# Patient Record
Sex: MALE | Race: WHITE | Employment: FULL TIME | URBAN - METROPOLITAN AREA
[De-identification: names, ages, dates, MRNs, and addresses within clinical notes are randomized per-mention and may not be internally consistent; named-entity substitution may affect disease eponyms.]

---

## 2017-05-03 ENCOUNTER — OFFICE VISIT (OUTPATIENT)
Dept: ORTHOPEDIC SURGERY | Age: 47
End: 2017-05-03

## 2017-05-03 VITALS
WEIGHT: 287 LBS | DIASTOLIC BLOOD PRESSURE: 72 MMHG | BODY MASS INDEX: 45.04 KG/M2 | SYSTOLIC BLOOD PRESSURE: 118 MMHG | TEMPERATURE: 97.6 F | HEART RATE: 82 BPM | HEIGHT: 67 IN | RESPIRATION RATE: 18 BRPM

## 2017-05-03 DIAGNOSIS — M50.20 HNP (HERNIATED NUCLEUS PULPOSUS), CERVICAL: ICD-10-CM

## 2017-05-03 DIAGNOSIS — M79.18 MYOFASCIAL PAIN: Primary | ICD-10-CM

## 2017-05-03 DIAGNOSIS — M62.838 MUSCLE SPASM: ICD-10-CM

## 2017-05-03 RX ORDER — METHYLPREDNISOLONE 4 MG/1
TABLET ORAL
Qty: 1 DOSE PACK | Refills: 0 | Status: SHIPPED | OUTPATIENT
Start: 2017-05-03 | End: 2017-05-25

## 2017-05-03 NOTE — MR AVS SNAPSHOT
Visit Information Date & Time Provider Department Dept. Phone Encounter #  
 5/3/2017  8:00 AM Sendy Bee MD [address] [facility] [phone] [id number] Follow-up Instructions Return in about 6 months (around 11/3/2017) for Medication follow up. Your Appointments 7/7/2017  3:20 PM  
Nurse Visit with [facility] WB NURSE [facility] ([facility]) Appt Note: psa  
 [address] 
[address] [place] [address]  
[address] [place] [address] 7/19/2017  3:45 PM  
Any with Kadeem Leiva MD  
[facility] ([facility]) Appt Note: annual  
 [address] 
[address] [place] [address]  
[address] [facility] [address] [place] [address] Upcoming Health Maintenance Date Due HEMOGLOBIN A1C Q6M 1972 LIPID PANEL Q1 1972 FOOT EXAM Q1 2/12/1982 MICROALBUMIN Q1 2/12/1982 EYE EXAM RETINAL OR DILATED Q1 2/12/1982 Pneumococcal 19-64 Medium Risk (1 of 1 - PPSV23) 2/12/1991 DTaP/Tdap/Td series (1 - Tdap) 2/12/1993 Allergies as of 5/3/2017  Review Complete On: 5/3/2017 By: Sendy Bee MD  
 No Known Allergies Current Immunizations  Never Reviewed No immunizations on file. Not reviewed this visit You Were Diagnosed With   
  
 Codes Comments Myofascial pain    -  Primary ICD-10-CM: M79.1 ICD-9-CM: 729.1 HNP (herniated nucleus pulposus), cervical     ICD-10-CM: M50.20 ICD-9-CM: 722.0 Muscle spasm     ICD-10-CM: R13.339 ICD-9-CM: 728.85 Vitals BP Pulse Temp Resp Height(growth percentile) Weight(growth percentile) 118/72 82 97.6 °F (36.4 °C) (Oral) 18 5' 7\" (1.702 m) 287 lb (130.2 kg) BMI Smoking Status 44.95 kg/m2 Never Smoker BMI and BSA Data Body Mass Index Body Surface Area  44.95 kg/m 2 2.48 m 2  
  
  
 Preferred Pharmacy Pharmacy Name Phone [facility] Your Updated Medication List  
  
   
This list is accurate as of: 5/3/17  8:41 AM.  Always use your most recent med list.  
  
  
  
  
 Georgiann Cables 250-50 mcg/dose diskus inhaler Generic drug:  fluticasone-salmeterol Take 1 Puff by inhalation every twelve (12) hours. amLODIPine-benazepril 5-20 mg per capsule Commonly known as:  Jillian Abide Take 1 Cap by mouth daily. aspirin 81 mg chewable tablet Take 81 mg by mouth daily. carvedilol 10 mg CR capsule Commonly known as:  COREG CR Take  by mouth daily (with breakfast). cpap machine kit  
by Does Not Apply route. DEXILANT 60 mg Cpdb Generic drug:  Dexlansoprazole Take 60 mg by mouth daily. hydroCHLOROthiazide 12.5 mg tablet Commonly known as:  HYDRODIURIL Take 12.5 mg by mouth daily. insulin aspart 100 unit/mL Inpn Commonly known as:  NOVOLOG  
by SubCUTAneous route. insulin glargine 100 unit/mL (3 mL) pen Commonly known as:  LANTUS SOLOSTAR  
by SubCUTAneous route.  
  
 magnesium citrate 100 mg Tab Take 300 mg by mouth daily. metaxalone 800 mg tablet Commonly known as:  SKELAXIN  
1 po bid prn   Indications: MUSCLE SPASM  
  
 methylPREDNISolone 4 mg tablet Commonly known as:  Rendall [place] Per dose pack instructions Misc. Devices Kit Please provide the patient following size mask. Mask: Mirage FX large size mask. DME: [place], [place] Phone: [phone], Fax: [phone]  
  
 montelukast 10 mg tablet Commonly known as:  SINGULAIR Take 10 mg by mouth daily. naproxen 500 mg tablet Commonly known as:  NAPROSYN Take 1 Tab by mouth two (2) times daily (with meals). ondansetron 4 mg disintegrating tablet Commonly known as:  ZOFRAN ODT Take 1-2 tablets every 6-8 hours as needed for nausea and vomiting. Potassium Citrate 15 mEq Wanda Cabrera Take  by mouth two (2) times a day. pravastatin 10 mg tablet Commonly known as:  PRAVACHOL Take  by mouth nightly. pregabalin 50 mg capsule Commonly known as:  Luis Gave TAKE 1 CAP PO Q AM AND 2 CAPS Q PM  
  
 tamsulosin 0.4 mg capsule Commonly known as:  FLOMAX Take 1 Cap by mouth daily (after dinner). traMADol 50 mg tablet Commonly known as:  ULTRAM  
1 po bid prn severe pain VENTOLIN HFA 90 mcg/actuation inhaler Generic drug:  albuterol Take  by inhalation every six (6) hours as needed. Prescriptions Printed Refills  
 methylPREDNISolone (MEDROL DOSEPACK) 4 mg tablet 0 Sig: Per dose pack instructions Class: Print Follow-up Instructions Return in about 6 months (around 11/3/2017) for Medication follow up. Patient Instructions Neck Arthritis: Exercises Your Care Instructions Here are some examples of typical rehabilitation exercises for your condition. Start each exercise slowly. Ease off the exercise if you start to have pain. Your doctor or physical therapist will tell you when you can start these exercises and which ones will work best for you. How to do the exercises Neck stretches to the side 1. This stretch works best if you keep your shoulder down as you lean away from it. To help you remember to do this, start by relaxing your shoulders and lightly holding on to your thighs or your chair. 2. Tilt your head toward your shoulder and hold for 15 to 30 seconds. Let the weight of your head stretch your muscles. 3. Repeat 2 to 4 times toward each shoulder. Chin tuck 1. Lie on the floor with a rolled-up towel under your neck. Your head should be touching the floor. 2. Slowly bring your chin toward your chest. 
3. Hold for a count of 6, and then relax for up to 10 seconds. 4. Repeat 8 to 12 times. Active cervical rotation 1. Sit in a firm chair, or stand up straight. 2. Keeping your chin level, turn your head to the right, and hold for 15 to 30 seconds. 3. Turn your head to the left and hold for 15 to 30 seconds. 4. Repeat 2 to 4 times to each side. Shoulder blade squeeze 1. While standing, squeeze your shoulder blades together. 2. Do not raise your shoulders up as you are squeezing. 3. Hold for 6 seconds. 4. Repeat 8 to 12 times. Shoulder rolls 1. Sit comfortably with your feet shoulder-width apart. You can also do this exercise standing up. 2. Roll your shoulders up, then back, and then down in a smooth, circular motion. 3. Repeat 2 to 4 times. Follow-up care is a key part of your treatment and safety. Be sure to make and go to all appointments, and call your doctor if you are having problems. It's also a good idea to know your test results and keep a list of the medicines you take. Where can you learn more? Go to http://marija-marianne.info/. Enter Y890 in the search box to learn more about \"Neck Arthritis: Exercises. \" Current as of: May 23, 2016 Content Version: 11.2 © 5009-4164 [employer]. Care instructions adapted under license by [employer] (which disclaims liability or warranty for this information). If you have questions about a medical condition or this instruction, always ask your healthcare professional. [place] [address] any warranty or liability for your use of this information. Healthy Upper Back: Exercises Your Care Instructions Here are some examples of exercises for your upper back. Start each exercise slowly. Ease off the exercise if you start to have pain. Your doctor or physical therapist will tell you when you can start these exercises and which ones will work best for you. How to do the exercises Lower neck and upper back stretch 4. Stretch your arms out in front of your body. Clasp one hand on top of your other hand. 5. Gently reach out so that you feel your shoulder blades stretching away from each other. 6. Gently bend your head forward. 7. Hold for 15 to 30 seconds. 8. Repeat 2 to 4 times. Midback stretch Note: If you have knee pain, do not do this exercise. 5. Kneel on the floor, and sit back on your ankles. 6. Lean forward, place your hands on the floor, and stretch your arms out in front of you. Rest your head between your arms. 7. Gently push your chest toward the floor, reaching as far in front of you as possible. 8. Hold for 15 to 30 seconds. 9. Repeat 2 to 4 times. Shoulder rolls 5. Sit comfortably with your feet shoulder-width apart. You can also do this exercise while standing. 6. Roll your shoulders up, then back, and then down in a smooth, circular motion. 7. Repeat 2 to 4 times. Wall push-up 5. Stand against a wall with your feet about 12 to 24 inches back from the wall. If you feel any pain when you do this exercise, stand closer to the wall. 6. Place your hands on the wall slightly wider apart than your shoulders, and lean forward. 7. Gently lean your body toward the wall. Then push back to your starting position. Keep the motion smooth and controlled. 8. Repeat 8 to 12 times. Resisted shoulder blade squeeze Note: For this exercise, you will need elastic exercise material, such as surgical tubing or Thera-Band. 4. Sit or stand, holding the band in both hands in front of you. Keep your elbows close to your sides, bent at a 90-degree angle. Your palms should face up. 5. Squeeze your shoulder blades together, and move your arms to the outside, stretching the band. Be sure to keep your elbows at your sides while you do this. 6. Relax. 7. Repeat 8 to 12 times. Resisted rows Note: For this exercise, you will need elastic exercise material, such as surgical tubing or Thera-Band.  
1. Put the band around a solid object, such as a bedpost, at about waist level. Hold one end of the band in each hand. 2. With your elbows at your sides and bent to 90 degrees, pull the band back to move your shoulder blades toward each other. Return to the starting position. 3. Repeat 8 to 12 times. Follow-up care is a key part of your treatment and safety. Be sure to make and go to all appointments, and call your doctor if you are having problems. It's also a good idea to know your test results and keep a list of the medicines you take. Where can you learn more? Go to http://marija-marianne.info/. Enter F498 in the search box to learn more about \"Healthy Upper Back: Exercises. \" Current as of: May 23, 2016 Content Version: 11.2 © 7960-2355 [employer]. Care instructions adapted under license by [employer]! (which disclaims liability or warranty for this information). If you have questions about a medical condition or this instruction, always ask your healthcare professional. [place] [address] any warranty or liability for your use of this information. Introducing [facility]! Dear Mane Pryor: Thank you for requesting a Pairin account. Our records indicate that you already have an active Pairin account. You can access your account anytime at [email or web address] Did you know that you can access your hospital and ER discharge instructions at any time in Pairin? You can also review all of your test results from your hospital stay or ER visit. Additional Information If you have questions, please visit the Frequently Asked Questions section of the Pairin website at [email or web address] Remember, Pairin is NOT to be used for urgent needs. For medical emergencies, dial 911. Now available from your iPhone and Android! Please provide this summary of care documentation to your next provider. Your primary care clinician is listed as Sumeet Santana. If you have any questions after today's visit, please call [phone].

## 2017-05-03 NOTE — PATIENT INSTRUCTIONS
Neck Arthritis: Exercises  Your Care Instructions  Here are some examples of typical rehabilitation exercises for your condition. Start each exercise slowly. Ease off the exercise if you start to have pain. Your doctor or physical therapist will tell you when you can start these exercises and which ones will work best for you. How to do the exercises  Neck stretches to the side    1. This stretch works best if you keep your shoulder down as you lean away from it. To help you remember to do this, start by relaxing your shoulders and lightly holding on to your thighs or your chair. 2. Tilt your head toward your shoulder and hold for 15 to 30 seconds. Let the weight of your head stretch your muscles. 3. Repeat 2 to 4 times toward each shoulder. Chin tuck    1. Lie on the floor with a rolled-up towel under your neck. Your head should be touching the floor. 2. Slowly bring your chin toward your chest.  3. Hold for a count of 6, and then relax for up to 10 seconds. 4. Repeat 8 to 12 times. Active cervical rotation    1. Sit in a firm chair, or stand up straight. 2. Keeping your chin level, turn your head to the right, and hold for 15 to 30 seconds. 3. Turn your head to the left and hold for 15 to 30 seconds. 4. Repeat 2 to 4 times to each side. Shoulder blade squeeze    1. While standing, squeeze your shoulder blades together. 2. Do not raise your shoulders up as you are squeezing. 3. Hold for 6 seconds. 4. Repeat 8 to 12 times. Shoulder rolls    1. Sit comfortably with your feet shoulder-width apart. You can also do this exercise standing up. 2. Roll your shoulders up, then back, and then down in a smooth, circular motion. 3. Repeat 2 to 4 times. Follow-up care is a key part of your treatment and safety. Be sure to make and go to all appointments, and call your doctor if you are having problems. It's also a good idea to know your test results and keep a list of the medicines you take.   Where can you learn more? Go to http://marija-marianne.info/. Enter T760 in the search box to learn more about \"Neck Arthritis: Exercises. \"  Current as of: May 23, 2016  Content Version: 11.2  © 2744-7025 [employer]. Care instructions adapted under license by [employer] (which disclaims liability or warranty for this information). If you have questions about a medical condition or this instruction, always ask your healthcare professional. [address] any warranty or liability for your use of this information. Healthy Upper Back: Exercises  Your Care Instructions  Here are some examples of exercises for your upper back. Start each exercise slowly. Ease off the exercise if you start to have pain. Your doctor or physical therapist will tell you when you can start these exercises and which ones will work best for you. How to do the exercises  Lower neck and upper back stretch    4. Stretch your arms out in front of your body. Clasp one hand on top of your other hand. 5. Gently reach out so that you feel your shoulder blades stretching away from each other. 6. Gently bend your head forward. 7. Hold for 15 to 30 seconds. 8. Repeat 2 to 4 times. Midback stretch    Note: If you have knee pain, do not do this exercise. 5. Kneel on the floor, and sit back on your ankles. 6. Lean forward, place your hands on the floor, and stretch your arms out in front of you. Rest your head between your arms. 7. Gently push your chest toward the floor, reaching as far in front of you as possible. 8. Hold for 15 to 30 seconds. 9. Repeat 2 to 4 times. Shoulder rolls    5. Sit comfortably with your feet shoulder-width apart. You can also do this exercise while standing. 6. Roll your shoulders up, then back, and then down in a smooth, circular motion. 7. Repeat 2 to 4 times. Wall push-up    5.  Stand against a wall with your feet about 12 to 24 inches back from the wall. If you feel any pain when you do this exercise, stand closer to the wall. 6. Place your hands on the wall slightly wider apart than your shoulders, and lean forward. 7. Gently lean your body toward the wall. Then push back to your starting position. Keep the motion smooth and controlled. 8. Repeat 8 to 12 times. Resisted shoulder blade squeeze    Note: For this exercise, you will need elastic exercise material, such as surgical tubing or Thera-Band. 4. Sit or stand, holding the band in both hands in front of you. Keep your elbows close to your sides, bent at a 90-degree angle. Your palms should face up. 5. Squeeze your shoulder blades together, and move your arms to the outside, stretching the band. Be sure to keep your elbows at your sides while you do this. 6. Relax. 7. Repeat 8 to 12 times. Resisted rows    Note: For this exercise, you will need elastic exercise material, such as surgical tubing or Thera-Band. 1. Put the band around a solid object, such as a bedpost, at about waist level. Hold one end of the band in each hand. 2. With your elbows at your sides and bent to 90 degrees, pull the band back to move your shoulder blades toward each other. Return to the starting position. 3. Repeat 8 to 12 times. Follow-up care is a key part of your treatment and safety. Be sure to make and go to all appointments, and call your doctor if you are having problems. It's also a good idea to know your test results and keep a list of the medicines you take. Where can you learn more? Go to http://marija-marianne.info/. Enter P558 in the search box to learn more about \"Healthy Upper Back: Exercises. \"  Current as of: May 23, 2016  Content Version: 11.2  © 9229-3351 [employer]. Care instructions adapted under license by [employer] (which disclaims liability or warranty for this information).  If you have questions about a medical condition or this instruction, always ask your healthcare professional. [place] [address] any warranty or liability for your use of this information.

## 2017-05-03 NOTE — PROGRESS NOTES
[facility]  [address] [place], [facility] [address]  Phone: [phone]  Fax: [phone]      ASSESSMENT   Sarah Pillai was seen today for neck pain and follow-up. Diagnoses and all orders for this visit:    Myofascial pain  -     methylPREDNISolone (MEDROL DOSEPACK) 4 mg tablet; Per dose pack instructions    HNP (herniated nucleus pulposus), cervical    Muscle spasm         IMPRESSION AND PLAN:  Nuvia Mendoza is a 41 y.o. male with history of cervical pain. Pt states that he did not try Botox injections since his last office visit because his electrical shocking pain has improved. He takes Skelaxin 800 mg and Ultram 50 mg rarely as his pain warrants. 1) Pt was given information on cervical and upper back exercises. 2) He will continue taking Skelaxin 800 mg and Ultram 50 mg as prescribed and does not need refills at this time. 3) Pt was prescribed a Medrol Dosepak prn severe pain. 4) Mr. Paul Quintanilla has a reminder for a \"due or due soon\" health maintenance. I have asked that he contact his primary care provider, Chriss Kawasaki, MD, for follow-up on this health maintenance. 5)  demonstrated consistency with prescribing. 6) Pt will follow-up in 6 months. HISTORY OF PRESENT ILLNESS:  Nuvia Mendoza is a 41 y.o. male with history of cervical pain. Pt states that he did not try Botox injections since his last office visit because his pain improved. He reports that he occasionally experiences tingling in the upper back but overall his electrical shocking pain has improved. Of note, he has tried dry needling with Kelsi Benedict in the past with significant relief. Pt takes Skelaxin 800 mg and Ultram 50 mg rarely as his pain warrants. He is diabetic and reports fluctuations in his blood sugars when trying steroid injections in the past. Pt at this time desires to continue with current care. Of note, Pt works at the [employer]. Pain Scale: 1/10    PCP: Cas Mendez MD       Past Medical History:   Diagnosis Date    Adverse effect of anesthesia     for back sx-had issues with breathing  with apnea- 6 years ago    Asthma     Chest pain, unspecified     ATYPICAL,POSSIBLE ANGINA,MUSCULAR,CAD,CHEST WALL PAINS PERSISTANT AND RECURRENT    Chronic kidney disease     Diabetes mellitus ([facility]     DM (diabetes mellitus) ([facility]     GERD (gastroesophageal reflux disease)     HTN (hypertension)     Hypercholesteremia     Hypertension     Kidney stones     Sleep apnea     cpap        Social History     Social History    Marital status:      Spouse name: N/A    Number of children: N/A    Years of education: N/A     Occupational History          Social History Main Topics    Smoking status: Never Smoker    Smokeless tobacco: Never Used    Alcohol use No    Drug use: No    Sexual activity: Not on file     Other Topics Concern    Not on file     Social History Narrative    ** Merged History Encounter **            Current Outpatient Prescriptions   Medication Sig Dispense Refill    methylPREDNISolone (MEDROL DOSEPACK) 4 mg tablet Per dose pack instructions 1 Dose Pack 0    cpap machine kit by Does Not Apply route.  pregabalin (LYRICA) 50 mg capsule TAKE 1 CAP PO Q AM AND 2 CAPS Q  Cap 1    metaxalone (SKELAXIN) 800 mg tablet 1 po bid prn   Indications: MUSCLE SPASM 60 Tab 5    traMADol (ULTRAM) 50 mg tablet 1 po bid prn severe pain 180 Tab 0    Potassium Citrate 15 mEq TbER Take  by mouth two (2) times a day.  ondansetron (ZOFRAN ODT) 4 mg disintegrating tablet Take 1-2 tablets every 6-8 hours as needed for nausea and vomiting. 10 Tab 0    tamsulosin (FLOMAX) 0.4 mg capsule Take 1 Cap by mouth daily (after dinner). 30 Cap 0    naproxen (NAPROSYN) 500 mg tablet Take 1 Tab by mouth two (2) times daily (with meals).  60 Tab 0    carvedilol (COREG CR) 10 mg CR capsule Take  by mouth daily (with breakfast).  montelukast (SINGULAIR) 10 mg tablet Take 10 mg by mouth daily.  insulin glargine (LANTUS SOLOSTAR) 100 unit/mL (3 mL) pen by SubCUTAneous route.  insulin aspart (NOVOLOG) 100 unit/mL inpn by SubCUTAneous route.  magnesium citrate 100 mg tab Take 300 mg by mouth daily.  aspirin 81 mg chewable tablet Take 81 mg by mouth daily.  Hydrochlorothiazide (HYDRODIURIL) 12.5 mg tablet Take 12.5 mg by mouth daily.  amLODIPine-benazepril (LOTREL) 5-20 mg per capsule Take 1 Cap by mouth daily.  fluticasone-salmeterol (ADVAIR DISKUS) 250-50 mcg/dose diskus inhaler Take 1 Puff by inhalation every twelve (12) hours.  pravastatin (PRAVACHOL) 10 mg tablet Take  by mouth nightly.  Dexlansoprazole (DEXILANT) 60 mg CpDM Take 60 mg by mouth daily.  albuterol (VENTOLIN HFA) 90 mcg/actuation inhaler Take  by inhalation every six (6) hours as needed.  Misc. Devices Kit Please provide the patient following size mask. Mask: Mirage FX large size mask. DME: [place], [place]  Phone: [phone], Fax: [phone] 1 Each 0       No Known Allergies      REVIEW OF SYSTEMS    Constitutional: Negative for fever, chills, or weight change. Respiratory: Negative for cough or shortness of breath. Cardiovascular: Negative for chest pain or palpitations. Gastrointestinal: Negative for acid reflux, change in bowel habits, or constipation. Genitourinary: Negative for dysuria and flank pain. Musculoskeletal: Positive for cervical pain. Skin: Negative for rash. Neurological: Positive for intermittent numbness in the upper back. Negative for headaches or dizziness. Endo/Heme/Allergies: Negative for increased bruising. Psychiatric/Behavioral: Negative for difficulty with sleep.       PHYSICAL EXAMINATION  Visit Vitals    /72    Pulse 82    Temp 97.6 °F (36.4 °C) (Oral)    Resp 18    Ht 5' 7\" (1.702 m)    Wt 287 lb (130.2 kg)    BMI 44.95 kg/m2       Constitutional: Awake, alert, and in no acute distress  Neurological: 1+ symmetrical DTRs in the upper extremities. Sensation to light touch is intact. Negative William's sign bilaterally. Skin: warm, dry, and intact. Musculoskeletal: Tight across the upper trapezius. No pain with extension, axial loading, or forward flexion. No pain with internal or external rotation of his hips. Negative straight leg raise bilaterally. Biceps  Triceps Deltoids Wrist Ext Wrist Flex Hand Intrin   Right +4/5 +4/5 +4/5 +4/5 +4/5 +4/5   Left +4/5 +4/5 +4/5 +4/5 +4/5 +4/5     IMAGING:    Cervical spine MRI from 10/26/2013 was personally reviewed with the Pt and demonstrated:    Results from Hospital Encounter encounter on 10/26/13   MRI CERV SPINE WO CONT   Narrative MR CERVICAL SPINE WITHOUT CONTRAST    CPT CODE: 35032    HISTORY: Chronic neck pain for a year. Numbness in the arm and shoulder on the  left side radiating down the arm. COMPARISON: 12/6/2011    TECHNIQUE: Cervical spine scanned with axial and sagittal T2W scans, sagittal  IR scan, and sagittal T1W scans. FINDINGS:    Alignment is normal at the cervical spine. There is ovoid high T2 signal  lesion at C5 compatible with bony hemangioma and stable. Disc space heights are normally maintained. Cord looks normal. Visualized  parts of base of the brain are unremarkable. There is low T1 and high T2 signal  lesion lateral to the craniocervical junction on the right adjacent to the  joint between the lateral mass of C1 and occipital condyle. It is stable when  compared with previous study and measures 9 x 14 mm. At C2/C3: No central or foraminal stenosis. At C3/C4: Stable tiny central protrusion. No central or foraminal stenosis. At C4/C5: No central or foraminal stenosis. At C5/C6: Mild broad-based protrusion indenting ventral sac. No central or  foraminal stenosis. .    At C6/C7: No central or foraminal stenosis.     At C7/T1: No central or foraminal stenosis. CONCLUSION:    Stable mild degenerative disc disease C3/C4 and C5/C6. No foraminal narrowing. Likely synovial cyst at the craniocervical junction on the right. Thank you for this referral.                  Written by Seven Gutierrez, as dictated by Minal Curtis MD.  I, Dr. Minal Curtis confirm that all documentation is accurate.

## 2017-05-16 ENCOUNTER — TELEPHONE (OUTPATIENT)
Dept: ORTHOPEDIC SURGERY | Age: 47
End: 2017-05-16

## 2017-05-16 DIAGNOSIS — M50.20 HNP (HERNIATED NUCLEUS PULPOSUS), CERVICAL: ICD-10-CM

## 2017-05-16 DIAGNOSIS — M79.18 MYOFASCIAL PAIN: ICD-10-CM

## 2017-05-16 RX ORDER — PREGABALIN 50 MG/1
CAPSULE ORAL
Qty: 270 CAP | Refills: 1 | Status: SHIPPED | OUTPATIENT
Start: 2017-05-16 | End: 2017-06-08 | Stop reason: SDUPTHER

## 2017-05-16 NOTE — TELEPHONE ENCOUNTER
Mail order pharmacy request refills 2 weeks early to allow enough time to receive, process, fill and mail the order.      Last Visit: 05/03/2017 with MD Ramiro Dockery    Next Appointment: 11/01/2017 with MD Ramiro Dockery   Previous Refill Encounters: 11/23/2016 per NP Fausto Valera #[id number] with 1 refill     Requested Prescriptions     Pending Prescriptions Disp Refills    pregabalin (LYRICA) 50 mg capsule 270 Cap 1     Sig: TAKE 1 CAP PO Q AM AND 2 CAPS Q PM

## 2017-06-08 DIAGNOSIS — M79.18 MYOFASCIAL PAIN: ICD-10-CM

## 2017-06-08 DIAGNOSIS — M50.20 HNP (HERNIATED NUCLEUS PULPOSUS), CERVICAL: ICD-10-CM

## 2017-06-08 RX ORDER — PREGABALIN 50 MG/1
CAPSULE ORAL
Qty: 270 CAP | Refills: 1 | Status: SHIPPED | OUTPATIENT
Start: 2017-06-08 | End: 2017-07-07 | Stop reason: SDUPTHER

## 2017-06-08 NOTE — TELEPHONE ENCOUNTER
PATIENT CALLED AND IS REQUESTING A REFILL ON LYRICA MEDICATION. PATIENT SAID HE FORGOT TO ASK FOR THE MEDICATION WHEN HE SAW HER ON 0503/17. PATIENT SAID HE WOULD LIKE TO  THE SCRIPT FOR THE Eva Half. DOES NOT WANT IT CALLED IN TO A PHARMACY. WILL  FROM [facility]. PATIENT TEL. [phone].

## 2017-06-08 NOTE — TELEPHONE ENCOUNTER
It appears there may have been an issue with the previous prescription as there is no documentation of a nurse contacting the patient for . Repending order to reprint.

## 2017-07-07 ENCOUNTER — TELEPHONE (OUTPATIENT)
Dept: ORTHOPEDIC SURGERY | Age: 47
End: 2017-07-07

## 2017-07-07 DIAGNOSIS — M50.20 HNP (HERNIATED NUCLEUS PULPOSUS), CERVICAL: ICD-10-CM

## 2017-07-07 DIAGNOSIS — M79.18 MYOFASCIAL PAIN: ICD-10-CM

## 2017-07-07 RX ORDER — PREGABALIN 50 MG/1
CAPSULE ORAL
Qty: 270 CAP | Refills: 1 | Status: SHIPPED | OUTPATIENT
Start: 2017-07-07 | End: 2017-11-01 | Stop reason: SDUPTHER

## 2017-07-07 NOTE — TELEPHONE ENCOUNTER
The patient states his prescription prescription was filled at [facility]. He recently received a letter from [address] [place] [address] [address] stating he is now required to use mail order. He is requesting a new prescription to be mailed to [facility].      Last Visit: 05/03/2017 with MD Raffi Mathis    Next Appointment: 11/01/2017 with MD Raffi Mathis   Previous Refill Encounters: 06/08/2017 per MD Raffi Mathis #[id number] with 1 refill     Requested Prescriptions     Pending Prescriptions Disp Refills    pregabalin (LYRICA) 50 mg capsule 270 Cap 1     Sig: TAKE 1 CAP PO Q AM AND 2 CAPS Q PM

## 2017-07-14 NOTE — TELEPHONE ENCOUNTER
Per Media Tab, patient picked up Rx on 07/07/2017, encounter being closed, no further action required at this time.

## 2017-11-01 ENCOUNTER — OFFICE VISIT (OUTPATIENT)
Dept: ORTHOPEDIC SURGERY | Age: 47
End: 2017-11-01

## 2017-11-01 VITALS
HEART RATE: 83 BPM | SYSTOLIC BLOOD PRESSURE: 118 MMHG | WEIGHT: 286.8 LBS | DIASTOLIC BLOOD PRESSURE: 83 MMHG | RESPIRATION RATE: 17 BRPM | OXYGEN SATURATION: 99 % | TEMPERATURE: 98.1 F | BODY MASS INDEX: 44.92 KG/M2

## 2017-11-01 DIAGNOSIS — M79.2 NEURITIS: ICD-10-CM

## 2017-11-01 DIAGNOSIS — M50.20 HNP (HERNIATED NUCLEUS PULPOSUS), CERVICAL: Primary | ICD-10-CM

## 2017-11-01 DIAGNOSIS — M62.838 MUSCLE SPASM: ICD-10-CM

## 2017-11-01 DIAGNOSIS — R29.898 LEFT ARM WEAKNESS: ICD-10-CM

## 2017-11-01 DIAGNOSIS — M79.18 MYOFASCIAL PAIN: ICD-10-CM

## 2017-11-01 RX ORDER — PREGABALIN 50 MG/1
CAPSULE ORAL
Qty: 360 CAP | Refills: 1 | Status: SHIPPED | OUTPATIENT
Start: 2017-11-01 | End: 2017-12-05 | Stop reason: DRUGHIGH

## 2017-11-01 NOTE — MR AVS SNAPSHOT
Visit Information Date & Time Provider Department Dept. Phone Encounter #  
 11/1/2017  8:15 AM Gala Yee MD [facility] [phone] [id number] Follow-up Instructions Return in about 1 month (around 12/1/2017) for Diagnostic Test follow up, Medication follow up. Your Appointments 8/29/2018  9:00 AM  
XRAY Visit with [facility] ([facility]) Appt Note: KUB  
 [address] 
[address] [facility] [address]  
[phone]  
  
   
 [address] [facility] [address]  
  
    
  
 8/29/2018  9:00 AM  
Any with Raeann Dumont MD  
[facility] ([facility]) Appt Note: 1 yr KUB same day [address] [facility] [address]  
[address] [facility] [address] [facility] [address] Upcoming Health Maintenance Date Due HEMOGLOBIN A1C Q6M 1972 LIPID PANEL Q1 1972 FOOT EXAM Q1 2/12/1982 MICROALBUMIN Q1 2/12/1982 EYE EXAM RETINAL OR DILATED Q1 2/12/1982 Pneumococcal 19-64 Medium Risk (1 of 1 - PPSV23) 2/12/1991 DTaP/Tdap/Td series (1 - Tdap) 2/12/1993 Allergies as of 11/1/2017  Review Complete On: 11/1/2017 By: Gaal Yee MD  
 No Known Allergies Current Immunizations  Never Reviewed No immunizations on file. Not reviewed this visit You Were Diagnosed With   
  
 Codes Comments HNP (herniated nucleus pulposus), cervical    -  Primary ICD-10-CM: M50.20 ICD-9-CM: 722.0 Myofascial pain     ICD-10-CM: M79.1 ICD-9-CM: 729.1 Left arm weakness     ICD-10-CM: R29.898 ICD-9-CM: 729.89 Neuritis     ICD-10-CM: M79.2 ICD-9-CM: 729.2 Vitals BP Pulse Temp Resp Weight(growth percentile) SpO2  
 118/83 (BP 1 Location: Left arm, BP Patient Position: Sitting) 83 98.1 °F (36.7 °C) (Oral) 17 286 lb 12.8 oz (130.1 kg) 99% BMI Smoking Status 44.92 kg/m2 Never Smoker BMI and BSA Data Body Mass Index Body Surface Area 44.92 kg/m 2 2.48 m 2 Preferred Pharmacy Pharmacy Name Phone [address] [phone] Your Updated Medication List  
  
   
This list is accurate as of: 11/1/17  8:58 AM.  Always use your most recent med list.  
  
  
  
  
 Gray Mantle 250-50 mcg/dose diskus inhaler Generic drug:  fluticasone-salmeterol Take 1 Puff by inhalation every twelve (12) hours. amLODIPine-benazepril 5-20 mg per capsule Commonly known as:  Jose Setters Take 1 Cap by mouth daily. aspirin 81 mg chewable tablet Take 81 mg by mouth daily. carvedilol 10 mg CR capsule Commonly known as:  COREG CR Take  by mouth daily (with breakfast). cpap machine kit  
by Does Not Apply route. DEXILANT 60 mg Cpdb Generic drug:  Dexlansoprazole Take 60 mg by mouth daily. hydroCHLOROthiazide 12.5 mg capsule Commonly known as:  Erik Nicholson HYDROcodone-acetaminophen 5-325 mg per tablet Commonly known as:  Caridad Doroteo Take 1 Tab by mouth every six (6) hours as needed for Pain. Max Daily Amount: 4 Tabs. Indications: Pain  
  
 insulin aspart 100 unit/mL Inpn Commonly known as:  NOVOLOG  
by SubCUTAneous route. insulin glargine 100 unit/mL (3 mL) Inpn Commonly known as:  LANTUS,BASAGLAR  
by SubCUTAneous route.  
  
 magnesium citrate 100 mg Tab Take 300 mg by mouth daily. metaxalone 800 mg tablet Commonly known as:  SKELAXIN  
1 po bid prn   Indications: MUSCLE SPASM Misc. Devices Kit Please provide the patient following size mask. Mask: Mirage FX large size mask. DME: [place], [place] Phone: [phone], Fax: [phone]  
  
 montelukast 10 mg tablet Commonly known as:  SINGULAIR Take 10 mg by mouth daily. ondansetron 4 mg disintegrating tablet Commonly known as:  ZOFRAN ODT  
 Take 1-2 tablets every 6-8 hours as needed for nausea and vomiting. Potassium Citrate Tber tablet Commonly known as:  Mead[employer]vaco Take  by mouth two (2) times a day. pravastatin 20 mg tablet Commonly known as:  PRAVACHOL  
  
 pregabalin 50 mg capsule Commonly known as:  Modesta German TAKE 1 CAP PO Q AM, 1 in the afternoon AND 2 CAPS Q PM  
  
 tamsulosin 0.4 mg capsule Commonly known as:  FLOMAX Take 1 Cap by mouth daily (after dinner). traMADol 50 mg tablet Commonly known as:  ULTRAM  
1 po bid prn severe pain VENTOLIN HFA 90 mcg/actuation inhaler Generic drug:  albuterol Take  by inhalation every six (6) hours as needed. Prescriptions Printed Refills  
 pregabalin (LYRICA) 50 mg capsule 1 Sig: TAKE 1 CAP PO Q AM, 1 in the afternoon AND 2 CAPS Q PM  
 Class: Print Follow-up Instructions Return in about 1 month (around 12/1/2017) for Diagnostic Test follow up, Medication follow up. To-Do List   
 11/02/2017 3:30 PM  
  Appointment with [facility] RM 2 at [address] [place] [address] ([phone]) GENERAL INSTRUCTIONS  Bring information (ID card) if you have any medically implanted devices. You will be required to lie still while the procedure is being performed. Remove any jewelry (including body piercing, hairpins) prior to MRI. If you have had a creatinine level drawn within the past 30 days, please bring most recent results to your appt. Bring any films, CD's, and reports related to your study with you on the day of your exam.  This only includes studies done outside of [address] [place] [address], \[facility]\"", Stacey Benavides, and [facility] Joshi. Bring a complete list of all medications you are currently taking to include prescriptions, over-the-counter meds, herbals, vitamins & any dietary supplements. If you were given medications for claustrophobia or anxiety, please arrange to have someone drive you to your appointment.   QUESTIONS Notify the MRI Department if you have any questions concerning your study. Paco Ott - [address] [facility] [phone]  
  
 11/08/2017 Imaging:  MRI CERV SPINE WO CONT Referral Information Referral ID Referred By Referred To  
  
 [id number] Deborasung Strauss Not Available Visits Status Start Date End Date 1 New Request 11/1/17 11/1/18 If your referral has a status of pending review or denied, additional information will be sent to support the outcome of this decision. Patient Instructions Neck Arthritis: Exercises Your Care Instructions Here are some examples of typical rehabilitation exercises for your condition. Start each exercise slowly. Ease off the exercise if you start to have pain. Your doctor or physical therapist will tell you when you can start these exercises and which ones will work best for you. How to do the exercises Neck stretches to the side 1. This stretch works best if you keep your shoulder down as you lean away from it. To help you remember to do this, start by relaxing your shoulders and lightly holding on to your thighs or your chair. 2. Tilt your head toward your shoulder and hold for 15 to 30 seconds. Let the weight of your head stretch your muscles. 3. Repeat 2 to 4 times toward each shoulder. Chin tuck 1. Lie on the floor with a rolled-up towel under your neck. Your head should be touching the floor. 2. Slowly bring your chin toward your chest. 
3. Hold for a count of 6, and then relax for up to 10 seconds. 4. Repeat 8 to 12 times. Active cervical rotation 1. Sit in a firm chair, or stand up straight. 2. Keeping your chin level, turn your head to the right, and hold for 15 to 30 seconds. 3. Turn your head to the left and hold for 15 to 30 seconds. 4. Repeat 2 to 4 times to each side. Shoulder blade squeeze 1. While standing, squeeze your shoulder blades together. 2. Do not raise your shoulders up as you are squeezing. 3. Hold for 6 seconds. 4. Repeat 8 to 12 times. Shoulder rolls 1. Sit comfortably with your feet shoulder-width apart. You can also do this exercise standing up. 2. Roll your shoulders up, then back, and then down in a smooth, circular motion. 3. Repeat 2 to 4 times. Follow-up care is a key part of your treatment and safety. Be sure to make and go to all appointments, and call your doctor if you are having problems. It's also a good idea to know your test results and keep a list of the medicines you take. Where can you learn more? Go to http://marija-marianne.info/. Enter O638 in the search box to learn more about \"Neck Arthritis: Exercises. \" Current as of: March 21, 2017 Content Version: 11.4 © 4002-5768 PressMatrix. Care instructions adapted under license by [employer] (which disclaims liability or warranty for this information). If you have questions about a medical condition or this instruction, always ask your healthcare professional. [place] [address] any warranty or liability for your use of this information. Healthy Upper Back: Exercises Your Care Instructions Here are some examples of exercises for your upper back. Start each exercise slowly. Ease off the exercise if you start to have pain. Your doctor or physical therapist will tell you when you can start these exercises and which ones will work best for you. How to do the exercises Lower neck and upper back stretch 4. Stretch your arms out in front of your body. Clasp one hand on top of your other hand. 5. Gently reach out so that you feel your shoulder blades stretching away from each other. 6. Gently bend your head forward. 7. Hold for 15 to 30 seconds. 8. Repeat 2 to 4 times. Midback stretch If you have knee pain, do not do this exercise. 5. Kneel on the floor, and sit back on your ankles. 6. Lean forward, place your hands on the floor, and stretch your arms out in front of you. Rest your head between your arms. 7. Gently push your chest toward the floor, reaching as far in front of you as possible. 8. Hold for 15 to 30 seconds. 9. Repeat 2 to 4 times. Shoulder rolls 5. Sit comfortably with your feet shoulder-width apart. You can also do this exercise while standing. 6. Roll your shoulders up, then back, and then down in a smooth, circular motion. 7. Repeat 2 to 4 times. Wall push-up 5. Stand against a wall with your feet about 12 to 24 inches back from the wall. If you feel any pain when you do this exercise, stand closer to the wall. 6. Place your hands on the wall slightly wider apart than your shoulders, and lean forward. 7. Gently lean your body toward the wall. Then push back to your starting position. Keep the motion smooth and controlled. 8. Repeat 8 to 12 times. Resisted shoulder blade squeeze For this exercise, you will need elastic exercise material, such as surgical tubing or Thera-Band. 4. Sit or stand, holding the band in both hands in front of you. Keep your elbows close to your sides, bent at a 90-degree angle. Your palms should face up. 5. Squeeze your shoulder blades together, and move your arms to the outside, stretching the band. Be sure to keep your elbows at your sides while you do this. 6. Relax. 7. Repeat 8 to 12 times. Resisted rows For this exercise, you will need elastic exercise material, such as surgical tubing or Thera-Band. 1. Put the band around a solid object, such as a bedpost, at about waist level. Hold one end of the band in each hand. 2. With your elbows at your sides and bent to 90 degrees, pull the band back to move your shoulder blades toward each other. Return to the starting position. 3. Repeat 8 to 12 times. Follow-up care is a key part of your treatment and safety.  Be sure to make and go to all appointments, and call your doctor if you are having problems. It's also a good idea to know your test results and keep a list of the medicines you take. Where can you learn more? Go to http://marija-marianne.info/. Enter O986 in the search box to learn more about \"Healthy Upper Back: Exercises. \" Current as of: March 21, 2017 Content Version: 11.4 © 8507-0303 Echobit. Care instructions adapted under license by [employer] (which disclaims liability or warranty for this information). If you have questions about a medical condition or this instruction, always ask your healthcare professional. [address] any warranty or liability for your use of this information. Introducing [facility]! Dear Amber Taveras: Thank you for requesting a [employer] account. Our records indicate that you already have an active [employer] account. You can access your account anytime at [email or web address] Did you know that you can access your hospital and ER discharge instructions at any time in [employer]? You can also review all of your test results from your hospital stay or ER visit. Additional Information If you have questions, please visit the Frequently Asked Questions section of the [employer] website at [email or web address] Remember, [employer] is NOT to be used for urgent needs. For medical emergencies, dial 911. Now available from your iPhone and Android! Please provide this summary of care documentation to your next provider. Your primary care clinician is listed as Kamran Coe. If you have any questions after today's visit, please call [phone].

## 2017-11-01 NOTE — PROGRESS NOTES
[facility]  [address] [place], [facility] [address]  Phone: [phone]  Fax: [phone]      ASSESSMENT   Diagnoses and all orders for this visit:    1. HNP (herniated nucleus pulposus), cervical  -     pregabalin (LYRICA) 50 mg capsule; TAKE 1 CAP PO Q AM, 1 in the afternoon AND 2 CAPS Q PM  -     MRI CERV SPINE WO CONT; Future    2. Myofascial pain  -     pregabalin (LYRICA) 50 mg capsule; TAKE 1 CAP PO Q AM, 1 in the afternoon AND 2 CAPS Q PM  -     MRI CERV SPINE WO CONT; Future    3. Left arm weakness  -     MRI CERV SPINE WO CONT; Future    4. Neuritis  -     MRI CERV SPINE WO CONT; Future    5. Muscle spasm         IMPRESSION AND PLAN:  Marvetta Sever is a 41 y.o. male with history of cervical pain. Pt reports that he has experienced flares in his neck pain over the years but he generally takes Ultram and Skelaxin for about a week with improvement. He admits to numbness in the 4th and 5th fingers on the left. 1) Pt was given information on cervical arthritis and upper back exercises; consideration for ulnar entrapment also considered  2) He will increase his Lyrica 50 mg 1 tab QAM, 1 tab in the morning, 1 in the afternoon and 2 tabs QHS to better manage his symptoms. 3) A cervical MRI was ordered. 4) Mr. Loan Price has a reminder for a \"due or due soon\" health maintenance. I have asked that he contact his primary care provider, Adrianne Rodriges MD, for follow-up on this health maintenance. 5)  demonstrated consistency with prescribing. 6) Pt will follow-up in 4 weeks. HISTORY OF PRESENT ILLNESS:  Marvetta Sever is a 41 y.o. male with history of cervical pain. Pt reports that he has experienced flares in his neck pain over the years but he generally takes Ultram and Skelaxin for about a week with improvement. He also has used the [employer] and has had massage in the past.  Pt admits to that he has noticed weakness in the left arm. He reports increased tingling in the 4th and 5th fingers on the left that is worse when he leans or rests on his left arm/elbow. Pt did not try Botox injections since his last office visit and states that when he tried to call the office they were unable to locate the referral for the procedure. Since that time also Dr. Red Menjivar has left the practice. Mr. Rod Carrion has experienced significant relief when trying dry needling with Winifred Albino in the past. Pt has not yet used his previously prescribed Medrol Dosepak. He currently takes Lyrica 50 mg 1 tab QAM and 2 tabs QHS without sedation. Pt reports that he experienced increased pain when he discontinued the Lyrica 50 mg for 1 week. Pt at this time desires to proceed with medication evaluation and a cervical MRI.     Pain Scale: 0 - No pain/10    PCP: Jane Hoover MD       Past Medical History:   Diagnosis Date    Adverse effect of anesthesia     for back sx-had issues with breathing  with apnea- 6 years ago    Asthma     Chest pain, unspecified     ATYPICAL,POSSIBLE ANGINA,MUSCULAR,CAD,CHEST WALL PAINS PERSISTANT AND RECURRENT    Chronic kidney disease     Diabetes mellitus ([address]     DM (diabetes mellitus) ([address]     GERD (gastroesophageal reflux disease)     HTN (hypertension)     Hypercholesteremia     Hypertension     Kidney stones     Sleep apnea     cpap        Social History     Social History    Marital status:      Spouse name: N/A    Number of children: N/A    Years of education: N/A     Occupational History          Social History Main Topics    Smoking status: Never Smoker    Smokeless tobacco: Never Used    Alcohol use No    Drug use: No    Sexual activity: Not on file     Other Topics Concern    Not on file     Social History Narrative    ** Merged History Encounter **            Current Outpatient Prescriptions   Medication Sig Dispense Refill    pregabalin (LYRICA) 50 mg capsule TAKE 1 CAP PO Q AM, 1 in the afternoon AND 2 CAPS Q  Cap 1    hydroCHLOROthiazide (MICROZIDE) 12.5 mg capsule       pravastatin (PRAVACHOL) 20 mg tablet       cpap machine kit by Does Not Apply route.  metaxalone (SKELAXIN) 800 mg tablet 1 po bid prn   Indications: MUSCLE SPASM 60 Tab 5    traMADol (ULTRAM) 50 mg tablet 1 po bid prn severe pain 180 Tab 0    Potassium Citrate 15 mEq TbER Take  by mouth two (2) times a day.  ondansetron (ZOFRAN ODT) 4 mg disintegrating tablet Take 1-2 tablets every 6-8 hours as needed for nausea and vomiting. 10 Tab 0    carvedilol (COREG CR) 10 mg CR capsule Take  by mouth daily (with breakfast).  montelukast (SINGULAIR) 10 mg tablet Take 10 mg by mouth daily.  insulin glargine (LANTUS SOLOSTAR) 100 unit/mL (3 mL) pen by SubCUTAneous route.  insulin aspart (NOVOLOG) 100 unit/mL inpn by SubCUTAneous route.  magnesium citrate 100 mg tab Take 300 mg by mouth daily.  aspirin 81 mg chewable tablet Take 81 mg by mouth daily.  amLODIPine-benazepril (LOTREL) 5-20 mg per capsule Take 1 Cap by mouth daily.  fluticasone-salmeterol (ADVAIR DISKUS) 250-50 mcg/dose diskus inhaler Take 1 Puff by inhalation every twelve (12) hours.  Dexlansoprazole (DEXILANT) 60 mg CpDM Take 60 mg by mouth daily.  albuterol (VENTOLIN HFA) 90 mcg/actuation inhaler Take  by inhalation every six (6) hours as needed.  Misc. Devices Kit Please provide the patient following size mask. Mask: Mirage FX large size mask. DME: [facility], [place], [address]  Phone: [phone], Fax: [phone] 1 Each 0    HYDROcodone-acetaminophen (NORCO) 5-325 mg per tablet Take 1 Tab by mouth every six (6) hours as needed for Pain. Max Daily Amount: 4 Tabs. Indications: Pain 25 Tab 0    tamsulosin (FLOMAX) 0.4 mg capsule Take 1 Cap by mouth daily (after dinner). 30 Cap 0       No Known Allergies      REVIEW OF SYSTEMS    Constitutional: Negative for fever, chills, or weight change. Respiratory: Negative for cough or shortness of breath. Cardiovascular: Negative for chest pain or palpitations. Gastrointestinal: Negative for acid reflux, change in bowel habits, or constipation. Genitourinary: Negative for dysuria and flank pain. Musculoskeletal: Positive for cervical pain and left arm weakness. Skin: Negative for rash. Neurological: Positive for numbness in the 4th and 5th digits in the left hand. Negative for headaches or dizziness. Endo/Heme/Allergies: Negative for increased bruising. Psychiatric/Behavioral: Negative for difficulty with sleep. PHYSICAL EXAMINATION  Visit Vitals    /83 (BP 1 Location: Left arm, BP Patient Position: Sitting)    Pulse 83    Temp 98.1 °F (36.7 °C) (Oral)    Resp 17    Wt 286 lb 12.8 oz (130.1 kg)    SpO2 99%    BMI 44.92 kg/m2       Constitutional: Awake, alert, and in no acute distress. Neurological: 1+ symmetrical DTRs in the upper extremities. Sensation to light touch is intact. Negative William's sign bilaterally. Skin: warm, dry, and intact. Musculoskeletal: Good range of motion in the cervical spine on all planes. Moderately tight across the upper trapezius with scattered trigger points      Biceps  Triceps Deltoids Wrist Ext Wrist Flex Hand Intrin   Right +4/5 +4/5 +4/5 +4/5 +4/5 +4/5   Left +4/5 4/5 +4/5 4/5 +4/5 +4/5     IMAGING:    Cervical spine MRI from 10/26/2013 was personally reviewed with the Pt and demonstrated:  Results from Hospital Encounter on 10/26/13   MRI CERV SPINE WO CONT    Narrative MR CERVICAL SPINE WITHOUT CONTRAST    CPT CODE: 86975    HISTORY: Chronic neck pain for a year. Numbness in the arm and shoulder on the  left side radiating down the arm. COMPARISON: 12/6/2011    TECHNIQUE: Cervical spine scanned with axial and sagittal T2W scans, sagittal  IR scan, and sagittal T1W scans. FINDINGS:    Alignment is normal at the cervical spine.  There is ovoid high T2 signal  lesion at C5 compatible with bony hemangioma and stable. Disc space heights are normally maintained. Cord looks normal. Visualized  parts of base of the brain are unremarkable. There is low T1 and high T2 signal  lesion lateral to the craniocervical junction on the right adjacent to the  joint between the lateral mass of C1 and occipital condyle. It is stable when  compared with previous study and measures 9 x 14 mm. At C2/C3: No central or foraminal stenosis. At C3/C4: Stable tiny central protrusion. No central or foraminal stenosis. At C4/C5: No central or foraminal stenosis. At C5/C6: Mild broad-based protrusion indenting ventral sac. No central or  foraminal stenosis. .    At C6/C7: No central or foraminal stenosis. At C7/T1: No central or foraminal stenosis. CONCLUSION:    Stable mild degenerative disc disease C3/C4 and C5/C6. No foraminal narrowing. Likely synovial cyst at the craniocervical junction on the right. Thank you for this referral.     Written by Nghia Noel, as dictated by Shazia Haynes MD.  I, Dr. Shazia Haynes confirm that all documentation is accurate.

## 2017-11-01 NOTE — PATIENT INSTRUCTIONS
Neck Arthritis: Exercises  Your Care Instructions  Here are some examples of typical rehabilitation exercises for your condition. Start each exercise slowly. Ease off the exercise if you start to have pain. Your doctor or physical therapist will tell you when you can start these exercises and which ones will work best for you. How to do the exercises  Neck stretches to the side    1. This stretch works best if you keep your shoulder down as you lean away from it. To help you remember to do this, start by relaxing your shoulders and lightly holding on to your thighs or your chair. 2. Tilt your head toward your shoulder and hold for 15 to 30 seconds. Let the weight of your head stretch your muscles. 3. Repeat 2 to 4 times toward each shoulder. Chin tuck    1. Lie on the floor with a rolled-up towel under your neck. Your head should be touching the floor. 2. Slowly bring your chin toward your chest.  3. Hold for a count of 6, and then relax for up to 10 seconds. 4. Repeat 8 to 12 times. Active cervical rotation    1. Sit in a firm chair, or stand up straight. 2. Keeping your chin level, turn your head to the right, and hold for 15 to 30 seconds. 3. Turn your head to the left and hold for 15 to 30 seconds. 4. Repeat 2 to 4 times to each side. Shoulder blade squeeze    1. While standing, squeeze your shoulder blades together. 2. Do not raise your shoulders up as you are squeezing. 3. Hold for 6 seconds. 4. Repeat 8 to 12 times. Shoulder rolls    1. Sit comfortably with your feet shoulder-width apart. You can also do this exercise standing up. 2. Roll your shoulders up, then back, and then down in a smooth, circular motion. 3. Repeat 2 to 4 times. Follow-up care is a key part of your treatment and safety. Be sure to make and go to all appointments, and call your doctor if you are having problems. It's also a good idea to know your test results and keep a list of the medicines you take.   Where can you learn more? Go to http://marija-marianne.info/. Enter C961 in the search box to learn more about \"Neck Arthritis: Exercises. \"  Current as of: March 21, 2017  Content Version: 11.4  © 1361-4484 [employer]. Care instructions adapted under license by [employer] (which disclaims liability or warranty for this information). If you have questions about a medical condition or this instruction, always ask your healthcare professional. [address] any warranty or liability for your use of this information. Healthy Upper Back: Exercises  Your Care Instructions  Here are some examples of exercises for your upper back. Start each exercise slowly. Ease off the exercise if you start to have pain. Your doctor or physical therapist will tell you when you can start these exercises and which ones will work best for you. How to do the exercises  Lower neck and upper back stretch    4. Stretch your arms out in front of your body. Clasp one hand on top of your other hand. 5. Gently reach out so that you feel your shoulder blades stretching away from each other. 6. Gently bend your head forward. 7. Hold for 15 to 30 seconds. 8. Repeat 2 to 4 times. Midback stretch    If you have knee pain, do not do this exercise. 5. Kneel on the floor, and sit back on your ankles. 6. Lean forward, place your hands on the floor, and stretch your arms out in front of you. Rest your head between your arms. 7. Gently push your chest toward the floor, reaching as far in front of you as possible. 8. Hold for 15 to 30 seconds. 9. Repeat 2 to 4 times. Shoulder rolls    5. Sit comfortably with your feet shoulder-width apart. You can also do this exercise while standing. 6. Roll your shoulders up, then back, and then down in a smooth, circular motion. 7. Repeat 2 to 4 times. Wall push-up    5. Stand against a wall with your feet about 12 to 24 inches back from the wall. If you feel any pain when you do this exercise, stand closer to the wall. 6. Place your hands on the wall slightly wider apart than your shoulders, and lean forward. 7. Gently lean your body toward the wall. Then push back to your starting position. Keep the motion smooth and controlled. 8. Repeat 8 to 12 times. Resisted shoulder blade squeeze    For this exercise, you will need elastic exercise material, such as surgical tubing or Thera-Band. 4. Sit or stand, holding the band in both hands in front of you. Keep your elbows close to your sides, bent at a 90-degree angle. Your palms should face up. 5. Squeeze your shoulder blades together, and move your arms to the outside, stretching the band. Be sure to keep your elbows at your sides while you do this. 6. Relax. 7. Repeat 8 to 12 times. Resisted rows    For this exercise, you will need elastic exercise material, such as surgical tubing or Thera-Band. 1. Put the band around a solid object, such as a bedpost, at about waist level. Hold one end of the band in each hand. 2. With your elbows at your sides and bent to 90 degrees, pull the band back to move your shoulder blades toward each other. Return to the starting position. 3. Repeat 8 to 12 times. Follow-up care is a key part of your treatment and safety. Be sure to make and go to all appointments, and call your doctor if you are having problems. It's also a good idea to know your test results and keep a list of the medicines you take. Where can you learn more? Go to http://marija-marianne.info/. Enter S743 in the search box to learn more about \"Healthy Upper Back: Exercises. \"  Current as of: March 21, 2017  Content Version: 11.4  © 1658-6041 [employer]. Care instructions adapted under license by [employer] (which disclaims liability or warranty for this information).  If you have questions about a medical condition or this instruction, always ask your healthcare professional. [address] any warranty or liability for your use of this information.

## 2017-11-02 ENCOUNTER — HOSPITAL ENCOUNTER (OUTPATIENT)
Age: 47
Discharge: HOME | End: 2017-11-02
Attending: PHYSICAL MEDICINE & REHABILITATION
Payer: OTHER GOVERNMENT

## 2017-11-02 DIAGNOSIS — R29.898 LEFT ARM WEAKNESS: ICD-10-CM

## 2017-11-02 DIAGNOSIS — M50.20 HNP (HERNIATED NUCLEUS PULPOSUS), CERVICAL: ICD-10-CM

## 2017-11-02 DIAGNOSIS — M79.18 MYOFASCIAL PAIN: ICD-10-CM

## 2017-11-02 DIAGNOSIS — M79.2 NEURITIS: ICD-10-CM

## 2017-11-02 PROCEDURE — 72141 MRI NECK SPINE W/O DYE: CPT

## 2017-12-05 ENCOUNTER — OFFICE VISIT (OUTPATIENT)
Dept: ORTHOPEDIC SURGERY | Age: 47
End: 2017-12-05

## 2017-12-05 VITALS
SYSTOLIC BLOOD PRESSURE: 118 MMHG | BODY MASS INDEX: 45.36 KG/M2 | WEIGHT: 289 LBS | TEMPERATURE: 98.5 F | OXYGEN SATURATION: 93 % | DIASTOLIC BLOOD PRESSURE: 78 MMHG | HEART RATE: 96 BPM | HEIGHT: 67 IN | RESPIRATION RATE: 18 BRPM

## 2017-12-05 DIAGNOSIS — M50.20 HNP (HERNIATED NUCLEUS PULPOSUS), CERVICAL: ICD-10-CM

## 2017-12-05 DIAGNOSIS — M62.838 MUSCLE SPASM: ICD-10-CM

## 2017-12-05 DIAGNOSIS — M79.18 MYOFASCIAL PAIN: Primary | ICD-10-CM

## 2017-12-05 DIAGNOSIS — M79.10 TRIGGER POINT: ICD-10-CM

## 2017-12-05 DIAGNOSIS — M47.812 CERVICAL FACET JOINT SYNDROME: ICD-10-CM

## 2017-12-05 PROBLEM — E66.01 OBESITY, MORBID (HCC): Status: ACTIVE | Noted: 2017-12-05

## 2017-12-05 RX ORDER — PREGABALIN 100 MG/1
100 CAPSULE ORAL 2 TIMES DAILY
Qty: 180 CAP | Refills: 1 | Status: SHIPPED | OUTPATIENT
Start: 2017-12-05 | End: 2018-06-11 | Stop reason: SDUPTHER

## 2017-12-05 NOTE — PROGRESS NOTES
[facility]  [address] [place], [facility] [address]  Phone: [phone]  Fax: [phone]      ASSESSMENT   Diagnoses and all orders for this visit:    1. Myofascial pain  -     pregabalin (LYRICA) 100 mg capsule; Take 1 Cap by mouth two (2) times a day. Max Daily Amount: 200 mg. Indications: FIBROMYALGIA  -     REFERRAL TO PHYSICAL THERAPY    2. Cervical facet joint syndrome  -     REFERRAL TO PHYSICAL THERAPY    3. Muscle spasm    4. HNP (herniated nucleus pulposus), cervical    5. Trigger point  -     REFERRAL TO PHYSICAL THERAPY       IMPRESSION AND PLAN:  Kenneth Gutierrez is a 41 y.o. male with history of cervical pain. Pt increased his Lyrica 50 mg to 2 tabs BID without sedation. He admits that he continues to experience neck pain but he has experienced improvement since increasing the Lyrica. 1) Pt was given information on cervical arthritis exercises. 2) He will adjust his Lyrica from 50 mg 2 tabs BID to 100 mg 1 tab BID. 3) Pt was referred to physical therapy with evaluation for dry needling with [facility]. 4) Mr. Rod Carrion has a reminder for a \"due or due soon\" health maintenance. I have asked that he contact his primary care provider, Jane Hoover MD, for follow-up on this health maintenance. 5)  demonstrated consistency with prescribing. 6) Pt will follow-up in 3 months or sooner if needed. HISTORY OF PRESENT ILLNESS:  Kenneth Gutierrez is a 41 y.o. male with history of cervical pain. He presents to the office today for cervical MRI follow up. Pt increased his Lyrica 50 mg to 2 tabs QAM and 2 tabs QHS without sedation. He admits that he continues to experience neck pain but he has experienced improvement since increasing the Lyrica. He takes Skelaxin 800 mg and Ultram 50 mg as needed with benefit. Pt has tried multiple courses of physical therapy and admits to relief after trying dry needling with Winifred Clay.  Pt at this time desires proceed with dry needling. Pain Scale: 2/10    PCP: Sandra Underwood MD       Past Medical History:   Diagnosis Date    Adverse effect of anesthesia     for back sx-had issues with breathing  with apnea- 6 years ago    Asthma     Chest pain, unspecified     ATYPICAL,POSSIBLE ANGINA,MUSCULAR,CAD,CHEST WALL PAINS PERSISTANT AND RECURRENT    Chronic kidney disease     [address] mellitus [address] [address] [address]     DM (diabetes mellitus) ([facility] [address] [facility]     GERD (gastroesophageal reflux disease)     HTN (hypertension)     Hypercholesteremia     Hypertension     Kidney stones     Sleep apnea     cpap        Social History     Social History    Marital status:      Spouse name: N/A    Number of children: N/A    Years of education: N/A     Occupational History          Social History Main Topics    Smoking status: Never Smoker    Smokeless tobacco: Never Used    Alcohol use No    Drug use: No    Sexual activity: Not on file     Other Topics Concern    Not on file     Social History Narrative    ** Merged History Encounter **            Current Outpatient Prescriptions   Medication Sig Dispense Refill    pregabalin (LYRICA) 100 mg capsule Take 1 Cap by mouth two (2) times a day. Max Daily Amount: 200 mg. Indications: FIBROMYALGIA 180 Cap 1    hydroCHLOROthiazide (MICROZIDE) 12.5 mg capsule       pravastatin (PRAVACHOL) 20 mg tablet       HYDROcodone-acetaminophen (NORCO) 5-325 mg per tablet Take 1 Tab by mouth every six (6) hours as needed for Pain. Max Daily Amount: 4 Tabs. Indications: Pain 25 Tab 0    cpap machine kit by Does Not Apply route.  metaxalone (SKELAXIN) 800 mg tablet 1 po bid prn   Indications: MUSCLE SPASM 60 Tab 5    traMADol (ULTRAM) 50 mg tablet 1 po bid prn severe pain 180 Tab 0    Potassium Citrate 15 mEq TbER Take  by mouth two (2) times a day.       ondansetron (ZOFRAN ODT) 4 mg disintegrating tablet Take 1-2 tablets every 6-8 hours as needed for nausea and vomiting. 10 Tab 0    tamsulosin (FLOMAX) 0.4 mg capsule Take 1 Cap by mouth daily (after dinner). 30 Cap 0    carvedilol (COREG CR) 10 mg CR capsule Take  by mouth daily (with breakfast).  montelukast (SINGULAIR) 10 mg tablet Take 10 mg by mouth daily.  insulin glargine (LANTUS SOLOSTAR) 100 unit/mL (3 mL) pen by SubCUTAneous route.  insulin aspart (NOVOLOG) 100 unit/mL inpn by SubCUTAneous route.  magnesium citrate 100 mg tab Take 300 mg by mouth daily.  aspirin 81 mg chewable tablet Take 81 mg by mouth daily.  amLODIPine-benazepril (LOTREL) 5-20 mg per capsule Take 1 Cap by mouth daily.  fluticasone-salmeterol (ADVAIR DISKUS) 250-50 mcg/dose diskus inhaler Take 1 Puff by inhalation every twelve (12) hours.  Dexlansoprazole (DEXILANT) 60 mg CpDM Take 60 mg by mouth daily.  albuterol (VENTOLIN HFA) 90 mcg/actuation inhaler Take  by inhalation every six (6) hours as needed.  Misc. Devices Kit Please provide the patient following size mask. Mask: Mirage FX large size mask. DME: [place], [place]  Phone: [phone], Fax: [phone] 1 Each 0       No Known Allergies      REVIEW OF SYSTEMS    Constitutional: Negative for fever, chills, or weight change. Respiratory: Negative for cough or shortness of breath. Cardiovascular: Negative for chest pain or palpitations. Gastrointestinal: Negative for acid reflux, change in bowel habits, or constipation. Genitourinary: Negative for dysuria and flank pain. Musculoskeletal: Positive for cervical pain. Skin: Negative for rash. Neurological: Negative for headaches, dizziness, or numbness. Endo/Heme/Allergies: Negative for increased bruising. Psychiatric/Behavioral: Negative for difficulty with sleep.       PHYSICAL EXAMINATION  Visit Vitals    /78    Pulse 96    Temp 98.5 °F (36.9 °C) (Oral)    Resp 18    Ht 5' 7\" (1.702 m)    Wt 289 lb (131.1 kg)    SpO2 93%    BMI 45.26 kg/m2 Constitutional: Awake, alert, and in no acute distress. Neurological: 1+ symmetrical DTRs in the upper extremities. Sensation to light touch is intact. Negative William's sign bilaterally. Skin: warm, dry, and intact. Musculoskeletal: Tight across the upper trapezius with multiple trigger points. Pain with side to side cervical flexion. Pain with cervical extension but good range of motion in the cervical spine on all planes. No pain with extension, axial loading, or forward flexion. No pain with internal or external rotation of his hips. Negative straight leg raise bilaterally. Biceps  Triceps Deltoids Wrist Ext Wrist Flex Hand Intrin   Right +4/5 +4/5 +4/5 +4/5 +4/5 +4/5   Left +4/5 +4/5 +4/5 +4/5 +4/5 +4/5     IMAGING:    Cervical spine MRI from 11/02/2017 was personally reviewed with the patient and demonstrated:    Results from [place] encounter on 11/02/17   MRI CERV SPINE WO CONT   Narrative MR CERVICAL SPINE WITHOUT CONTRAST    CPT CODE: 13812    HISTORY: Chronic cervicalgia progressing over the past 2 months. Left arm pain  and paresthesias. No injury. COMPARISON: October 2013. TECHNIQUE: The following sequences were performed through the cervical spine:    1. Sagittal and axial T2 weighted images without fat saturation. 2.  Sagittal T1 weighted images without fat saturation. 3.  Sagittal STIR sequence. FINDINGS:     Cervical straightening as before. Normal vertebral body heights. No change in a  small rounded area of T2/STIR bright signal within right C5. Likely some  stippled T1 bright signal within; favors hemangioma. Unremarkable disc space  height. Patent cervical medullary junction. No cervical cord expansion or  atrophy. Small oblong fluid bright focus at the right occipital cervical  articulation anteriorly is unchanged. On axial imaging, findings at each level are as follows:    C2/C3: Patent canal and foramina.     C3/C4: Patent canal and foramina. C4/C5: Patent canal and foramina. C5/C6: Mild right paracentral disc protrusion. Patent canal and foramina. C6/C7: Minimal disc bulge on sagittal imaging. Patent canal and foramina. C7/T1: Patent canal and foramina. Incidental imaging of the adjacent soft tissues is unremarkable. Impression IMPRESSION:    1. Minimal degenerative findings of cervical spine. No substantive change. Patent canal and foramina. No specific source for paresthesias. Thank you for this referral.      Written by Jerry Mills, as dictated by Wandy Nagy MD.  I, Dr. Wandy Nagy confirm that all documentation is accurate.

## 2017-12-05 NOTE — PATIENT INSTRUCTIONS
Neck Arthritis: Exercises  Your Care Instructions  Here are some examples of typical rehabilitation exercises for your condition. Start each exercise slowly. Ease off the exercise if you start to have pain. Your doctor or physical therapist will tell you when you can start these exercises and which ones will work best for you. How to do the exercises  Neck stretches to the side    1. This stretch works best if you keep your shoulder down as you lean away from it. To help you remember to do this, start by relaxing your shoulders and lightly holding on to your thighs or your chair. 2. Tilt your head toward your shoulder and hold for 15 to 30 seconds. Let the weight of your head stretch your muscles. 3. Repeat 2 to 4 times toward each shoulder. Chin tuck    1. Lie on the floor with a rolled-up towel under your neck. Your head should be touching the floor. 2. Slowly bring your chin toward your chest.  3. Hold for a count of 6, and then relax for up to 10 seconds. 4. Repeat 8 to 12 times. Active cervical rotation    1. Sit in a firm chair, or stand up straight. 2. Keeping your chin level, turn your head to the right, and hold for 15 to 30 seconds. 3. Turn your head to the left and hold for 15 to 30 seconds. 4. Repeat 2 to 4 times to each side. Shoulder blade squeeze    1. While standing, squeeze your shoulder blades together. 2. Do not raise your shoulders up as you are squeezing. 3. Hold for 6 seconds. 4. Repeat 8 to 12 times. Shoulder rolls    1. Sit comfortably with your feet shoulder-width apart. You can also do this exercise standing up. 2. Roll your shoulders up, then back, and then down in a smooth, circular motion. 3. Repeat 2 to 4 times. Follow-up care is a key part of your treatment and safety. Be sure to make and go to all appointments, and call your doctor if you are having problems. It's also a good idea to know your test results and keep a list of the medicines you take.   Where can you learn more? Go to http://marija-marianne.info/. Enter P866 in the search box to learn more about \"Neck Arthritis: Exercises. \"  Current as of: March 21, 2017  Content Version: 11.4  © 6146-9375 [employer]. Care instructions adapted under license by [employer] (which disclaims liability or warranty for this information). If you have questions about a medical condition or this instruction, always ask your healthcare professional. [address] any warranty or liability for your use of this information.

## 2017-12-08 ENCOUNTER — TELEPHONE (OUTPATIENT)
Dept: ORTHOPEDIC SURGERY | Age: 47
End: 2017-12-08

## 2017-12-08 NOTE — TELEPHONE ENCOUNTER
[facility] referral has been submitted for Physical Therapy to be scheduled @ [facility]

## 2017-12-22 ENCOUNTER — HOSPITAL ENCOUNTER (OUTPATIENT)
Dept: PHYSICAL THERAPY | Age: 47
Discharge: HOME | End: 2017-12-22
Payer: OTHER GOVERNMENT

## 2017-12-22 PROCEDURE — 97110 THERAPEUTIC EXERCISES: CPT | Performed by: PHYSICAL THERAPIST

## 2017-12-22 PROCEDURE — 97140 MANUAL THERAPY 1/> REGIONS: CPT | Performed by: PHYSICAL THERAPIST

## 2017-12-22 PROCEDURE — 97162 PT EVAL MOD COMPLEX 30 MIN: CPT | Performed by: PHYSICAL THERAPIST

## 2017-12-22 NOTE — PROGRESS NOTES
[facility]              [address]        [place], [address]   [phone] fax    Plan of Care/ Statement of Necessity for Physical Therapy Services  Patient name: Ame Talbert Start of Care: 2017   Referral source: Sayra Augustin MD : 1970    Medical Diagnosis: Myalgia [M79.1]  Other specific arthropathies, not elsewhere classified, other specified site [M12.88]   Onset Date:worsened ~2-3 months ago    Treatment Diagnosis: cervical myofascial restrictions   Prior Hospitalization: see medical history Provider#: [id number]   Medications: Verified on Patient summary List    Comorbidities: arthritis, asthma, back pain, BMI >30, DM, GI disease, HA, HBP, kidney/bladder/prostate/urination problems, prior surgery, sleep dysfunction   Prior Level of Function: hx of back and neck pain over the last 5 years, lumbar laminectomy, working full time, minimal numbness and tingling in the L UE     The Plan of Care and following information is based on the information from the initial evaluation. Assessment/ key information: Pt is a 40 y/o male w/ c/o increased pain, tightness, and n/t in the L neck and UE that increased 2-3 months ago but reports he has been dealing with for the last 5 years. Reports pain increases w/ sleeping on the L side, certain positions of L arm elevation, and when holding her L arm tight to his body like holding a phone. Reports pain and numbness decrease w/ heat, massage, TENS, cervical traction, and dry needling. Reports he has a TENS and cervical traction unit at home. Reports MRI shows narrowing and arthritis in his spine. Hx of injections and IMDN in the past w/ some relief of symptoms. Hx of lumbar laminectomy at L5-S1. 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 Pt presents w/ rounded shoulders in sitting. Cervical AROM WNL and pain free w/ pulling reported ar EROM cervical flexion, B rotation, and L SB. B shoulder AROM WFL w/ pulling reported w/ ER and IR. MMT L MT 3+/5, LT -3/5; R MT4+/5, LT 4/5. Poor thoracic segmental mobility, fair cervical segmental mobility. Increased neural tension noted w/ (+) ulnar NTT L. Increased mm tightness in  UT, LS, scalene's, SCM, and pec marjan and min. Pt will benefit from skilled PT to address the deficits and progress with pt goals. Evaluation Complexity History HIGH Complexity :3+ comorbidities / personal factors will impact the outcome/ POC ; Examination MEDIUM Complexity : 3 Standardized tests and measures addressing body structure, function, activity limitation and / or participation in recreation  ;Presentation MEDIUM Complexity : Evolving with changing characteristics  ; Clinical Decision Making MEDIUM Complexity : FOTO score of 26-74  Overall Complexity Rating: MEDIUM  Problem List: pain affecting function, decrease ROM, decrease strength, decrease ADL/ functional abilitiies, decrease activity tolerance and decrease flexibility/ joint mobility   Treatment Plan may include any combination of the following: Therapeutic exercise, Therapeutic activities, Neuromuscular re-education, Physical agent/modality, Manual therapy, Patient education and Functional mobility training  Patient / Family readiness to learn indicated by: asking questions, trying to perform skills and interest  Persons(s) to be included in education: patient (P)  Barriers to Learning/Limitations: None  Patient Goal (s): to help with pain and numbness  Patient Self Reported Health Status: good  Rehabilitation Potential: good    Short Term Goals: To be accomplished in 1 weeks:  1. Pt will be independent and compliant w/ HEP to progress gains in PT. Long Term Goals: To be accomplished in 6 weeks:  1. Pt will improve FOTO to > or = to 65 to demo improved function.    2. Pt will increase L ulnar nerve mobility as demo'd by (-) Ulnar NTT for decreased n/t in the L hand. 3. Pt will improve posture as demo'd by correct shoulder positioning to decrease impingement symptoms. 4. Pt will report > or = to 75% improvement in symptoms for ease w/ return to normal ADLs. Frequency / Duration: Patient to be seen 2 times per week for 6 weeks. Patient/ Caregiver education and instruction: Diagnosis, prognosis, activity modification and exercises   [x]  Plan of care has been reviewed with DEBBIE Wilson, PT 12/22/2017 10:01 AM  ________________________________________________________________________    I certify that the above Therapy Services are being furnished while the patient is under my care. I agree with the treatment plan and certify that this therapy is necessary.     [de-identified] Signature:____________________  Date:____________Time: _________    Please sign and return to [facility] [address], [address]   [phone] fax

## 2017-12-22 NOTE — PROGRESS NOTES
PT DAILY TREATMENT NOTE     Patient Name: Dahiana Began  Date:2017  : 1970  [x]  Patient  Verified  Payor:  / Plan: [facility]  ACTIVE DUTY AND DEPENDENTS / Product Type: Alveta Hammersmith /    In time:900  Out time:944  Total Treatment Time (min): 44  Visit #: 1 of 12    Treatment Area: Myalgia [M79.1]  Other specific arthropathies, not elsewhere classified, other specified site [M12.88]    SUBJECTIVE  Pain Level (0-10 scale): 2-3/10  Any medication changes, allergies to medications, adverse drug reactions, diagnosis change, or new procedure performed?: [x] No    [] Yes (see summary sheet for update)  Subjective functional status/changes:   [] No changes reported  HPI: Pt c/o increased pain, tightness, and n/t in the L neck and UE that increased 2-3 months ago but reports he has been dealing with for the last 5 years. Reports pain increases w/ sleeping on the L side, certain positions of L arm elevation, and when holding her L arm tight to his body like holding a phone. Reports pain and numbness decrease w/ heat, massage, TENS, cervical traction, and dry needling. Reports he has a TENS and cervical traction unit at home. Reports MRI shows narrowing and arthritis in his spine. Hx of injections and IMDN in the past w/ some relief of symptoms. Hx of lumbar laminectomy at L5-S1. 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      OBJECTIVE    Modality rationale: decrease pain and increase tissue extensibility to improve the patients ability to improve mobility and activity tolerance    Min Type Additional Details    [] Estim:  []Unatt       []IFC  []Premod                        []Other:  []w/ice   []w/heat  Position:  Location:    [] Estim: []Att    []TENS instruct  []NMES                    []Other:  []w/US   []w/ice   []w/heat  Position:  Location:    []  Traction: [] Cervical []Lumbar                       [] Prone          []Supine                       []Intermittent   []Continuous Lbs:  [] before manual  [] after manual    []  Ultrasound: []Continuous   [] Pulsed                           []1MHz   []3MHz W/cm2:  Location:    []  Iontophoresis with dexamethasone         Location: [] Take home patch   [] In clinic   10 []  Ice     [x]  heat  []  Ice massage  []  Laser   []  Anodyne Position: supine  Location: neck    []  Laser with stim  []  Other:  Position:  Location:    []  Vasopneumatic Device Pressure:       [] lo [] med [] hi   Temperature: [] lo [] med [] hi   [] Skin assessment post-treatment:  []intact []redness- no adverse reaction    []redness  adverse reaction:     12 min [x]Eval                  []Re-Eval       10 min Therapeutic Exercise:  [] See flow sheet : instructed in and demo'd HEP, educated on IMDN and goals w/ PT. Educated on posture and neural tension   Rationale: increase ROM, increase strength, improve coordination and increase proprioception to improve the patients ability to decrease pain and improve activity tolerance      min Therapeutic Activity:  []  See flow sheet :   Rationale:   to improve the patients ability to       min Neuromuscular Re-education:  []  See flow sheet :   Rationale:   to improve the patients ability to     12 min Manual Therapy:  SOR, TPR/DTM to B UT, LS, scalene's, SCM, and B pec marjan and min, MET for L 1st rib elevation, manual str to L scalenes and into cervical flexion, t/s and c/s PA's, downglides and rib springs    Rationale: decrease pain, increase ROM, increase tissue extensibility, decrease trigger points and increase postural awareness to improve mobility and activity tolerance      min Gait Training:  ___ feet with ___ device on level surfaces with ___ level of assist   Rationale:           With   [] TE   [] TA   [] neuro   [] other: Patient Education: [x] Review HEP    [] Progressed/Changed HEP based on:   [] positioning [] body mechanics   [] transfers   [] heat/ice application    [] other:      Other Objective/Functional Measures: Pt presents w/ rounded shoulders in sitting. Cervical AROM WNL and pain free w/ pulling reported ar EROM cervical flexion, B rotation, and L SB. B shoulder AROM WFL w/ pulling reported w/ ER and IR. MMT L MT 3+/5, LT -3/5; R MT4+/5, LT 4/5. Poor thoracic segmental mobility, fair cervical segmental mobility. Increased neural tension noted w/ (+) ulnar NTT L. Increased mm tightness in  UT, LS, scalene's, SCM, and pec marjan and min. Pain Level (0-10 scale) post treatment: 0/10    ASSESSMENT/Changes in Function: Focus on improving segmental mobility and posture to decrease impingement and nerve pinching into the L UE. Patient will continue to benefit from skilled PT services to modify and progress therapeutic interventions, address functional mobility deficits, address strength deficits, analyze and address soft tissue restrictions, analyze and cue movement patterns, analyze and modify body mechanics/ergonomics, assess and modify postural abnormalities and instruct in home and community integration to attain remaining goals. [x]  See Plan of Care  []  See progress note/recertification  []  See Discharge Summary         Progress towards goals / Updated goals:  Short Term Goals: To be accomplished in 1 weeks:  1. Pt will be independent and compliant w/ HEP to progress gains in PT. At eval: initiated HEP  Long Term Goals: To be accomplished in 6 weeks:  1. Pt will improve FOTO to > or = to 65 to demo improved function. At eval: FOTO = 59  2. Pt will increase L ulnar nerve mobility as demo'd by (-) Ulnar NTT for decreased n/t in the L hand. At eval: (+) L ulnar NTT  3. Pt will improve posture as demo'd by correct shoulder positioning to decrease impingement symptoms. At eval: rounded shoulders in sitting  4.  Pt will report > or = to 75% improvement in symptoms for ease w/ return to normal ADLs.   At eval: 0%    PLAN  []  Upgrade activities as tolerated     []  Continue plan of care  []  Update interventions per flow sheet       []  Discharge due to:_  [x]  Other: 2x/6 weeks      Sunil Franklin PT 12/22/2017  9:49 AM    Future Appointments  Date Time Provider Bryant Meghann   12/26/2017 4:30 PM Sunil Franklin PT [facility]   12/28/2017 2:30 PM Sunil Franklin PT [facility]   1/2/2018 4:30 PM Skip Hernandez PT [facility]   1/4/2018 4:00 PM Sunil Franklin PT [facility]   1/9/2018 9:30 AM Sunil Franklin PT [facility]   3/12/2018 3:30 PM Manuela Viera  [address]   8/29/2018 9:00 AM Gamal Barriga  [address]   8/29/2018 9:00 AM [facility] [place]

## 2017-12-26 ENCOUNTER — HOSPITAL ENCOUNTER (OUTPATIENT)
Dept: PHYSICAL THERAPY | Age: 47
Discharge: HOME | End: 2017-12-26
Payer: OTHER GOVERNMENT

## 2017-12-26 PROCEDURE — 97140 MANUAL THERAPY 1/> REGIONS: CPT | Performed by: PHYSICAL THERAPIST

## 2017-12-26 PROCEDURE — 97110 THERAPEUTIC EXERCISES: CPT | Performed by: PHYSICAL THERAPIST

## 2017-12-26 NOTE — PROGRESS NOTES
PT DAILY TREATMENT NOTE     Patient Name: Gennaro Rush  Date:2017  : 1970  [x]  Patient  Verified  Payor:  / Plan: [facility]  ACTIVE DUTY AND DEPENDENTS / Product Type: Pat Quijano /    In time:409  Out time:505  Total Treatment Time (min): 64  Visit #: 2 of 12    Treatment Area: Myalgia [M79.1]  Other specific arthropathies, not elsewhere classified, other specified site [M12.88]    SUBJECTIVE  Pain Level (0-10 scale): 2/10  Any medication changes, allergies to medications, adverse drug reactions, diagnosis change, or new procedure performed?: [x] No    [] Yes (see summary sheet for update)  Subjective functional status/changes:   [] No changes reported  Pt reports the home exercises are helping w/ his pain    OBJECTIVE  Dry Needling Procedure Note    Procedure: An intramuscular manual therapy (dry needling) and a neuro-muscular re-education treatment was done to deactivate myofascial trigger points with a 30 gauge filament needle under aseptic technique. Indications:  [x] Myofascial pain and dysfunction [] Muscled spasms  [] Myalgia/myositis   [] Muscle cramps  [x] Muscle imbalances  [] Temporomandibular Dysfunction  [] Other:    Chart reviewed for the following:  Lilliana ATKINSON, PT, have reviewed the medical history, summary list and precautions/contraindications for Gennaro Rush.   TIME OUT performed immediately prior to start of procedure:  Lilliana ATKINSON PT, have performed the following reviews on Gennaro Rush prior to the start of the session:      [x] Verified patient identification by name and date of birth    [x] Agreement on all muscles being treated was verified   [x] Purpose of dry needling, side effects, possible complications, risks and benefits were explained to the patient   [x] Procedure site(s) verified  [x] Patient was positioned for comfort and draped for privacy  [x] Informed Consent was signed (initial visit) and verified verbally (subsequent visits)  [x] Patient was instructed on the need to report the use of blood thinners and/or immunosuppressant medications  [x] How to respond to possible adverse effects of treatment  [x] Self treatment of post needling soreness: ice, heat (moist heat, heat wraps) and stretching  [x] Opportunity was given to ask any questions, all questions were answered            Time: 438  Date of procedure: 12/26/2017  Treatment: The following muscles were treated today with intramuscular dry needling  [] Left [] Right Masster  [] Left [] Right Temporalis  [] Left [] Right Zygomaticus Major / Minor  [] Left [] Right Lateral Pterygoid  [] Left [] Right Medial Pterygoid  [] Left [] Right Digastric Post / Anterior Belly  [] Left [] Right Sternocleidomastoid  [] Left [] Right Scalene Anterior / Medial / Posterior  [] Left [] Right Extra Laryngeal Muscles  [x] Left [] Right Upper Trapezius  [] Left [] Right Middle Trapezius  [] Left [] Right Lower Trapezius  [] Left [] Right Oblique Capitis Inferior  [] Left [] Right Splenius Capitis / Cervicis  [] Left [] Right Semispinalis: Capitis / Cervicis  [] Left [] Right Multifidi / Rotatores Cervicis / Thoracic  [] Left [] Right Longissimus Thoracis / Illiocostalis  [x] Left [] Right Levator Scapulae  [] Left [] Right Supraspinatus / Infraspinatus  [] Left [] Right Teres Major / Minor  [x] Left [] Right Rhomboids / Serratus posterior superior  [] Left [] Right Pectoralis Major / Minor  [] Left [] Right Serratus Anterior  [] Left [] Right Latissimus Dorsi  [x] Left [] Right Subscapularis (medial boarder)  [] Left [] Right Coracobrachialis  [] Left [] Right Biceps Brachii  [] Left [] Right Deltoid: Anterior / Medial / Posterior  [] Left [] Right Brachialis  [] Left [] Right Triceps  [] Left [] Right Brachioradialis  [] Left [] Right Extensor Carpi Radialis Brevis / Extensor Carpi Radialis Longus    [] Left [] Right  Extensor digitorum  [] Left [] Right Supinator / Pronator Teres  [] Left [] Right Flexor Carpi Radialis/ Flexor Carpi Ulnaris   [] Left [] Right  Flexor Digitorum Superficialis/ Flexor Digitorum Profundus  [] Left [] Right Flexor Pollicis Longus / Flexor Pollicis Brevis / Palmaris Longus  [] Left [] Right Abductor Pollicis Longus / Abductor Pollicis Brevis  [] Left [] Right Opponens Pollicis / Adductor Pollicis  [] Left [] Right Dorsal / Palmar Interossei / Lumbricalis  [] Left [] Right Abductor Digiti Minimi / Opponens Digiti Minimi    Patient's response to today's treatment:  [x] Latent Twitch Response     [x] Muscle relaxation [x] Pain Relief  [x] Post needling soreness    [x] without complications  [x] Increased Range of Motion   [] Decreased headaches    [] Decreased Tinnitus  [] Other:     Performed by: Keith Fuller PT      Modality rationale: decrease edema, decrease inflammation and decrease pain to improve the patients ability to decrease post needling soreness   Min Type Additional Details    [] Estim:  []Unatt       []IFC  []Premod                        []Other:  []w/ice   []w/heat  Position:  Location:    [] Estim: []Att    []TENS instruct  []NMES                    []Other:  []w/US   []w/ice   []w/heat  Position:  Location:    []  Traction: [] Cervical       []Lumbar                       [] Prone          []Supine                       []Intermittent   []Continuous Lbs:  [] before manual  [] after manual    []  Ultrasound: []Continuous   [] Pulsed                           []1MHz   []3MHz W/cm2:  Location:    []  Iontophoresis with dexamethasone         Location: [] Take home patch   [] In clinic   10 [x]  Ice     []  heat  []  Ice massage  []  Laser   []  Anodyne Position: supine  Location: L shoulder and neck    []  Laser with stim  []  Other:  Position:  Location:    []  Vasopneumatic Device Pressure:       [] lo [] med [] hi   Temperature: [] lo [] med [] hi   [] Skin assessment post-treatment:  []intact []redness- no adverse reaction    []redness  adverse reaction:      min []Eval                  []Re-Eval       33 min Therapeutic Exercise:  [x] See flow sheet : initiated per flow sheet   Rationale: increase ROM, increase strength, improve coordination and increase proprioception to improve the patients ability to decrease pain and improve activity tolerance      min Therapeutic Activity:  []  See flow sheet :   Rationale:   to improve the patients ability to       min Neuromuscular Re-education:  []  See flow sheet :   Rationale:   to improve the patients ability to     13 min Manual Therapy:  IMDN as listed above to include education, palpation, hemostasis, and STM/stretching to treated areas   Rationale: decrease pain, increase ROM, increase tissue extensibility, decrease trigger points and increase postural awareness to decrease pain and improve activity tolerance      min Gait Training:  ___ feet with ___ device on level surfaces with ___ level of assist   Rationale: With   [] TE   [] TA   [] neuro   [] other: Patient Education: [x] Review HEP    [] Progressed/Changed HEP based on:   [] positioning   [] body mechanics   [] transfers   [] heat/ice application    [] other:      Other Objective/Functional Measures:      Pain Level (0-10 scale) post treatment: 1-1.5/10     ASSESSMENT/Changes in Function: initiated POC and IMDN as listed above. Mod VC's for technique w/ ex's. Educated on icing and stretching to decrease post needling soreness. Reassess and continue as beneficial NV. Patient will continue to benefit from skilled PT services to modify and progress therapeutic interventions, address functional mobility deficits, address ROM deficits, address strength deficits, analyze and address soft tissue restrictions, analyze and cue movement patterns, analyze and modify body mechanics/ergonomics, assess and modify postural abnormalities and instruct in home and community integration to attain remaining goals.      []  See Plan of Care  []  See progress note/recertification  []  See Discharge Summary         Progress towards goals / Updated goals:  Short Term Goals: To be accomplished in 1 weeks:  1. Pt will be independent and compliant w/ HEP to progress gains in PT. At eval: initiated HEP  Current: met per pt report 12/26/17  Long Term Goals: To be accomplished in 6 weeks:  1. Pt will improve FOTO to > or = to 65 to demo improved function. At eval: FOTO = 59  2. Pt will increase L ulnar nerve mobility as demo'd by (-) Ulnar NTT for decreased n/t in the L hand. At eval: (+) L ulnar NTT  3. Pt will improve posture as demo'd by correct shoulder positioning to decrease impingement symptoms. At eval: rounded shoulders in sitting  4. Pt will report > or = to 75% improvement in symptoms for ease w/ return to normal ADLs.    At eval: 0%    PLAN  [x]  Upgrade activities as tolerated     [x]  Continue plan of care  []  Update interventions per flow sheet       []  Discharge due to:_  []  Other:_      Susie Neal PT 12/26/2017  4:59 PM    Future Appointments  Date Time Provider Bryant Zavaleta   12/28/2017 2:30 PM Susie Neal PT [facility]   1/2/2018 4:30 PM Gilbert Guillen PT [facility]   1/4/2018 4:00 PM Susie Neal PT [facility]   1/9/2018 9:30 AM Susie Neal PT [facility]   1/11/2018 8:00 AM Susie Neal PT [facility]   1/17/2018 8:00 AM Susie Neal PT [facility]   1/19/2018 7:30 AM Susie Neal PT [facility]   1/22/2018 6:00 PM Susie eNal PT [facility]   1/26/2018 7:30 AM Susie Neal PT [facility]   1/30/2018 6:00 PM Susie Neal, PT [facility]   2/1/2018 5:30 PM Hector Gillette, PT [facility]   3/12/2018 3:30 PM Shwetha Mike  [address]    8/29/2018 9:00 AM Alejandra Posadas MD [id number]   8/29/2018 9:00 AM [facility] [place]

## 2017-12-28 ENCOUNTER — HOSPITAL ENCOUNTER (OUTPATIENT)
Dept: PHYSICAL THERAPY | Age: 47
Discharge: HOME | End: 2017-12-28
Payer: OTHER GOVERNMENT

## 2017-12-28 PROCEDURE — 97110 THERAPEUTIC EXERCISES: CPT | Performed by: PHYSICAL THERAPIST

## 2017-12-28 PROCEDURE — 97140 MANUAL THERAPY 1/> REGIONS: CPT | Performed by: PHYSICAL THERAPIST

## 2017-12-28 NOTE — PROGRESS NOTES
PT DAILY TREATMENT NOTE     Patient Name: Kalyan Fletcher  Date:2017  : 1970  [x]  Patient  Verified  Payor:  / Plan: [facility]  ACTIVE DUTY AND DEPENDENTS / Product Type:  /    In time:303  Out time:349  Total Treatment Time (min): 46  Visit #: 3 of 12    Treatment Area: Myalgia [M79.1]  Other specific arthropathies, not elsewhere classified, other specified site [M12.88]    SUBJECTIVE  Pain Level (0-10 scale): 1/10  Any medication changes, allergies to medications, adverse drug reactions, diagnosis change, or new procedure performed?: [x] No    [] Yes (see summary sheet for update)  Subjective functional status/changes:   [] No changes reported  Pt reports he felt good following last session and is having just a little tightness on the R side. OBJECTIVE  Dry Needling Procedure Note    Procedure: An intramuscular manual therapy (dry needling) and a neuro-muscular re-education treatment was done to deactivate myofascial trigger points with a 30 gauge filament needle under aseptic technique. Indications:  [x] Myofascial pain and dysfunction [x] Muscled spasms  [] Myalgia/myositis   [] Muscle cramps  [x] Muscle imbalances  [] Temporomandibular Dysfunction  [] Other:    Chart reviewed for the following:  IKaitlin, PT, have reviewed the medical history, summary list and precautions/contraindications for Kalyan Fletcher.   TIME OUT performed immediately prior to start of procedure:  Kaitlin ATKINSON, PT, have performed the following reviews on Kalyan Fletcher prior to the start of the session:      [x] Verified patient identification by name and date of birth    [x] Agreement on all muscles being treated was verified   [x] Purpose of dry needling, side effects, possible complications, risks and benefits were explained to the patient   [x] Procedure site(s) verified  [x] Patient was positioned for comfort and draped for privacy  [x] Informed Consent was signed (initial visit) and verified verbally (subsequent visits)  [x] Patient was instructed on the need to report the use of blood thinners and/or immunosuppressant medications  [x] How to respond to possible adverse effects of treatment  [x] Self treatment of post needling soreness: ice, heat (moist heat, heat wraps) and stretching  [x] Opportunity was given to ask any questions, all questions were answered            Time: 329  Date of procedure: 12/28/2017  Treatment: The following muscles were treated today with intramuscular dry needling  [] Left [] Right Masster  [] Left [] Right Temporalis  [] Left [] Right Zygomaticus Major / Minor  [] Left [] Right Lateral Pterygoid  [] Left [] Right Medial Pterygoid  [] Left [] Right Digastric Post / Anterior Belly  [] Left [] Right Sternocleidomastoid  [] Left [] Right Scalene Anterior / Medial / Posterior  [] Left [] Right Extra Laryngeal Muscles  [] Left [x] Right Upper Trapezius  [] Left [x] Right Middle Trapezius  [] Left [] Right Lower Trapezius  [] Left [] Right Oblique Capitis Inferior  [] Left [] Right Splenius Capitis / Cervicis  [] Left [] Right Semispinalis: Capitis / Cervicis  [] Left [] Right Multifidi / Rotatores Cervicis / Thoracic  [] Left [] Right Longissimus Thoracis / Illiocostalis  [] Left [x] Right Levator Scapulae  [] Left [] Right Supraspinatus / Infraspinatus  [] Left [] Right Teres Major / Minor  [] Left [] Right Rhomboids / Serratus posterior superior  [] Left [] Right Pectoralis Major / Minor  [] Left [] Right Serratus Anterior  [] Left [] Right Latissimus Dorsi  [] Left [] Right Subscapularis  [] Left [] Right Coracobrachialis  [] Left [] Right Biceps Brachii  [] Left [] Right Deltoid: Anterior / Medial / Posterior  [] Left [] Right Brachialis  [] Left [] Right Triceps  [] Left [] Right Brachioradialis  [] Left [] Right Extensor Carpi Radialis Brevis / Extensor Carpi Radialis Longus     [] Left [] Right  Extensor digitorum  [] Left [] Right Supinator / Pronator Teres  [] Left [] Right Flexor Carpi Radialis/ Flexor Carpi Ulnaris   [] Left [] Right  Flexor Digitorum Superficialis/ Flexor Digitorum Profundus  [] Left [] Right Flexor Pollicis Longus / Flexor Pollicis Brevis / Palmaris Longus  [] Left [] Right Abductor Pollicis Longus / Abductor Pollicis Brevis  [] Left [] Right Opponens Pollicis / Adductor Pollicis  [] Left [] Right Dorsal / Palmar Interossei / Lumbricalis  [] Left [] Right Abductor Digiti Minimi / Opponens Digiti Minimi    Patient's response to today's treatment:  [] Latent Twitch Response     [x] Muscle relaxation [x] Pain Relief  [] Post needling soreness    [x] without complications  [x] Increased Range of Motion   [] Decreased headaches    [] Decreased Tinnitus  [] Other:     Performed by: Jalyn Blackwell PT      Modality rationale: decrease edema, decrease inflammation and decrease pain to improve the patients ability to decrease post needling soreness   Min Type Additional Details    [] Estim:  []Unatt       []IFC  []Premod                        []Other:  []w/ice   []w/heat  Position:  Location:    [] Estim: []Att    []TENS instruct  []NMES                    []Other:  []w/US   []w/ice   []w/heat  Position:  Location:    []  Traction: [] Cervical       []Lumbar                       [] Prone          []Supine                       []Intermittent   []Continuous Lbs:  [] before manual  [] after manual    []  Ultrasound: []Continuous   [] Pulsed                           []1MHz   []3MHz W/cm2:  Location:    []  Iontophoresis with dexamethasone         Location: [] Take home patch   [] In clinic   10 [x]  Ice     []  heat  []  Ice massage  []  Laser   []  Anodyne Position: supine  Location: R neck    []  Laser with stim  []  Other:  Position:  Location:    []  Vasopneumatic Device Pressure:       [] lo [] med [] hi   Temperature: [] lo [] med [] hi   [] Skin assessment post-treatment:  []intact []redness- no adverse reaction    []redness  adverse reaction:      min []Eval                  []Re-Eval       26 min Therapeutic Exercise:  [x] See flow sheet : increased per flow sheet   Rationale: increase ROM, increase strength, improve coordination and increase proprioception to improve the patients ability to decrease pain and improve activity tolerance     min Therapeutic Activity:  []  See flow sheet :   Rationale:   to improve the patients ability to       min Neuromuscular Re-education:  []  See flow sheet :   Rationale:   to improve the patients ability to     10 min Manual Therapy:  IMDN as listed above to include education, palpation, hemostasis, and STM/stretching to treated areas   Rationale: decrease pain, increase ROM, increase tissue extensibility, decrease trigger points and increase postural awareness to improve activity tolerance and mobility      min Gait Training:  ___ feet with ___ device on level surfaces with ___ level of assist   Rationale: With   [] TE   [] TA   [] neuro   [] other: Patient Education: [x] Review HEP    [] Progressed/Changed HEP based on:   [] positioning   [] body mechanics   [] transfers   [] heat/ice application    [] other:      Other Objective/Functional Measures:      Pain Level (0-10 scale) post treatment: 0/10    ASSESSMENT/Changes in Function: Pt responded well to session today and reported the L tightness decreased following the R IMDN. Continue to progress with pt goals. Patient will continue to benefit from skilled PT services to modify and progress therapeutic interventions, address functional mobility deficits, address ROM deficits, address strength deficits, analyze and address soft tissue restrictions, analyze and cue movement patterns, analyze and modify body mechanics/ergonomics, assess and modify postural abnormalities and instruct in home and community integration to attain remaining goals.      []  See Plan of Care  []  See progress note/recertification  []  See Discharge Summary         Progress towards goals / Updated goals:  Short Term Goals: To be accomplished in 1 weeks:  1. Pt will be independent and compliant w/ HEP to progress gains in PT. At eval: initiated HEP  Current: met per pt report 12/26/17  Long Term Goals: To be accomplished in 6 weeks:  1. Pt will improve FOTO to > or = to 65 to demo improved function. At eval: FOTO = 59  2. Pt will increase L ulnar nerve mobility as demo'd by (-) Ulnar NTT for decreased n/t in the L hand. At eval: (+) L ulnar NTT  3. Pt will improve posture as demo'd by correct shoulder positioning to decrease impingement symptoms. At eval: rounded shoulders in sitting  4. Pt will report > or = to 75% improvement in symptoms for ease w/ return to normal ADLs.    At eval: 0%    PLAN  [x]  Upgrade activities as tolerated     [x]  Continue plan of care  []  Update interventions per flow sheet       []  Discharge due to:_  []  Other:_      Roberto Pae, PT 12/28/2017  3:06 PM    Future Appointments  Date Time Provider Bryant Zavaleta   1/2/2018 4:30 PM Betina Johnson, PT [facility]   1/4/2018 4:00 PM Cherylann Pae, PT [facility]   1/9/2018 9:30 AM Cherylann Pae, PT [facility]   1/11/2018 8:00 AM Cherylann Pae, PT [facility]   1/17/2018 8:00 AM Cherylann Pae, PT [facility]   1/19/2018 7:30 AM Cherylann Pae, PT [facility]   1/22/2018 6:00 PM Cherylann Pae, PT [facility]   1/26/2018 7:30 AM Cherylann Pae, PT [facility]   1/30/2018 6:00 PM Cherylann Pae, PT [facility]   2/1/2018 5:30 PM Ariel Browne PT [facility]   3/12/2018 3:30 PM Robynn Cowden,  [address]    8/29/2018 9:00 AM Amada Alicea MD [id number] [facility]   8/29/2018 9:00 AM [facility] [place]

## 2018-01-02 ENCOUNTER — APPOINTMENT (OUTPATIENT)
Dept: PHYSICAL THERAPY | Age: 48
End: 2018-01-02
Payer: OTHER GOVERNMENT

## 2018-01-04 ENCOUNTER — APPOINTMENT (OUTPATIENT)
Dept: PHYSICAL THERAPY | Age: 48
End: 2018-01-04
Payer: OTHER GOVERNMENT

## 2018-01-09 ENCOUNTER — HOSPITAL ENCOUNTER (OUTPATIENT)
Dept: PHYSICAL THERAPY | Age: 48
Discharge: HOME | End: 2018-01-09
Payer: OTHER GOVERNMENT

## 2018-01-09 PROCEDURE — 97140 MANUAL THERAPY 1/> REGIONS: CPT | Performed by: PHYSICAL THERAPIST

## 2018-01-09 PROCEDURE — 97110 THERAPEUTIC EXERCISES: CPT | Performed by: PHYSICAL THERAPIST

## 2018-01-11 ENCOUNTER — HOSPITAL ENCOUNTER (OUTPATIENT)
Dept: PHYSICAL THERAPY | Age: 48
Discharge: HOME | End: 2018-01-11
Payer: OTHER GOVERNMENT

## 2018-01-11 PROCEDURE — 97140 MANUAL THERAPY 1/> REGIONS: CPT | Performed by: PHYSICAL THERAPIST

## 2018-01-11 PROCEDURE — 97110 THERAPEUTIC EXERCISES: CPT | Performed by: PHYSICAL THERAPIST

## 2018-01-11 NOTE — PROGRESS NOTES
PT DAILY TREATMENT NOTE     Patient Name: Gin Griggs  Date:2018  : 1970  [x]  Patient  Verified  Payor:  / Plan: [facility]  ACTIVE DUTY AND DEPENDENTS / Product Type:  /    In time:759  Out time:855  Total Treatment Time (min): 56  Visit #: 5 of 12    Treatment Area: Myalgia [M79.1]  Other specific arthropathies, not elsewhere classified, other specified site [M12.88]    SUBJECTIVE  Pain Level (0-10 scale): 0/10  Any medication changes, allergies to medications, adverse drug reactions, diagnosis change, or new procedure performed?: [x] No    [] Yes (see summary sheet for update)  Subjective functional status/changes:   [] No changes reported  Pt reports he feels much looser and that the treatments are working    OBJECTIVE  Dry Needling Procedure Note    Procedure: An intramuscular manual therapy (dry needling) and a neuro-muscular re-education treatment was done to deactivate myofascial trigger points with a 30 gauge filament needle under aseptic technique. Indications:  [x] Myofascial pain and dysfunction [x] Muscled spasms  [] Myalgia/myositis   [] Muscle cramps  [x] Muscle imbalances  [] Temporomandibular Dysfunction  [] Other:    Chart reviewed for the following:  Asha ATKINSON, PT, have reviewed the medical history, summary list and precautions/contraindications for Gin Griggs.   TIME OUT performed immediately prior to start of procedure:  Asha ATKINSON PT, have performed the following reviews on Gin Griggs prior to the start of the session:      [x] Verified patient identification by name and date of birth    [x] Agreement on all muscles being treated was verified   [x] Purpose of dry needling, side effects, possible complications, risks and benefits were explained to the patient   [x] Procedure site(s) verified  [x] Patient was positioned for comfort and draped for privacy  [x] Informed Consent was signed (initial visit) and verified verbally (subsequent visits)  [x] Patient was instructed on the need to report the use of blood thinners and/or immunosuppressant medications  [x] How to respond to possible adverse effects of treatment  [x] Self treatment of post needling soreness: ice, heat (moist heat, heat wraps) and stretching  [x] Opportunity was given to ask any questions, all questions were answered            Time: 830  Date of procedure: 1/11/2018  Treatment: The following muscles were treated today with intramuscular dry needling  [] Left [] Right Masster  [] Left [] Right Temporalis  [] Left [] Right Zygomaticus Major / Minor  [] Left [] Right Lateral Pterygoid  [] Left [] Right Medial Pterygoid  [] Left [] Right Digastric Post / Anterior Belly  [] Left [] Right Sternocleidomastoid  [] Left [] Right Scalene Anterior / Medial / Posterior  [] Left [] Right Extra Laryngeal Muscles  [] Left [] Right Upper Trapezius  [] Left [] Right Middle Trapezius  [] Left [] Right Lower Trapezius  [] Left [] Right Oblique Capitis Inferior  [] Left [] Right Splenius Capitis / Cervicis  [] Left [] Right Semispinalis: Capitis / Cervicis  [] Left [] Right Multifidi / Rotatores Cervicis / Thoracic  [x] Left [x] Right Longissimus Thoracis / Illiocostalis  [] Left [] Right Levator Scapulae  [] Left [] Right Supraspinatus / Infraspinatus  [] Left [] Right Teres Major / Minor  [] Left [] Right Rhomboids / Serratus posterior superior  [] Left [] Right Pectoralis Major / Minor  [] Left [] Right Serratus Anterior  [] Left [] Right Latissimus Dorsi  [] Left [] Right Subscapularis  [] Left [] Right Coracobrachialis  [] Left [] Right Biceps Brachii  [] Left [] Right Deltoid: Anterior / Medial / Posterior  [] Left [] Right Brachialis  [] Left [] Right Triceps  [] Left [] Right Brachioradialis  [] Left [] Right Extensor Carpi Radialis Brevis / Extensor Carpi Radialis Longus    [] Left [] Right  Extensor digitorum  [] Left [] Right Supinator / Pronator Teres  [] Left [] Right Flexor Carpi Radialis/ Flexor Carpi Ulnaris   [] Left [] Right  Flexor Digitorum Superficialis/ Flexor Digitorum Profundus  [] Left [] Right Flexor Pollicis Longus / Flexor Pollicis Brevis / Palmaris Longus  [] Left [] Right Abductor Pollicis Longus / Abductor Pollicis Brevis  [] Left [] Right Opponens Pollicis / Adductor Pollicis  [] Left [] Right Dorsal / Palmar Interossei / Lumbricalis  [] Left [] Right Abductor Digiti Minimi / Opponens Digiti Minimi    Patient's response to today's treatment:  [] Latent Twitch Response     [x] Muscle relaxation [x] Pain Relief  [] Post needling soreness    [x] without complications  [x] Increased Range of Motion   [] Decreased headaches    [] Decreased Tinnitus  [] Other:     Performed by: Jalyn Blackwell PT      Modality rationale: decrease edema, decrease inflammation and decrease pain to improve the patients ability to decrease post needling soreness   Min Type Additional Details    [] Estim:  []Unatt       []IFC  []Premod                        []Other:  []w/ice   []w/heat  Position:  Location:    [] Estim: []Att    []TENS instruct  []NMES                    []Other:  []w/US   []w/ice   []w/heat  Position:  Location:    []  Traction: [] Cervical       []Lumbar                       [] Prone          []Supine                       []Intermittent   []Continuous Lbs:  [] before manual  [] after manual    []  Ultrasound: []Continuous   [] Pulsed                           []1MHz   []3MHz W/cm2:  Location:    []  Iontophoresis with dexamethasone         Location: [] Take home patch   [] In clinic   10 [x]  Ice     []  heat  []  Ice massage  []  Laser   []  Anodyne Position: supine  Location: mid back    []  Laser with stim  []  Other:  Position:  Location:    []  Vasopneumatic Device Pressure:       [] lo [] med [] hi   Temperature: [] lo [] med [] hi   [] Skin assessment post-treatment:  []intact []redness- no adverse reaction    []redness  adverse reaction:      min []Eval                  []Re-Eval       31 min Therapeutic Exercise:  [x] See flow sheet : increased and adjusted per flow sheet   Rationale: increase ROM, increase strength, improve coordination and increase proprioception to improve the patients ability to decrease pain and improve activity tolerance      min Therapeutic Activity:  []  See flow sheet :   Rationale:   to improve the patients ability to       min Neuromuscular Re-education:  []  See flow sheet :   Rationale:   to improve the patients ability to     15 min Manual Therapy:  IMDN as listed above to include education, palpation, hemostasis, and STM/stretching to treated areas, t/s PA's and rib springs, grade 4 manip w/ min cavitation   Rationale: decrease pain, increase ROM, increase tissue extensibility, decrease trigger points and increase postural awareness to improve mobility and activity tolerance     min Gait Training:  ___ feet with ___ device on level surfaces with ___ level of assist   Rationale: With   [] TE   [] TA   [] neuro   [] other: Patient Education: [x] Review HEP    [] Progressed/Changed HEP based on:   [] positioning   [] body mechanics   [] transfers   [] heat/ice application    [] other:      Other Objective/Functional Measures:      Pain Level (0-10 scale) post treatment: 0/10    ASSESSMENT/Changes in Function: Pt is progressing well and demo's improved mobility and while his unlar NTT is still (+) it is improved. Added radial nerve glide to HEP. Continue to progress as tolerated. Patient will continue to benefit from skilled PT services to modify and progress therapeutic interventions, address functional mobility deficits, address ROM deficits, address strength deficits, analyze and address soft tissue restrictions, analyze and cue movement patterns, analyze and modify body mechanics/ergonomics and instruct in home and community integration to attain remaining goals.      []  See Plan of Care  []  See progress note/recertification  []  See Discharge Summary         Progress towards goals / Updated goals:  Short Term Goals: To be accomplished in 1 weeks:  1. Pt will be independent and compliant w/ HEP to progress gains in PT. At eval: initiated HEP  Current: met per pt report 12/26/17  Long Term Goals: To be accomplished in 6 weeks:  1. Pt will improve FOTO to > or = to 65 to demo improved function. At eval: FOTO = 59  2. Pt will increase L ulnar nerve mobility as demo'd by (-) Ulnar NTT for decreased n/t in the L hand. At eval: (+) L ulnar NTT  3. Pt will improve posture as demo'd by correct shoulder positioning to decrease impingement symptoms. At eval: rounded shoulders in sitting  4. Pt will report > or = to 75% improvement in symptoms for ease w/ return to normal ADLs.    At eval: 0%  Current: progressing, pt reports 50% improvement in symptoms since starting 1/11/18    PLAN  [x]  Upgrade activities as tolerated     [x]  Continue plan of care  []  Update interventions per flow sheet       []  Discharge due to:_  []  Other:_      Gurinder Liriano, PT 1/11/2018  8:04 AM    Future Appointments  Date Time Provider Bryant Zavaleta   1/17/2018 8:00 AM Gurinder Liriano PT [facility]   1/19/2018 7:30 AM Gurinder Liriano PT [facility]   1/22/2018 6:00 PM Gurinder Liriano PT [facility]   1/26/2018 7:30 AM Gurinder Liriano PT [facility]   1/30/2018 6:00 PM Gurinder Liriano PT [facility]   2/1/2018 5:30 PM Kathleen Hanson PT [facility]   3/12/2018 3:30 PM Melita Gates  [address]   8/29/2018 9:00 AM Lenora Rios MD [id number]   8/29/2018 9:00 AM [facility] [place]

## 2018-01-17 ENCOUNTER — HOSPITAL ENCOUNTER (OUTPATIENT)
Dept: PHYSICAL THERAPY | Age: 48
Discharge: HOME | End: 2018-01-17
Payer: OTHER GOVERNMENT

## 2018-01-17 PROCEDURE — 97140 MANUAL THERAPY 1/> REGIONS: CPT | Performed by: PHYSICAL THERAPIST

## 2018-01-17 PROCEDURE — 97110 THERAPEUTIC EXERCISES: CPT | Performed by: PHYSICAL THERAPIST

## 2018-01-17 NOTE — PROGRESS NOTES
PT DAILY TREATMENT NOTE     Patient Name: Luisa Hayes  Date:2018  : 1970  [x]  Patient  Verified  Payor:  / Plan: [facility]  ACTIVE DUTY AND DEPENDENTS / Product Type:  /    In time:745  Out time:843  Total Treatment Time (min): 58  Visit #: 6 of 12    Treatment Area: Myalgia [M79.1]  Other specific arthropathies, not elsewhere classified, other specified site [M12.88]    SUBJECTIVE  Pain Level (0-10 scale): 0/10 except chest 2/10  Any medication changes, allergies to medications, adverse drug reactions, diagnosis change, or new procedure performed?: [x] No    [] Yes (see summary sheet for update)  Subjective functional status/changes:   [] No changes reported  Pt reports his chest has tightened on the L and has gotten up to a 4/10 pain and just on the L side. OBJECTIVE  Dry Needling Procedure Note    Procedure: An intramuscular manual therapy (dry needling) and a neuro-muscular re-education treatment was done to deactivate myofascial trigger points with a 30 gauge filament needle under aseptic technique. Indications:  [x] Myofascial pain and dysfunction [x] Muscled spasms  [] Myalgia/myositis   [] Muscle cramps  [x] Muscle imbalances  [] Temporomandibular Dysfunction  [] Other:    Chart reviewed for the following:  Reynold ATKINSON, PT, have reviewed the medical history, summary list and precautions/contraindications for Luisa Hayes.   TIME OUT performed immediately prior to start of procedure:  Reynold ATKINSON, PT, have performed the following reviews on Luisa Hayes prior to the start of the session:      [x] Verified patient identification by name and date of birth    [x] Agreement on all muscles being treated was verified   [x] Purpose of dry needling, side effects, possible complications, risks and benefits were explained to the patient   [x] Procedure site(s) verified  [x] Patient was positioned for comfort and draped for privacy  [x] Informed Consent was signed (initial visit) and verified verbally (subsequent visits)  [x] Patient was instructed on the need to report the use of blood thinners and/or immunosuppressant medications  [x] How to respond to possible adverse effects of treatment  [x] Self treatment of post needling soreness: ice, heat (moist heat, heat wraps) and stretching  [x] Opportunity was given to ask any questions, all questions were answered            Time: 818  Date of procedure: 1/17/2018  Treatment: The following muscles were treated today with intramuscular dry needling  [] Left [] Right Masster  [] Left [] Right Temporalis  [] Left [] Right Zygomaticus Major / Minor  [] Left [] Right Lateral Pterygoid  [] Left [] Right Medial Pterygoid  [] Left [] Right Digastric Post / Anterior Belly  [] Left [] Right Sternocleidomastoid  [] Left [] Right Scalene Anterior / Medial / Posterior  [] Left [] Right Extra Laryngeal Muscles  [] Left [] Right Upper Trapezius  [] Left [] Right Middle Trapezius  [] Left [] Right Lower Trapezius  [] Left [] Right Oblique Capitis Inferior  [] Left [] Right Splenius Capitis / Cervicis  [] Left [] Right Semispinalis: Capitis / Cervicis  [] Left [] Right Multifidi / Rotatores Cervicis / Thoracic  [] Left [] Right Longissimus Thoracis / Illiocostalis  [] Left [] Right Levator Scapulae  [] Left [] Right Supraspinatus / Infraspinatus  [] Left [] Right Teres Major / Minor  [] Left [] Right Rhomboids / Serratus posterior superior  [x] Left [] Right Pectoralis Major / Minor  [] Left [] Right Serratus Anterior  [] Left [] Right Latissimus Dorsi  [] Left [] Right Subscapularis  [] Left [] Right Coracobrachialis  [] Left [] Right Biceps Brachii  [] Left [] Right Deltoid: Anterior / Medial / Posterior  [] Left [] Right Brachialis  [] Left [] Right Triceps  [] Left [] Right Brachioradialis  [] Left [] Right Extensor Carpi Radialis Brevis / Extensor Carpi Radialis Longus     [] Left [] Right  Extensor digitorum  [] Left [] Right Supinator / Pronator Teres  [] Left [] Right Flexor Carpi Radialis/ Flexor Carpi Ulnaris   [] Left [] Right  Flexor Digitorum Superficialis/ Flexor Digitorum Profundus  [] Left [] Right Flexor Pollicis Longus / Flexor Pollicis Brevis / Palmaris Longus  [] Left [] Right Abductor Pollicis Longus / Abductor Pollicis Brevis  [] Left [] Right Opponens Pollicis / Adductor Pollicis  [] Left [] Right Dorsal / Palmar Interossei / Lumbricalis  [] Left [] Right Abductor Digiti Minimi / Opponens Digiti Minimi    Patient's response to today's treatment:  [] Latent Twitch Response     [] Muscle relaxation [] Pain Relief  [] Post needling soreness    [] without complications  [] Increased Range of Motion   [] Decreased headaches    [] Decreased Tinnitus  [] Other:     Performed by: Serg Low PT      Modality rationale: decrease edema, decrease inflammation and decrease pain to improve the patients ability to improve post needling soreness   Min Type Additional Details    [] Estim:  []Unatt       []IFC  []Premod                        []Other:  []w/ice   []w/heat  Position:  Location:    [] Estim: []Att    []TENS instruct  []NMES                    []Other:  []w/US   []w/ice   []w/heat  Position:  Location:    []  Traction: [] Cervical       []Lumbar                       [] Prone          []Supine                       []Intermittent   []Continuous Lbs:  [] before manual  [] after manual    []  Ultrasound: []Continuous   [] Pulsed                           []1MHz   []3MHz W/cm2:  Location:    []  Iontophoresis with dexamethasone         Location: [] Take home patch   [] In clinic   10 [x]  Ice     []  heat  []  Ice massage  []  Laser   []  Anodyne Position: long sitting  Location: L chest    []  Laser with stim  []  Other:  Position:  Location:    []  Vasopneumatic Device Pressure:       [] lo [] med [] hi   Temperature: [] lo [] med [] hi   [] Skin assessment post-treatment:  []intact []redness- no adverse reaction    []redness  adverse reaction:      min []Eval                  []Re-Eval       35 min Therapeutic Exercise:  [x] See flow sheet : increased per flow sheet    Rationale: increase ROM, increase strength, improve coordination and increase proprioception to improve the patients ability to decrease pain and improve activity tolerance      min Therapeutic Activity:  []  See flow sheet :   Rationale:   to improve the patients ability to       min Neuromuscular Re-education:  []  See flow sheet :   Rationale:   to improve the patients ability to     13 min Manual Therapy:  IMDN as listed above to include education, palpation, hemostasis, and STM/stretching to treated areas, manual TPR to L pec along the sternum and inntercostals   Rationale: decrease pain, increase ROM, increase tissue extensibility, decrease trigger points and increase postural awareness to decrease myofascial restrictions and allow of greater improvement in mobility      min Gait Training:  ___ feet with ___ device on level surfaces with ___ level of assist   Rationale: With   [] TE   [] TA   [] neuro   [] other: Patient Education: [x] Review HEP    [] Progressed/Changed HEP based on:   [] positioning   [] body mechanics   [] transfers   [] heat/ice application    [] other:      Other Objective/Functional Measures:      Pain Level (0-10 scale) post treatment: 1.5/10 post needling soreness     ASSESSMENT/Changes in Function: Pt progressing well improve mobility and decreasing neural tension. Patient will continue to benefit from skilled PT services to modify and progress therapeutic interventions, address functional mobility deficits, address ROM deficits, address strength deficits, analyze and address soft tissue restrictions, analyze and cue movement patterns, analyze and modify body mechanics/ergonomics, assess and modify postural abnormalities and instruct in home and community integration to attain remaining goals.      [] See Plan of Care  []  See progress note/recertification  []  See Discharge Summary         Progress towards goals / Updated goals:  Short Term Goals: To be accomplished in 1 weeks:  1. Pt will be independent and compliant w/ HEP to progress gains in PT. At eval: initiated HEP  Current: met per pt report 12/26/17  Long Term Goals: To be accomplished in 6 weeks:  1. Pt will improve FOTO to > or = to 65 to demo improved function. At eval: FOTO = 59  Current: progressing, FOTO = 61 1/17/18  2. Pt will increase L ulnar nerve mobility as demo'd by (-) Ulnar NTT for decreased n/t in the L hand. At eval: (+) L ulnar NTT  3. Pt will improve posture as demo'd by correct shoulder positioning to decrease impingement symptoms. At eval: rounded shoulders in sitting  4. Pt will report > or = to 75% improvement in symptoms for ease w/ return to normal ADLs.    At eval: 0%  Current: progressing, pt reports 50% improvement in symptoms since starting 1/11/18    PLAN  [x]  Upgrade activities as tolerated     [x]  Continue plan of care  []  Update interventions per flow sheet       []  Discharge due to:_  []  Other:_      Haydee Cabrera PT 1/17/2018  7:53 AM    Future Appointments  Date Time Provider Bryant Daltoni   1/17/2018 8:00 AM Haydee Cabrera PT [facility] [address]   1/19/2018 7:30 AM Haydee Cabrera PT [facility] [address]   1/22/2018 6:00 PM Haydee Cabrera PT [facility] [address]   1/26/2018 7:30 AM Haydee Cabrera PT [facility] [address]   1/30/2018 6:00 PM Haydee Cabrera PT [facility] [address]   2/1/2018 5:30 PM Bo Rhodes PT [facility] [address]   3/12/2018 3:30 PM Zachary Deras  [address]   8/29/2018 9:00 AM Anamaria Elkins  [facility]   8/29/2018 9:00 AM [facility] [place]

## 2018-01-19 ENCOUNTER — HOSPITAL ENCOUNTER (OUTPATIENT)
Dept: PHYSICAL THERAPY | Age: 48
Discharge: HOME | End: 2018-01-19
Payer: OTHER GOVERNMENT

## 2018-01-19 PROCEDURE — 97110 THERAPEUTIC EXERCISES: CPT | Performed by: PHYSICAL THERAPIST

## 2018-01-19 PROCEDURE — 97140 MANUAL THERAPY 1/> REGIONS: CPT | Performed by: PHYSICAL THERAPIST

## 2018-01-19 NOTE — PROGRESS NOTES
PT DAILY TREATMENT NOTE     Patient Name: Leoncio Swan  Date:2018  : 1970  [x]  Patient  Verified  Payor:  / Plan: [facility]  ACTIVE DUTY AND DEPENDENTS / Product Type:  /    In time:750  Out time:826  Total Treatment Time (min): 36  Visit #: 7 of 12    Treatment Area: Myalgia [M79.1]  Other specific arthropathies, not elsewhere classified, other specified site [M12.88]    SUBJECTIVE  Pain Level (0-10 scale): 0/10  Any medication changes, allergies to medications, adverse drug reactions, diagnosis change, or new procedure performed?: [x] No    [] Yes (see summary sheet for update)  Subjective functional status/changes:   [] No changes reported  Pt reports he is feeling better in the L pec and that's it's sore but stretching now.  Reports relief and 90% improvement in symptoms    OBJECTIVE    Modality rationale:  to improve the patients ability to    Min Type Additional Details    [] Estim:  []Unatt       []IFC  []Premod                        []Other:  []w/ice   []w/heat  Position:  Location:    [] Estim: []Att    []TENS instruct  []NMES                    []Other:  []w/US   []w/ice   []w/heat  Position:  Location:    []  Traction: [] Cervical       []Lumbar                       [] Prone          []Supine                       []Intermittent   []Continuous Lbs:  [] before manual  [] after manual    []  Ultrasound: []Continuous   [] Pulsed                           []1MHz   []3MHz W/cm2:  Location:    []  Iontophoresis with dexamethasone         Location: [] Take home patch   [] In clinic    []  Ice     []  heat  []  Ice massage  []  Laser   []  Anodyne Position:  Location:    []  Laser with stim  []  Other:  Position:  Location:    []  Vasopneumatic Device Pressure:       [] lo [] med [] hi   Temperature: [] lo [] med [] hi   [] Skin assessment post-treatment:  []intact []redness- no adverse reaction    []redness  adverse reaction:      min []Eval                  []Re-Eval 28 min Therapeutic Exercise:  [x] See flow sheet : increased and adjusted per flow sheet   Rationale: increase ROM, increase strength, improve coordination and increase proprioception to improve the patients ability to decrease irritation and improve activity tolerance     min Therapeutic Activity:  []  See flow sheet :   Rationale:   to improve the patients ability to       min Neuromuscular Re-education:  []  See flow sheet :   Rationale:   to improve the patients ability to     8 min Manual Therapy:  T/s PA's, rib springs, grade 4 thoracic PA manip w/ cavitation   Rationale: decrease pain, increase ROM, increase tissue extensibility, decrease trigger points and increase postural awareness to decrease myofascial restrictions and improve postural tolerance     min Gait Training:  ___ feet with ___ device on level surfaces with ___ level of assist   Rationale: With   [] TE   [] TA   [] neuro   [] other: Patient Education: [x] Review HEP    [] Progressed/Changed HEP based on:   [] positioning   [] body mechanics   [] transfers   [] heat/ice application    [] other:      Other Objective/Functional Measures: see below     Pain Level (0-10 scale) post treatment: 0/10    ASSESSMENT/Changes in Function: Pt has made great progression with PT and is reporting 90% improvement in symptoms since [facility]. Reports minimal L hand tingling and that strength is returning. Will decrease sessions to 1x/week to assess tolerance to DC. Patient will continue to benefit from skilled PT services to modify and progress therapeutic interventions, address functional mobility deficits, analyze and address soft tissue restrictions, analyze and cue movement patterns, analyze and modify body mechanics/ergonomics, assess and modify postural abnormalities and instruct in home and community integration to attain remaining goals.      []  See Plan of Care  []  See progress note/recertification  []  See Discharge Summary Progress towards goals / Updated goals:  Short Term Goals: To be accomplished in 1 weeks:  1. Pt will be independent and compliant w/ HEP to progress gains in PT. At eval: initiated HEP  Current: met per pt report 12/26/17  Long Term Goals: To be accomplished in 6 weeks:  1. Pt will improve FOTO to > or = to 65 to demo improved function. At eval: FOTO = 59  Current: progressing, FOTO = 61 1/17/18  2. Pt will increase L ulnar nerve mobility as demo'd by (-) Ulnar NTT for decreased n/t in the L hand. At eval: (+) L ulnar NTT  Current: met, (-) L ulnar NTT 1/19/18  3. Pt will improve posture as demo'd by correct shoulder positioning to decrease impingement symptoms. At eval: rounded shoulders in sitting  Current: progressing, improved overall sitting posture and able to correct to shoulder placement with min VC's 1/19/18  4. Pt will report > or = to 75% improvement in symptoms for ease w/ return to normal ADLs.    At eval: 0%  Current: met, pt reports 90% improvement in symptoms since starting 1/19/18    PLAN  [x]  Upgrade activities as tolerated     [x]  Continue plan of care  []  Update interventions per flow sheet       []  Discharge due to:_  []  Other:_      Serg Low PT 1/19/2018  8:11 AM    Future Appointments  Date Time Provider Bryant Zavaleta   1/22/2018 6:00 PM Serg Low PT [facility]   1/26/2018 7:30 AM Serg Low PT [facility]   1/30/2018 6:00 PM Serg Low PT [facility]   2/1/2018 5:30 PM Iqra Smart PT [facility]   3/12/2018 3:30 PM Duane Ensign,  [address]   8/29/2018 9:00 AM Ninfa Roberson MD [facility]   8/29/2018 9:00 AM [facility] [place]

## 2018-01-19 NOTE — PROGRESS NOTES
[facility]              [address]        [place], [address] [place]   [phone] fax    Progress Note  Patient name: Kelvin Hernandez Start of Care: 17   Referral source: Mckenzie Estrada MD : 1970   Medical/Treatment Diagnosis: Myalgia [M79.1]  Other specific arthropathies, not elsewhere classified, other specified site [M12.88] Onset Date:worsened ~2-3 months ago           Prior Hospitalization: see medical history Provider#: [id number]   Medications: Verified on Patient Summary List    Comorbidities: arthritis, asthma, back pain, BMI >30, DM, GI disease, HA, HBP, kidney/bladder/prostate/urination problems, prior surgery, sleep dysfunction   Prior Level of Function: hx of back and neck pain over the last 5 years, lumbar laminectomy, working full time, minimal numbness and tingling in the L UE  Visits from Start of Care: 7    Missed Visits: 1    Established Goals:        Excellent         Good         Limited            None  [] Increased ROM   []  []  []  []  [] Increased Strength  []  []  []  []  [] Increased Mobility  []  []  []  []   [] Decreased Pain   []  []  []  []  [] Decreased Swelling  []  []  []  []    Key Functional Changes:   Progress towards goals / Updated goals:  Short Term Goals: To be accomplished in 1 weeks:  1. Pt will be independent and compliant w/ HEP to progress gains in PT. At eval: initiated HEP  Current: met per pt report   Long Term Goals: To be accomplished in 6 weeks:  1. Pt will improve FOTO to > or = to 65 to demo improved function. At eval: FOTO = 59  Current: progressing, FOTO = 61   2. Pt will increase L ulnar nerve mobility as demo'd by (-) Ulnar NTT for decreased n/t in the L hand. At eval: (+) L ulnar NTT  Current: met, (-) L ulnar NTT   3. Pt will improve posture as demo'd by correct shoulder positioning to decrease impingement symptoms.    At eval: rounded shoulders in sitting  Current: progressing, improved overall sitting posture and able to correct to shoulder placement with min VC's   4. Pt will report > or = to 75% improvement in symptoms for ease w/ return to normal ADLs. At eval: 0%  Current: met, pt reports 90% improvement in symptoms since starting     Pt has made great progression with PT and is reporting 90% improvement in symptoms since [facility]. Reports minimal L hand tingling and that strength is returning. Will decrease sessions to 1x/week to assess tolerance to DC. Patient will continue to benefit from skilled PT services to modify and progress therapeutic interventions, address functional mobility deficits, analyze and address soft tissue restrictions, analyze and cue movement patterns, analyze and modify body mechanics/ergonomics, assess and modify postural abnormalities and instruct in home and community integration to attain remaining goals.     Updated Goals: to be achieved in 3 weeks:   Continue with unmet goals above    ASSESSMENT/RECOMMENDATIONS:  [x]Continue therapy per initial plan/protocol at a frequency of  1 x per week for 3 weeks  []Continue therapy with the following recommended changes:_____________________      _____________________________________________________________________  []Discontinue therapy progressing towards or have reached established goals  []Discontinue therapy due to lack of appreciable progress towards goals  []Discontinue therapy due to lack of attendance or compliance  []Await Physician's recommendations/decisions regarding therapy  []Other:________________________________________________________________    Thank you for this referral.    Haydee Cabrera, PT 1/19/2018 8:19 AM  NOTE TO PHYSICIAN:  PLEASE COMPLETE THE ORDERS BELOW AND   [facility]: [phone]  If you are unable to process this request in 24 hours please contact our office: [phone]    []  I have read the above report and request that my patient continue as recommended. []  I have read the above report and request that my patient continue therapy with the following changes/special instructions:________________________________________  []I have read the above report and request that my patient be discharged from therapy.     Physicians signature: ________________________________Date: _____Time:_____

## 2018-01-22 ENCOUNTER — APPOINTMENT (OUTPATIENT)
Dept: PHYSICAL THERAPY | Age: 48
End: 2018-01-22
Payer: OTHER GOVERNMENT

## 2018-01-26 ENCOUNTER — APPOINTMENT (OUTPATIENT)
Dept: PHYSICAL THERAPY | Age: 48
End: 2018-01-26
Payer: OTHER GOVERNMENT

## 2018-01-30 ENCOUNTER — HOSPITAL ENCOUNTER (OUTPATIENT)
Dept: PHYSICAL THERAPY | Age: 48
Discharge: HOME | End: 2018-01-30
Payer: OTHER GOVERNMENT

## 2018-01-30 PROCEDURE — 97140 MANUAL THERAPY 1/> REGIONS: CPT | Performed by: PHYSICAL THERAPIST

## 2018-01-30 PROCEDURE — 97110 THERAPEUTIC EXERCISES: CPT | Performed by: PHYSICAL THERAPIST

## 2018-01-30 NOTE — PROGRESS NOTES
[facility]              [address]        [place], [address] [place]   [phone] fax    Discharge Summary  Patient name: Gin Griggs Start of Care: 17   Referral source: Carmen Ovalle MD : 1970   Medical/Treatment Diagnosis: Myalgia [M79.1]  Other specific arthropathies, not elsewhere classified, other specified site [M12.88] Onset Date:worsened ~2-3 months prior to initial visit      Prior Hospitalization: see medical history Provider#: [id number]   Medications: Verified on Patient Summary List    Comorbidities: arthritis, asthma, back pain, BMI >30, DM, GI disease, HA, HBP, kidney/bladder/prostate/urination problems, prior surgery, sleep dysfunction   Prior Level of Function: hx of back and neck pain over the last 5 years, lumbar laminectomy, working full time, minimal numbness and tingling in the L UE  Visits from Start of Care: 8    Missed Visits: 2  Reporting Period : 17 to 18    Summary of Care:  Progress towards goals / Updated goals:  Short Term Goals: To be accomplished in 1 weeks:  1. Pt will be independent and compliant w/ HEP to progress gains in PT. At eval: initiated HEP  Current: met per pt report   Long Term Goals: To be accomplished in 6 weeks:  1. Pt will improve FOTO to > or = to 65 to demo improved function. At eval: FOTO = 59  Current: met, FOTO = 70  2. Pt will increase L ulnar nerve mobility as demo'd by (-) Ulnar NTT for decreased n/t in the L hand. At eval: (+) L ulnar NTT  Current: met, (-) L ulnar NTT   3. Pt will improve posture as demo'd by correct shoulder positioning to decrease impingement symptoms. At eval: rounded shoulders in sitting  Current: met, correct sitting posture w/ good alignment and able to verbalize adjustments he makes when notices poor sitting posture at work  4. Pt will report > or = to 75% improvement in symptoms for ease w/ return to normal ADLs.    At eval: 0%  Current: met, pt reports 90% improvement in symptoms since starting      Pt has made great progress with PT and has met all goals. Reviewed use of weights during gym program and ways to maintain thoracic mobility to assist in keeping impingement symptoms at bay. Pt is (I) w/ HEP and pain management. DC to HEP at this time.     ASSESSMENT/RECOMMENDATIONS:  [x]Discontinue therapy: [x]Patient has reached or is progressing toward set goals      []Patient is non-compliant or has abdicated      []Due to lack of appreciable progress towards set Shayne Love PT 1/30/2018 6:42 PM

## 2018-01-30 NOTE — PROGRESS NOTES
PT DAILY TREATMENT NOTE     Patient Name: Chary Peers  Date:2018  : 1970  [x]  Patient  Verified  Payor:  / Plan: [facility]  ACTIVE DUTY AND DEPENDENTS / Product Type:  /    In time:550  Out time:636  Total Treatment Time (min): 55  Visit #: 1 of 3    Treatment Area: Myalgia [M79.1]  Other specific arthropathies, not elsewhere classified, other specified site [M12.88]    SUBJECTIVE  Pain Level (0-10 scale): 0/10  Any medication changes, allergies to medications, adverse drug reactions, diagnosis change, or new procedure performed?: [x] No    [] Yes (see summary sheet for update)  Subjective functional status/changes:   [] No changes reported  Pt reports he had a minimal amount of pain along the L shoulder blade but was able to get rid of it with stretching and heat.      OBJECTIVE    Modality rationale:  to improve the patients ability to    Min Type Additional Details    [] Estim:  []Unatt       []IFC  []Premod                        []Other:  []w/ice   []w/heat  Position:  Location:    [] Estim: []Att    []TENS instruct  []NMES                    []Other:  []w/US   []w/ice   []w/heat  Position:  Location:    []  Traction: [] Cervical       []Lumbar                       [] Prone          []Supine                       []Intermittent   []Continuous Lbs:  [] before manual  [] after manual    []  Ultrasound: []Continuous   [] Pulsed                           []1MHz   []3MHz W/cm2:  Location:    []  Iontophoresis with dexamethasone         Location: [] Take home patch   [] In clinic    []  Ice     []  heat  []  Ice massage  []  Laser   []  Anodyne Position:  Location:    []  Laser with stim  []  Other:  Position:  Location:    []  Vasopneumatic Device Pressure:       [] lo [] med [] hi   Temperature: [] lo [] med [] hi   [] Skin assessment post-treatment:  []intact []redness- no adverse reaction    []redness  adverse reaction:      min []Eval                  []Re-Eval 38 min Therapeutic Exercise:  [x] See flow sheet : adjusted to allow for reassessment for DC and review of use of foam roller for HEP to assist w/ thoracic mobility   Rationale: increase ROM, increase strength, improve coordination and increase proprioception to improve the patients ability to decrease pain and improve activity tolerance      min Therapeutic Activity:  []  See flow sheet :   Rationale:   to improve the patients ability to       min Neuromuscular Re-education:  []  See flow sheet :   Rationale:   to improve the patients ability to     8 min Manual Therapy:  T/s PA's, rib springs, grade 4 thoracic PA manip w/ cavitation   Rationale: decrease pain, increase ROM, increase tissue extensibility, decrease trigger points and increase postural awareness to improve activity tolerance and mobility      min Gait Training:  ___ feet with ___ device on level surfaces with ___ level of assist   Rationale: With   [] TE   [] TA   [] neuro   [] other: Patient Education: [x] Review HEP    [] Progressed/Changed HEP based on:   [] positioning   [] body mechanics   [] transfers   [] heat/ice application    [] other:      Other Objective/Functional Measures: see below      Pain Level (0-10 scale) post treatment: 0/10    ASSESSMENT/Changes in Function: Pt has made great progress with PT and has met all goals. Reviewed use of weights during gym program and ways to maintain thoracic mobility to assist in keeping impingement symptoms at bay. Pt is (I) w/ HEP and pain management. DC to HEP at this time     []  See Plan of Care  []  See progress note/recertification  [x]  See Discharge Summary         Progress towards goals / Updated goals:  Short Term Goals: To be accomplished in 1 weeks:  1. Pt will be independent and compliant w/ HEP to progress gains in PT. At eval: initiated HEP  Current: met per pt report 12/26/17  Long Term Goals: To be accomplished in 6 weeks:  1.  Pt will improve FOTO to > or = to 65 to demo improved function. At eval: FOTO = 59  Current: met, FOTO = 70 1/30/18  2. Pt will increase L ulnar nerve mobility as demo'd by (-) Ulnar NTT for decreased n/t in the L hand. At eval: (+) L ulnar NTT  Current: met, (-) L ulnar NTT 1/19/18  3. Pt will improve posture as demo'd by correct shoulder positioning to decrease impingement symptoms. At eval: rounded shoulders in sitting  Current: met, correct sitting posture w/ good alignment and able to verbalize adjustments he makes when notices poor sitting posture at work 1/30/18  4. Pt will report > or = to 75% improvement in symptoms for ease w/ return to normal ADLs.    At eval: 0%  Current: met, pt reports 90% improvement in symptoms since starting 1/19/18    PLAN  []  Upgrade activities as tolerated     []  Continue plan of care  []  Update interventions per flow sheet       [x]  Discharge due to: progressing towards or has met all goals  []  Other:_      Kenneth Escalante PT 1/30/2018  6:25 PM    Future Appointments  Date Time Provider \[facility] [place] [facility]\""   3/12/2018 3:30 PM Oscar Winters  [address] [facility]   8/29/2018 9:00 AM Jaspreet Sheppard  [facility]   8/29/2018 9:00 AM [facility] [place]

## 2018-02-01 ENCOUNTER — APPOINTMENT (OUTPATIENT)
Dept: PHYSICAL THERAPY | Age: 48
End: 2018-02-01

## 2018-03-12 ENCOUNTER — OFFICE VISIT (OUTPATIENT)
Dept: ORTHOPEDIC SURGERY | Age: 48
End: 2018-03-12

## 2018-03-12 VITALS
OXYGEN SATURATION: 95 % | HEIGHT: 67 IN | SYSTOLIC BLOOD PRESSURE: 131 MMHG | HEART RATE: 99 BPM | DIASTOLIC BLOOD PRESSURE: 89 MMHG | WEIGHT: 289.6 LBS | BODY MASS INDEX: 45.45 KG/M2 | TEMPERATURE: 98 F | RESPIRATION RATE: 18 BRPM

## 2018-03-12 DIAGNOSIS — M50.20 HNP (HERNIATED NUCLEUS PULPOSUS), CERVICAL: ICD-10-CM

## 2018-03-12 DIAGNOSIS — M79.18 MYOFASCIAL PAIN: Primary | ICD-10-CM

## 2018-03-12 DIAGNOSIS — G89.29 OTHER CHRONIC PAIN: ICD-10-CM

## 2018-03-12 DIAGNOSIS — Z79.891 USE OF OPIATES FOR THERAPEUTIC PURPOSES: ICD-10-CM

## 2018-03-12 DIAGNOSIS — M62.838 MUSCLE SPASM: ICD-10-CM

## 2018-03-12 RX ORDER — LIDOCAINE HYDROCHLORIDE 20 MG/ML
1 INJECTION, SOLUTION EPIDURAL; INFILTRATION; INTRACAUDAL; PERINEURAL ONCE
Qty: 1 ML | Refills: 0
Start: 2018-03-12 | End: 2018-03-12

## 2018-03-12 RX ORDER — BETAMETHASONE SODIUM PHOSPHATE AND BETAMETHASONE ACETATE 3; 3 MG/ML; MG/ML
6 INJECTION, SUSPENSION INTRA-ARTICULAR; INTRALESIONAL; INTRAMUSCULAR; SOFT TISSUE ONCE
Qty: 1 ML | Refills: 0
Start: 2018-03-12 | End: 2018-03-12

## 2018-03-12 RX ORDER — BENAZEPRIL HYDROCHLORIDE 20 MG/1
20 TABLET ORAL DAILY
COMMUNITY

## 2018-03-12 RX ORDER — METAXALONE 800 MG/1
TABLET ORAL
Qty: 60 TAB | Refills: 3 | Status: SHIPPED | OUTPATIENT
Start: 2018-03-12 | End: 2019-07-03 | Stop reason: SDUPTHER

## 2018-03-12 RX ORDER — TRAMADOL HYDROCHLORIDE 50 MG/1
TABLET ORAL
Qty: 60 TAB | Refills: 1 | Status: SHIPPED | OUTPATIENT
Start: 2018-03-12

## 2018-03-12 RX ORDER — BUPIVACAINE HYDROCHLORIDE 2.5 MG/ML
2.5 INJECTION, SOLUTION INFILTRATION; PERINEURAL ONCE
Qty: 1 ML | Refills: 0
Start: 2018-03-12 | End: 2018-03-12

## 2018-03-12 RX ORDER — AMLODIPINE BESYLATE 5 MG/1
5 TABLET ORAL DAILY
COMMUNITY

## 2018-03-12 NOTE — PATIENT INSTRUCTIONS
Neck Arthritis: Exercises  Your Care Instructions  Here are some examples of typical rehabilitation exercises for your condition. Start each exercise slowly. Ease off the exercise if you start to have pain. Your doctor or physical therapist will tell you when you can start these exercises and which ones will work best for you. How to do the exercises  Neck stretches to the side    1. This stretch works best if you keep your shoulder down as you lean away from it. To help you remember to do this, start by relaxing your shoulders and lightly holding on to your thighs or your chair. 2. Tilt your head toward your shoulder and hold for 15 to 30 seconds. Let the weight of your head stretch your muscles. 3. Repeat 2 to 4 times toward each shoulder. Chin tuck    1. Lie on the floor with a rolled-up towel under your neck. Your head should be touching the floor. 2. Slowly bring your chin toward your chest.  3. Hold for a count of 6, and then relax for up to 10 seconds. 4. Repeat 8 to 12 times. Active cervical rotation    1. Sit in a firm chair, or stand up straight. 2. Keeping your chin level, turn your head to the right, and hold for 15 to 30 seconds. 3. Turn your head to the left and hold for 15 to 30 seconds. 4. Repeat 2 to 4 times to each side. Shoulder blade squeeze    1. While standing, squeeze your shoulder blades together. 2. Do not raise your shoulders up as you are squeezing. 3. Hold for 6 seconds. 4. Repeat 8 to 12 times. Shoulder rolls    1. Sit comfortably with your feet shoulder-width apart. You can also do this exercise standing up. 2. Roll your shoulders up, then back, and then down in a smooth, circular motion. 3. Repeat 2 to 4 times. Follow-up care is a key part of your treatment and safety. Be sure to make and go to all appointments, and call your doctor if you are having problems. It's also a good idea to know your test results and keep a list of the medicines you take.   Where can you learn more? Go to http://marija-marianne.info/. Enter Y397 in the search box to learn more about \"Neck Arthritis: Exercises. \"  Current as of: March 21, 2017  Content Version: 11.4  © 7412-3874 [employer]. Care instructions adapted under license by [employer] (which disclaims liability or warranty for this information). If you have questions about a medical condition or this instruction, always ask your healthcare professional. [address] any warranty or liability for your use of this information. Healthy Upper Back: Exercises  Your Care Instructions  Here are some examples of exercises for your upper back. Start each exercise slowly. Ease off the exercise if you start to have pain. Your doctor or physical therapist will tell you when you can start these exercises and which ones will work best for you. How to do the exercises  Lower neck and upper back stretch    4. Stretch your arms out in front of your body. Clasp one hand on top of your other hand. 5. Gently reach out so that you feel your shoulder blades stretching away from each other. 6. Gently bend your head forward. 7. Hold for 15 to 30 seconds. 8. Repeat 2 to 4 times. Midback stretch    If you have knee pain, do not do this exercise. 5. Kneel on the floor, and sit back on your ankles. 6. Lean forward, place your hands on the floor, and stretch your arms out in front of you. Rest your head between your arms. 7. Gently push your chest toward the floor, reaching as far in front of you as possible. 8. Hold for 15 to 30 seconds. 9. Repeat 2 to 4 times. Shoulder rolls    5. Sit comfortably with your feet shoulder-width apart. You can also do this exercise while standing. 6. Roll your shoulders up, then back, and then down in a smooth, circular motion. 7. Repeat 2 to 4 times. Wall push-up    5. Stand against a wall with your feet about 12 to 24 inches back from the wall. If you feel any pain when you do this exercise, stand closer to the wall. 6. Place your hands on the wall slightly wider apart than your shoulders, and lean forward. 7. Gently lean your body toward the wall. Then push back to your starting position. Keep the motion smooth and controlled. 8. Repeat 8 to 12 times. Resisted shoulder blade squeeze    For this exercise, you will need elastic exercise material, such as surgical tubing or Thera-Band. 4. Sit or stand, holding the band in both hands in front of you. Keep your elbows close to your sides, bent at a 90-degree angle. Your palms should face up. 5. Squeeze your shoulder blades together, and move your arms to the outside, stretching the band. Be sure to keep your elbows at your sides while you do this. 6. Relax. 7. Repeat 8 to 12 times. Resisted rows    For this exercise, you will need elastic exercise material, such as surgical tubing or Thera-Band. 1. Put the band around a solid object, such as a bedpost, at about waist level. Hold one end of the band in each hand. 2. With your elbows at your sides and bent to 90 degrees, pull the band back to move your shoulder blades toward each other. Return to the starting position. 3. Repeat 8 to 12 times. Follow-up care is a key part of your treatment and safety. Be sure to make and go to all appointments, and call your doctor if you are having problems. It's also a good idea to know your test results and keep a list of the medicines you take. Where can you learn more? Go to http://marija-marianne.info/. Enter Y702 in the search box to learn more about \"Healthy Upper Back: Exercises. \"  Current as of: March 21, 2017  Content Version: 11.4  © 4867-8087 [employer]. Care instructions adapted under license by [employer] (which disclaims liability or warranty for this information).  If you have questions about a medical condition or this instruction, always ask your healthcare professional. [address] any warranty or liability for your use of this information.

## 2018-03-12 NOTE — PROGRESS NOTES
[facility]  [address] [place] [address], [facility] [address]  Phone: [phone]  Fax: [phone]    Pt's YOB: 1970    ASSESSMENT   Diagnoses and all orders for this visit:    1. Myofascial pain  -     metaxalone (SKELAXIN) 800 mg tablet; 1 po bid prn   Indications: Muscle Spasm  -     traMADol (ULTRAM) 50 mg tablet; 1 po bid prn severe pain  -     bupivacaine (MARCAINE) 0.25 % (2.5 mg/mL) soln injection; 1 mL by IntraMUSCular route once for 1 dose. -     INJECTION, BUPIVICAINE HYDRO  -     LIDOCAINE INJECTION  -     lidocaine, PF, (XYLOCAINE) 20 mg/mL (2 %) injection; 1 mL by Other route once for 1 dose. -     betamethasone (CELESTONE SOLUSPAN) 6 mg/mL injection; 1 mL by IntraMUSCular route once for 1 dose.  -     BETAMETHASONE ACETATE & SODIUM PHOSPHATE INJECTION 6 MG  -     36368 - INJECT TRIGGER POINTS, > 3    2. Other chronic pain  -     DRUG SCREEN UR - W/ CONFIRM; Future  -     traMADol (ULTRAM) 50 mg tablet; 1 po bid prn severe pain    3. Use of opiates for therapeutic purposes  -     DRUG SCREEN UR - W/ CONFIRM; Future  -     traMADol (ULTRAM) 50 mg tablet; 1 po bid prn severe pain    4. Muscle spasm  -     metaxalone (SKELAXIN) 800 mg tablet; 1 po bid prn   Indications: Muscle Spasm    5. HNP (herniated nucleus pulposus), cervical  -     traMADol (ULTRAM) 50 mg tablet; 1 po bid prn severe pain         IMPRESSION AND PLAN:  Kathy Griggs is a 57 y.o. male with history of cervical pain. He tried physical therapy with dry needling since his last office visit with significant improvement. Pt has been taking Lyrica 100 mg 1 tab QAM and 1 tab QHS, Skelaxin 800 mg, and Ultram 50 mg rarely as his pain warrants. 1) Pt was given information on cervical arthritis and upper back exercises. 2) He will continue taking Lyrica 100 mg 1 tab BID. 3) Pt received a refill of Ultram 50 mg 1 tab BID prn pain.   4) He also received a refill of Skelaxin 800 mg 1 tab BID prn muscle spasms. 5) A UDS was obtained and he updated his pain agreement. 6) Pt received trigger point injections in the office today. 7) Mr. Kenyon Collier has a reminder for a \"due or due soon\" health maintenance. I have asked that he contact his primary care provider, Lincoln Johnson MD, for follow-up on this health maintenance. 8)  demonstrated consistency with prescribing. 9) Pt will follow-up in 3 months or sooner if needed. HISTORY OF PRESENT ILLNESS:  Suzanna Sol is a 57 y.o. male with history of cervical pain. He tried physical therapy with dry needling since his last office visit with significant improvement. Pt admits that he continues to experience some tightness across the upper back and wishes to try trigger point injections today. Pt has been prescribed Lyrica 100 mg and takes 1 tab QAM and 1 tab QHS. He also takes Skelaxin 800 mg and Ultram 50 mg rarely as his pain warrants. Pt reports that he generally takes the Ultram 50 mg 6 x a month. Pt at this time desires to proceed with trigger point injections and medication refills.     Pain Scale: 4/10    PCP: Lincoln Johnson MD     Past Medical History:   Diagnosis Date    Adverse effect of anesthesia     for back sx-had issues with breathing  with apnea- 6 years ago    Asthma     Chest pain, unspecified     ATYPICAL,POSSIBLE ANGINA,MUSCULAR,CAD,CHEST WALL PAINS PERSISTANT AND RECURRENT    Chronic kidney disease     Diabetes mellitus ([address]     DM (diabetes mellitus) ([address]     GERD (gastroesophageal reflux disease)     HTN (hypertension)     Hypercholesteremia     Hypertension     Kidney stones     Sleep apnea     cpap        Social History     Social History    Marital status:      Spouse name: N/A    Number of children: N/A    Years of education: N/A     Occupational History          Social History Main Topics    Smoking status: Never Smoker    Smokeless tobacco: Never Used    Alcohol use No    Drug use: No    Sexual activity: Not on file     Other Topics Concern    Not on file     Social History Narrative    ** Merged History Encounter **            Current Outpatient Prescriptions   Medication Sig Dispense Refill    amLODIPine (NORVASC) 5 mg tablet Take 5 mg by mouth daily.  benazepril (LOTENSIN) 20 mg tablet Take 20 mg by mouth daily.  metaxalone (SKELAXIN) 800 mg tablet 1 po bid prn   Indications: Muscle Spasm 60 Tab 3    traMADol (ULTRAM) 50 mg tablet 1 po bid prn severe pain 60 Tab 1    pregabalin (LYRICA) 100 mg capsule Take 1 Cap by mouth two (2) times a day. Max Daily Amount: 200 mg. Indications: FIBROMYALGIA 180 Cap 1    hydroCHLOROthiazide (MICROZIDE) 12.5 mg capsule       pravastatin (PRAVACHOL) 20 mg tablet       cpap machine kit by Does Not Apply route.  Potassium Citrate 15 mEq TbER Take  by mouth two (2) times a day.  ondansetron (ZOFRAN ODT) 4 mg disintegrating tablet Take 1-2 tablets every 6-8 hours as needed for nausea and vomiting. 10 Tab 0    tamsulosin (FLOMAX) 0.4 mg capsule Take 1 Cap by mouth daily (after dinner). 30 Cap 0    carvedilol (COREG CR) 10 mg CR capsule Take  by mouth daily (with breakfast).  montelukast (SINGULAIR) 10 mg tablet Take 10 mg by mouth daily.  insulin glargine (LANTUS SOLOSTAR) 100 unit/mL (3 mL) pen by SubCUTAneous route.  insulin aspart (NOVOLOG) 100 unit/mL inpn by SubCUTAneous route.  magnesium citrate 100 mg tab Take 300 mg by mouth daily.  aspirin 81 mg chewable tablet Take 81 mg by mouth daily.  fluticasone-salmeterol (ADVAIR DISKUS) 250-50 mcg/dose diskus inhaler Take 1 Puff by inhalation every twelve (12) hours.  Dexlansoprazole (DEXILANT) 60 mg CpDM Take 60 mg by mouth daily.  albuterol (VENTOLIN HFA) 90 mcg/actuation inhaler Take  by inhalation every six (6) hours as needed.       HYDROcodone-acetaminophen (NORCO) 5-325 mg per tablet Take 1 Tab by mouth every six (6) hours as needed for Pain. Max Daily Amount: 4 Tabs. Indications: Pain 25 Tab 0    amLODIPine-benazepril (LOTREL) 5-20 mg per capsule Take 1 Cap by mouth daily.  Misc. Devices Kit Please provide the patient following size mask. Mask: Mirage FX large size mask. DME: [facility], [place], [place]  Phone: [phone], Fax: [phone] 1 Each 0       No Known Allergies      REVIEW OF SYSTEMS    Constitutional: Negative for fever, chills, or weight change. Respiratory: Negative for cough or shortness of breath. Cardiovascular: Negative for chest pain or palpitations. Gastrointestinal: Negative for acid reflux, change in bowel habits, or constipation. Genitourinary: Negative for dysuria and flank pain. Musculoskeletal: Positive for cervical pain and muscle spasm  Skin: Negative for rash. Neurological: Negative for headaches, dizziness, or numbness. Endo/Heme/Allergies: Negative for increased bruising. Psychiatric/Behavioral: Negative for difficulty with sleep. PHYSICAL EXAMINATION  Visit Vitals    /89    Pulse 99    Temp 98 °F (36.7 °C) (Oral)    Resp 18    Ht 5' 7\" (1.702 m)    Wt 289 lb 9.6 oz (131.4 kg)    SpO2 95%    BMI 45.36 kg/m2       Constitutional: Awake, alert, and in no acute distress. Neurological: 1+ symmetrical DTRs in the upper extremities. Sensation to light touch is intact. Negative William's sign bilaterally. Skin: warm, dry, and intact. Musculoskeletal: Good range of motion in the cervical spine on all planes. No pain with extension, axial loading, or forward flexion. No pain with internal or external rotation of his hips. Negative straight leg raise bilaterally.       Biceps  Triceps Deltoids Wrist Ext Wrist Flex Hand Intrin   Right +4/5 +4/5 +4/5 +4/5 +4/5 +4/5   Left +4/5 +4/5 +4/5 +4/5 +4/5 +4/5     PROCEDURES:    I administered 2 trigger point injections to the upper left trapezius and 2 to the left lower trapezius using a 1/2 inch 27 gauge needle. Pre pain procedure level is 1. 5/10. Post pain procedure 1.5/10. I used a total of 1cc Marcaine, 1cc Lidocaine, 1cc Celestone to these areas. Pt tolerated procedure well. Consent has been obtained. Time out initiated. IMAGING:    Cervical spine MRI from 11/02/2017 was personally reviewed with the patient and demonstrated:  Results from [facility] on 11/02/17   MRI CERV SPINE WO CONT    Narrative MR CERVICAL SPINE WITHOUT CONTRAST    CPT CODE: 17050    HISTORY: Chronic cervicalgia progressing over the past 2 months. Left arm pain  and paresthesias. No injury. COMPARISON: October 2013. TECHNIQUE: The following sequences were performed through the cervical spine:    1. Sagittal and axial T2 weighted images without fat saturation. 2.  Sagittal T1 weighted images without fat saturation. 3.  Sagittal STIR sequence. FINDINGS:     Cervical straightening as before. Normal vertebral body heights. No change in a  small rounded area of T2/STIR bright signal within right C5. Likely some  stippled T1 bright signal within; favors hemangioma. Unremarkable disc space  height. Patent cervical medullary junction. No cervical cord expansion or  atrophy. Small oblong fluid bright focus at the right occipital cervical  articulation anteriorly is unchanged. On axial imaging, findings at each level are as follows:    C2/C3: Patent canal and foramina. C3/C4: Patent canal and foramina. C4/C5: Patent canal and foramina. C5/C6: Mild right paracentral disc protrusion. Patent canal and foramina. C6/C7: Minimal disc bulge on sagittal imaging. Patent canal and foramina. C7/T1: Patent canal and foramina. Incidental imaging of the adjacent soft tissues is unremarkable.           Impression IMPRESSION:    1. Minimal degenerative findings of cervical spine. No substantive change. Patent canal and foramina. No specific source for paresthesias.     Thank you for this referral. Written by Gen Cooper, as dictated by Breann Bahena MD.  I, Dr. Breann Bahena confirm that all documentation is accurate.

## 2018-03-12 NOTE — MR AVS SNAPSHOT
[address] [facility] [address] [facility] [address] [place] [address] 
[phone] Patient: Chary Hernandez MRN: [id number] Visit Information Date & Time Provider Department Dept. Phone Encounter #  
 3/12/2018  3:30 PM Jose Tejeda MD [facility] [phone] [id number] Follow-up Instructions Return in about 3 months (around 6/12/2018) for Medication follow up. Your Appointments 8/29/2018  9:00 AM  
XRAY Visit with Thais Moritz [facility] ([address] [place] [address]) Appt Note: KUB  
 [address] [facility] [address] [facility]  
  
    
  
 8/29/2018  9:15 AM  
Any with Sharifa Hernandez MD  
[facility] ([address] [place] [address]) Appt Note: 3 yr KUB same day 7[address] [facility] [address]  
  
   
 [address]09 [facility] [address] [address] [address] Upcoming Health Maintenance Date Due HEMOGLOBIN A1C Q6M 1970 LIPID PANEL Q1 1970 FOOT EXAM Q1 2/12/1982 MICROALBUMIN Q1 2/12/1982 EYE EXAM RETINAL OR DILATED Q1 2/12/1982 Pneumococcal 19-64 Medium Risk (1 of 1 - PPSV23) 2/12/1991 DTaP/Tdap/Td series (1 - Tdap) 2/12/1993 Allergies as of 3/12/2018  Review Complete On: 3/12/2018 By: Pema Dorman LPN No Known Allergies Current Immunizations  Never Reviewed No immunizations on file. Not reviewed this visit You Were Diagnosed With   
  
 Codes Comments Other chronic pain    -  Primary ICD-10-CM: G89.29 ICD-9-CM: 338.29 Myofascial pain     ICD-10-CM: M79.1 ICD-9-CM: 729.1 Muscle spasm     ICD-10-CM: H71.509 ICD-9-CM: 728.85 HNP (herniated nucleus pulposus), cervical     ICD-10-CM: M50.20 ICD-9-CM: 722.0 Use of opiates for therapeutic purposes     ICD-10-CM: Z79.891 ICD-9-CM: V58.69 Vitals BP Pulse Temp Resp Height(growth percentile) Weight(growth percentile) 131/89 99 98 °F (36.7 °C) (Oral) 18 5' 7\" (1.702 m) 289 lb 9.6 oz (131.4 kg) SpO2 BMI Smoking Status 95% 45.36 kg/m2 Never Smoker Vitals History BMI and BSA Data Body Mass Index Body Surface Area  
 45.36 kg/m 2 2.49 m 2 Preferred Pharmacy Pharmacy Name [facility] Your Updated Medication List  
  
   
This list is accurate as of 3/12/18  4:59 PM.  Always use your most recent med list.  
  
  
  
  
 Qian Cayetano 250-50 mcg/dose diskus inhaler Generic drug:  fluticasone-salmeterol Take 1 Puff by inhalation every twelve (12) hours. amLODIPine 5 mg tablet Commonly known as:  Rosa Hair Take 5 mg by mouth daily. amLODIPine-benazepril 5-20 mg per capsule Commonly known as:  Agnes Skeeters Take 1 Cap by mouth daily. aspirin 81 mg chewable tablet Take 81 mg by mouth daily. benazepril 20 mg tablet Commonly known as:  LOTENSIN Take 20 mg by mouth daily. betamethasone 6 mg/mL injection Commonly known as:  CELESTONE SOLUSPAN  
1 mL by IntraMUSCular route once for 1 dose. bupivacaine 0.25 % (2.5 mg/mL) Soln injection Commonly known as:  MARCAINE  
1 mL by IntraMUSCular route once for 1 dose. carvedilol 10 mg CR capsule Commonly known as:  COREG CR Take  by mouth daily (with breakfast). cpap machine kit  
by Does Not Apply route. DEXILANT 60 mg Cpdb Generic drug:  Dexlansoprazole Take 60 mg by mouth daily. hydroCHLOROthiazide 12.5 mg capsule Commonly known as:  Arleta Rife HYDROcodone-acetaminophen 5-325 mg per tablet Commonly known as:  Alan Lama Take 1 Tab by mouth every six (6) hours as needed for Pain. Max Daily Amount: 4 Tabs. Indications: Pain insulin aspart U-100 100 unit/mL Inpn Commonly known as:  NOVOLOG  
by SubCUTAneous route. insulin glargine 100 unit/mL (3 mL) Inpn Commonly known as:  LANTUS,BASAGLAR  
by SubCUTAneous route.  
  
 lidocaine (PF) 20 mg/mL (2 %) injection Commonly known as:  XYLOCAINE  
1 mL by Other route once for 1 dose.  
  
 magnesium citrate 100 mg Tab Take 300 mg by mouth daily. metaxalone 800 mg tablet Commonly known as:  SKELAXIN  
1 po bid prn   Indications: Muscle Spasm Misc. Devices Kit Please provide the patient following size mask. Mask: Mirage FX large size mask. DME: [place], [place] Phone: [phone], Fax: [phone]  
  
 montelukast 10 mg tablet Commonly known as:  SINGULAIR Take 10 mg by mouth daily. ondansetron 4 mg disintegrating tablet Commonly known as:  ZOFRAN ODT Take 1-2 tablets every 6-8 hours as needed for nausea and vomiting. Potassium Citrate Tber tablet Commonly known as:  [employer] Take  by mouth two (2) times a day. pravastatin 20 mg tablet Commonly known as:  PRAVACHOL  
  
 pregabalin 100 mg capsule Commonly known as:  Cori Midget Take 1 Cap by mouth two (2) times a day. Max Daily Amount: 200 mg. Indications: FIBROMYALGIA  
  
 tamsulosin 0.4 mg capsule Commonly known as:  FLOMAX Take 1 Cap by mouth daily (after dinner). traMADol 50 mg tablet Commonly known as:  ULTRAM  
1 po bid prn severe pain VENTOLIN HFA 90 mcg/actuation inhaler Generic drug:  albuterol Take  by inhalation every six (6) hours as needed. Prescriptions Printed Refills  
 traMADol (ULTRAM) 50 mg tablet 1 Si po bid prn severe pain  
 Class: Print Prescriptions Sent to Pharmacy Refills  
 metaxalone (SKELAXIN) 800 mg tablet 3 Si po bid prn   Indications: Muscle Spasm Class: Normal  
 Pharmacy: [address], [place]  #: [phone] We Performed the Following BETAMETHASONE ACETATE & SODIUM PHOSPHATE INJECTION 3 MG EA. [ [facility]] INJECTION, BUPIVICAINE HYDRO [ [facility]] LIDOCAINE INJECTION [ [facility]] PA INJECT TRIGGER POINTS, > 3 [id number] CPT(R)] Follow-up Instructions Return in about 3 months (around 6/12/2018) for Medication follow up. To-Do List   
 03/12/2018 Lab:  DRUG SCREEN UR - W/ CONFIRM Patient Instructions Neck Arthritis: Exercises Your Care Instructions Here are some examples of typical rehabilitation exercises for your condition. Start each exercise slowly. Ease off the exercise if you start to have pain. Your doctor or physical therapist will tell you when you can start these exercises and which ones will work best for you. How to do the exercises Neck stretches to the side 1. This stretch works best if you keep your shoulder down as you lean away from it. To help you remember to do this, start by relaxing your shoulders and lightly holding on to your thighs or your chair. 2. Tilt your head toward your shoulder and hold for 15 to 30 seconds. Let the weight of your head stretch your muscles. 3. Repeat 2 to 4 times toward each shoulder. Chin tuck 1. Lie on the floor with a rolled-up towel under your neck. Your head should be touching the floor. 2. Slowly bring your chin toward your chest. 
3. Hold for a count of 6, and then relax for up to 10 seconds. 4. Repeat 8 to 12 times. Active cervical rotation 1. Sit in a firm chair, or stand up straight. 2. Keeping your chin level, turn your head to the right, and hold for 15 to 30 seconds. 3. Turn your head to the left and hold for 15 to 30 seconds. 4. Repeat 2 to 4 times to each side. Shoulder blade squeeze 1. While standing, squeeze your shoulder blades together. 2. Do not raise your shoulders up as you are squeezing. 3. Hold for 6 seconds. 4. Repeat 8 to 12 times. Shoulder rolls 1. Sit comfortably with your feet shoulder-width apart. You can also do this exercise standing up. 2. Roll your shoulders up, then back, and then down in a smooth, circular motion. 3. Repeat 2 to 4 times. Follow-up care is a key part of your treatment and safety. Be sure to make and go to all appointments, and call your doctor if you are having problems. It's also a good idea to know your test results and keep a list of the medicines you take. Where can you learn more? Go to http://marija-marianne.info/. Enter Q066 in the search box to learn more about \"Neck Arthritis: Exercises. \" Current as of: March 21, 2017 Content Version: 11.4 © 3722-1485 [employer]. Care instructions adapted under license by [employer] (which disclaims liability or warranty for this information). If you have questions about a medical condition or this instruction, always ask your healthcare professional. [place] [address] any warranty or liability for your use of this information. Healthy Upper Back: Exercises Your Care Instructions Here are some examples of exercises for your upper back. Start each exercise slowly. Ease off the exercise if you start to have pain. Your doctor or physical therapist will tell you when you can start these exercises and which ones will work best for you. How to do the exercises Lower neck and upper back stretch 4. Stretch your arms out in front of your body. Clasp one hand on top of your other hand. 5. Gently reach out so that you feel your shoulder blades stretching away from each other. 6. Gently bend your head forward. 7. Hold for 15 to 30 seconds. 8. Repeat 2 to 4 times. Midback stretch If you have knee pain, do not do this exercise. 5. Kneel on the floor, and sit back on your ankles. 6. Lean forward, place your hands on the floor, and stretch your arms out in front of you. Rest your head between your arms. 7. Gently push your chest toward the floor, reaching as far in front of you as possible. 8. Hold for 15 to 30 seconds. 9. Repeat 2 to 4 times. Shoulder rolls 5. Sit comfortably with your feet shoulder-width apart. You can also do this exercise while standing. 6. Roll your shoulders up, then back, and then down in a smooth, circular motion. 7. Repeat 2 to 4 times. Wall push-up 5. Stand against a wall with your feet about 12 to 24 inches back from the wall. If you feel any pain when you do this exercise, stand closer to the wall. 6. Place your hands on the wall slightly wider apart than your shoulders, and lean forward. 7. Gently lean your body toward the wall. Then push back to your starting position. Keep the motion smooth and controlled. 8. Repeat 8 to 12 times. Resisted shoulder blade squeeze For this exercise, you will need elastic exercise material, such as surgical tubing or Thera-Band. 4. Sit or stand, holding the band in both hands in front of you. Keep your elbows close to your sides, bent at a 90-degree angle. Your palms should face up. 5. Squeeze your shoulder blades together, and move your arms to the outside, stretching the band. Be sure to keep your elbows at your sides while you do this. 6. Relax. 7. Repeat 8 to 12 times. Resisted rows For this exercise, you will need elastic exercise material, such as surgical tubing or Thera-Band. 1. Put the band around a solid object, such as a bedpost, at about waist level. Hold one end of the band in each hand. 2. With your elbows at your sides and bent to 90 degrees, pull the band back to move your shoulder blades toward each other. Return to the starting position. 3. Repeat 8 to 12 times. Follow-up care is a key part of your treatment and safety. Be sure to make and go to all appointments, and call your doctor if you are having problems. It's also a good idea to know your test results and keep a list of the medicines you take. Where can you learn more? Go to http://marija-marianne.info/. Enter Z806 in the search box to learn more about \"Healthy Upper Back: Exercises. \" Current as of: March 21, 2017 Content Version: 11.4 © 8983-0209 [employer]wise, [employer]. Care instructions adapted under license by [employer] (which disclaims liability or warranty for this information). If you have questions about a medical condition or this instruction, always ask your healthcare professional. [address] any warranty or liability for your use of this information. Introducing [facility]! Dear Marybel Roca: Thank you for requesting a [employer] account. Our records indicate that you already have an active [employer] account. You can access your account anytime at [email or web address] Did you know that you can access your hospital and ER discharge instructions at any time in [employer]? You can also review all of your test results from your hospital stay or ER visit. Additional Information If you have questions, please visit the Frequently Asked Questions section of the [employer] website at [email or web address] Remember, [employer] is NOT to be used for urgent needs. For medical emergencies, dial 911. Now available from your iPhone and Android! Please provide this summary of care documentation to your next provider. Your primary care clinician is listed as Dyana Ashford. If you have any questions after today's visit, please call [phone].

## 2018-06-11 ENCOUNTER — OFFICE VISIT (OUTPATIENT)
Dept: ORTHOPEDIC SURGERY | Age: 48
End: 2018-06-11

## 2018-06-11 VITALS
TEMPERATURE: 98.3 F | SYSTOLIC BLOOD PRESSURE: 124 MMHG | HEART RATE: 97 BPM | BODY MASS INDEX: 45.48 KG/M2 | OXYGEN SATURATION: 98 % | RESPIRATION RATE: 15 BRPM | WEIGHT: 289.8 LBS | HEIGHT: 67 IN | DIASTOLIC BLOOD PRESSURE: 75 MMHG

## 2018-06-11 DIAGNOSIS — M79.18 MYOFASCIAL PAIN: ICD-10-CM

## 2018-06-11 DIAGNOSIS — M79.10 TRIGGER POINT: Primary | ICD-10-CM

## 2018-06-11 DIAGNOSIS — M62.838 MUSCLE SPASM: ICD-10-CM

## 2018-06-11 RX ORDER — PREGABALIN 100 MG/1
100 CAPSULE ORAL 2 TIMES DAILY
Qty: 180 CAP | Refills: 1 | Status: SHIPPED | OUTPATIENT
Start: 2018-06-11 | End: 2019-01-03 | Stop reason: SDUPTHER

## 2018-06-11 RX ORDER — BUPIVACAINE HYDROCHLORIDE 2.5 MG/ML
2.5 INJECTION, SOLUTION INFILTRATION; PERINEURAL ONCE
Qty: 1 ML | Refills: 0
Start: 2018-06-11 | End: 2018-06-11

## 2018-06-11 RX ORDER — BETAMETHASONE SODIUM PHOSPHATE AND BETAMETHASONE ACETATE 3; 3 MG/ML; MG/ML
6 INJECTION, SUSPENSION INTRA-ARTICULAR; INTRALESIONAL; INTRAMUSCULAR; SOFT TISSUE ONCE
Qty: 1 ML | Refills: 0
Start: 2018-06-11 | End: 2018-06-11

## 2018-06-11 RX ORDER — LIDOCAINE HYDROCHLORIDE 20 MG/ML
1 INJECTION, SOLUTION EPIDURAL; INFILTRATION; INTRACAUDAL; PERINEURAL ONCE
Qty: 1 ML | Refills: 0
Start: 2018-06-11 | End: 2018-06-11

## 2018-06-11 NOTE — PATIENT INSTRUCTIONS
Neck Arthritis: Exercises  Your Care Instructions  Here are some examples of typical rehabilitation exercises for your condition. Start each exercise slowly. Ease off the exercise if you start to have pain. Your doctor or physical therapist will tell you when you can start these exercises and which ones will work best for you. How to do the exercises  Neck stretches to the side    1. This stretch works best if you keep your shoulder down as you lean away from it. To help you remember to do this, start by relaxing your shoulders and lightly holding on to your thighs or your chair. 2. Tilt your head toward your shoulder and hold for 15 to 30 seconds. Let the weight of your head stretch your muscles. 3. Repeat 2 to 4 times toward each shoulder. Chin tuck    1. Lie on the floor with a rolled-up towel under your neck. Your head should be touching the floor. 2. Slowly bring your chin toward your chest.  3. Hold for a count of 6, and then relax for up to 10 seconds. 4. Repeat 8 to 12 times. Active cervical rotation    1. Sit in a firm chair, or stand up straight. 2. Keeping your chin level, turn your head to the right, and hold for 15 to 30 seconds. 3. Turn your head to the left and hold for 15 to 30 seconds. 4. Repeat 2 to 4 times to each side. Shoulder blade squeeze    1. While standing, squeeze your shoulder blades together. 2. Do not raise your shoulders up as you are squeezing. 3. Hold for 6 seconds. 4. Repeat 8 to 12 times. Shoulder rolls    1. Sit comfortably with your feet shoulder-width apart. You can also do this exercise standing up. 2. Roll your shoulders up, then back, and then down in a smooth, circular motion. 3. Repeat 2 to 4 times. Follow-up care is a key part of your treatment and safety. Be sure to make and go to all appointments, and call your doctor if you are having problems. It's also a good idea to know your test results and keep a list of the medicines you take.   Where can you learn more? Go to http://marija-marianne.info/. Enter N187 in the search box to learn more about \"Neck Arthritis: Exercises. \"  Current as of: March 21, 2017  Content Version: 11.4  © 6287-2308 [employer]. Care instructions adapted under license by [employer] (which disclaims liability or warranty for this information). If you have questions about a medical condition or this instruction, always ask your healthcare professional. [address] any warranty or liability for your use of this information.

## 2018-06-11 NOTE — PROGRESS NOTES
[facility]  [address] [place] [address], [facility] [address]  Phone: [phone]  Fax: [phone]    Pt's YOB: 1970    ASSESSMENT   Diagnoses and all orders for this visit:    1. Trigger point  -     bupivacaine (MARCAINE) 0.25 % (2.5 mg/mL) soln injection; 1 mL by IntraMUSCular route once for 1 dose. -     INJECTION, BUPIVICAINE HYDRO  -     LIDOCAINE INJECTION  -     lidocaine, PF, (XYLOCAINE) 20 mg/mL (2 %) injection; 1 mL by Other route once for 1 dose. -     betamethasone (CELESTONE SOLUSPAN) 6 mg/mL injection; 1 mL by IntraMUSCular route once for 1 dose.  -     BETAMETHASONE ACETATE & SODIUM PHOSPHATE INJECTION 6 MG  -     70174 - INJECT TRIGGER POINTS, > 3    2. Muscle spasm    3. Myofascial pain  -     pregabalin (LYRICA) 100 mg capsule; Take 1 Cap by mouth two (2) times a day. Max Daily Amount: 200 mg. Indications: FIBROMYALGIA  -     bupivacaine (MARCAINE) 0.25 % (2.5 mg/mL) soln injection; 1 mL by IntraMUSCular route once for 1 dose. -     INJECTION, BUPIVICAINE HYDRO  -     LIDOCAINE INJECTION  -     lidocaine, PF, (XYLOCAINE) 20 mg/mL (2 %) injection; 1 mL by Other route once for 1 dose. -     betamethasone (CELESTONE SOLUSPAN) 6 mg/mL injection; 1 mL by IntraMUSCular route once for 1 dose.  -     BETAMETHASONE ACETATE & SODIUM PHOSPHATE INJECTION 6 MG  -     38726 - INJECT TRIGGER POINTS, > 3       IMPRESSION AND PLAN:  Ken Nicholson is a 57 y.o. male with history of cervical pain. Pt reports that he is doing well at this time. He admits to significant relief when trying trigger point injections at his last office visit. Pt continues to take Lyrica 100 mg and very rarely takes Ultram 50 mg as his pain warrants. 1) Pt was given information on cervical arthritis exercises. 2) He received trigger point injections in the office today. 3) Pt received a refill of Lyrica 100 mg 1 tab BID.   4) Mr. Benji Estrada has a reminder for a \"due or due soon\" health maintenance. I have asked that he contact his primary care provider, Mihir Willett MD, for follow-up on this health maintenance. 5)  demonstrated consistency with prescribing. 6) Last UDS from 03/12/2018 was consistent. 7) Pt will follow-up in 3 months or sooner if needed. HISTORY OF PRESENT ILLNESS:  Junito Landaverde is a 57 y.o. male with history of cervical pain. Pt reports that he is doing well at this time. He continues to experience tightness across the upper back but states that this is generally worse with colder weather. He admits to significant relief when trying trigger point injections at his last office visit. Pt has been prescribed Ultram 50 mg and takes it very rarely as his pain warrants. He has rare use and has only taken 2 tabs of the Ultram 50 mg since his last office visit. He continues to take Lyrica 100 mg and requests a refill at this time. Pt at this time desires to proceed with trigger point injections.     Pain Scale: 2/10    PCP: Mihir Willett MD     Past Medical History:   Diagnosis Date    Adverse effect of anesthesia     for back sx-had issues with breathing  with apnea- 6 years ago    Asthma     Chest pain, unspecified     ATYPICAL,POSSIBLE ANGINA,MUSCULAR,CAD,CHEST WALL PAINS PERSISTANT AND RECURRENT    Chronic kidney disease     Diabetes mellitus ([address]     DM (diabetes mellitus) ([address]     GERD (gastroesophageal reflux disease)     HTN (hypertension)     Hypercholesteremia     Hypertension     Kidney stones     Sleep apnea     cpap        Social History     Social History    Marital status:      Spouse name: N/A    Number of children: N/A    Years of education: N/A     Occupational History          Social History Main Topics    Smoking status: Never Smoker    Smokeless tobacco: Never Used    Alcohol use No    Drug use: No    Sexual activity: Not on file     Other Topics Concern    Not on file     Social History Narrative    ** Merged History Encounter **            Current Outpatient Prescriptions   Medication Sig Dispense Refill    pregabalin (LYRICA) 100 mg capsule Take 1 Cap by mouth two (2) times a day. Max Daily Amount: 200 mg. Indications: FIBROMYALGIA 180 Cap 1    amLODIPine (NORVASC) 5 mg tablet Take 5 mg by mouth daily.  benazepril (LOTENSIN) 20 mg tablet Take 20 mg by mouth daily.  metaxalone (SKELAXIN) 800 mg tablet 1 po bid prn   Indications: Muscle Spasm 60 Tab 3    traMADol (ULTRAM) 50 mg tablet 1 po bid prn severe pain 60 Tab 1    hydroCHLOROthiazide (MICROZIDE) 12.5 mg capsule       pravastatin (PRAVACHOL) 20 mg tablet       HYDROcodone-acetaminophen (NORCO) 5-325 mg per tablet Take 1 Tab by mouth every six (6) hours as needed for Pain. Max Daily Amount: 4 Tabs. Indications: Pain 25 Tab 0    cpap machine kit by Does Not Apply route.  Potassium Citrate 15 mEq TbER Take  by mouth two (2) times a day.  ondansetron (ZOFRAN ODT) 4 mg disintegrating tablet Take 1-2 tablets every 6-8 hours as needed for nausea and vomiting. 10 Tab 0    tamsulosin (FLOMAX) 0.4 mg capsule Take 1 Cap by mouth daily (after dinner). 30 Cap 0    carvedilol (COREG CR) 10 mg CR capsule Take  by mouth daily (with breakfast).  montelukast (SINGULAIR) 10 mg tablet Take 10 mg by mouth daily.  insulin glargine (LANTUS SOLOSTAR) 100 unit/mL (3 mL) pen by SubCUTAneous route.  insulin aspart (NOVOLOG) 100 unit/mL inpn by SubCUTAneous route.  magnesium citrate 100 mg tab Take 300 mg by mouth daily.  aspirin 81 mg chewable tablet Take 81 mg by mouth daily.  fluticasone-salmeterol (ADVAIR DISKUS) 250-50 mcg/dose diskus inhaler Take 1 Puff by inhalation every twelve (12) hours.  Dexlansoprazole (DEXILANT) 60 mg CpDM Take 60 mg by mouth daily.  albuterol (VENTOLIN HFA) 90 mcg/actuation inhaler Take  by inhalation every six (6) hours as needed.  Misc.  Devices Kit Please provide the patient following size mask. Mask: Mirage FX large size mask. DME: [facility], [place], [place]  Phone: [phone], Fax: [phone] 1 Each 0    amLODIPine-benazepril (LOTREL) 5-20 mg per capsule Take 1 Cap by mouth daily. No Known Allergies      REVIEW OF SYSTEMS    Constitutional: Negative for fever, chills, or weight change. Respiratory: Negative for cough or shortness of breath. Cardiovascular: Negative for chest pain or palpitations. Gastrointestinal: Negative for acid reflux, change in bowel habits, or constipation. Genitourinary: Negative for dysuria and flank pain. Musculoskeletal: Positive for cervical pain. Skin: Negative for rash. Neurological: Negative for headaches, dizziness, or numbness. Endo/Heme/Allergies: Negative for increased bruising. Psychiatric/Behavioral: Negative for difficulty with sleep. PHYSICAL EXAMINATION  Visit Vitals    /75    Pulse 97    Temp 98.3 °F (36.8 °C)    Resp 15    Ht 5' 7\" (1.702 m)    Wt 289 lb 12.8 oz (131.5 kg)    SpO2 98%    BMI 45.39 kg/m2       Constitutional: Awake, alert, and in no acute distress. Neurological: 1+ symmetrical DTRs in the upper extremities. Sensation to light touch is intact. Negative William's sign bilaterally. Skin: warm, dry, and intact. Musculoskeletal: Tight across the upper trapezius bilaterally with few trigger points. No pain with extension, axial loading, or forward flexion. No pain with internal or external rotation of his hips. Negative straight leg raise bilaterally. Biceps  Triceps Deltoids Wrist Ext Wrist Flex Hand Intrin   Right +4/5 +4/5 +4/5 +4/5 +4/5 +4/5   Left +4/5 +4/5 +4/5 +4/5 +4/5 +4/5     I administered 3 trigger point injections to the left trapezius upper using a 1/2 inch 27 gauge needle. Pre pain procedure level is 2/10. Post pain procedure 2/10.   He states he experiences significant relief after the trigger point injections usually 2-3 hours later.   I used a total of 1cc Marcaine, 1cc Lidocaine, 1cc Celestone to these areas. Pt tolerated procedure well. Consent has been obtained. Time out initiated. IMAGING:    Cervical spine MRI from 11/02/2017 was personally reviewed with the patient and demonstrated:  [facility]   MRI CERV SPINE WO CONT     Narrative MR CERVICAL SPINE WITHOUT CONTRAST    CPT CODE: 54818    HISTORY: Chronic cervicalgia progressing over the past 2 months. Left arm pain  and paresthesias. No injury. COMPARISON: October 2013. TECHNIQUE: The following sequences were performed through the cervical spine:    1.  Sagittal and axial T2 weighted images without fat saturation. 2.  Sagittal T1 weighted images without fat saturation. 3.  Sagittal STIR sequence. FINDINGS:     Cervical straightening as before. Normal vertebral body heights. No change in a  small rounded area of T2/STIR bright signal within right C5. Likely some  stippled T1 bright signal within; favors hemangioma. Unremarkable disc space  height. Patent cervical medullary junction. No cervical cord expansion or  atrophy. Small oblong fluid bright focus at the right occipital cervical  articulation anteriorly is unchanged. On axial imaging, findings at each level are as follows:    C2/C3: Patent canal and foramina. C3/C4: Patent canal and foramina. C4/C5: Patent canal and foramina. C5/C6: Mild right paracentral disc protrusion. Patent canal and foramina. C6/C7: Minimal disc bulge on sagittal imaging. Patent canal and foramina. C7/T1: Patent canal and foramina. Incidental imaging of the adjacent soft tissues is unremarkable.              Impression IMPRESSION:    1. Minimal degenerative findings of cervical spine. No substantive change. Patent canal and foramina. No specific source for paresthesias.     Thank you for this referral.     Written by Bakari Clemens, as dictated by Dominic Camcaho MD.  I Dr. Roberto Contreras confirm that all documentation is accurate.

## 2018-06-11 NOTE — MR AVS SNAPSHOT
[address] [facility] [address] [facility] [address] 
[phone] Patient: Juanjose Rodriguez MRN: [id number] Visit Information Date & Time Provider Department Dept. Phone Encounter #  
 6/11/2018  3:30 PM Chiki Pryor MD [facility] [phone] Follow-up Instructions Return in about 3 months (around 9/11/2018) for Medication follow up. Your Appointments 8/6/2018  9:00 AM  
XRAY Visit with Rebecca Jaramillo [facility] ([address] [facility]) Appt Note: KUB  
 [address] [facility] [address]  
  
   
 [address]  
  
    
  
 8/6/2018  9:15 AM  
Any with Nicol Bustamante MD  
[facility] ([facility]) Appt Note: 1 yr KUB same day [address] [facility] [address] [facility]  
  
   
 [address] [facility] [address] [facility] [address] [facility] Upcoming Health Maintenance Date Due HEMOGLOBIN A1C Q6M 1970 LIPID PANEL Q1 1970 FOOT EXAM Q1 2/12/1982 MICROALBUMIN Q1 2/12/1982 EYE EXAM RETINAL OR DILATED Q1 2/12/1982 Pneumococcal 19-64 Medium Risk (1 of 1 - PPSV23) 2/12/1991 DTaP/Tdap/Td series (1 - Tdap) 2/12/1993 Allergies as of 6/11/2018  Review Complete On: 6/11/2018 By: Chiki Pryor MD  
 No Known Allergies Current Immunizations  Never Reviewed No immunizations on file. Not reviewed this visit You Were Diagnosed With   
  
 Codes Comments Trigger point    -  Primary ICD-10-CM: M79.1 ICD-9-CM: 729.1 Muscle spasm     ICD-10-CM: R84.428 ICD-9-CM: 728.85 Myofascial pain     ICD-10-CM: M79.1 ICD-9-CM: 729.1 Vitals BP Pulse Temp Resp Height(growth percentile) Weight(growth percentile) 124/75 97 98.3 °F (36.8 °C) 15 5' 7\" (1.702 m) 289 lb 12.8 oz (131.5 kg) SpO2 BMI Smoking Status 98% 45.39 kg/m2 Never Smoker BMI and BSA Data Body Mass Index Body Surface Area  
 45.39 kg/m 2 2.49 m 2 Preferred Pharmacy Pharmacy Name Phone [facility] Your Updated Medication List  
  
   
This list is accurate as of 6/11/18  3:52 PM.  Always use your most recent med list.  
  
  
  
  
 Farzanehbert Bucy 250-50 mcg/dose diskus inhaler Generic drug:  fluticasone-salmeterol Take 1 Puff by inhalation every twelve (12) hours. amLODIPine 5 mg tablet Commonly known as:  Trini Unique Take 5 mg by mouth daily. amLODIPine-benazepril 5-20 mg per capsule Commonly known as:  Sylwia Areola Take 1 Cap by mouth daily. aspirin 81 mg chewable tablet Take 81 mg by mouth daily. benazepril 20 mg tablet Commonly known as:  LOTENSIN Take 20 mg by mouth daily. betamethasone 6 mg/mL injection Commonly known as:  CELESTONE SOLUSPAN  
1 mL by IntraMUSCular route once for 1 dose. bupivacaine 0.25 % (2.5 mg/mL) Soln injection Commonly known as:  MARCAINE  
1 mL by IntraMUSCular route once for 1 dose. carvedilol 10 mg CR capsule Commonly known as:  COREG CR Take  by mouth daily (with breakfast). cpap machine kit  
by Does Not Apply route. DEXILANT 60 mg Cpdb Generic drug:  Dexlansoprazole Take 60 mg by mouth daily. hydroCHLOROthiazide 12.5 mg capsule Commonly known as:  Alvie Perches HYDROcodone-acetaminophen 5-325 mg per tablet Commonly known as:  [place] No Take 1 Tab by mouth every six (6) hours as needed for Pain. Max Daily Amount: 4 Tabs. Indications: Pain  
  
 insulin aspart U-100 100 unit/mL Inpn Commonly known as:  NOVOLOG  
by SubCUTAneous route. insulin glargine 100 unit/mL (3 mL) Inpn Commonly known as:  Yael Baker  
 by SubCUTAneous route.  
  
 lidocaine (PF) 20 mg/mL (2 %) injection Commonly known as:  XYLOCAINE  
1 mL by Other route once for 1 dose.  
  
 magnesium citrate 100 mg Tab Take 300 mg by mouth daily. metaxalone 800 mg tablet Commonly known as:  SKELAXIN  
1 po bid prn   Indications: Muscle Spasm Misc. Devices Kit Please provide the patient following size mask. Mask: Mirage FX large size mask. DME: [place], [place] Phone: [phone], Fax: [phone]  
  
 montelukast 10 mg tablet Commonly known as:  SINGULAIR Take 10 mg by mouth daily. ondansetron 4 mg disintegrating tablet Commonly known as:  ZOFRAN ODT Take 1-2 tablets every 6-8 hours as needed for nausea and vomiting. Potassium Citrate Tber tablet Commonly known as:  [employer] Take  by mouth two (2) times a day. pravastatin 20 mg tablet Commonly known as:  PRAVACHOL  
  
 pregabalin 100 mg capsule Commonly known as:  Huizar Dub Take 1 Cap by mouth two (2) times a day. Max Daily Amount: 200 mg. Indications: FIBROMYALGIA  
  
 tamsulosin 0.4 mg capsule Commonly known as:  FLOMAX Take 1 Cap by mouth daily (after dinner). traMADol 50 mg tablet Commonly known as:  ULTRAM  
1 po bid prn severe pain VENTOLIN HFA 90 mcg/actuation inhaler Generic drug:  albuterol Take  by inhalation every six (6) hours as needed. Prescriptions Printed Refills  
 pregabalin (LYRICA) 100 mg capsule 1 Sig: Take 1 Cap by mouth two (2) times a day. Max Daily Amount: 200 mg. Indications: FIBROMYALGIA Class: Print Route: Oral  
  
We Performed the Following BETAMETHASONE ACETATE & SODIUM PHOSPHATE INJECTION 3 MG EA. [ [facility]] INJECTION, BUPIVICAINE HYDRO [ [facility]] LIDOCAINE INJECTION [ [facility]] MO INJECT TRIGGER POINTS, > 3 [id number] CPT(R)] Follow-up Instructions Return in about 3 months (around 9/11/2018) for Medication follow up. Patient Instructions Neck Arthritis: Exercises Your Care Instructions Here are some examples of typical rehabilitation exercises for your condition. Start each exercise slowly. Ease off the exercise if you start to have pain. Your doctor or physical therapist will tell you when you can start these exercises and which ones will work best for you. How to do the exercises Neck stretches to the side 1. This stretch works best if you keep your shoulder down as you lean away from it. To help you remember to do this, start by relaxing your shoulders and lightly holding on to your thighs or your chair. 2. Tilt your head toward your shoulder and hold for 15 to 30 seconds. Let the weight of your head stretch your muscles. 3. Repeat 2 to 4 times toward each shoulder. Chin tuck 1. Lie on the floor with a rolled-up towel under your neck. Your head should be touching the floor. 2. Slowly bring your chin toward your chest. 
3. Hold for a count of 6, and then relax for up to 10 seconds. 4. Repeat 8 to 12 times. Active cervical rotation 1. Sit in a firm chair, or stand up straight. 2. Keeping your chin level, turn your head to the right, and hold for 15 to 30 seconds. 3. Turn your head to the left and hold for 15 to 30 seconds. 4. Repeat 2 to 4 times to each side. Shoulder blade squeeze 1. While standing, squeeze your shoulder blades together. 2. Do not raise your shoulders up as you are squeezing. 3. Hold for 6 seconds. 4. Repeat 8 to 12 times. Shoulder rolls 1. Sit comfortably with your feet shoulder-width apart. You can also do this exercise standing up. 2. Roll your shoulders up, then back, and then down in a smooth, circular motion. 3. Repeat 2 to 4 times. Follow-up care is a key part of your treatment and safety. Be sure to make and go to all appointments, and call your doctor if you are having problems.  It's also a good idea to know your test results and keep a list of the medicines you take. Where can you learn more? Go to http://marija-marianne.info/. Enter L339 in the search box to learn more about \"Neck Arthritis: Exercises. \" Current as of: March 21, 2017 Content Version: 11.4 © 6097-6988 [employer]. Care instructions adapted under license by [employer] (which disclaims liability or warranty for this information). If you have questions about a medical condition or this instruction, always ask your healthcare professional. [address] any warranty or liability for your use of this information. Introducing [facility]! Dear Jerald Rios: Thank you for requesting a [employer] account. Our records indicate that you already have an active [employer] account. You can access your account anytime at [email or web address] Did you know that you can access your hospital and ER discharge instructions at any time in [employer]? You can also review all of your test results from your hospital stay or ER visit. Additional Information If you have questions, please visit the Frequently Asked Questions section of the [employer] website at [email or web address] Remember, [employer] is NOT to be used for urgent needs. For medical emergencies, dial 911. Now available from your iPhone and Android! Please provide this summary of care documentation to your next provider. Your primary care clinician is listed as Lynn Evans. If you have any questions after today's visit, please call [phone].

## 2018-08-06 ENCOUNTER — HOSPITAL ENCOUNTER (OUTPATIENT)
Dept: ULTRASOUND IMAGING | Age: 48
Discharge: HOME | End: 2018-08-06
Attending: UROLOGY
Payer: OTHER GOVERNMENT

## 2018-08-06 DIAGNOSIS — N20.0 KIDNEY STONE: ICD-10-CM

## 2018-08-06 PROCEDURE — 76700 US EXAM ABDOM COMPLETE: CPT

## 2018-08-06 NOTE — PROGRESS NOTES
tell pat  that   kidneys  are fine   and that  gallbladder not well seen. ... no  cause for  back pain  . ..  needs to  further  d/w  PCP

## 2018-08-09 ENCOUNTER — APPOINTMENT (OUTPATIENT)
Dept: GENERAL RADIOLOGY | Age: 48
End: 2018-08-09
Attending: EMERGENCY MEDICINE
Payer: OTHER GOVERNMENT

## 2018-08-09 ENCOUNTER — HOSPITAL ENCOUNTER (EMERGENCY)
Age: 48
Discharge: HOME | End: 2018-08-09
Attending: EMERGENCY MEDICINE
Payer: OTHER GOVERNMENT

## 2018-08-09 VITALS
DIASTOLIC BLOOD PRESSURE: 75 MMHG | HEART RATE: 84 BPM | OXYGEN SATURATION: 99 % | BODY MASS INDEX: 44.73 KG/M2 | RESPIRATION RATE: 16 BRPM | HEIGHT: 67 IN | TEMPERATURE: 98.7 F | WEIGHT: 285 LBS | SYSTOLIC BLOOD PRESSURE: 124 MMHG

## 2018-08-09 DIAGNOSIS — R07.89 ATYPICAL CHEST PAIN: ICD-10-CM

## 2018-08-09 DIAGNOSIS — R53.81 MALAISE AND FATIGUE: Primary | ICD-10-CM

## 2018-08-09 DIAGNOSIS — R51.9 NONINTRACTABLE EPISODIC HEADACHE, UNSPECIFIED HEADACHE TYPE: ICD-10-CM

## 2018-08-09 DIAGNOSIS — R53.83 MALAISE AND FATIGUE: Primary | ICD-10-CM

## 2018-08-09 LAB
ALBUMIN SERPL-MCNC: 3.5 G/DL (ref 3.4–5)
ALBUMIN/GLOB SERPL: 1.1 {RATIO} (ref 0.8–1.7)
ALP SERPL-CCNC: 95 U/L (ref 45–117)
ALT SERPL-CCNC: 44 U/L (ref 16–61)
ANION GAP SERPL CALC-SCNC: 5 MMOL/L (ref 3–18)
AST SERPL-CCNC: 20 U/L (ref 15–37)
BASOPHILS # BLD: 0.1 K/UL (ref 0–0.1)
BASOPHILS NFR BLD: 1 % (ref 0–2)
BILIRUB SERPL-MCNC: 0.5 MG/DL (ref 0.2–1)
BUN SERPL-MCNC: 20 MG/DL (ref 7–18)
BUN/CREAT SERPL: 19 (ref 12–20)
CALCIUM SERPL-MCNC: 8.9 MG/DL (ref 8.5–10.1)
CHLORIDE SERPL-SCNC: 104 MMOL/L (ref 100–108)
CO2 SERPL-SCNC: 32 MMOL/L (ref 21–32)
CREAT SERPL-MCNC: 1.08 MG/DL (ref 0.6–1.3)
DIFFERENTIAL METHOD BLD: NORMAL
EOSINOPHIL # BLD: 0.2 K/UL (ref 0–0.4)
EOSINOPHIL NFR BLD: 3 % (ref 0–5)
ERYTHROCYTE [DISTWIDTH] IN BLOOD BY AUTOMATED COUNT: 13.9 % (ref 11.6–14.5)
GLOBULIN SER CALC-MCNC: 3.1 G/DL (ref 2–4)
GLUCOSE SERPL-MCNC: 132 MG/DL (ref 74–99)
HCT VFR BLD AUTO: 44.3 % (ref 36–48)
HGB BLD-MCNC: 14.5 G/DL (ref 13–16)
LYMPHOCYTES # BLD: 2 K/UL (ref 0.9–3.6)
LYMPHOCYTES NFR BLD: 25 % (ref 21–52)
MAGNESIUM SERPL-MCNC: 1.8 MG/DL (ref 1.6–2.6)
MCH RBC QN AUTO: 27.4 PG (ref 24–34)
MCHC RBC AUTO-ENTMCNC: 32.7 G/DL (ref 31–37)
MCV RBC AUTO: 83.7 FL (ref 74–97)
MONOCYTES # BLD: 0.7 K/UL (ref 0.05–1.2)
MONOCYTES NFR BLD: 9 % (ref 3–10)
NEUTS SEG # BLD: 5.1 K/UL (ref 1.8–8)
NEUTS SEG NFR BLD: 62 % (ref 40–73)
PLATELET # BLD AUTO: 236 K/UL (ref 135–420)
PMV BLD AUTO: 11.4 FL (ref 9.2–11.8)
POTASSIUM SERPL-SCNC: 4 MMOL/L (ref 3.5–5.5)
PROT SERPL-MCNC: 6.6 G/DL (ref 6.4–8.2)
RBC # BLD AUTO: 5.29 M/UL (ref 4.7–5.5)
SODIUM SERPL-SCNC: 141 MMOL/L (ref 136–145)
TROPONIN I SERPL-MCNC: <0.02 NG/ML (ref 0–0.06)
TSH SERPL DL<=0.05 MIU/L-ACNC: 2.2 UIU/ML (ref 0.36–3.74)
WBC # BLD AUTO: 8 K/UL (ref 4.6–13.2)

## 2018-08-09 PROCEDURE — 99284 EMERGENCY DEPT VISIT MOD MDM: CPT

## 2018-08-09 PROCEDURE — 84484 ASSAY OF TROPONIN QUANT: CPT | Performed by: EMERGENCY MEDICINE

## 2018-08-09 PROCEDURE — 71046 X-RAY EXAM CHEST 2 VIEWS: CPT

## 2018-08-09 PROCEDURE — 84443 ASSAY THYROID STIM HORMONE: CPT | Performed by: EMERGENCY MEDICINE

## 2018-08-09 PROCEDURE — 83735 ASSAY OF MAGNESIUM: CPT | Performed by: EMERGENCY MEDICINE

## 2018-08-09 PROCEDURE — 93005 ELECTROCARDIOGRAM TRACING: CPT

## 2018-08-09 PROCEDURE — 80053 COMPREHEN METABOLIC PANEL: CPT | Performed by: EMERGENCY MEDICINE

## 2018-08-09 PROCEDURE — 85025 COMPLETE CBC W/AUTO DIFF WBC: CPT | Performed by: EMERGENCY MEDICINE

## 2018-08-09 NOTE — ED TRIAGE NOTES
Pt presents with intermittent chest pain x 1 week, headache and right flank pain x 1 week, nausea and vomiting. Pt states was seen at urologist office Monday for routine visit.

## 2018-08-09 NOTE — DISCHARGE INSTRUCTIONS
Fatigue: Care Instructions  Your Care Instructions    Fatigue is a feeling of tiredness, exhaustion, or lack of energy. You may feel fatigue because of too much or not enough activity. It can also come from stress, lack of sleep, boredom, and poor diet. Many medical problems, such as viral infections, can cause fatigue. Emotional problems, especially depression, are often the cause of fatigue. Fatigue is most often a symptom of another problem. Treatment for fatigue depends on the cause. For example, if you have fatigue because you have a certain health problem, treating this problem also treats your fatigue. If depression or anxiety is the cause, treatment may help. Follow-up care is a key part of your treatment and safety. Be sure to make and go to all appointments, and call your doctor if you are having problems. It's also a good idea to know your test results and keep a list of the medicines you take. How can you care for yourself at home? · Get regular exercise. But don't overdo it. Go back and forth between rest and exercise. · Get plenty of rest.  · Eat a healthy diet. Do not skip meals, especially breakfast.  · Reduce your use of caffeine, tobacco, and alcohol. Caffeine is most often found in coffee, tea, cola drinks, and chocolate. · Limit medicines that can cause fatigue. This includes tranquilizers and cold and allergy medicines. When should you call for help? Watch closely for changes in your health, and be sure to contact your doctor if:    · You have new symptoms such as fever or a rash.     · Your fatigue gets worse.     · You have been feeling down, depressed, or hopeless. Or you may have lost interest in things that you usually enjoy.     · You are not getting better as expected. Where can you learn more? Go to http://marija-marianne.info/. Enter K512 in the search box to learn more about \"Fatigue: Care Instructions. \"  Current as of: November 20, 2017  Content Version: 11.7  © 1086-9684 [employer]. Care instructions adapted under license by [employer] (which disclaims liability or warranty for this information). If you have questions about a medical condition or this instruction, always ask your healthcare professional. [address] any warranty or liability for your use of this information. Chest Pain: Care Instructions  Your Care Instructions    There are many things that can cause chest pain. Some are not serious and will get better on their own in a few days. But some kinds of chest pain need more testing and treatment. Your doctor may have recommended a follow-up visit in the next 8 to 12 hours. If you are not getting better, you may need more tests or treatment. Even though your doctor has released you, you still need to watch for any problems. The doctor carefully checked you, but sometimes problems can develop later. If you have new symptoms or if your symptoms do not get better, get medical care right away. If you have worse or different chest pain or pressure that lasts more than 5 minutes or you passed out (lost consciousness), call 911 or seek other emergency help right away. A medical visit is only one step in your treatment. Even if you feel better, you still need to do what your doctor recommends, such as going to all suggested follow-up appointments and taking medicines exactly as directed. This will help you recover and help prevent future problems. How can you care for yourself at home? · Rest until you feel better. · Take your medicine exactly as prescribed. Call your doctor if you think you are having a problem with your medicine. · Do not drive after taking a prescription pain medicine. When should you call for help? Call 911 if:    · You passed out (lost consciousness).     · You have severe difficulty breathing.     · You have symptoms of a heart attack.  These may include:  ¨ Chest pain or pressure, or a strange feeling in your chest.  ¨ Sweating. ¨ Shortness of breath. ¨ Nausea or vomiting. ¨ Pain, pressure, or a strange feeling in your back, neck, jaw, or upper belly or in one or both shoulders or arms. ¨ Lightheadedness or sudden weakness. ¨ A fast or irregular heartbeat. After you call 911, the  may tell you to chew 1 adult-strength or 2 to 4 low-dose aspirin. Wait for an ambulance. Do not try to drive yourself.    Call your doctor today if:    · You have any trouble breathing.     · Your chest pain gets worse.     · You are dizzy or lightheaded, or you feel like you may faint.     · You are not getting better as expected.     · You are having new or different chest pain. Where can you learn more? Go to http://marija-marianne.info/. Enter A120 in the search box to learn more about \"Chest Pain: Care Instructions. \"  Current as of: November 20, 2017  Content Version: 11.7  © 9875-0750 [employer]. Care instructions adapted under license by [employer] (which disclaims liability or warranty for this information). If you have questions about a medical condition or this instruction, always ask your healthcare professional. Delano Hazel any warranty or liability for your use of this information.

## 2018-08-09 NOTE — ED PROVIDER NOTES
EMERGENCY DEPARTMENT HISTORY AND PHYSICAL EXAM    9:14 AM      Date: 8/9/2018  Patient Name: Sandeep Valdes    History of Presenting Illness     Chief Complaint   Patient presents with    Chest Pain    Flank Pain    Headache    Vomiting         History Provided By: Patient    Chief Complaint: Fatigue   Duration: 1.5 Weeks  Timing:  Intermittent and Worsening  Quality: generalized weakness   Severity: Moderate  Modifying Factors: worse even after sleeping   Associated Symptoms: right axillary rash, mild cp, headache       Additional History (Context): Sandeep Valdes is a 57 y.o. male with PMHx of GERD, diabetes, HTN, asthma, CKD, and sleep apnea who presents with intermittent now worsening moderate fatigue described as a generalized weakness that has been ongoing for x 1.5 weeks. The fatigue remains even after he \"has had a good night sleep\". Pt also presents c/o a right axillary rash, mild cp, and a headache. Pt states he \"doesn't think it's a heart issue\". The headache is generalized and \"doesn't completely go away\". No Hx of tension headache. Admits to similar Sx \"a few years ago\" when he was diagnosed with low Magnesium. Denies fever. Denies any further complaints or symptoms at the moment. PCP: Vita Elliott MD    Current Outpatient Prescriptions   Medication Sig Dispense Refill    pregabalin (LYRICA) 100 mg capsule Take 1 Cap by mouth two (2) times a day. Max Daily Amount: 200 mg. Indications: FIBROMYALGIA 180 Cap 1    amLODIPine (NORVASC) 5 mg tablet Take 5 mg by mouth daily.  benazepril (LOTENSIN) 20 mg tablet Take 20 mg by mouth daily.  metaxalone (SKELAXIN) 800 mg tablet 1 po bid prn   Indications: Muscle Spasm 60 Tab 3    traMADol (ULTRAM) 50 mg tablet 1 po bid prn severe pain 60 Tab 1    hydroCHLOROthiazide (MICROZIDE) 12.5 mg capsule       pravastatin (PRAVACHOL) 20 mg tablet       cpap machine kit by Does Not Apply route.       Potassium Citrate 15 mEq TbER Take  by mouth two (2) times a day.  ondansetron (ZOFRAN ODT) 4 mg disintegrating tablet Take 1-2 tablets every 6-8 hours as needed for nausea and vomiting. 10 Tab 0    tamsulosin (FLOMAX) 0.4 mg capsule Take 1 Cap by mouth daily (after dinner). 30 Cap 0    carvedilol (COREG CR) 10 mg CR capsule Take  by mouth daily (with breakfast).  montelukast (SINGULAIR) 10 mg tablet Take 10 mg by mouth daily.  insulin glargine (LANTUS SOLOSTAR) 100 unit/mL (3 mL) pen by SubCUTAneous route.  insulin aspart (NOVOLOG) 100 unit/mL inpn by SubCUTAneous route.  magnesium citrate 100 mg tab Take 300 mg by mouth daily.  aspirin 81 mg chewable tablet Take 81 mg by mouth daily.  fluticasone-salmeterol (ADVAIR DISKUS) 250-50 mcg/dose diskus inhaler Take 1 Puff by inhalation every twelve (12) hours.  Dexlansoprazole (DEXILANT) 60 mg CpDM Take 60 mg by mouth daily.  albuterol (VENTOLIN HFA) 90 mcg/actuation inhaler Take  by inhalation every six (6) hours as needed.  Misc. Devices Kit Please provide the patient following size mask. Mask: Mirage FX large size mask.   DME: [place], [place]  Phone: [phone], Fax: [phone] 1 Each 0       Past History     Past Medical History:  Past Medical History:   Diagnosis Date    Adverse effect of anesthesia     for back sx-had issues with breathing  with apnea- 6 years ago    Asthma     Chest pain, unspecified     ATYPICAL,POSSIBLE ANGINA,MUSCULAR,CAD,CHEST WALL PAINS PERSISTANT AND RECURRENT    Chronic kidney disease     Diabetes mellitus ([address]     DM (diabetes mellitus) ([address]     GERD (gastroesophageal reflux disease)     HTN (hypertension)     Hypercholesteremia     Hypertension     Kidney stones     Sleep apnea     cpap       Past Surgical History:  Past Surgical History:   Procedure Laterality Date    HX BACK SURGERY      laminectomy L5-S1    HX BACK SURGERY      HX TONSILLECTOMY      HX UROLOGICAL  3/24/2016    [facility], Dr. Obed Mcgill, Cystoscopy, Right Retrograde Ureteroscopy, Holmium Laser Lithotripsy, double j cath       Family History:  Family History   Problem Relation Age of Onset    Diabetes Mother     Hypertension Mother     Heart Disease Mother     Heart Attack Father      MI in 45s    Stroke Father     Diabetes Father        Social History:  Social History   Substance Use Topics    Smoking status: Never Smoker    Smokeless tobacco: Never Used    Alcohol use No       Allergies:  No Known Allergies      Review of Systems       Review of Systems   Constitutional: Positive for fatigue. Negative for fever. Respiratory: Negative for shortness of breath. Cardiovascular: Positive for chest pain (L-sided intermittent, chronic, not acutely worse). Skin: Positive for rash. Neurological: Positive for headaches. All other systems reviewed and are negative. Physical Exam     Visit Vitals    /73 (BP 1 Location: Left arm, BP Patient Position: Supine; Head of bed elevated (Comment degrees))    Pulse 87    Temp 98.7 °F (37.1 °C)    Resp 18    Ht 5' 7\" (1.702 m)    Wt 129.3 kg (285 lb)    SpO2 98%    BMI 44.64 kg/m2         Physical Exam   Constitutional: He is oriented to person, place, and time. He appears well-developed and well-nourished. No distress. HENT:   Head: Normocephalic and atraumatic. Eyes: No scleral icterus. Neck: No thyromegaly present. Cardiovascular: Normal rate, regular rhythm and normal heart sounds. Pulmonary/Chest: Effort normal and breath sounds normal. No respiratory distress. Abdominal: Soft. There is no tenderness. Musculoskeletal: He exhibits no edema. Neurological: He is alert and oriented to person, place, and time. No cranial nerve deficit. Coordination normal.   Skin: Skin is warm and dry. Scaly axillary rash on R   Psychiatric: He has a normal mood and affect. Nursing note and vitals reviewed.         Diagnostic Study Results     Labs -  Recent Results (from the past 12 hour(s))   EKG, 12 LEAD, INITIAL    Collection Time: 08/09/18  8:43 AM   Result Value Ref Range    Ventricular Rate 86 BPM    Atrial Rate 86 BPM    P-R Interval 168 ms    QRS Duration 82 ms    Q-T Interval 350 ms    QTC Calculation (Bezet) 418 ms    Calculated P Axis 47 degrees    Calculated R Axis 38 degrees    Calculated T Axis 29 degrees    Diagnosis       Normal sinus rhythm  Normal ECG  When compared with ECG of 22-MAR-2016 12:20,  No significant change was found     CBC WITH AUTOMATED DIFF    Collection Time: 08/09/18  8:45 AM   Result Value Ref Range    WBC 8.0 4.6 - 13.2 K/uL    RBC 5.29 4.70 - 5.50 M/uL    HGB 14.5 13.0 - 16.0 g/dL    HCT 44.3 36.0 - 48.0 %    MCV 83.7 74.0 - 97.0 FL    MCH 27.4 24.0 - 34.0 PG    MCHC 32.7 31.0 - 37.0 g/dL    RDW 13.9 11.6 - 14.5 %    PLATELET 544 518 - 183 K/uL    MPV 11.4 9.2 - 11.8 FL    NEUTROPHILS 62 40 - 73 %    LYMPHOCYTES 25 21 - 52 %    MONOCYTES 9 3 - 10 %    EOSINOPHILS 3 0 - 5 %    BASOPHILS 1 0 - 2 %    ABS. NEUTROPHILS 5.1 1.8 - 8.0 K/UL    ABS. LYMPHOCYTES 2.0 0.9 - 3.6 K/UL    ABS. MONOCYTES 0.7 0.05 - 1.2 K/UL    ABS. EOSINOPHILS 0.2 0.0 - 0.4 K/UL    ABS. BASOPHILS 0.1 0.0 - 0.1 K/UL    DF AUTOMATED     METABOLIC PANEL, COMPREHENSIVE    Collection Time: 08/09/18  8:45 AM   Result Value Ref Range    Sodium 141 136 - 145 mmol/L    Potassium 4.0 3.5 - 5.5 mmol/L    Chloride 104 100 - 108 mmol/L    CO2 32 21 - 32 mmol/L    Anion gap 5 3.0 - 18 mmol/L    Glucose 132 (H) 74 - 99 mg/dL    BUN 20 (H) 7.0 - 18 MG/DL    Creatinine 1.08 0.6 - 1.3 MG/DL    BUN/Creatinine ratio 19 12 - 20      GFR est AA >60 >60 ml/min/1.73m2    GFR est non-AA >60 >60 ml/min/1.73m2    Calcium 8.9 8.5 - 10.1 MG/DL    Bilirubin, total 0.5 0.2 - 1.0 MG/DL    ALT (SGPT) 44 16 - 61 U/L    AST (SGOT) 20 15 - 37 U/L    Alk.  phosphatase 95 45 - 117 U/L    Protein, total 6.6 6.4 - 8.2 g/dL    Albumin 3.5 3.4 - 5.0 g/dL    Globulin 3.1 2.0 - 4.0 g/dL    A-G Ratio 1.1 0.8 - 1.7     MAGNESIUM    Collection Time: 08/09/18  8:45 AM   Result Value Ref Range    Magnesium 1.8 1.6 - 2.6 mg/dL   TROPONIN I    Collection Time: 08/09/18  8:45 AM   Result Value Ref Range    Troponin-I, Qt. <0.02 0.00 - 0.06 NG/ML   TSH 3RD GENERATION    Collection Time: 08/09/18  8:45 AM   Result Value Ref Range    TSH 2.20 0.36 - 3.74 uIU/mL       Radiologic Studies -   XR CHEST PA LAT   Final Result        CXR NAD per radiology      Medical Decision Making   I am the first provider for this patient. I reviewed the vital signs, available nursing notes, past medical history, past surgical history, family history and social history. Vital Signs-Reviewed the patient's vital signs. Pulse Oximetry Analysis -  98 % on RA, normal     EKG: Interpreted by the EP. Time Interpreted: 2470   Rate: 86   Rhythm: Normal Sinus Rhythm, normal intervals and axis, no st elevations   Interpretation: normal    Records Reviewed: Nursing Notes (Time of Review: 9:14 AM)    ED Course: Progress Notes, Reevaluation, and Consults:    Provider Notes (Medical Decision Making): Imp: Generalized malaise and fatigue along w/ sporadic headaches. Has \"very mild\" chest pain upper chest, sharp, worse w/ inspiration--low risk for cardiac source by history. EKG and Trop normal. Axillary rash c/w candida. Pt treating w/ OTC meds currently--continue same. Diagnosis     Clinical Impression:   1. Malaise and fatigue    2. Nonintractable episodic headache, unspecified headache type    3. Atypical chest pain      1) Routine labs including magnesium and thyroid profile were normal today and no precise cause of underlying fatigue was determined  2) EKG, chest x-ray, and heart muscle enzymes test (troponin) were normal.   3) OK to take Tylenol or Excedrin for headache  4) Recheck with your PCP in 1 week if not improved.   5) Return for medical emergencies as needed    Disposition: home    Follow-up Information     Follow up With Details Comments Contact Dada Bustamante MD  for recheck of ongoing symptoms [facility] [address]  [address] [facility]  [phone]      [facility] EMERGENCY DEPT Go to As needed, If symptoms worsen [address] [facility]  [phone]           Patient's Medications   Start Taking    No medications on file   Continue Taking    ALBUTEROL (VENTOLIN HFA) 90 MCG/ACTUATION INHALER    Take  by inhalation every six (6) hours as needed. AMLODIPINE (NORVASC) 5 MG TABLET    Take 5 mg by mouth daily. ASPIRIN 81 MG CHEWABLE TABLET    Take 81 mg by mouth daily. BENAZEPRIL (LOTENSIN) 20 MG TABLET    Take 20 mg by mouth daily. CARVEDILOL (COREG CR) 10 MG CR CAPSULE    Take  by mouth daily (with breakfast). CPAP MACHINE KIT    by Does Not Apply route. DEXLANSOPRAZOLE (DEXILANT) 60 MG CPDM    Take 60 mg by mouth daily. FLUTICASONE-SALMETEROL (ADVAIR DISKUS) 250-50 MCG/DOSE DISKUS INHALER    Take 1 Puff by inhalation every twelve (12) hours. HYDROCHLOROTHIAZIDE (MICROZIDE) 12.5 MG CAPSULE        INSULIN ASPART (NOVOLOG) 100 UNIT/ML INPN    by SubCUTAneous route. INSULIN GLARGINE (LANTUS SOLOSTAR) 100 UNIT/ML (3 ML) PEN    by SubCUTAneous route. MAGNESIUM CITRATE 100 MG TAB    Take 300 mg by mouth daily. METAXALONE (SKELAXIN) 800 MG TABLET    1 po bid prn   Indications: Muscle Spasm    MISC. DEVICES KIT    Please provide the patient following size mask. Mask: Mirage FX large size mask. DME: [place], [place]  Phone: [phone], Fax: [phone]    MONTELUKAST (SINGULAIR) 10 MG TABLET    Take 10 mg by mouth daily. ONDANSETRON (ZOFRAN ODT) 4 MG DISINTEGRATING TABLET    Take 1-2 tablets every 6-8 hours as needed for nausea and vomiting. POTASSIUM CITRATE 15 MEQ TBER    Take  by mouth two (2) times a day. PRAVASTATIN (PRAVACHOL) 20 MG TABLET        PREGABALIN (LYRICA) 100 MG CAPSULE    Take 1 Cap by mouth two (2) times a day.  Max Daily Amount: 200 mg. Indications: FIBROMYALGIA    TAMSULOSIN (FLOMAX) 0.4 MG CAPSULE    Take 1 Cap by mouth daily (after dinner). TRAMADOL (ULTRAM) 50 MG TABLET    1 po bid prn severe pain   These Medications have changed    No medications on file   Stop Taking    AMLODIPINE-BENAZEPRIL (LOTREL) 5-20 MG PER CAPSULE    Take 1 Cap by mouth daily. _______________________________    Attestations:  Scribe Attestation     Olvin Banda (Aj) acting as a scribe for and in the presence of Zainab Arevalo MD      August 09, 2018 at 9:14 AM       Provider Attestation:      I personally performed the services described in the documentation, reviewed the documentation, as recorded by the scribe in my presence, and it accurately and completely records my words and actions.  August 09, 2018 at 9:14 AM - Zainab Arevalo MD    _______________________________

## 2018-08-10 LAB
ATRIAL RATE: 86 BPM
CALCULATED P AXIS, ECG09: 47 DEGREES
CALCULATED R AXIS, ECG10: 38 DEGREES
CALCULATED T AXIS, ECG11: 29 DEGREES
DIAGNOSIS, 93000: NORMAL
P-R INTERVAL, ECG05: 168 MS
Q-T INTERVAL, ECG07: 350 MS
QRS DURATION, ECG06: 82 MS
QTC CALCULATION (BEZET), ECG08: 418 MS
VENTRICULAR RATE, ECG03: 86 BPM

## 2018-09-11 ENCOUNTER — OFFICE VISIT (OUTPATIENT)
Dept: ORTHOPEDIC SURGERY | Age: 48
End: 2018-09-11

## 2018-09-11 VITALS
WEIGHT: 290 LBS | RESPIRATION RATE: 16 BRPM | BODY MASS INDEX: 45.52 KG/M2 | SYSTOLIC BLOOD PRESSURE: 146 MMHG | DIASTOLIC BLOOD PRESSURE: 81 MMHG | TEMPERATURE: 98.3 F | OXYGEN SATURATION: 97 % | HEIGHT: 67 IN | HEART RATE: 88 BPM

## 2018-09-11 DIAGNOSIS — M62.838 MUSCLE SPASM: ICD-10-CM

## 2018-09-11 DIAGNOSIS — M79.18 MYOFASCIAL PAIN: Primary | ICD-10-CM

## 2018-09-11 DIAGNOSIS — M50.90 CERVICAL DISC DISEASE: ICD-10-CM

## 2018-09-11 NOTE — PROGRESS NOTES
[address] 
[address] Phone: [phone] Fax: [phone] Pt's YOB: 1970 ASSESSMENT Diagnoses and all orders for this visit: 
 
1. Myofascial pain -     LIDOCAINE INJECTION 
-     lidocaine, PF, (XYLOCAINE) 20 mg/mL (2 %) injection; 1 mL by Other route once for 1 dose. -     bupivacaine (MARCAINE) 0.25 % (2.5 mg/mL) soln injection; 1 mL by IntraMUSCular route once for 1 dose. -     INJECTION, BUPIVICAINE HYDRO 
-     betamethasone (CELESTONE SOLUSPAN) 6 mg/mL injection; 1 mL by IntraMUSCular route once for 1 dose. 
-     BETAMETHASONE ACETATE & SODIUM PHOSPHATE INJECTION 6 MG 
-     00882 - INJECT TRIGGER POINTS, > 3 2. Cervical disc disease 3. Muscle spasm IMPRESSION AND PLAN: 
Serean Terry is a 57 y.o. male with history of cervical pain. He notes that he experiences intermittent neck pain and frequent stiffness/tightness in the upper back and neck. Pt takes Lyrica 100 mg 1 tab BID and Skelaxin 800 mg and request to try trigger point injections today. 1) Pt was given information on cervical arthritis exercises. 2) He received trigger point injections in the office today. 3) Pt will continue taking Lyrica 100 mg and Skelaxin 800 mg as prescribed and does not need a refill at this time. 4)  [employer] has a reminder for a \"due or due soon\" health maintenance. I have asked that he contact his primary care provider, Kwadwo Seaman MD, for follow-up on this health maintenance. 5)  demonstrated consistency with prescribing. 6) Pt will follow-up in 3 months or sooner if needed. HISTORY OF PRESENT ILLNESS: 
Serena Terry is a 57 y.o. male with history of cervical pain. He notes that he experiences intermittent neck pain and frequent stiffness/tightness in the upper back and neck.  Pt has been prescribed Lyrica 100 mg and takes 1 tab BID. He also takes Skelaxin 800 mg as needed. Pt reports significant relief with trigger points in the past and requests them again at this time. He notes that he tries dry needling about once a year with benefit. Pt at this time desires to proceed with trigger point injections. Of note, patient is retired [employer] but continues to work at the [employer] at this time. Pain Scale: 5/10 PCP: Kal Cortés MD 
  
Past Medical History:  
Diagnosis Date  Adverse effect of anesthesia   
 for back sx-had issues with breathing  with apnea- 6 years ago  Asthma  Chest pain, unspecified ATYPICAL,POSSIBLE ANGINA,MUSCULAR,CAD,CHEST WALL PAINS PERSISTANT AND RECURRENT  Chronic kidney disease  Diabetes mellitus ([address]  DM (diabetes mellitus) ([address]  GERD (gastroesophageal reflux disease)  HTN (hypertension)  Hypercholesteremia  Hypertension  Kidney stones  Sleep apnea   
 cpap Social History Social History  Marital status:  Spouse name: N/A  
 Number of children: N/A  
 Years of education: N/A Occupational History   Social History Main Topics  Smoking status: Never Smoker  Smokeless tobacco: Never Used  Alcohol use No  
 Drug use: No  
 Sexual activity: Not on file Other Topics Concern  Not on file Social History Narrative ** Merged History Encounter **  
    
 
 
Current Outpatient Prescriptions Medication Sig Dispense Refill  lidocaine, PF, (XYLOCAINE) 20 mg/mL (2 %) injection 1 mL by Other route once for 1 dose. 1 mL 0  
 bupivacaine (MARCAINE) 0.25 % (2.5 mg/mL) soln injection 1 mL by IntraMUSCular route once for 1 dose. 1 mL 0  
 betamethasone (CELESTONE SOLUSPAN) 6 mg/mL injection 1 mL by IntraMUSCular route once for 1 dose.  1 mL 0  
 pregabalin (LYRICA) 100 mg capsule Take 1 Cap by mouth two (2) times a day. Max Daily Amount: 200 mg. Indications: FIBROMYALGIA 180 Cap 1  
 amLODIPine (NORVASC) 5 mg tablet Take 5 mg by mouth daily.  benazepril (LOTENSIN) 20 mg tablet Take 20 mg by mouth daily.  metaxalone (SKELAXIN) 800 mg tablet 1 po bid prn   Indications: Muscle Spasm 60 Tab 3  
 traMADol (ULTRAM) 50 mg tablet 1 po bid prn severe pain 60 Tab 1  
 hydroCHLOROthiazide (MICROZIDE) 12.5 mg capsule  pravastatin (PRAVACHOL) 20 mg tablet  cpap machine kit by Does Not Apply route.  Potassium Citrate 15 mEq TbER Take  by mouth two (2) times a day.  ondansetron (ZOFRAN ODT) 4 mg disintegrating tablet Take 1-2 tablets every 6-8 hours as needed for nausea and vomiting. 10 Tab 0  
 tamsulosin (FLOMAX) 0.4 mg capsule Take 1 Cap by mouth daily (after dinner). 30 Cap 0  carvedilol (COREG CR) 10 mg CR capsule Take  by mouth daily (with breakfast).  montelukast (SINGULAIR) 10 mg tablet Take 10 mg by mouth daily.  insulin glargine (LANTUS SOLOSTAR) 100 unit/mL (3 mL) pen by SubCUTAneous route.  insulin aspart (NOVOLOG) 100 unit/mL inpn by SubCUTAneous route.  magnesium citrate 100 mg tab Take 300 mg by mouth daily.  aspirin 81 mg chewable tablet Take 81 mg by mouth daily.  fluticasone-salmeterol (ADVAIR DISKUS) 250-50 mcg/dose diskus inhaler Take 1 Puff by inhalation every twelve (12) hours.  Dexlansoprazole (DEXILANT) 60 mg CpDM Take 60 mg by mouth daily.  albuterol (VENTOLIN HFA) 90 mcg/actuation inhaler Take  by inhalation every six (6) hours as needed.  Misc. Devices Kit Please provide the patient following size mask. Mask: Mirage FX large size mask. DME: [place], [place] Phone: [phone], Fax: [phone] 1 Each 0 No Known Allergies REVIEW OF SYSTEMS Constitutional: Negative for fever, chills, or weight change. Respiratory: Negative for cough or shortness of breath. Cardiovascular: Negative for chest pain or palpitations. Gastrointestinal: Negative for acid reflux, change in bowel habits, or constipation. Genitourinary: Negative for dysuria and flank pain. Musculoskeletal: Positive for cervical pain. Skin: Negative for rash. Neurological: Negative for headaches, dizziness, or numbness. Endo/Heme/Allergies: Negative for increased bruising. Psychiatric/Behavioral: Negative for difficulty with sleep. PHYSICAL EXAMINATION Visit Vitals  /81 (BP 1 Location: Left arm, BP Patient Position: Sitting)  Pulse 88  Temp 98.3 °F (36.8 °C) (Oral)  Resp 16  
 Ht 5' 7\" (1.702 m)  Wt 290 lb (131.5 kg)  SpO2 97%  BMI 45.42 kg/m2 Constitutional: Awake, alert, and in no acute distress. Neurological: 1+ symmetrical DTRs in the upper extremities. 1+ symmetrical DTRs in the lower extremities. Sensation to light touch is intact. Negative William's sign bilaterally. Skin: warm, dry, and intact. Musculoskeletal: Tight across the upper trapezius bilaterally with few trigger points. No pain with extension, axial loading, or forward flexion. No pain with internal or external rotation of his hips. Negative straight leg raise bilaterally. Biceps  Triceps Deltoids Wrist Ext Wrist Flex Hand Intrin Right +4/5 +4/5 +4/5 +4/5 +4/5 +4/5 Left +4/5 +4/5 +4/5 +4/5 +4/5 +4/5 PROCEDURES: 
 
I administered 3 trigger point injections to left upper trapezius. Pre pain procedure level is 2/10. Post pain procedure 2/10. I used a total of 1cc Marcaine, 1cc Lidocaine, 1cc Celestone to these areas. Pt tolerated procedure well. Consent has been obtained. Time out initiated. IMAGING: 
 
Cervical spine MRI from 11/02/2017 was personally reviewed with the patient and demonstrated: 
Results from [facility] on 11/02/17 MRI CERV SPINE WO CONT 
   Narrative MR CERVICAL SPINE WITHOUT CONTRAST 
 
CPT CODE: 69359 HISTORY: Chronic cervicalgia progressing over the past 2 months. Left arm pain 
and paresthesias. No injury. COMPARISON: October 2013. TECHNIQUE: The following sequences were performed through the cervical spine: 1.  Sagittal and axial T2 weighted images without fat saturation. 2.  Sagittal T1 weighted images without fat saturation. 3.  Sagittal STIR sequence. FINDINGS:  
 
Cervical straightening as before. Normal vertebral body heights. No change in a 
small rounded area of T2/STIR bright signal within right C5. Likely some 
stippled T1 bright signal within; favors hemangioma. Unremarkable disc space 
height. Patent cervical medullary junction. No cervical cord expansion or 
atrophy. Small oblong fluid bright focus at the right occipital cervical 
articulation anteriorly is unchanged. On axial imaging, findings at each level are as follows: 
 
C2/C3: Patent canal and foramina. C3/C4: Patent canal and foramina. C4/C5: Patent canal and foramina. C5/C6: Mild right paracentral disc protrusion. Patent canal and foramina. C6/C7: Minimal disc bulge on sagittal imaging. Patent canal and foramina. C7/T1: Patent canal and foramina. Incidental imaging of the adjacent soft tissues is unremarkable.      
   
   Impression IMPRESSION: 
 
1. Minimal degenerative findings of cervical spine. No substantive change. Patent canal and foramina. No specific source for paresthesias. Thank you for this referral.  
  
Written by Radha Canales, as dictated by Scout Roberson MD. 
I, Dr. Scout Roberson confirm that all documentation is accurate.

## 2018-09-11 NOTE — PATIENT INSTRUCTIONS
Neck Arthritis: Exercises Your Care Instructions Here are some examples of typical rehabilitation exercises for your condition. Start each exercise slowly. Ease off the exercise if you start to have pain. Your doctor or physical therapist will tell you when you can start these exercises and which ones will work best for you. How to do the exercises Neck stretches to the side 1. This stretch works best if you keep your shoulder down as you lean away from it. To help you remember to do this, start by relaxing your shoulders and lightly holding on to your thighs or your chair. 2. Tilt your head toward your shoulder and hold for 15 to 30 seconds. Let the weight of your head stretch your muscles. 3. Repeat 2 to 4 times toward each shoulder. Chin tuck 1. Lie on the floor with a rolled-up towel under your neck. Your head should be touching the floor. 2. Slowly bring your chin toward your chest. 
3. Hold for a count of 6, and then relax for up to 10 seconds. 4. Repeat 8 to 12 times. Active cervical rotation 1. Sit in a firm chair, or stand up straight. 2. Keeping your chin level, turn your head to the right, and hold for 15 to 30 seconds. 3. Turn your head to the left and hold for 15 to 30 seconds. 4. Repeat 2 to 4 times to each side. Shoulder blade squeeze 1. While standing, squeeze your shoulder blades together. 2. Do not raise your shoulders up as you are squeezing. 3. Hold for 6 seconds. 4. Repeat 8 to 12 times. Shoulder rolls 1. Sit comfortably with your feet shoulder-width apart. You can also do this exercise standing up. 2. Roll your shoulders up, then back, and then down in a smooth, circular motion. 3. Repeat 2 to 4 times. Follow-up care is a key part of your treatment and safety. Be sure to make and go to all appointments, and call your doctor if you are having problems. It's also a good idea to know your test results and keep a list of the medicines you take. Where can you learn more? Go to http://marija-marianne.info/. Enter W262 in the search box to learn more about \"Neck Arthritis: Exercises. \" Current as of: November 29, 2017 Content Version: 11.7 © 3436-1046 [employer]. Care instructions adapted under license by [employer] (which disclaims liability or warranty for this information). If you have questions about a medical condition or this instruction, always ask your healthcare professional. [place] [address] any warranty or liability for your use of this information.

## 2018-09-12 RX ORDER — BETAMETHASONE SODIUM PHOSPHATE AND BETAMETHASONE ACETATE 3; 3 MG/ML; MG/ML
6 INJECTION, SUSPENSION INTRA-ARTICULAR; INTRALESIONAL; INTRAMUSCULAR; SOFT TISSUE ONCE
Qty: 1 ML | Refills: 0
Start: 2018-09-12 | End: 2018-09-12

## 2018-09-12 RX ORDER — LIDOCAINE HYDROCHLORIDE 20 MG/ML
1 INJECTION, SOLUTION EPIDURAL; INFILTRATION; INTRACAUDAL; PERINEURAL ONCE
Qty: 1 ML | Refills: 0
Start: 2018-09-12 | End: 2018-09-12

## 2018-09-12 RX ORDER — BUPIVACAINE HYDROCHLORIDE 2.5 MG/ML
2.5 INJECTION, SOLUTION INFILTRATION; PERINEURAL ONCE
Qty: 1 ML | Refills: 0
Start: 2018-09-12 | End: 2018-09-12

## 2018-09-14 DIAGNOSIS — M50.20 HNP (HERNIATED NUCLEUS PULPOSUS), CERVICAL: ICD-10-CM

## 2018-09-14 DIAGNOSIS — M79.18 MYOFASCIAL PAIN: ICD-10-CM

## 2018-09-14 DIAGNOSIS — Z79.891 USE OF OPIATES FOR THERAPEUTIC PURPOSES: ICD-10-CM

## 2018-09-14 DIAGNOSIS — G89.29 OTHER CHRONIC PAIN: ICD-10-CM

## 2018-09-14 RX ORDER — TRAMADOL HYDROCHLORIDE 50 MG/1
TABLET ORAL
Qty: 60 TAB | Refills: 0 | OUTPATIENT
Start: 2018-09-14

## 2018-09-26 NOTE — TELEPHONE ENCOUNTER
How Severe Are Your Spot(S)?: mild Pt was just seen Tuesday -- he did not mention needing refill -- he rarely uses this -- please call him and ok to phone in refill if needed Have Your Spot(S) Been Treated In The Past?: has not been treated Hpi Title: Evaluation of Skin Lesions Family Member: Mother

## 2018-10-17 NOTE — TELEPHONE ENCOUNTER
Attempted to call patient numerous times, no return call noted. Closing encounter due to inability to reach patient.

## 2018-12-17 ENCOUNTER — OFFICE VISIT (OUTPATIENT)
Dept: ORTHOPEDIC SURGERY | Age: 48
End: 2018-12-17

## 2018-12-17 VITALS
DIASTOLIC BLOOD PRESSURE: 79 MMHG | TEMPERATURE: 98.8 F | HEIGHT: 67 IN | RESPIRATION RATE: 18 BRPM | SYSTOLIC BLOOD PRESSURE: 132 MMHG | BODY MASS INDEX: 45.52 KG/M2 | WEIGHT: 290 LBS | HEART RATE: 90 BPM | OXYGEN SATURATION: 96 %

## 2018-12-17 DIAGNOSIS — M79.10 TRIGGER POINT: ICD-10-CM

## 2018-12-17 DIAGNOSIS — M47.812 CERVICAL FACET JOINT SYNDROME: ICD-10-CM

## 2018-12-17 DIAGNOSIS — M62.838 MUSCLE SPASM: ICD-10-CM

## 2018-12-17 DIAGNOSIS — M79.18 MYOFASCIAL PAIN: Primary | ICD-10-CM

## 2018-12-17 RX ORDER — LIDOCAINE HYDROCHLORIDE 20 MG/ML
1 INJECTION, SOLUTION EPIDURAL; INFILTRATION; INTRACAUDAL; PERINEURAL ONCE
Qty: 1 ML | Refills: 0
Start: 2018-12-17 | End: 2018-12-17

## 2018-12-17 RX ORDER — BETAMETHASONE SODIUM PHOSPHATE AND BETAMETHASONE ACETATE 3; 3 MG/ML; MG/ML
6 INJECTION, SUSPENSION INTRA-ARTICULAR; INTRALESIONAL; INTRAMUSCULAR; SOFT TISSUE ONCE
Qty: 1 ML | Refills: 0
Start: 2018-12-17 | End: 2018-12-17

## 2018-12-17 RX ORDER — BUPIVACAINE HYDROCHLORIDE 2.5 MG/ML
1 INJECTION, SOLUTION INFILTRATION; PERINEURAL ONCE
Qty: 1 ML | Refills: 0
Start: 2018-12-17 | End: 2018-12-17

## 2018-12-17 NOTE — PATIENT INSTRUCTIONS
Neck Arthritis: Exercises  Your Care Instructions  Here are some examples of typical rehabilitation exercises for your condition. Start each exercise slowly. Ease off the exercise if you start to have pain. Your doctor or physical therapist will tell you when you can start these exercises and which ones will work best for you. How to do the exercises  Neck stretches to the side    1. This stretch works best if you keep your shoulder down as you lean away from it. To help you remember to do this, start by relaxing your shoulders and lightly holding on to your thighs or your chair. 2. Tilt your head toward your shoulder and hold for 15 to 30 seconds. Let the weight of your head stretch your muscles. 3. Repeat 2 to 4 times toward each shoulder. Chin tuck    1. Lie on the floor with a rolled-up towel under your neck. Your head should be touching the floor. 2. Slowly bring your chin toward your chest.  3. Hold for a count of 6, and then relax for up to 10 seconds. 4. Repeat 8 to 12 times. Active cervical rotation    1. Sit in a firm chair, or stand up straight. 2. Keeping your chin level, turn your head to the right, and hold for 15 to 30 seconds. 3. Turn your head to the left and hold for 15 to 30 seconds. 4. Repeat 2 to 4 times to each side. Shoulder blade squeeze    1. While standing, squeeze your shoulder blades together. 2. Do not raise your shoulders up as you are squeezing. 3. Hold for 6 seconds. 4. Repeat 8 to 12 times. Shoulder rolls    1. Sit comfortably with your feet shoulder-width apart. You can also do this exercise standing up. 2. Roll your shoulders up, then back, and then down in a smooth, circular motion. 3. Repeat 2 to 4 times. Follow-up care is a key part of your treatment and safety. Be sure to make and go to all appointments, and call your doctor if you are having problems.  It's also a good idea to know your test results and keep a list of the medicines you take.  Where can you learn more? Go to http://marija-marianne.info/. Enter K394 in the search box to learn more about \"Neck Arthritis: Exercises. \"  Current as of: November 29, 2017  Content Version: 11.8  © 9377-2676 [facility]. Care instructions adapted under license by [employer] (which disclaims liability or warranty for this information). If you have questions about a medical condition or this instruction, always ask your healthcare professional. [address] any warranty or liability for your use of this information. Healthy Upper Back: Exercises  Your Care Instructions  Here are some examples of exercises for your upper back. Start each exercise slowly. Ease off the exercise if you start to have pain. Your doctor or physical therapist will tell you when you can start these exercises and which ones will work best for you. How to do the exercises  Lower neck and upper back stretch    4. Stretch your arms out in front of your body. Clasp one hand on top of your other hand. 5. Gently reach out so that you feel your shoulder blades stretching away from each other. 6. Gently bend your head forward. 7. Hold for 15 to 30 seconds. 8. Repeat 2 to 4 times. Midback stretch    5. Kneel on the floor, and sit back on your ankles. 6. Lean forward, place your hands on the floor, and stretch your arms out in front of you. Rest your head between your arms. 7. Gently push your chest toward the floor, reaching as far in front of you as possible. 8. Hold for 15 to 30 seconds. 9. Repeat 2 to 4 times. Shoulder rolls    5. Sit comfortably with your feet shoulder-width apart. You can also do this exercise while standing. 6. Roll your shoulders up, then back, and then down in a smooth, circular motion. 7. Repeat 2 to 4 times. Wall push-up    5. Stand against a wall with your feet about 12 to 24 inches back from the wall.  If you feel any pain when you do this exercise, stand closer to the wall. 6. Place your hands on the wall slightly wider apart than your shoulders, and lean forward. 7. Gently lean your body toward the wall. Then push back to your starting position. Keep the motion smooth and controlled. 8. Repeat 8 to 12 times. Resisted shoulder blade squeeze    4. Sit or stand, holding the band in both hands in front of you. Keep your elbows close to your sides, bent at a 90-degree angle. Your palms should face up. 5. Squeeze your shoulder blades together, and move your arms to the outside, stretching the band. Be sure to keep your elbows at your sides while you do this. 6. Relax. 7. Repeat 8 to 12 times. Resisted rows    1. Put the band around a solid object, such as a bedpost, at about waist level. Hold one end of the band in each hand. 2. With your elbows at your sides and bent to 90 degrees, pull the band back to move your shoulder blades toward each other. Return to the starting position. 3. Repeat 8 to 12 times. Follow-up care is a key part of your treatment and safety. Be sure to make and go to all appointments, and call your doctor if you are having problems. It's also a good idea to know your test results and keep a list of the medicines you take. Where can you learn more? Go to http://marija-marianne.info/. Enter Q825 in the search box to learn more about \"Healthy Upper Back: Exercises. \"  Current as of: November 29, 2017  Content Version: 11.8  © 0395-1491 [employer]. Care instructions adapted under license by [employer] (which disclaims liability or warranty for this information). If you have questions about a medical condition or this instruction, always ask your healthcare professional. [address] any warranty or liability for your use of this information.

## 2018-12-17 NOTE — PROGRESS NOTES
[facility]  [address] [place] [address], [facility] [address]  Phone: [phone]  Fax: [phone]    Pt's YOB: 1970    ASSESSMENT   Diagnoses and all orders for this visit:    1. Myofascial pain  -     LIDOCAINE INJECTION  -     lidocaine, PF, (XYLOCAINE) 20 mg/mL (2 %) injection; 1 mL by Other route once for 1 dose. -     bupivacaine (MARCAINE) 0.25 % (2.5 mg/mL) soln injection; 1 mL by IntraMUSCular route once for 1 dose. -     INJECTION, BUPIVICAINE HYDRO  -     betamethasone (CELESTONE SOLUSPAN) 6 mg/mL injection; 1 mL by IntraMUSCular route once for 1 dose.  -     BETAMETHASONE ACETATE & SODIUM PHOSPHATE INJECTION 3 MG EA.  -     FL INJECT TRIGGER POINTS, > 3    2. Trigger point  -     LIDOCAINE INJECTION  -     lidocaine, PF, (XYLOCAINE) 20 mg/mL (2 %) injection; 1 mL by Other route once for 1 dose. -     bupivacaine (MARCAINE) 0.25 % (2.5 mg/mL) soln injection; 1 mL by IntraMUSCular route once for 1 dose. -     INJECTION, BUPIVICAINE HYDRO  -     betamethasone (CELESTONE SOLUSPAN) 6 mg/mL injection; 1 mL by IntraMUSCular route once for 1 dose.  -     BETAMETHASONE ACETATE & SODIUM PHOSPHATE INJECTION 3 MG EA.  -     FL INJECT TRIGGER POINTS, > 3    3. Cervical facet joint syndrome    4. Muscle spasm         IMPRESSION AND PLAN:  Babar Polanco is a 57 y.o. male with history of cervical pain. He notes tightness in the upper back with numbness radiating down the left arm to the thumb. Pt takes Skelaxin 800 mg as needed and Ultram 50 mg rarely as his pain warrants. 1) Pt was given information on cervical arthritis and upper back exercises. 2) He received trigger point injections during his office visit today. 3) Pt will continue taking Skelaxin 800 mg and Ultram as prescribed and does not need a refill at this time. 4) Mr. Denton Graves has a reminder for a \"due or due soon\" health maintenance.  I have asked that he contact his primary care provider, Soumya Kinsey MD, for follow-up on this health maintenance. 5)  demonstrated consistency with prescribing. 6) Pt will follow-up in 3 months or sooner if needed. HISTORY OF PRESENT ILLNESS:  Danielito Akers is a 57 y.o. male with history of cervical pain and presents to the office today for follow up. He notes tightness in the upper back with numbness radiating down the left arm to the thumb. Pt presents to the office today requesting trigger point injections which have helped in the past.  He also has had dry needling with improvement as well. He denies any weakness in his arms, headache, or difficulty with his balance. He takes Skelaxin 800 mg as needed and Ultram 50 mg rarely as his pain warrants. Pt at this time desires to trigger point injections.     Pain Scale: 2/10    PCP: Soumya Kinsey MD     Past Medical History:   Diagnosis Date    Adverse effect of anesthesia     for back sx-had issues with breathing  with apnea- 6 years ago    Asthma     Chest pain, unspecified     ATYPICAL,POSSIBLE ANGINA,MUSCULAR,CAD,CHEST WALL PAINS PERSISTANT AND RECURRENT    Chronic kidney disease     Diabetes mellitus ([address]     DM (diabetes mellitus) ([address]     GERD (gastroesophageal reflux disease)     HTN (hypertension)     Hypercholesteremia     Hypertension     Kidney stones     Sleep apnea     cpap        Social History     Socioeconomic History    Marital status:      Spouse name: Not on file    Number of children: Not on file    Years of education: Not on file    Highest education level: Not on file   Social Needs    Financial resource strain: Not on file    Food insecurity - worry: Not on file    Food insecurity - inability: Not on file   [employer] needs - medical: Not on file   [employer] needs - non-medical: Not on file   Occupational History    Occupation:    Tobacco Use    Smoking status: Never Smoker    Smokeless tobacco: Never Used   Substance and Sexual Activity    Alcohol use: No    Drug use: No    Sexual activity: Not on file   Other Topics Concern    Not on file   Social History Narrative    ** Merged History Encounter **            Current Outpatient Medications   Medication Sig Dispense Refill    pregabalin (LYRICA) 100 mg capsule Take 1 Cap by mouth two (2) times a day. Max Daily Amount: 200 mg. Indications: FIBROMYALGIA 180 Cap 1    amLODIPine (NORVASC) 5 mg tablet Take 5 mg by mouth daily.  benazepril (LOTENSIN) 20 mg tablet Take 20 mg by mouth daily.  metaxalone (SKELAXIN) 800 mg tablet 1 po bid prn   Indications: Muscle Spasm 60 Tab 3    traMADol (ULTRAM) 50 mg tablet 1 po bid prn severe pain 60 Tab 1    hydroCHLOROthiazide (MICROZIDE) 12.5 mg capsule       pravastatin (PRAVACHOL) 20 mg tablet       cpap machine kit by Does Not Apply route.  Potassium Citrate 15 mEq TbER Take  by mouth two (2) times a day.  ondansetron (ZOFRAN ODT) 4 mg disintegrating tablet Take 1-2 tablets every 6-8 hours as needed for nausea and vomiting. 10 Tab 0    tamsulosin (FLOMAX) 0.4 mg capsule Take 1 Cap by mouth daily (after dinner). 30 Cap 0    carvedilol (COREG CR) 10 mg CR capsule Take  by mouth daily (with breakfast).  montelukast (SINGULAIR) 10 mg tablet Take 10 mg by mouth daily.  insulin glargine (LANTUS SOLOSTAR) 100 unit/mL (3 mL) pen by SubCUTAneous route.  insulin aspart (NOVOLOG) 100 unit/mL inpn by SubCUTAneous route.  magnesium citrate 100 mg tab Take 300 mg by mouth daily.  aspirin 81 mg chewable tablet Take 81 mg by mouth daily.  fluticasone-salmeterol (ADVAIR DISKUS) 250-50 mcg/dose diskus inhaler Take 1 Puff by inhalation every twelve (12) hours.  Dexlansoprazole (DEXILANT) 60 mg CpDM Take 60 mg by mouth daily.  albuterol (VENTOLIN HFA) 90 mcg/actuation inhaler Take  by inhalation every six (6) hours as needed.  Misc.  Devices Kit Please provide the patient following size mask. Mask: Mirage FX large size mask. DME: [facility], [place], [address]  Phone: [phone], Fax: [phone] 1 Each 0       No Known Allergies      REVIEW OF SYSTEMS    Constitutional: Negative for fever, chills, or weight change. Respiratory: Negative for cough or shortness of breath. Cardiovascular: Negative for chest pain or palpitations. Gastrointestinal: Negative for acid reflux, change in bowel habits, or constipation. Genitourinary: Negative for dysuria and flank pain. Musculoskeletal: Positive for cervical pain. Skin: Negative for rash. Neurological: Positive for numbness in the left arm. Negative for headaches or dizziness. Endo/Heme/Allergies: Negative for increased bruising. Psychiatric/Behavioral: Negative for difficulty with sleep. PHYSICAL EXAMINATION  Visit Vitals  /79   Pulse 90   Temp 98.8 °F (37.1 °C)   Resp 18   Ht 5' 7\" (1.702 m)   Wt 290 lb (131.5 kg)   SpO2 96%   BMI 45.42 kg/m²       Constitutional: Awake, alert, and in no acute distress. Neurological: 1+ symmetrical DTRs in the upper extremities. Sensation to light touch is intact. Negative William's sign bilaterally. Skin: warm, dry, and intact. Musculoskeletal: Tight across the upper trapezii with scattered trigger points. Decreased range of motion with side to side cervical flexion. No pain with extension, axial loading, or forward flexion. No pain with internal or external rotation of his hips. Negative straight leg raise bilaterally. Biceps  Triceps Deltoids Wrist Ext Wrist Flex Hand Intrin   Right +4/5 +4/5 +4/5 +4/5 +4/5 +4/5   Left +4/5 +4/5 +4/5 +4/5 +4/5 +4/5     PROCEDURES:    I administered 4 trigger point injections to the left upper trapezius and 1 below the left arm in the lower axilla using 1 cc Marcaine, 1 cc Lidocaine, and 1 cc Celestone . Pre-injection pain level was 3/10 and post-injection pain level was 2/10.      Chart reviewed for the following:   Aron Ojeda Cherrie Odom MD, have reviewed the History, Physical and updated the Allergic reactions for Ray Pal. TIME OUT performed immediately prior to start of procedure:  I, Gopi Art MD, have performed the following reviews on Ray Pal prior to the start of the procedure:            * Patient was identified by name and date of birth   * Agreement on procedure being performed was verified  * Risks and Benefits explained to the patient  * Procedure site verified and marked as necessary  * Patient was positioned for comfort  * Consent was obtained     Time: 3:54 PM     Date of procedure: 12/21/2018    Procedure performed by: Dorothy Romero MD    Mr. Nohemi Mayo tolerated the procedure well with no complications. IMAGING:    Cervical spine MRI from 11/02/2017 was personally reviewed with the patient and demonstrated:  Results from [facility] on 11/02/17   MRI CERV SPINE WO CONT     Narrative MR CERVICAL SPINE WITHOUT CONTRAST    CPT CODE: 33075    HISTORY: Chronic cervicalgia progressing over the past 2 months. Left arm pain  and paresthesias. No injury. COMPARISON: October 2013. TECHNIQUE: The following sequences were performed through the cervical spine:    1.  Sagittal and axial T2 weighted images without fat saturation. 2.  Sagittal T1 weighted images without fat saturation. 3.  Sagittal STIR sequence. FINDINGS:     Cervical straightening as before. Normal vertebral body heights. No change in a  small rounded area of T2/STIR bright signal within right C5. Likely some  stippled T1 bright signal within; favors hemangioma. Unremarkable disc space  height. Patent cervical medullary junction. No cervical cord expansion or  atrophy. Small oblong fluid bright focus at the right occipital cervical  articulation anteriorly is unchanged. On axial imaging, findings at each level are as follows:    C2/C3: Patent canal and foramina. C3/C4: Patent canal and foramina.     C4/C5: Patent canal and foramina. C5/C6: Mild right paracentral disc protrusion. Patent canal and foramina. C6/C7: Minimal disc bulge on sagittal imaging. Patent canal and foramina. C7/T1: Patent canal and foramina. Incidental imaging of the adjacent soft tissues is unremarkable.              Impression IMPRESSION:    1. Minimal degenerative findings of cervical spine. No substantive change. Patent canal and foramina. No specific source for paresthesias. Thank you for this referral.     Written by Dar Mendez, as dictated by Katya Medina MD.  I, Dr. Katya Medina confirm that all documentation is accurate.

## 2019-01-03 DIAGNOSIS — M79.18 MYOFASCIAL PAIN: ICD-10-CM

## 2019-01-03 RX ORDER — PREGABALIN 100 MG/1
100 CAPSULE ORAL 2 TIMES DAILY
Qty: 180 CAP | Refills: 0 | Status: SHIPPED | OUTPATIENT
Start: 2019-01-03 | End: 2019-03-18 | Stop reason: SDUPTHER

## 2019-01-03 NOTE — TELEPHONE ENCOUNTER
Patient requests a hardcopy RX. Last Visit: 12/17/2018 with MD Raffi Mathis  Next Appointment: 03/18/2019 with MD Raffi Mathis  Previous Refill Encounter(s): 06/11/2018 per MD Raffi Mathis #[id number] with 1 refill    Requested Prescriptions     Pending Prescriptions Disp Refills    pregabalin (LYRICA) 100 mg capsule 180 Cap 0     Sig: Take 1 Cap by mouth two (2) times a day. Max Daily Amount: 200 mg.

## 2019-01-03 NOTE — TELEPHONE ENCOUNTER
Called patient and informed him that his prescription for Lyrica 100 mg #180 is ready for pickup at our [facility] office. Patient stated he will be there to pick it up tomorrow.

## 2019-03-18 ENCOUNTER — OFFICE VISIT (OUTPATIENT)
Dept: ORTHOPEDIC SURGERY | Age: 49
End: 2019-03-18

## 2019-03-18 VITALS
SYSTOLIC BLOOD PRESSURE: 144 MMHG | OXYGEN SATURATION: 99 % | TEMPERATURE: 98.2 F | WEIGHT: 291 LBS | BODY MASS INDEX: 45.67 KG/M2 | HEART RATE: 96 BPM | HEIGHT: 67 IN | DIASTOLIC BLOOD PRESSURE: 73 MMHG | RESPIRATION RATE: 16 BRPM

## 2019-03-18 DIAGNOSIS — M79.18 MYOFASCIAL PAIN: Primary | ICD-10-CM

## 2019-03-18 DIAGNOSIS — M47.812 CERVICAL FACET JOINT SYNDROME: ICD-10-CM

## 2019-03-18 DIAGNOSIS — M62.838 MUSCLE SPASM: ICD-10-CM

## 2019-03-18 RX ORDER — PREGABALIN 100 MG/1
100 CAPSULE ORAL 2 TIMES DAILY
Qty: 180 CAP | Refills: 1 | Status: SHIPPED | OUTPATIENT
Start: 2019-03-18 | End: 2019-07-03 | Stop reason: SDUPTHER

## 2019-03-18 NOTE — PATIENT INSTRUCTIONS
Neck Arthritis: Exercises  Your Care Instructions  Here are some examples of typical rehabilitation exercises for your condition. Start each exercise slowly. Ease off the exercise if you start to have pain. Your doctor or physical therapist will tell you when you can start these exercises and which ones will work best for you. How to do the exercises  Neck stretches to the side    1. This stretch works best if you keep your shoulder down as you lean away from it. To help you remember to do this, start by relaxing your shoulders and lightly holding on to your thighs or your chair. 2. Tilt your head toward your shoulder and hold for 15 to 30 seconds. Let the weight of your head stretch your muscles. 3. Repeat 2 to 4 times toward each shoulder. Chin tuck    1. Lie on the floor with a rolled-up towel under your neck. Your head should be touching the floor. 2. Slowly bring your chin toward your chest.  3. Hold for a count of 6, and then relax for up to 10 seconds. 4. Repeat 8 to 12 times. Active cervical rotation    1. Sit in a firm chair, or stand up straight. 2. Keeping your chin level, turn your head to the right, and hold for 15 to 30 seconds. 3. Turn your head to the left and hold for 15 to 30 seconds. 4. Repeat 2 to 4 times to each side. Shoulder blade squeeze    1. While standing, squeeze your shoulder blades together. 2. Do not raise your shoulders up as you are squeezing. 3. Hold for 6 seconds. 4. Repeat 8 to 12 times. Shoulder rolls    1. Sit comfortably with your feet shoulder-width apart. You can also do this exercise standing up. 2. Roll your shoulders up, then back, and then down in a smooth, circular motion. 3. Repeat 2 to 4 times. Follow-up care is a key part of your treatment and safety. Be sure to make and go to all appointments, and call your doctor if you are having problems.  It's also a good idea to know your test results and keep a list of the medicines you take.  Where can you learn more? Go to http://marija-marianne.info/. Enter P056 in the search box to learn more about \"Neck Arthritis: Exercises. \"  Current as of: September 20, 2018  Content Version: 11.9  © 4608-4450 [employer]. Care instructions adapted under license by [employer] (which disclaims liability or warranty for this information). If you have questions about a medical condition or this instruction, always ask your healthcare professional. [address] any warranty or liability for your use of this information. Yarelis Shearing Tenosynovitis: Care Instructions  Your Care Instructions  Yarelis Shearing (say \"Fernando\") tenosynovitis is a problem that makes the bottom of your thumb and the side of your wrist hurt. When you have de Quervain's, the ropey fiber (tendon) that helps move your thumb away from your fingers becomes swollen. You may have pain when you move your wrist or pick things up. You may hear a creaking sound when you move your wrist or thumb. Symptoms often get better in a few weeks with home care. Your doctor may want you to start some gentle stretching exercises once your symptoms are gone. Sometimes treatment with an injection or surgery is needed. Follow-up care is a key part of your treatment and safety. Be sure to make and go to all appointments, and call your doctor if you are having problems. It's also a good idea to know your test results and keep a list of the medicines you take. How can you care for yourself at home? · Until your symptoms are better, stop the activities that caused the pain. · Avoid moving the hand and wrist that hurt. · Follow your doctor's directions for wearing a splint to keep your thumb and wrist from moving. · Try ice or heat. ? Put ice or a cold pack on your thumb and wrist for 10 to 20 minutes at a time. Put a thin cloth between the ice and your skin.   ? You can use heat for 20 to 30 minutes, 2 or 3 times a day. Try using a heating pad, hot shower, or hot pack. · Ask your doctor if you can take an over-the-counter pain medicine, such as acetaminophen (Tylenol), ibuprofen (Advil, Motrin), or naproxen (Aleve). Be safe with medicines. Read and follow all instructions on the label. When should you call for help? Watch closely for changes in your health, and be sure to contact your doctor if:    · You have new or worse pain.     · You have new or worse numbness or tingling in your hand or fingers.     · Your hand feels weaker.     · You do not get better as expected. Where can you learn more? Go to http://marija-marianne.info/. Enter P557 in the search box to learn more about \"De Quervain's Tenosynovitis: Care Instructions. \"  Current as of: September 20, 2018  Content Version: 11.9  © 1469-4438 [employer]. Care instructions adapted under license by [employer] (which disclaims liability or warranty for this information). If you have questions about a medical condition or this instruction, always ask your healthcare professional. [place] [address] any warranty or liability for your use of this information. Tenosynovitis of the Wrist: Care Instructions  Your Care Instructions  Tenosynovitis (say \"ten-oh-sin-uh-VY-tus\") means the lining of a tendon is inflamed. This problem usually affects tendons in your thumb and wrist. A tendon is a cord that joins muscle to bone. Tenosynovitis can be caused by an injury. Or it may be caused by repeating a movement over and over, such as when you knit, lift things, or play video games. In rare cases, an infected wound causes it. In most cases, you can recover fully. But if the problem is caused by doing something over and over and you don't stop or change doing that, it may come back. Follow-up care is a key part of your treatment and safety.  Be sure to make and go to all appointments, and call your doctor if you are having problems. It's also a good idea to know your test results and keep a list of the medicines you take. How can you care for yourself at home? · Prop up the sore wrist on a pillow when you ice it or anytime you sit or lie down during the next 3 days. Try to keep it above the level of your heart. This will help reduce swelling. · Put ice or cold packs on your wrist for 10 to 20 minutes at a time. Try to do this every 1 to 2 hours for the next 3 days (when you are awake) or until the swelling goes down. Put a thin cloth between the ice pack and your skin. · If your swelling is gone after 2 or 3 days, put a heating pad set on low or a warm cloth on your wrist for 15 to 20 minutes. This can reduce pain. · If your doctor gave you an elastic bandage, keep it on for the next 24 to 36 hours or for as long as your doctor told you. The bandage should be snug. But it should not be tight enough to cause numbness, tingling, or swelling. · If your doctor gave you a splint or brace, wear it as directed. It will protect your wrist until it is better. · Take pain medicines exactly as directed. ? If the doctor gave you a prescription medicine for pain, take it as prescribed. ? If you are not taking a prescription pain medicine, ask your doctor if you can take an over-the-counter medicine. · If your doctor prescribes antibiotics, take them as directed. Do not stop taking them just because you feel better. You need to take the full course of antibiotics. · Try not to use your injured wrist and hand. · After you are better, do exercises to make the muscles around your tendon stronger. This can prevent the problem from coming back. Follow instructions from your doctor. When should you call for help?   Call your doctor now or seek immediate medical care if:    · Your hand or fingers are cool or pale or change colors.     · You have tingling, weakness, or numbness in your hand and fingers.     · Your pain gets worse.     · The tendon may be infected. Signs of infection include:  ? Increased pain and tenderness around the wrist or thumb. ? Swelling or redness of the wrist or thumb. ? A fever.    Watch closely for changes in your health, and be sure to contact your doctor if:    · You do not get better as expected. Where can you learn more? Go to http://marija-marianne.info/. Enter O390 in the search box to learn more about \"Tenosynovitis of the Wrist: Care Instructions. \"  Current as of: September 20, 2018  Content Version: 11.9  © 0527-8551 [employer]. Care instructions adapted under license by [employer] (which disclaims liability or warranty for this information). If you have questions about a medical condition or this instruction, always ask your healthcare professional. [address] any warranty or liability for your use of this information. Tenosynovitis of the Wrist: Care Instructions  Your Care Instructions  Tenosynovitis (say \"ten-oh-sin-uh-VY-tus\") means the lining of a tendon is inflamed. This problem usually affects tendons in your thumb and wrist. A tendon is a cord that joins muscle to bone. Tenosynovitis can be caused by an injury. Or it may be caused by repeating a movement over and over, such as when you knit, lift things, or play video games. In rare cases, an infected wound causes it. In most cases, you can recover fully. But if the problem is caused by doing something over and over and you don't stop or change doing that, it may come back. Follow-up care is a key part of your treatment and safety. Be sure to make and go to all appointments, and call your doctor if you are having problems. It's also a good idea to know your test results and keep a list of the medicines you take. How can you care for yourself at home?   · Prop up the sore wrist on a pillow when you ice it or anytime you sit or lie down during the next 3 days. Try to keep it above the level of your heart. This will help reduce swelling. · Put ice or cold packs on your wrist for 10 to 20 minutes at a time. Try to do this every 1 to 2 hours for the next 3 days (when you are awake) or until the swelling goes down. Put a thin cloth between the ice pack and your skin. · If your swelling is gone after 2 or 3 days, put a heating pad set on low or a warm cloth on your wrist for 15 to 20 minutes. This can reduce pain. · If your doctor gave you an elastic bandage, keep it on for the next 24 to 36 hours or for as long as your doctor told you. The bandage should be snug. But it should not be tight enough to cause numbness, tingling, or swelling. · If your doctor gave you a splint or brace, wear it as directed. It will protect your wrist until it is better. · Take pain medicines exactly as directed. ? If the doctor gave you a prescription medicine for pain, take it as prescribed. ? If you are not taking a prescription pain medicine, ask your doctor if you can take an over-the-counter medicine. · If your doctor prescribes antibiotics, take them as directed. Do not stop taking them just because you feel better. You need to take the full course of antibiotics. · Try not to use your injured wrist and hand. · After you are better, do exercises to make the muscles around your tendon stronger. This can prevent the problem from coming back. Follow instructions from your doctor. When should you call for help? Call your doctor now or seek immediate medical care if:    · Your hand or fingers are cool or pale or change colors.     · You have tingling, weakness, or numbness in your hand and fingers.     · Your pain gets worse.     · The tendon may be infected. Signs of infection include:  ? Increased pain and tenderness around the wrist or thumb. ? Swelling or redness of the wrist or thumb.   ? A fever.    Watch closely for changes in your health, and be sure to contact your doctor if:    · You do not get better as expected. Where can you learn more? Go to http://marija-marianne.info/. Enter K672 in the search box to learn more about \"Tenosynovitis of the Wrist: Care Instructions. \"  Current as of: September 20, 2018  Content Version: 11.9  © 8110-9177 [employer]. Care instructions adapted under license by [employer] (which disclaims liability or warranty for this information). If you have questions about a medical condition or this instruction, always ask your healthcare professional. [place] [address] any warranty or liability for your use of this information. Neck Arthritis: Exercises  Your Care Instructions  Here are some examples of typical rehabilitation exercises for your condition. Start each exercise slowly. Ease off the exercise if you start to have pain. Your doctor or physical therapist will tell you when you can start these exercises and which ones will work best for you. How to do the exercises  Neck stretches to the side    4. This stretch works best if you keep your shoulder down as you lean away from it. To help you remember to do this, start by relaxing your shoulders and lightly holding on to your thighs or your chair. 5. Tilt your head toward your shoulder and hold for 15 to 30 seconds. Let the weight of your head stretch your muscles. 6. Repeat 2 to 4 times toward each shoulder. Chin tuck    5. Lie on the floor with a rolled-up towel under your neck. Your head should be touching the floor. 6. Slowly bring your chin toward your chest.  7. Hold for a count of 6, and then relax for up to 10 seconds. 8. Repeat 8 to 12 times. Active cervical rotation    5. Sit in a firm chair, or stand up straight. 6. Keeping your chin level, turn your head to the right, and hold for 15 to 30 seconds. 7. Turn your head to the left and hold for 15 to 30 seconds.   8. Repeat 2 to 4 times to each side. Shoulder blade squeeze    5. While standing, squeeze your shoulder blades together. 6. Do not raise your shoulders up as you are squeezing. 7. Hold for 6 seconds. 8. Repeat 8 to 12 times. Shoulder rolls    4. Sit comfortably with your feet shoulder-width apart. You can also do this exercise standing up. 5. Roll your shoulders up, then back, and then down in a smooth, circular motion. 6. Repeat 2 to 4 times. Follow-up care is a key part of your treatment and safety. Be sure to make and go to all appointments, and call your doctor if you are having problems. It's also a good idea to know your test results and keep a list of the medicines you take. Where can you learn more? Go to http://marija-marianne.info/. Enter H492 in the search box to learn more about \"Neck Arthritis: Exercises. \"  Current as of: September 20, 2018  Content Version: 11.9  © 1007-5856 [employer]. Care instructions adapted under license by [employer] (which disclaims liability or warranty for this information). If you have questions about a medical condition or this instruction, always ask your healthcare professional. [address] any warranty or liability for your use of this information.

## 2019-03-18 NOTE — PROGRESS NOTES
[facility]  [address] [place], [facility] [address]  Phone: [phone]  Fax: [phone]    Pt's YOB: 1970    ASSESSMENT   Diagnoses and all orders for this visit:    1. Myofascial pain  -     pregabalin (LYRICA) 100 mg capsule; Take 1 Cap by mouth two (2) times a day. Max Daily Amount: 200 mg.    2. Cervical facet joint syndrome    3. Muscle spasm         IMPRESSION AND PLAN:  John Peralta is a 54 y.o. male with history of cervical pain. He admits to increased pain with colder weather. Pt takes Ultram 50 mg intermittently as his pain warrants, Lyrica 100 mg 1 tab BID, and Skelaxin 800 mg as needed. He notes that he generally takes Tylenol prior to resorting to the Ultram.     1) Pt was given information on cervical arthritis exercises. 2) He will continue taking Ultram 50 mg as prescribed and does not need a refill at this time. 3) Pt received a refill of Lyrica 100 mg 1 tab BID. 4)  [employer] has a reminder for a \"due or due soon\" health maintenance. I have asked that he contact his primary care provider, Odilon Vee MD, for follow-up on this health maintenance. 5)  demonstrated consistency with prescribing. 6) Pt will follow-up in 3 months or sooner if needed. HISTORY OF PRESENT ILLNESS:  John Peralta is a 54 y.o. male with history of cervical pain and presents to the office today for follow up. Pt complains of pain in the neck that occasionally radiates down the left arm to the wrist. He admits to increased pain with colder weather. Pt states that he recently returned from a business trip in [place] and it was snowing while he was there. Pt has been prescribed Ultram 50 mg and takes it intermittently as his pain warrants. He notes that he generally takes Tylenol prior to resorting to the Ultram. Pt continues to take Lyrica 100 mg BID and Skelaxin 800 mg as needed.  He is diabetic and notes that his blood sugars are well controlled at this time. Pt at this time desires to continue with current care.     Pain Scale: 2/10    PCP: Gabriel Choe MD     Past Medical History:   Diagnosis Date    Adverse effect of anesthesia     for back sx-had issues with breathing  with apnea- 6 years ago    Asthma     Chest pain, unspecified     ATYPICAL,POSSIBLE ANGINA,MUSCULAR,CAD,CHEST WALL PAINS PERSISTANT AND RECURRENT    Chronic kidney disease     Diabetes mellitus ([facility]     DM (diabetes mellitus) ([facility]     GERD (gastroesophageal reflux disease)     HTN (hypertension)     Hypercholesteremia     Hypertension     Kidney stones     Sleep apnea     cpap        Social History     Socioeconomic History    Marital status:      Spouse name: Not on file    Number of children: Not on file    Years of education: Not on file    Highest education level: Not on file   Occupational History    Occupation:    Social Needs    Financial resource strain: Not on file    Food insecurity:     Worry: Not on file     Inability: Not on file    Transportation needs:     Medical: Not on file     Non-medical: Not on file   Tobacco Use    Smoking status: Never Smoker    Smokeless tobacco: Never Used   Substance and Sexual Activity    Alcohol use: No    Drug use: No    Sexual activity: Not on file   Lifestyle    Physical activity:     Days per week: Not on file     Minutes per session: Not on file    Stress: Not on file   Relationships    Social connections:     Talks on phone: Not on file     Gets together: Not on file     Attends [employer] service: Not on file     Active member of club or organization: Not on file     Attends meetings of clubs or organizations: Not on file     Relationship status: Not on file    Intimate partner violence:     Fear of current or ex partner: Not on file     Emotionally abused: Not on file     Physically abused: Not on file     Forced sexual activity: Not on file   Other Topics Concern    Not on file   Social History Narrative    ** Merged History Encounter **            Current Outpatient Medications   Medication Sig Dispense Refill    pregabalin (LYRICA) 100 mg capsule Take 1 Cap by mouth two (2) times a day. Max Daily Amount: 200 mg. 180 Cap 1    amLODIPine (NORVASC) 5 mg tablet Take 5 mg by mouth daily.  benazepril (LOTENSIN) 20 mg tablet Take 20 mg by mouth daily.  metaxalone (SKELAXIN) 800 mg tablet 1 po bid prn   Indications: Muscle Spasm 60 Tab 3    traMADol (ULTRAM) 50 mg tablet 1 po bid prn severe pain 60 Tab 1    hydroCHLOROthiazide (MICROZIDE) 12.5 mg capsule       pravastatin (PRAVACHOL) 20 mg tablet       cpap machine kit by Does Not Apply route.  Potassium Citrate 15 mEq TbER Take  by mouth two (2) times a day.  ondansetron (ZOFRAN ODT) 4 mg disintegrating tablet Take 1-2 tablets every 6-8 hours as needed for nausea and vomiting. 10 Tab 0    tamsulosin (FLOMAX) 0.4 mg capsule Take 1 Cap by mouth daily (after dinner). 30 Cap 0    carvedilol (COREG CR) 10 mg CR capsule Take  by mouth daily (with breakfast).  montelukast (SINGULAIR) 10 mg tablet Take 10 mg by mouth daily.  insulin glargine (LANTUS SOLOSTAR) 100 unit/mL (3 mL) pen by SubCUTAneous route.  insulin aspart (NOVOLOG) 100 unit/mL inpn by SubCUTAneous route.  magnesium citrate 100 mg tab Take 300 mg by mouth daily.  aspirin 81 mg chewable tablet Take 81 mg by mouth daily.  fluticasone-salmeterol (ADVAIR DISKUS) 250-50 mcg/dose diskus inhaler Take 1 Puff by inhalation every twelve (12) hours.  Dexlansoprazole (DEXILANT) 60 mg CpDM Take 60 mg by mouth daily.  albuterol (VENTOLIN HFA) 90 mcg/actuation inhaler Take  by inhalation every six (6) hours as needed.  Misc. Devices Kit Please provide the patient following size mask. Mask: Mirage FX large size mask.   DME: [facility], [place], [place]  Phone: [phone], Fax: [phone] 1 Each 0       No Known Allergies      REVIEW OF SYSTEMS    Constitutional: Negative for fever, chills, or weight change. Respiratory: Negative for cough or shortness of breath. Cardiovascular: Negative for chest pain or palpitations. Gastrointestinal: Negative for acid reflux, change in bowel habits, or constipation. Genitourinary: Negative for dysuria and flank pain. Musculoskeletal: Positive for cervical and left wrist pain. Skin: Negative for rash. Neurological: Negative for headaches, dizziness, or numbness. Endo/Heme/Allergies: Negative for increased bruising. Psychiatric/Behavioral: Negative for difficulty with sleep. PHYSICAL EXAMINATION  Visit Vitals  /73   Pulse 96   Temp 98.2 °F (36.8 °C) (Oral)   Resp 16   Ht 5' 7\" (1.702 m)   Wt 291 lb (132 kg)   SpO2 99%   BMI 45.58 kg/m²       Constitutional: Awake, alert, and in no acute distress. Neurological: 1+ symmetrical DTRs in the upper extremities. 1+ symmetrical DTRs in the lower extremities. Sensation to light touch is intact. Negative William's sign bilaterally. Skin: warm, dry, and intact. Musculoskeletal: Decreased range of motion with side to side cervical flexion. Minimally tight across the upper trapezius. Positive Finkelstein's test on the left. Biceps  Triceps Deltoids Wrist Ext Wrist Flex Hand Intrin   Right +4/5 +4/5 +4/5 +4/5 +4/5 +4/5   Left +4/5 +4/5 +4/5 +4/5 +4/5 +4/5     IMAGING:    Cervical spine MRI from 11/02/2017 was personally reviewed with the patient and demonstrated:  Results from [facility] on 11/02/17   MRI CERV SPINE WO CONT     Narrative MR CERVICAL SPINE WITHOUT CONTRAST    CPT CODE: 76395    HISTORY: Chronic cervicalgia progressing over the past 2 months. Left arm pain  and paresthesias. No injury. COMPARISON: October 2013. TECHNIQUE: The following sequences were performed through the cervical spine:    1.  Sagittal and axial T2 weighted images without fat saturation.   2.  Sagittal T1 weighted images without fat saturation. 3.  Sagittal STIR sequence. FINDINGS:     Cervical straightening as before. Normal vertebral body heights. No change in a  small rounded area of T2/STIR bright signal within right C5. Likely some  stippled T1 bright signal within; favors hemangioma. Unremarkable disc space  height. Patent cervical medullary junction. No cervical cord expansion or  atrophy. Small oblong fluid bright focus at the right occipital cervical  articulation anteriorly is unchanged. On axial imaging, findings at each level are as follows:    C2/C3: Patent canal and foramina. C3/C4: Patent canal and foramina. C4/C5: Patent canal and foramina. C5/C6: Mild right paracentral disc protrusion. Patent canal and foramina. C6/C7: Minimal disc bulge on sagittal imaging. Patent canal and foramina. C7/T1: Patent canal and foramina. Incidental imaging of the adjacent soft tissues is unremarkable.              Impression IMPRESSION:    1. Minimal degenerative findings of cervical spine. No substantive change. Patent canal and foramina. No specific source for paresthesias. Thank you for this referral.     Written by Bill Marroquin MD, [address], as dictated by Tori Rodriguez MD.  I, Dr. Tori Rodriguez confirm that all documentation is accurate.

## 2019-07-03 ENCOUNTER — OFFICE VISIT (OUTPATIENT)
Dept: ORTHOPEDIC SURGERY | Age: 49
End: 2019-07-03

## 2019-07-03 VITALS
HEART RATE: 92 BPM | BODY MASS INDEX: 45.2 KG/M2 | RESPIRATION RATE: 18 BRPM | OXYGEN SATURATION: 95 % | TEMPERATURE: 98.3 F | DIASTOLIC BLOOD PRESSURE: 63 MMHG | WEIGHT: 288 LBS | SYSTOLIC BLOOD PRESSURE: 124 MMHG | HEIGHT: 67 IN

## 2019-07-03 DIAGNOSIS — M79.18 MYOFASCIAL PAIN: ICD-10-CM

## 2019-07-03 DIAGNOSIS — M47.812 CERVICAL FACET JOINT SYNDROME: Primary | ICD-10-CM

## 2019-07-03 DIAGNOSIS — G89.29 CHRONIC RIGHT SHOULDER PAIN: ICD-10-CM

## 2019-07-03 DIAGNOSIS — M79.10 TRIGGER POINT: ICD-10-CM

## 2019-07-03 DIAGNOSIS — M62.838 MUSCLE SPASM: ICD-10-CM

## 2019-07-03 DIAGNOSIS — M25.511 CHRONIC RIGHT SHOULDER PAIN: ICD-10-CM

## 2019-07-03 RX ORDER — BETAMETHASONE SODIUM PHOSPHATE AND BETAMETHASONE ACETATE 3; 3 MG/ML; MG/ML
6 INJECTION, SUSPENSION INTRA-ARTICULAR; INTRALESIONAL; INTRAMUSCULAR; SOFT TISSUE ONCE
Qty: 1 ML | Refills: 0
Start: 2019-07-03 | End: 2019-07-03

## 2019-07-03 RX ORDER — PREGABALIN 100 MG/1
100 CAPSULE ORAL 2 TIMES DAILY
Qty: 180 CAP | Refills: 1 | Status: SHIPPED | OUTPATIENT
Start: 2019-07-03 | End: 2020-01-06 | Stop reason: SDUPTHER

## 2019-07-03 RX ORDER — LIDOCAINE HYDROCHLORIDE 20 MG/ML
1 INJECTION, SOLUTION EPIDURAL; INFILTRATION; INTRACAUDAL; PERINEURAL ONCE
Qty: 1 ML | Refills: 0
Start: 2019-07-03 | End: 2019-07-03

## 2019-07-03 RX ORDER — METAXALONE 800 MG/1
TABLET ORAL
Qty: 180 TAB | Refills: 1 | Status: SHIPPED | OUTPATIENT
Start: 2019-07-03 | End: 2021-05-10

## 2019-07-03 RX ORDER — BUPIVACAINE HYDROCHLORIDE 2.5 MG/ML
2.5 INJECTION, SOLUTION INFILTRATION; PERINEURAL ONCE
Qty: 1 ML | Refills: 0
Start: 2019-07-03 | End: 2019-07-03

## 2019-07-03 NOTE — PATIENT INSTRUCTIONS
Shoulder Arthritis: Exercises Your Care Instructions Here are some examples of typical rehabilitation exercises for your condition. Start each exercise slowly. Ease off the exercise if you start to have pain. Your doctor or physical therapist will tell you when you can start these exercises and which ones will work best for you. How to do the exercises Shoulder flexion (lying down) 1. Lie on your back, holding a wand with both hands. Your palms should face down as you hold the wand. 2. Keeping your elbows straight, slowly raise your arms over your head. Raise them until you feel a stretch in your shoulders, upper back, and chest. 
3. Hold for 15 to 30 seconds. 4. Repeat 2 to 4 times. Shoulder rotation (lying down) 1. Lie on your back. Hold a wand with both hands with your elbows bent and palms up. 2. Keep your elbows close to your body, and move the wand across your body toward the sore arm. 3. Hold for 8 to 12 seconds. 4. Repeat 2 to 4 times. Shoulder internal rotation with towel 1. Hold a towel above and behind your head with the arm that is not sore. 2. With your sore arm, reach behind your back and grasp the towel. 3. With the arm above your head, pull the towel upward. Do this until you feel a stretch on the front and outside of your sore shoulder. 4. Hold 15 to 30 seconds. 5. Repeat 2 to 4 times. Shoulder blade squeeze 1. Stand with your arms at your sides, and squeeze your shoulder blades together. Do not raise your shoulders up as you squeeze. 2. Hold 6 seconds. 3. Repeat 8 to 12 times. Resisted rows 1. Put the band around a solid object at about waist level. (A bedpost will work well.) Each hand should hold an end of the band. 2. With your elbows at your sides and bent to 90 degrees, pull the band back. Your shoulder blades should move toward each other. Return to the starting position. 3. Repeat 8 to 12 times. External rotator strengthening exercise 1. Start by tying a piece of elastic exercise material to a doorknob. You can use surgical tubing or Thera-Band. (You may also hold one end of the band in each hand.) 2. Stand or sit with your shoulder relaxed and your elbow bent 90 degrees. Your upper arm should rest comfortably against your side. Squeeze a rolled towel between your elbow and your body for comfort. This will help keep your arm at your side. 3. Hold one end of the elastic band with the hand of the painful arm. 4. Start with your forearm across your belly. Slowly rotate the forearm out away from your body. Keep your elbow and upper arm tucked against the towel roll or the side of your body until you begin to feel tightness in your shoulder. Slowly move your arm back to where you started. 5. Repeat 8 to 12 times. Internal rotator strengthening exercise 1. Start by tying a piece of elastic exercise material to a doorknob. You can use surgical tubing or Thera-Band. 2. Stand or sit with your shoulder relaxed and your elbow bent 90 degrees. Your upper arm should rest comfortably against your side. Squeeze a rolled towel between your elbow and your body for comfort. This will help keep your arm at your side. 3. Hold one end of the elastic band in the hand of the painful arm. 4. Slowly rotate your forearm toward your body until it touches your belly. Slowly move it back to where you started. 5. Keep your elbow and upper arm firmly tucked against the towel roll or at your side. 6. Repeat 8 to 12 times. Pendulum swing 1. Hold on to a table or the back of a chair with your good arm. Then bend forward a little and let your sore arm hang straight down. This exercise does not use the arm muscles. Rather, use your legs and your hips to create movement that makes your arm swing freely. 2. Use the movement from your hips and legs to guide the slightly swinging arm back and forth like a pendulum (or elephant trunk).  Then guide it in circles that start small (about the size of a dinner plate). Make the circles a bit larger each day, as your pain allows. 3. Do this exercise for 5 minutes, 5 to 7 times each day. 4. As you have less pain, try bending over a little farther to do this exercise. This will increase the amount of movement at your shoulder. Follow-up care is a key part of your treatment and safety. Be sure to make and go to all appointments, and call your doctor if you are having problems. It's also a good idea to know your test results and keep a list of the medicines you take. Where can you learn more? Go to http://marija-marianne.info/. Enter H562 in the search box to learn more about \"Shoulder Arthritis: Exercises. \" Current as of: September 20, 2018 Content Version: 11.9 © 4640-3334 [employer]. Care instructions adapted under license by [employer] (which disclaims liability or warranty for this information). If you have questions about a medical condition or this instruction, always ask your healthcare professional. [address] any warranty or liability for your use of this information. Neck Arthritis: Exercises Your Care Instructions Here are some examples of typical rehabilitation exercises for your condition. Start each exercise slowly. Ease off the exercise if you start to have pain. Your doctor or physical therapist will tell you when you can start these exercises and which ones will work best for you. How to do the exercises Neck stretches to the side 5. This stretch works best if you keep your shoulder down as you lean away from it. To help you remember to do this, start by relaxing your shoulders and lightly holding on to your thighs or your chair. 6. Tilt your head toward your shoulder and hold for 15 to 30 seconds. Let the weight of your head stretch your muscles. 7. Repeat 2 to 4 times toward each shoulder. Chin tuck 5. Lie on the floor with a rolled-up towel under your neck. Your head should be touching the floor. 6. Slowly bring your chin toward your chest. 
7. Hold for a count of 6, and then relax for up to 10 seconds. 8. Repeat 8 to 12 times. Active cervical rotation 6. Sit in a firm chair, or stand up straight. 7. Keeping your chin level, turn your head to the right, and hold for 15 to 30 seconds. 8. Turn your head to the left and hold for 15 to 30 seconds. 9. Repeat 2 to 4 times to each side. Shoulder blade squeeze 4. While standing, squeeze your shoulder blades together. 5. Do not raise your shoulders up as you are squeezing. 6. Hold for 6 seconds. 7. Repeat 8 to 12 times. Shoulder rolls 4. Sit comfortably with your feet shoulder-width apart. You can also do this exercise standing up. 5. Roll your shoulders up, then back, and then down in a smooth, circular motion. 6. Repeat 2 to 4 times. Follow-up care is a key part of your treatment and safety. Be sure to make and go to all appointments, and call your doctor if you are having problems. It's also a good idea to know your test results and keep a list of the medicines you take. Where can you learn more? Go to http://marija-marianne.info/. Enter R236 in the search box to learn more about \"Neck Arthritis: Exercises. \" Current as of: September 20, 2018 Content Version: 11.9 © 2385-0840 [employer]. Care instructions adapted under license by [employer] (which disclaims liability or warranty for this information). If you have questions about a medical condition or this instruction, always ask your healthcare professional. [address] any warranty or liability for your use of this information.

## 2019-07-03 NOTE — LETTER
7/8/19 Patient: Татьяна Marquez YOB: 1970 Date of Visit: 7/3/2019 Tanisha Bojorquez MD 
[address] [place] [address] VIA Facsimile: [phone] Dear Tanisha Bojorquez MD, Thank you for referring Mr. Cassandra Denny to [facility] for evaluation. My notes for this consultation are attached. If you have questions, please do not hesitate to call me. I look forward to following your patient along with you. Sincerely, Michelle Limon MD
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
## 2019-07-15 ENCOUNTER — HOSPITAL ENCOUNTER (OUTPATIENT)
Dept: PHYSICAL THERAPY | Age: 49
Discharge: HOME | End: 2019-07-15
Payer: OTHER GOVERNMENT

## 2019-07-15 PROCEDURE — 97530 THERAPEUTIC ACTIVITIES: CPT

## 2019-07-15 PROCEDURE — 97161 PT EVAL LOW COMPLEX 20 MIN: CPT

## 2019-07-15 PROCEDURE — 97110 THERAPEUTIC EXERCISES: CPT

## 2019-07-15 NOTE — PROGRESS NOTES
PT DAILY TREATMENT NOTE/SHOULDER EVAL 10-18    Patient Name: Caridad Pimentel  Date:7/15/2019  : 1970  [x]  Patient  Verified  Payor:  / Plan: [facility]  [facility] / Product Type:  /    In time:503  Out time:550  Total Treatment Time (min): 47  Visit #: 1 of 12         Medicare/BCBS Only   Total Timed Codes (min):  - 1:1 Treatment Time:  -      Treatment Area: Pain in right shoulder [M25.511]  Neck pain [M54.2]  Spondylosis without myelopathy or radiculopathy, cervical region [M47.812]  Other muscle spasm [M62.838]  Myalgia, other site [M79.18]     SUBJECTIVE  Pain Level (0-10 scale): 2  [x]constant []intermittent [x]improving []worsening []no change since onset  WORSE movement and use of mostly right arm with certain mid range movements and reaching behind, also left arm, BETTER rest and non use  Any medication changes, allergies to medications, adverse drug reactions, diagnosis change, or new procedure performed?: [x] No    [] Yes (see summary sheet for update)  Subjective functional status/changes:     PLOF: I all areas of ADLs and activities with 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 Current symptoms/Complaints: 53 YO male diagnosed as above and with S/S consistent with above diagnosis presents to skilled outpatient PT. CCO right shoulder weakness,and  pain with certain movements of the right shoulder, neck tightness, headaches and exacerbation of neck MFPS due to a right shoulder injury late May/early 2019. He is right hand dominant.    Previous Treatment/Compliance: ER, spine center for regular follow up for his neck, x-rays, previously prescribed meds, ice and heat  PMHx/Surgical Hx: DM, arthritis, MFPS, HTN, kidney disease. Back surgery 2010  Work Hx: full time [employer]  Living Situation: lives in a 2 story house , not alone   Pt Goals: gain strength in the right arm, \"fix \" the pain in the right shoulder during movement. Barriers: [x]pain []financial []time []transportation []other  Motivation:Good  Substance use: []Alcohol []Tobacco []other:   FABQ Score: []low []elevate  Cognition: A & O x 4  Other:     OBJECTIVE/EXAMINATION  Domestic Life: work, household and community activities, Christian , walking, playing bass guitar, camping and shooting  Activity/Recreational Limitations: pain and weakness of the right shoulder  Mobility: I  Self Care:  I    Modality rationale:     Min Type Additional Details    [] Estim:  []Unatt       []IFC  []Premod                        []Other:  []w/ice   []w/heat  Position:  Location:    [] Estim: []Att    []TENS instruct  []NMES                    []Other:  []w/US   []w/ice   []w/heat  Position:  Location:    []  Traction: [] Cervical       []Lumbar                       [] Prone          []Supine                       []Intermittent   []Continuous Lbs:  [] before manual  [] after manual    []  Ultrasound: []Continuous   [] Pulsed                           []1MHz   []3MHz Location:  W/cm2:    []  Iontophoresis with dexamethasone         Location: [] Take home patch   [] In clinic    []  Ice     []  heat  []  Ice massage  []  Laser   []  Anodyne Position:  Location:    []  Laser with stim  []  Other: Position:  Location:    []  Vasopneumatic Device Pressure:       [] lo [] med [] hi   Temperature: [] lo [] med [] hi   [] Skin assessment post-treatment:  []intact []redness- no adverse reaction    []redness  adverse reaction:     22 min [x]Eval                  []Re-Eval       15 min Therapeutic Exercise:  [x] See flow sheet :   Rationale: increase ROM, increase strength and improve coordination to improve the patients ability to aid with increase tolerance to ADLs and activities    10 min Therapeutic Activity:  [x]  See flow sheet :   Rationale: increase ROM, increase strength and improve coordination  to improve the patients ability to aid with increase tolerance to ADLs and activities      min Neuromuscular Re-education:  []  See flow sheet :   Rationale:   to improve the patients ability to      min Manual Therapy:     Rationale:  to      min Gait Training:  ___ feet with ___ device on level surfaces with ___ level of assist   Rationale:           With   [x] TE   [x] TA   [] neuro   [] other: Patient Education: [x] Review HEP    [] Progressed/Changed HEP based on:   [] positioning   [] body mechanics   [] transfers   [] heat/ice application    [x] other: Anatomy, diagnosis     Other Objective/Functional Measures: Csp FF/BB   25/35   ROT Right /Left   50/50   SB Right/ left   42/36    Physical Therapy Evaluation - Shoulder    Posture: [] Poor    [x] Fair    [] Good    Describe:    ROM:  [] Unable to assess at this time                                           AROM                                                              PROM   Left Right  Left Right   Flexion 150 140 Flexion     Extension   Extension     Scaption/ 92 Scaptin/ABD     ER @ 0 Degrees 70 55 ER @ 0 Degrees     ER @ 90 Degrees   ER @ 90 Degrees     IR @ 0 Degrees abdomen abdomen IR @ 90 Degrees       End Feel / Painful Arc:    Strength:   [] Unable to assess at this time                                                                            L (1-5) R (1-5) Pain   Flexors 5 4 [] Yes   [] No   Abductors 5 4- [x] Yes   [] No   External Rotators 5 4 [] Yes   [] No   Internal Rotators 5 5 [] Yes   [] No   Supraspinatus   [] Yes   [] No   Serratus Anterior   [] Yes   [] No   Lower Trapezius   [] Yes   [] No   Elbow Flexion   [] Yes   [] No   Elbow Extension   [] Yes   [] No       Scapulohumoral Control / Rhythm:  Able to eccentrically lower with good control? Left: [x] Yes   [] No     Right: [x] Yes   [] No    Accessory Motions:    Palpation  [] Min  [x] Mod  [] Severe    Location:STC and TTP  , trigger points B UT  [] Min  [] Mod  [] Severe    Location:  [] Min  [] Mod  [] Severe    Location:    Optional Tests:    Sensation Left Right Reflexes Left Right   Biceps (C5)   Biceps (C5)     Robbins Radial(C6-7)   Brachioradialis (C6)     Robbins Ulnar(C8-T1)   Triceps (C7)       Adson's Test  [] Pos   [] Neg Yergason's Test [] Pos   [] Neg  Margaux's Test  [] Pos   [] Neg Jose's Sign [] Pos   [] Neg  Neer's Test  [] Pos   [x] Neg Clunk Test  [] Pos   [] Neg  Hawkin's Test  [x] Pos   [] Neg AC Joint  [] Pos   [] Neg  Speed's Test  [] Pos   [] Neg SC Joint  [] Pos   [] Neg  Empty Can  [x] Pos   [] Neg Pectoral Tightness [] Pos   [] Neg  Anterior Apprehension [] Pos   [] Neg   Posterior Apprehension [] Pos   [] Neg      Other Tests / Comments:        Pain Level (0-10 scale) post treatment: 2    ASSESSMENT/Changes in Function: Patient demonstrates the potential to make gains with improved ROM, strength, endurance/activity tolerance, functional FOTO survey score and all within a reasonable time frame so as to increase their functional independence with ADLs and activities for carryover to  Improved quality of life, tolerance to household chores, community activities and work demands. Patient requires skilled Physical Therapy so as to monitor their response to and modify their treatment plan accordingly. Patient appears to be an appropriate candidate for skilled outpatient Physical Therapy.     Patient will continue to benefit from skilled PT services to modify and progress therapeutic interventions, address ROM deficits, address strength deficits, analyze and address soft tissue restrictions, analyze and cue movement patterns, analyze and modify body mechanics/ergonomics, assess and modify postural abnormalities and instruct in home and community integration to attain remaining goals.      [x]  See Plan of Care  []  See progress note/recertification  []  See Discharge Summary         Progress towards goals / Updated goals:       PLAN  [x]  Upgrade activities as tolerated     [x]  Continue plan of care  []  Update interventions per flow sheet       []  Discharge due to:_  []  Other:_      Debora Ward, PT 7/15/2019  5:19 PM

## 2019-07-15 NOTE — PROGRESS NOTES
[facility] [id number] [facility], [address] [place] [address], [facility] [address] [address]  [phone] fax      Plan of Care/ Statement of Necessity for Physical Therapy Services    Patient name: Deni Salazar Start of Care: 7/15/2019   Referral source: Doris Cotton MD : 1970    Medical Diagnosis: Pain in right shoulder [M25.511]  Neck pain [M54.2]  Spondylosis without myelopathy or radiculopathy, cervical region [M47.812]  Other muscle spasm [M62.838]  Myalgia, other site [M79.18]  Payor:  / Plan: Carlos Minor 74 / Product Type: [employer] /  Onset Date:late May , early 2019    Treatment Diagnosis: right shoulder pain, neck pain   Prior Hospitalization: see medical history Provider#: [id number]   Medications: Verified on Patient summary List    Comorbidities: : DM, arthritis, MFPS, HTN, kidney disease. 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 Assessment/ key information: 53 YO male diagnosed as above and with S/S consistent with above diagnosis presents to skilled outpatient PT. CCO right shoulder weakness,and  pain with certain movements of the right shoulder, neck tightness, headaches and exacerbation of neck MFPS due to a right shoulder injury late May/early 2019. He is right hand dominant. Minimal pain today and with impingement S/S of the right shoulder, additionally with decreased strength right shoulder. He has TTP anterior right shoulder and trigger points in the right UT.    Patient demonstrates the potential to make gains with improved ROM, strength, endurance/activity tolerance, functional FOTO survey score and all within a reasonable time frame so as to increase their functional independence with ADLs and activities for carryover to  Improved quality of life, tolerance to household chores, community activities and work demands. Patient requires skilled Physical Therapy so as to monitor their response to and modify their treatment plan accordingly. Patient appears to be an appropriate candidate for skilled outpatient Physical Therapy. Evaluation Complexity History MEDIUM  Complexity : 1-2 comorbidities / personal factors will impact the outcome/ POC ; Examination MEDIUM Complexity : 3 Standardized tests and measures addressing body structure, function, activity limitation and / or participation in recreation  ;Presentation LOW Complexity : Stable, uncomplicated  ;Clinical Decision Making MEDIUM Complexity : FOTO score of 26-74  Overall Complexity Rating: LOW   Problem List: pain affecting function, decrease ROM, decrease strength, decrease ADL/ functional abilitiies, decrease activity tolerance, decrease flexibility/ joint mobility and other FOTO 54   Treatment Plan may include any combination of the following: Therapeutic exercise, Therapeutic activities, Neuromuscular re-education, Physical agent/modality, Manual therapy, Patient education, Self Care training and Home safety training  Patient / Family readiness to learn indicated by: asking questions, trying to perform skills and interest  Persons(s) to be included in education: patient (P)  Barriers to Learning/Limitations: None  Patient Goal (s): gain strength in the right arm, \"fix \" the pain in the right shoulder during movement\"  Patient Self Reported Health Status: good  Rehabilitation Potential: good  Short Term Goals: To be accomplished in 5 treatments:   1 patient will have established and be I with HEP to aid with self management of S/S at discharge   EVAL issued   CURRENT     Long Term Goals:  To be accomplished in 12 treatments:   1 patient will have AROM right shoulder F 150 ad  to aid with increase tolerance to work demands and light household activities  Including reaching a shelf at shoulder height   EVAL F 140 and ABD 92, some difficulty   CURRENT   2 patient will have MMT right shoulder 5/5 to aid with  Moving a skillet on the stove   EVAL MMT right shoulder F 4, abd 4-, ER 4, much difficulty   CURRENT    3 patient will have FOTO 70 to show significant improvement with reaching across his body   EVAL 54, little difficulty   CURRENT   4 patient will report overall 50% improvement to aid with increase tolerance to mid range movements of the right UE with activities    EVAL worsens pain   CURRENT  Frequency / Duration: Patient to be seen 1-2 times per week for 12 treatments. Patient/ Caregiver education and instruction: Diagnosis, prognosis, self care, activity modification and exercises   [x]  Plan of care has been reviewed with DEBBIE Chaudhari, PT 7/15/2019 5:35 PM  ________________________________________________________________________    I certify that the above Therapy Services are being furnished while the patient is under my care. I agree with the treatment plan and certify that this therapy is necessary.     Physician's Signature:____________Date:_________TIME:________    [facility], Date and Time must be completed for valid certification **      Please sign and return to In . Taina Ksawerego [address], [address] [place] [address], [address] [place] [address],[facility] [address]  [phone] fax

## 2019-07-24 ENCOUNTER — HOSPITAL ENCOUNTER (OUTPATIENT)
Dept: PHYSICAL THERAPY | Age: 49
Discharge: HOME | End: 2019-07-24
Payer: OTHER GOVERNMENT

## 2019-07-24 PROCEDURE — 97140 MANUAL THERAPY 1/> REGIONS: CPT

## 2019-07-24 PROCEDURE — 97110 THERAPEUTIC EXERCISES: CPT

## 2019-07-24 NOTE — PROGRESS NOTES
PT DAILY TREATMENT NOTE 10-18    Patient Name: Geralene Severin  Date:2019  : 1970  [x]  Patient  Verified  Payor: CHERI / Plan: Carlos Minor 76 / Product Type:  /    In time:5:37  Out time:6:09  Total Treatment Time (min): 32  Visit #: 2 of 12    Medicare/BCBS Only   Total Timed Codes (min):  n/a 1:1 Treatment Time:  n/a       Treatment Area: Pain in right shoulder [M25.511]  Neck pain [M54.2]  Spondylosis without myelopathy or radiculopathy, cervical region [M47.812]  Other muscle spasm [M62.838]  Myalgia, other site [M79.18]    SUBJECTIVE  Pain Level (0-10 scale): 2  Any medication changes, allergies to medications, adverse drug reactions, diagnosis change, or new procedure performed?: [x] No    [] Yes (see summary sheet for update)  Subjective functional status/changes:   [] No changes reported  Pt reports compliance with the HEP but reports having pain/pinching in the right shoulder with a fw of the exercises ,especially the pendulums. OBJECTIVE    17 min Therapeutic Exercise:  [x] See flow sheet :   Rationale: increase ROM and increase strength to improve the patients ability to tolerate ADLs    15 min Manual Therapy:  Long axis distraction right GHJ, inferior/posterior glides to the right GHJ, DTM/TPR right UT   Rationale: decrease pain, increase ROM, increase tissue extensibility and decrease trigger points to improve activity tolerance          With   [] TE   [] TA   [] neuro   [] other: Patient Education: [x] Review HEP    [] Progressed/Changed HEP based on:   [] positioning   [] body mechanics   [] transfers   [] heat/ice application    [] other:      Other Objective/Functional Measures: Initiated exercises/interventions in flow sheet. Pain Level (0-10 scale) post treatment: 0    ASSESSMENT/Changes in Function: Reported no pain post session today or with exercises. Tenderness noted to the right UT region, which improved with manual interventions.  Educated pt to perform pendulums/HEP exercises in a smaller motion to avoid increased pain. Continue POC as tolerated. Patient will continue to benefit from skilled PT services to modify and progress therapeutic interventions, address functional mobility deficits, address ROM deficits, address strength deficits, analyze and address soft tissue restrictions, analyze and cue movement patterns, analyze and modify body mechanics/ergonomics, assess and modify postural abnormalities, address imbalance/dizziness and instruct in home and community integration to attain remaining goals. []  See Plan of Care  []  See progress note/recertification  []  See Discharge Summary         Progress towards goals / Updated goals:  Short Term Goals: To be accomplished in 5 treatments:               1 patient will have established and be I with HEP to aid with self management of S/S at discharge               EVAL issued               CURRENT reports compliance but reports having some pain with exercises 7/24/2019  Long Term Goals:  To be accomplished in 12 treatments:               1 patient will have AROM right shoulder F 150 ad  to aid with increase tolerance to work demands and light household activities  Including reaching a shelf at shoulder height               EVAL F 140 and ABD 92, some difficulty               CURRENT               2 patient will have MMT right shoulder 5/5 to aid with  Moving a skillet on the stove               EVAL MMT right shoulder F 4, abd 4-, ER 4, much difficulty               CURRENT                3 patient will have FOTO 70 to show significant improvement with reaching across his body               EVAL 54, little difficulty               CURRENT               4 patient will report overall 50% improvement to aid with increase tolerance to mid range movements of the right UE with activities                EVAL worsens pain               CURRENT    PLAN  [x]  Upgrade activities as tolerated     [x] Continue plan of care  [x]  Update interventions per flow sheet       []  Discharge due to:_  []  Other:_      Nolan Smith, PT 7/24/2019  5:50 PM    Future Appointments   Date Time Provider Bryant Zavaleta   7/24/2019  5:30 PM Maday Little, PT [facility] [address]   7/30/2019  5:00 PM Maciej Jackson, PT [facility] [address]   8/1/2019  3:00 PM Monica Marinelli, PT [facility] [address]   8/5/2019  5:30 PM Maday Little, PT [facility] [address]   8/8/2019  5:00 PM Elby Feather, PT [facility] [address]   8/12/2019  6:00 PM Elby Feather, PT [facility] [address]   8/20/2019  8:30 AM Elby Feather, PT [facility] [address]   8/22/2019  5:30 PM Elby Feather, PT [facility] [address]   8/26/2019  6:00 PM Elby Feather, PT [facility] [address]   8/29/2019  5:00 PM Elby Feather, PT [facility] [address]   9/5/2019  3:30 PM [facility]   9/5/2019  4:00 PM Eze Belcher MD [facility]   9/9/2019  3:30 PM Amber Gates  [address]

## 2019-07-30 ENCOUNTER — APPOINTMENT (OUTPATIENT)
Dept: PHYSICAL THERAPY | Age: 49
End: 2019-07-30
Payer: OTHER GOVERNMENT

## 2019-08-01 ENCOUNTER — APPOINTMENT (OUTPATIENT)
Dept: PHYSICAL THERAPY | Age: 49
End: 2019-08-01
Payer: OTHER GOVERNMENT

## 2019-08-05 ENCOUNTER — HOSPITAL ENCOUNTER (OUTPATIENT)
Dept: PHYSICAL THERAPY | Age: 49
Discharge: HOME | End: 2019-08-05
Payer: OTHER GOVERNMENT

## 2019-08-05 PROCEDURE — 97140 MANUAL THERAPY 1/> REGIONS: CPT

## 2019-08-05 PROCEDURE — 97110 THERAPEUTIC EXERCISES: CPT

## 2019-08-05 NOTE — PROGRESS NOTES
PT DAILY TREATMENT NOTE 10-18    Patient Name: Bob Laguna  Date:2019  : 1970  [x]  Patient  Verified  Payor: CHERI / Plan: [facility] Minor [facility] / Product Type:  /    In time: 5:31   Out time: 6:19  Total Treatment Time (min): 45 (3 min wait on therapist)  Visit #: 3 of 12    Medicare/BCBS Only   Total Timed Codes (min):  n/a 1:1 Treatment Time:  n/a       Treatment Area: Pain in right shoulder [M25.511]  Neck pain [M54.2]  Spondylosis without myelopathy or radiculopathy, cervical region [M47.812]  Other muscle spasm [M62.838]  Myalgia, other site [M79.18]    SUBJECTIVE  Pain Level (0-10 scale): 2  Any medication changes, allergies to medications, adverse drug reactions, diagnosis change, or new procedure performed?: [x] No    [] Yes (see summary sheet for update)  Subjective functional status/changes:   [] No changes reported  Pt reports he felt a lot looser after the last session. Pt reports he had to cancel the last few appointments secondary to being sick and has not performed his exercises for HEP. OBJECTIVE    30 min Therapeutic Exercise:  [x] See flow sheet :   Rationale: increase ROM and increase strength to improve the patients ability to tolerate ADLs    15 min Manual Therapy:  Long axis distraction right GHJ, inferior/posterior glides/grade 2-3 to the right GHJ, DTM/TPR right UT/pec minot   Rationale: decrease pain, increase ROM, increase tissue extensibility and decrease trigger points to improve activity tolerance          With   [] TE   [] TA   [] neuro   [] other: Patient Education: [x] Review HEP    [] Progressed/Changed HEP based on:   [] positioning   [] body mechanics   [] transfers   [] heat/ice application    [] other:      Other Objective/Functional Measures: Tightness and tenderness noted to the right pec minor during manual interventions.       Pain Level (0-10 scale) post treatment: 0    ASSESSMENT/Changes in Function: Reported no pain post session and reported improvement in tightness post manual interventions. Educated pt on how to perform a self-TPR with a theracane/tennis ball to the right pec minor to improve tenderness and tightness to the right shoulder. Continue POC as tolerated. Patient will continue to benefit from skilled PT services to modify and progress therapeutic interventions, address functional mobility deficits, address ROM deficits, address strength deficits, analyze and address soft tissue restrictions, analyze and cue movement patterns, analyze and modify body mechanics/ergonomics, assess and modify postural abnormalities, address imbalance/dizziness and instruct in home and community integration to attain remaining goals. []  See Plan of Care  []  See progress note/recertification  []  See Discharge Summary         Progress towards goals / Updated goals:  Short Term Goals: To be accomplished in 5 treatments:               1 patient will have established and be I with HEP to aid with self management of S/S at discharge               EVAL issued               CURRENT reports compliance but reports having some pain with exercises 7/24/2019  Long Term Goals:  To be accomplished in 12 treatments:               1 patient will have AROM right shoulder F 150 ad  to aid with increase tolerance to work demands and light household activities  Including reaching a shelf at shoulder height               EVAL F 140 and ABD 92, some difficulty               CURRENT               2 patient will have MMT right shoulder 5/5 to aid with  Moving a skillet on the stove               EVAL MMT right shoulder F 4, abd 4-, ER 4, much difficulty               CURRENT                3 patient will have FOTO 70 to show significant improvement with reaching across his body               EVAL 54, little difficulty               CURRENT               4 patient will report overall 50% improvement to aid with increase tolerance to mid range movements of the right UE with activities                EVAL worsens pain               CURRENT    PLAN  [x]  Upgrade activities as tolerated     [x]  Continue plan of care  [x]  Update interventions per flow sheet       []  Discharge due to:_  []  Other:_      Jennie Mcaariorachel, PT 2019  5:56 PM    Future Appointments   Date Time Provider Bryant Meghann   2019  5:30 PM Michoacano Giron, PT [facility]   2019  5:00 PM Aliyah Arrant, PT [facility]   2019  6:00 PM Aliyah Arrant, PT [facility]   2019  8:30 AM Aliyah Arrant, PT [facility]   2019  5:30 PM Aliyah Arrant, PT [facility]   2019  6:00 PM Aliyah Arrant, PT [facility]   2019  5:00 PM Aliyah Arrant, PT [facility]   2019  3:30 PM MITESH Love   2019  4:00 PM MD Ivan Weathers   2019  3:30 PM Miya Carvalho  [address]

## 2019-08-08 ENCOUNTER — APPOINTMENT (OUTPATIENT)
Dept: PHYSICAL THERAPY | Age: 49
End: 2019-08-08
Payer: OTHER GOVERNMENT

## 2019-08-12 ENCOUNTER — HOSPITAL ENCOUNTER (OUTPATIENT)
Dept: PHYSICAL THERAPY | Age: 49
Discharge: HOME | End: 2019-08-12
Payer: OTHER GOVERNMENT

## 2019-08-12 PROCEDURE — 97140 MANUAL THERAPY 1/> REGIONS: CPT

## 2019-08-12 PROCEDURE — 97110 THERAPEUTIC EXERCISES: CPT

## 2019-08-12 PROCEDURE — 97530 THERAPEUTIC ACTIVITIES: CPT

## 2019-08-12 NOTE — PROGRESS NOTES
PT DAILY TREATMENT NOTE 10-18    Patient Name: Luca Manley  Date:2019  : 1970  [x]  Patient  Verified  Payor:  / Plan: Wilton Chauhan / Product Type:  /    In time:602  Out time:652  Total Treatment Time (min): 45  Visit #: 4 of 12    Medicare/BCBS Only   Total Timed Codes (min):   1:1 Treatment Time:         Treatment Area: Pain in right shoulder [M25.511]  Neck pain [M54.2]  Spondylosis without myelopathy or radiculopathy, cervical region [M47.812]  Other muscle spasm [M62.838]  Myalgia, other site [M79.18]    SUBJECTIVE  Pain Level (0-10 scale): 2 1/2  Any medication changes, allergies to medications, adverse drug reactions, diagnosis change, or new procedure performed?: [x] No    [] Yes (see summary sheet for update)  Subjective functional status/changes:   [] No changes reported  \"I missed a few times due to having bronchitis and then once due to traffic. It is definitely improved overall about 25% with the movement , it has gotten alot looser , strength wise it is still the same, going out to the side is still weak, the mid range and twisting movements are still the same and overall the pain is still about the same.  \"    OBJECTIVE    Modality rationale:     Min Type Additional Details    [] Estim:  []Unatt       []IFC  []Premod                        []Other:  []w/ice   []w/heat  Position:  Location:    [] Estim: []Att    []TENS instruct  []NMES                    []Other:  []w/US   []w/ice   []w/heat  Position:  Location:    []  Traction: [] Cervical       []Lumbar                       [] Prone          []Supine                       []Intermittent   []Continuous Lbs:  [] before manual  [] after manual    []  Ultrasound: []Continuous   [] Pulsed                           []1MHz   []3MHz W/cm2:  Location:    []  Iontophoresis with dexamethasone         Location: [] Take home patch   [] In clinic   PD ices at home []  Ice     []  heat  []  Ice massage  []  Laser   [] Anodyne Position:  Location:    []  Laser with stim  []  Other:  Position:  Location:    []  Vasopneumatic Device Pressure:       [] lo [] med [] hi   Temperature: [] lo [] med [] hi   [] Skin assessment post-treatment:  []intact []redness- no adverse reaction    []redness  adverse reaction:       min []Eval                  []Re-Eval       15 min Therapeutic Exercise:  [x] See flow sheet :   Rationale: increase ROM, increase strength and improve coordination to improve the patients ability to aid with increase tolerance to ADLS and activities    15 min Therapeutic Activity:  [x]  See flow sheet :   Rationale: increase ROM, increase strength and improve coordination  to improve the patients ability to aid with increase tolerance to ADLs and activities      min Neuromuscular Re-education:  []  See flow sheet :   Rationale:   to improve the patients ability to     15 min Manual Therapy:  LAD, Oscill, PROM all planes with overstretch for IR and ER   Rationale: decrease pain, increase ROM and increase tissue extensibility to aid with increase tolerance to ADLs and activities     min Gait Training:  ___ feet with ___ device on level surfaces with ___ level of assist   Rationale: With   [x] TE   [x] TA   [] neuro   [] other: Patient Education: [x] Review HEP    [x] Progressed/Changed HEP based on:   [] positioning   [] body mechanics   [] transfers   [] heat/ice application    [] other:      Other Objective/Functional Measures: VC exercises and technique Added Tband exercises    Pain Level (0-10 scale) post treatment: 2 1/2    ASSESSMENT/Changes in Function: tolerated well.      Patient will continue to benefit from skilled PT services to modify and progress therapeutic interventions, address ROM deficits, address strength deficits, analyze and address soft tissue restrictions, analyze and cue movement patterns, analyze and modify body mechanics/ergonomics, assess and modify postural abnormalities and instruct in home and community integration to attain remaining goals.      [x]  See Plan of Care  [x]  See progress note/recertification  []  See Discharge Summary         Progress towards goals / Updated goals:  Short Term Goals: To be accomplished in 5 treatments:               1 patient will have established and be I with HEP to aid with self management of S/S at discharge               EVAL issued               XVSBVAR reports compliance but reports having some pain with exercises 7/24/2019 8/12/19  Long Term Goals: To be accomplished in 12 treatments:               1 patient will have AROM right shoulder F 150 ad  to aid with increase tolerance to work demands and light household activities [address] reaching a shelf at shoulder height               EVAL F 140 and ABD 92, some difficulty               TZLQCYO               2 patient will have MMT right shoulder 5/5 to aid with  Moving a skillet on the stove               EVAL MMT right shoulder F 4, abd 4-, ER 4, much difficulty               YDZCHYR                3 patient will have FOTO 70 to show significant improvement with reaching across his body               EVAL 54, little difficulty               MEZLVRD               4 patient will report overall 50% improvement to aid with increase tolerance to mid range movements of the right UE with activities                EVAL worsens pain               BZYZAXU  25% especially with movement, not pain or strength 8/12/19    PLAN  [x]  Upgrade activities as tolerated     [x]  Continue plan of care  []  Update interventions per flow sheet       []  Discharge due to:_  []  Other:_      Alexx Caldwell, PT 8/12/2019  6:08 PM    Future Appointments   Date Time Provider Bryant Zavaleta   8/20/2019  8:30 AM Hollie Monteiro PT [facility]   8/22/2019  5:30 PM Hollie Monteiro PT [facility]   8/26/2019  6:00 PM Hollie Monteiro PT [facility]   8/29/2019  5:00 PM Hollie Monteiro PT [facility]   9/5/2019  3:30 PM [facility] Ivan   9/5/2019  4:00 PM MD Ivan Toledo   9/9/2019  3:30 PM Jose G Bowden  [facility]

## 2019-08-12 NOTE — PROGRESS NOTES
[facility]  [address] [facility], [address] [place] [address], [facility] [address]  [phone] fax    Physician Update  [x] Progress Note  [] Discharge Summary  Patient name: Gabriella Major Start of Care: 7/15/19   Referral source: Lazaro Zhang MD : 1970   Medical/Treatment Diagnosis: Pain in right shoulder [M25.511]  Neck pain [M54.2]  Spondylosis without myelopathy or radiculopathy, cervical region [M47.812]  Other muscle spasm [M62.838]  Myalgia, other site [M79.18]  Payor:  / Plan: Carlos Minor 76 / Product Type:  /  Onset Date::late May , early 2019                  Prior Hospitalization: see medical history Provider#: [id number]   Medications: Verified on Patient Summary List     Comorbidities: : DM, arthritis, MFPS, HTN, kidney disease. 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 Overall about 25% with the movement , it has gotten alot looser , strength wise it is still the same, going out to the side is still weak, the mid range and twisting movements are still the same and overall the pain is still about the same. \" he is progressing well with the exercises and continues with S/S of + impingement . Patient demonstrates the potential to make gains with improved ROM, strength, endurance/activity tolerance, functional FOTO survey score   and all within a reasonable time frame so as to increase their functional independence with ADLs and activities for carryover to  Improved quality of life and tolerance to household chores , work demands and community activities.  Patient requires skilled Physical Therapy so as to monitor their response to and modify their treatment plan accordingly. Patient appears to be an appropriate candidate for skilled outpatient Physical Therapy. Goals: to be achieved in 12 treatments :  Continue with eval goals  ASSESSMENT/RECOMMENDATIONS:  [x]Continue therapy per initial plan/protocol at a frequency of  2-3 x per week for 12 total treatment   []Continue therapy with the following recommended changes:_____________________      _____________________________________________________________________  []Discontinue therapy progressing towards or have reached established goals  []Discontinue therapy due to lack of appreciable progress towards goals  []Discontinue therapy due to lack of attendance or compliance  []Await Physician's recommendations/decisions regarding therapy  []Other:________________________________________________________________    Thank you for this referral. Debora Ward, PT 8/12/2019 6:56 PM  NOTE TO PHYSICIAN:  PLEASE COMPLETE THE ORDERS BELOW AND   [facility]: [phone]  If you are unable to process this request in 24 hours please contact our office: [phone]    ? I have read the above report and request that my patient continue as recommended. ? I have read the above report and request that my patient continue therapy with the following changes/special instructions:_____________________________________  ? I have read the above report and request that my patient be discharged from therapy.     [address] [place] [address] Signature:____________Date:_________TIME:________    [facility], Date and Time must be completed for valid certification **

## 2019-08-20 ENCOUNTER — HOSPITAL ENCOUNTER (OUTPATIENT)
Dept: PHYSICAL THERAPY | Age: 49
Discharge: HOME | End: 2019-08-20
Payer: OTHER GOVERNMENT

## 2019-08-20 PROCEDURE — 97530 THERAPEUTIC ACTIVITIES: CPT

## 2019-08-20 PROCEDURE — 97110 THERAPEUTIC EXERCISES: CPT

## 2019-08-20 PROCEDURE — 97140 MANUAL THERAPY 1/> REGIONS: CPT

## 2019-08-20 NOTE — PROGRESS NOTES
PT DAILY TREATMENT NOTE 10-18    Patient Name: Bambi Yung  Date:2019  : 1970  [x]  Patient  Verified  Payor:  / Plan: [facility]  [facility] / Product Type:  /    In time:836  Out time:916  Total Treatment Time (min): 40  Visit #: 5 of 12    Medicare/BCBS Only   Total Timed Codes (min):   1:1 Treatment Time:         Treatment Area: Pain in right shoulder [M25.511]  Neck pain [M54.2]  Spondylosis without myelopathy or radiculopathy, cervical region [M47.812]  Other muscle spasm [M62.838]  Myalgia, other site [M79.18]    SUBJECTIVE  Pain Level (0-10 scale): 1 1/2  Any medication changes, allergies to medications, adverse drug reactions, diagnosis change, or new procedure performed?: [x] No    [] Yes (see summary sheet for update)  Subjective functional status/changes:   [] No changes reported  \"I can tell the therapy is helping because I can sleep on the right side now whereas when I started here I could not do that. \"    OBJECTIVE    Modality rationale:       Min Type Additional Details    [] Estim:  []Unatt       []IFC  []Premod                        []Other:  []w/ice   []w/heat  Position:  Location:    [] Estim: []Att    []TENS instruct  []NMES                    []Other:  []w/US   []w/ice   []w/heat  Position:  Location:    []  Traction: [] Cervical       []Lumbar                       [] Prone          []Supine                       []Intermittent   []Continuous Lbs:  [] before manual  [] after manual    []  Ultrasound: []Continuous   [] Pulsed                           []1MHz   []3MHz W/cm2:  Location:    []  Iontophoresis with dexamethasone         Location: [] Take home patch   [] In clinic    []  Ice     []  heat  []  Ice massage  []  Laser   []  Anodyne Position:  Location:    []  Laser with stim  []  Other:  Position:  Location:    []  Vasopneumatic Device Pressure:       [] lo [] med [] hi   Temperature: [] lo [] med [] hi   [] Skin assessment post-treatment:  []intact []redness- no adverse reaction    []redness  adverse reaction:       min []Eval                  []Re-Eval       15 min Therapeutic Exercise:  [x] See flow sheet :   Rationale: increase ROM, increase strength and improve coordination to improve the patients ability to aid with increase tolerance to ADLs and activities    12 min Therapeutic Activity:  [x]  See flow sheet :   Rationale: increase ROM, increase strength and improve coordination  to improve the patients ability to aid with increase tolerance to ADLs and activities    min Neuromuscular Re-education:  []  See flow sheet :   Rationale:   to improve the patients ability to     13 min Manual Therapy:  LAD, oscillations PROM all planes right shoulder with mobs for ER IR   Rationale: decrease pain, increase ROM and increase tissue extensibility to aid with increase tolerance to ADLS and activities     min Gait Training:  ___ feet with ___ device on level surfaces with ___ level of assist   Rationale: With   [x] TE   [x] TA   [] neuro   [] other: Patient Education: [x] Review HEP    [] Progressed/Changed HEP based on:   [] positioning   [] body mechanics   [] transfers   [] heat/ice application    [] other:      Other Objective/Functional Measures: VC exercises and tech     Pain Level (0-10 scale) post treatment: 1    ASSESSMENT/Changes in Function: tolerated well. Patient will continue to benefit from skilled PT services to modify and progress therapeutic interventions, address ROM deficits, address strength deficits, analyze and address soft tissue restrictions, analyze and cue movement patterns, analyze and modify body mechanics/ergonomics, assess and modify postural abnormalities and instruct in home and community integration to attain remaining goals.      [x]  See Plan of Care  [x]  See progress note/recertification  []  See Discharge Summary         Progress towards goals / Updated goals:   Short Term Goals: To be accomplished in 5 treatments:               3 patient will have established and be I with HEP to aid with self management of S/S at discharge               EVAL issued               [id number] reports compliance but reports having some pain with exercises 7/24/2019 8/12/19 8/20/19  Long Term Goals: To be accomplished in 12 treatments:               1 patient will have AROM right shoulder F 150 ad  to aid with increase tolerance to work demands and light household activities [address] reaching a shelf at shoulder height               EVAL F 140 and ABD 92, some difficulty               EOLKSNT               2 patient will have MMT right shoulder 5/5 to aid with  Moving a skillet on the stove               EVAL MMT right shoulder F 4, abd 4-, ER 4, much difficulty               RDYAJDY                3 patient will have FOTO 70 to show significant improvement with reaching across his body               EVAL 54, little difficulty               LFSKLGD               4 patient will report overall 50% improvement to aid with increase tolerance to mid range movements of the right UE with activities                EVAL worsens pain               FZCCZSR  25% especially with movement, not pain or strength 8/12/19       PLAN  [x]  Upgrade activities as tolerated     [x]  Continue plan of care  []  Update interventions per flow sheet       []  Discharge due to:_  []  Other:_      Luz Elena Chaudhari, PT 8/20/2019  8:32 AM    Future Appointments   Date Time Provider Bryant Zavaleta   8/22/2019  5:30 PM Connor Oar, PT [facility]   8/26/2019  6:00 PM Connor Oar, PT [facility]   8/29/2019  5:00 PM Connor Oar, PT [facility]   9/5/2019  3:30 PM [facility] [address]   9/5/2019  4:00 PM Lyndon Gomez MD [address] [facility]   9/9/2019  3:30 PM Dave Du  [address]

## 2019-08-21 ENCOUNTER — HOSPITAL ENCOUNTER (EMERGENCY)
Age: 49
Discharge: HOME | End: 2019-08-21
Attending: EMERGENCY MEDICINE
Payer: OTHER GOVERNMENT

## 2019-08-21 ENCOUNTER — APPOINTMENT (OUTPATIENT)
Dept: CT IMAGING | Age: 49
End: 2019-08-21
Attending: EMERGENCY MEDICINE
Payer: OTHER GOVERNMENT

## 2019-08-21 VITALS
WEIGHT: 276.5 LBS | BODY MASS INDEX: 43.4 KG/M2 | TEMPERATURE: 99.6 F | RESPIRATION RATE: 23 BRPM | HEIGHT: 67 IN | HEART RATE: 87 BPM | DIASTOLIC BLOOD PRESSURE: 95 MMHG | OXYGEN SATURATION: 93 % | SYSTOLIC BLOOD PRESSURE: 148 MMHG

## 2019-08-21 DIAGNOSIS — R11.2 NON-INTRACTABLE VOMITING WITH NAUSEA, UNSPECIFIED VOMITING TYPE: ICD-10-CM

## 2019-08-21 DIAGNOSIS — R51.9 ACUTE NONINTRACTABLE HEADACHE, UNSPECIFIED HEADACHE TYPE: Primary | ICD-10-CM

## 2019-08-21 LAB
ANION GAP SERPL CALC-SCNC: 8 MMOL/L (ref 3–18)
BASOPHILS # BLD: 0 K/UL (ref 0–0.1)
BASOPHILS NFR BLD: 0 % (ref 0–2)
BUN SERPL-MCNC: 16 MG/DL (ref 7–18)
BUN/CREAT SERPL: 14 (ref 12–20)
CALCIUM SERPL-MCNC: 8.9 MG/DL (ref 8.5–10.1)
CHLORIDE SERPL-SCNC: 107 MMOL/L (ref 100–111)
CO2 SERPL-SCNC: 27 MMOL/L (ref 21–32)
CREAT SERPL-MCNC: 1.17 MG/DL (ref 0.6–1.3)
DIFFERENTIAL METHOD BLD: ABNORMAL
EOSINOPHIL # BLD: 0.1 K/UL (ref 0–0.4)
EOSINOPHIL NFR BLD: 1 % (ref 0–5)
ERYTHROCYTE [DISTWIDTH] IN BLOOD BY AUTOMATED COUNT: 14 % (ref 11.6–14.5)
GLUCOSE SERPL-MCNC: 135 MG/DL (ref 74–99)
HCT VFR BLD AUTO: 42.8 % (ref 36–48)
HGB BLD-MCNC: 14.3 G/DL (ref 13–16)
LACTATE BLD-SCNC: 1.65 MMOL/L (ref 0.4–2)
LYMPHOCYTES # BLD: 1.2 K/UL (ref 0.9–3.6)
LYMPHOCYTES NFR BLD: 8 % (ref 21–52)
MAGNESIUM SERPL-MCNC: 1.8 MG/DL (ref 1.6–2.6)
MCH RBC QN AUTO: 27.8 PG (ref 24–34)
MCHC RBC AUTO-ENTMCNC: 33.4 G/DL (ref 31–37)
MCV RBC AUTO: 83.3 FL (ref 74–97)
MONOCYTES # BLD: 1.1 K/UL (ref 0.05–1.2)
MONOCYTES NFR BLD: 7 % (ref 3–10)
NEUTS SEG # BLD: 12.6 K/UL (ref 1.8–8)
NEUTS SEG NFR BLD: 84 % (ref 40–73)
PLATELET # BLD AUTO: 235 K/UL (ref 135–420)
PMV BLD AUTO: 11.4 FL (ref 9.2–11.8)
POTASSIUM SERPL-SCNC: 4.1 MMOL/L (ref 3.5–5.5)
RBC # BLD AUTO: 5.14 M/UL (ref 4.7–5.5)
SODIUM SERPL-SCNC: 142 MMOL/L (ref 136–145)
WBC # BLD AUTO: 15 K/UL (ref 4.6–13.2)

## 2019-08-21 PROCEDURE — 85025 COMPLETE CBC W/AUTO DIFF WBC: CPT

## 2019-08-21 PROCEDURE — 93005 ELECTROCARDIOGRAM TRACING: CPT

## 2019-08-21 PROCEDURE — 83735 ASSAY OF MAGNESIUM: CPT

## 2019-08-21 PROCEDURE — 96374 THER/PROPH/DIAG INJ IV PUSH: CPT

## 2019-08-21 PROCEDURE — 80048 BASIC METABOLIC PNL TOTAL CA: CPT

## 2019-08-21 PROCEDURE — 70450 CT HEAD/BRAIN W/O DYE: CPT

## 2019-08-21 PROCEDURE — 99284 EMERGENCY DEPT VISIT MOD MDM: CPT

## 2019-08-21 PROCEDURE — 74011250636 HC RX REV CODE- 250/636: Performed by: EMERGENCY MEDICINE

## 2019-08-21 PROCEDURE — 83605 ASSAY OF LACTIC ACID: CPT

## 2019-08-21 PROCEDURE — 96361 HYDRATE IV INFUSION ADD-ON: CPT

## 2019-08-21 PROCEDURE — 96375 TX/PRO/DX INJ NEW DRUG ADDON: CPT

## 2019-08-21 RX ORDER — KETOROLAC TROMETHAMINE 30 MG/ML
15 INJECTION, SOLUTION INTRAMUSCULAR; INTRAVENOUS
Status: COMPLETED | OUTPATIENT
Start: 2019-08-21 | End: 2019-08-21

## 2019-08-21 RX ORDER — SODIUM CHLORIDE 9 MG/ML
1000 INJECTION, SOLUTION INTRAVENOUS ONCE
Status: COMPLETED | OUTPATIENT
Start: 2019-08-21 | End: 2019-08-21

## 2019-08-21 RX ORDER — METOCLOPRAMIDE HYDROCHLORIDE 5 MG/ML
10 INJECTION INTRAMUSCULAR; INTRAVENOUS
Status: COMPLETED | OUTPATIENT
Start: 2019-08-21 | End: 2019-08-21

## 2019-08-21 RX ORDER — DIPHENHYDRAMINE HYDROCHLORIDE 50 MG/ML
25 INJECTION, SOLUTION INTRAMUSCULAR; INTRAVENOUS
Status: COMPLETED | OUTPATIENT
Start: 2019-08-21 | End: 2019-08-21

## 2019-08-21 RX ORDER — ONDANSETRON 4 MG/1
4 TABLET, ORALLY DISINTEGRATING ORAL
Qty: 10 TAB | Refills: 0 | Status: SHIPPED | OUTPATIENT
Start: 2019-08-21

## 2019-08-21 RX ADMIN — KETOROLAC TROMETHAMINE 15 MG: 30 INJECTION, SOLUTION INTRAMUSCULAR at 08:30

## 2019-08-21 RX ADMIN — SODIUM CHLORIDE 1000 ML: 900 INJECTION, SOLUTION INTRAVENOUS at 11:05

## 2019-08-21 RX ADMIN — DIPHENHYDRAMINE HYDROCHLORIDE 25 MG: 50 INJECTION INTRAMUSCULAR; INTRAVENOUS at 08:30

## 2019-08-21 RX ADMIN — METOCLOPRAMIDE 10 MG: 5 INJECTION, SOLUTION INTRAMUSCULAR; INTRAVENOUS at 08:30

## 2019-08-21 NOTE — ED PROVIDER NOTES
53-year-old male presents for evaluation of headache and vomiting. Onset of symptoms was this morning upon awakening. He notes that he is still getting over an upper respiratory infection characterized by cough and congestion loss of appetite. Onset of cough symptoms was more than 4 weeks ago. Outpatient chest x-ray was negative per the patient report. He was treated with antibiotics regardless. He seemed to be doing better until this morning. Woke with a throbbing headache but predominantly left-sided. Is associated with nausea and the patient vomited once. He denies prior history of migraine. He has no fever in the last 24 hours. There is no stiff neck. No recent head trauma. No chest pain. He does note a generalized \"tingling\" over his entire body. No focal weakness.            Past Medical History:   Diagnosis Date    Adverse effect of anesthesia     for back sx-had issues with breathing  with apnea- 6 years ago    Asthma     Chest pain, unspecified     ATYPICAL,POSSIBLE ANGINA,MUSCULAR,CAD,CHEST WALL PAINS PERSISTANT AND RECURRENT    Chronic kidney disease     Diabetes mellitus ([facility] [address]     DM (diabetes mellitus) (HCC)     GERD (gastroesophageal reflux disease)     HTN (hypertension)     Hypercholesteremia     Hypertension     Kidney stones     Sleep apnea     cpap       Past Surgical History:   Procedure Laterality Date    HX BACK SURGERY      laminectomy L5-S1    HX BACK SURGERY      HX TONSILLECTOMY      HX UROLOGICAL  3/24/2016    [facility], Dr. Jer Bedoya, Cystoscopy, Right Retrograde Ureteroscopy, Holmium Laser Lithotripsy, double j cath         Family History:   Problem Relation Age of Onset    Diabetes Mother     Hypertension Mother     Heart Disease Mother     Heart Attack Father         MI in 45s    Stroke Father     Diabetes Father        Social History     Socioeconomic History    Marital status:      Spouse name: Not on file    Number of children: Not on file    Years of education: Not on file    Highest education level: Not on file   Occupational History    Occupation:    Social Needs    Financial resource strain: Not on file    Food insecurity:     Worry: Not on file     Inability: Not on file    Transportation needs:     Medical: Not on file     Non-medical: Not on file   Tobacco Use    Smoking status: Never Smoker    Smokeless tobacco: Never Used   Substance and Sexual Activity    Alcohol use: No    Drug use: No    Sexual activity: Not on file   Lifestyle    Physical activity:     Days per week: Not on file     Minutes per session: Not on file    Stress: Not on file   Relationships    Social connections:     Talks on phone: Not on file     Gets together: Not on file     Attends Restorationism service: Not on file     Active member of club or organization: Not on file     Attends meetings of clubs or organizations: Not on file     Relationship status: Not on file    Intimate partner violence:     Fear of current or ex partner: Not on file     Emotionally abused: Not on file     Physically abused: Not on file     Forced sexual activity: Not on file   Other Topics Concern    Not on file   Social History Narrative    ** Merged History Encounter **              ALLERGIES: Patient has no known allergies. Review of Systems   Constitutional: Positive for fatigue. Gastrointestinal: Positive for nausea and vomiting. Neurological: Positive for headaches. Negative for syncope. All other systems reviewed and are negative. Vitals:    08/21/19 0806   BP: 129/77   Pulse: (!) 103   Resp: 19   Temp: 99.6 °F (37.6 °C)   SpO2: 99%   Weight: 125.4 kg (276 lb 8 oz)   Height: 5' 7\" (1.702 m)            Physical Exam   Constitutional: He is oriented to person, place, and time. He appears well-developed and well-nourished. No distress. HENT:   Head: Normocephalic and atraumatic. Mouth/Throat: No oropharyngeal exudate.    Eyes: Pupils are equal, round, and reactive to light. EOM are normal. No scleral icterus. Discs sharp bilaterally. Neck: Neck supple. No JVD present. Cardiovascular: Regular rhythm. Tachycardic rate. Pulmonary/Chest: Effort normal and breath sounds normal.   Abdominal: Soft. Musculoskeletal: He exhibits no edema. Neurological: He is alert and oriented to person, place, and time. No cranial nerve deficit. He exhibits normal muscle tone. Coordination normal.   Skin: Skin is warm and dry. Psychiatric: He has a normal mood and affect. Nursing note and vitals reviewed. EKG interpreted per myself at 0801 hrs. Sinus tachycardia with a rate of 104. Intervals and axis are within normal limits. No ischemic changes. Compared to prior EKG dated 3/22/2016 sinus tachycardia new, otherwise unchanged. CT HEAD WO CONT   Final Result   IMPRESSION:       The brain looks normal.         ===================   Note: [facility] maintains that all CT scans at their facilities   are performed by using dose optimization technique as appropriate to a performed   examination, to include automated exposure control, adjustment of the mAs and/or   kVp according to patient size (including appropriate matching for site specific   examinations) or use of iterative reconstruction technique.            Recent Results (from the past 12 hour(s))   EKG, 12 LEAD, INITIAL    Collection Time: 08/21/19  8:01 AM   Result Value Ref Range    Ventricular Rate 104 BPM    Atrial Rate 104 BPM    P-R Interval 156 ms    QRS Duration 88 ms    Q-T Interval 330 ms    QTC Calculation (Bezet) 433 ms    Calculated P Axis 33 degrees    Calculated R Axis 28 degrees    Calculated T Axis 19 degrees    Diagnosis       Sinus tachycardia  Otherwise normal ECG  When compared with ECG of 09-AUG-2018 08:43,  No significant change was found     METABOLIC PANEL, BASIC    Collection Time: 08/21/19  8:10 AM   Result Value Ref Range    Sodium 142 136 - 145 mmol/L    Potassium 4.1 3.5 - 5.5 mmol/L    Chloride 107 100 - 111 mmol/L    CO2 27 21 - 32 mmol/L    Anion gap 8 3.0 - 18 mmol/L    Glucose 135 (H) 74 - 99 mg/dL    BUN 16 7.0 - 18 MG/DL    Creatinine 1.17 0.6 - 1.3 MG/DL    BUN/Creatinine ratio 14 12 - 20      GFR est AA >60 >60 ml/min/1.73m2    GFR est non-AA >60 >60 ml/min/1.73m2    Calcium 8.9 8.5 - 10.1 MG/DL   CBC WITH AUTOMATED DIFF    Collection Time: 08/21/19  8:10 AM   Result Value Ref Range    WBC 15.0 (H) 4.6 - 13.2 K/uL    RBC 5.14 4.70 - 5.50 M/uL    HGB 14.3 13.0 - 16.0 g/dL    HCT 42.8 36.0 - 48.0 %    MCV 83.3 74.0 - 97.0 FL    MCH 27.8 24.0 - 34.0 PG    MCHC 33.4 31.0 - 37.0 g/dL    RDW 14.0 11.6 - 14.5 %    PLATELET 003 940 - 990 K/uL    MPV 11.4 9.2 - 11.8 FL    NEUTROPHILS 84 (H) 40 - 73 %    LYMPHOCYTES 8 (L) 21 - 52 %    MONOCYTES 7 3 - 10 %    EOSINOPHILS 1 0 - 5 %    BASOPHILS 0 0 - 2 %    ABS. NEUTROPHILS 12.6 (H) 1.8 - 8.0 K/UL    ABS. LYMPHOCYTES 1.2 0.9 - 3.6 K/UL    ABS. MONOCYTES 1.1 0.05 - 1.2 K/UL    ABS. EOSINOPHILS 0.1 0.0 - 0.4 K/UL    ABS. BASOPHILS 0.0 0.0 - 0.1 K/UL    DF AUTOMATED     MAGNESIUM    Collection Time: 08/21/19  8:10 AM   Result Value Ref Range    Magnesium 1.8 1.6 - 2.6 mg/dL       MDM  Number of Diagnoses or Management Options  Acute nonintractable headache, unspecified headache type:   Non-intractable vomiting with nausea, unspecified vomiting type:   Diagnosis management comments: Impression: New onset headache with nausea and vomiting. Neuro exam is nonfocal at this time. He does not have nuchal rigidity or other findings of meningitis. Recent URI. Lungs currently clear without clinical findings of pneumonia. Patient has a generalized tingling and numbness may be secondary to hyperventilation. No focal deficits appreciated. Weaning head CT obtained as well as electrolyte panel. Headache treated with Toradol, Reglan, and Benadryl IV.     Patient meets SIRS criteria with heart rate greater than 90 and white blood cell count greater than 12,000, however, does not demonstrate a source of infection. Moreno Segoviagood appearance is nontoxic and his headache is relieved with above-noted medications. Heart rate is approximately 100 after these interventions. Lactic acid is within normal limits. 1 L of normal saline infused heart rate afterwards in the mid 80s. Procedures      Diagnosis:   1. Acute nonintractable headache, unspecified headache type    2. Non-intractable vomiting with nausea, unspecified vomiting type      1. CT scan of the brain is negative for acute abnormalities. 2.  Routine labs are within normal limits with the exception of an elevation in the white blood cell count. This is a nonspecific marker for inflammation or infection. Lactic acid level was normal (marker for sepsis). 3.  Drink plenty of fluids. 4.  Okay to take Excedrin for recurrent headache. 5.  Zofran for nausea. 6.  Clear liquids advancing to bland diet as tolerated. 7.  Follow-up with your primary care physician for recheck in 2 to 3 days if not better. 8.  Return to the emergency department for acutely worsening headache, high fever, repetitive vomiting, or other acute medical emergencies. Disposition: home    Follow-up Information     Follow up With Specialties Details Why Contact Info    Chiki White MD Family Practice In 3 days As needed, If symptoms persist [place] [id number]  [address] [facility]  [phone] [id number] [facility] EMERGENCY DEPT Emergency Medicine  If symptoms worsen [address] [facility]  [phone]          Patient's Medications   Start Taking    ONDANSETRON (ZOFRAN ODT) 4 MG DISINTEGRATING TABLET    1 Tab by SubLINGual route every eight (8) hours as needed for Nausea. Continue Taking    ALBUTEROL (VENTOLIN HFA) 90 MCG/ACTUATION INHALER    Take  by inhalation every six (6) hours as needed. AMLODIPINE (NORVASC) 5 MG TABLET    Take 5 mg by mouth daily.     ASPIRIN 81 MG CHEWABLE TABLET    Take 81 mg by mouth daily. BENAZEPRIL (LOTENSIN) 20 MG TABLET    Take 20 mg by mouth daily. CARVEDILOL (COREG CR) 10 MG CR CAPSULE    Take  by mouth daily (with breakfast). CPAP MACHINE KIT    by Does Not Apply route. DEXLANSOPRAZOLE (DEXILANT) 60 MG CPDM    Take 60 mg by mouth daily. FLUTICASONE-SALMETEROL (ADVAIR DISKUS) 250-50 MCG/DOSE DISKUS INHALER    Take 1 Puff by inhalation every twelve (12) hours. HYDROCHLOROTHIAZIDE (MICROZIDE) 12.5 MG CAPSULE        INSULIN ASPART (NOVOLOG) 100 UNIT/ML INPN    by SubCUTAneous route. INSULIN GLARGINE (LANTUS SOLOSTAR) 100 UNIT/ML (3 ML) PEN    by SubCUTAneous route. MAGNESIUM CITRATE 100 MG TAB    Take 300 mg by mouth daily. METAXALONE (SKELAXIN) 800 MG TABLET    1 po BID-TID prn  Indications: muscle spasm    MISC. DEVICES KIT    Please provide the patient following size mask. Mask: Mirage FX large size mask. DME: [facility]  Phone: [phone], Fax: [phone]    MONTELUKAST (SINGULAIR) 10 MG TABLET    Take 10 mg by mouth daily. POTASSIUM CITRATE 15 MEQ TBER    Take  by mouth two (2) times a day. PRAVASTATIN (PRAVACHOL) 20 MG TABLET        PREGABALIN (LYRICA) 100 MG CAPSULE    Take 1 Cap by mouth two (2) times a day. Max Daily Amount: 200 mg. Indications: disorder characterized by stiff, tender & painful muscles    TAMSULOSIN (FLOMAX) 0.4 MG CAPSULE    Take 1 Cap by mouth daily (after dinner). TRAMADOL (ULTRAM) 50 MG TABLET    1 po bid prn severe pain   These Medications have changed    No medications on file   Stop Taking    ONDANSETRON (ZOFRAN ODT) 4 MG DISINTEGRATING TABLET    Take 1-2 tablets every 6-8 hours as needed for nausea and vomiting.

## 2019-08-21 NOTE — DISCHARGE INSTRUCTIONS
1.  CT scan of the brain is negative for acute abnormalities. 2.  Routine labs are within normal limits with the exception of an elevation in the white blood cell count. This is a nonspecific marker for inflammation or infection. Lactic acid level was normal (marker for sepsis). 3.  Drink plenty of fluids. 4.  Okay to take Excedrin for recurrent headache. 5.  Zofran for nausea. 6.  Clear liquids advancing to bland diet as tolerated. 7.  Follow-up with your primary care physician for recheck in 2 to 3 days if not better. 8.  Return to the emergency department for acutely worsening headache, high fever, repetitive vomiting, or other acute medical emergencies. Patient Education        Headache: Care Instructions  Your Care Instructions    Headaches have many possible causes. Most headaches aren't a sign of a more serious problem, and they will get better on their own. Home treatment may help you feel better faster. The doctor has checked you carefully, but problems can develop later. If you notice any problems or new symptoms, get medical treatment right away. Follow-up care is a key part of your treatment and safety. Be sure to make and go to all appointments, and call your doctor if you are having problems. It's also a good idea to know your test results and keep a list of the medicines you take. How can you care for yourself at home? · Do not drive if you have taken a prescription pain medicine. · Rest in a quiet, dark room until your headache is gone. Close your eyes and try to relax or go to sleep. Don't watch TV or read. · Put a cold, moist cloth or cold pack on the painful area for 10 to 20 minutes at a time. Put a thin cloth between the cold pack and your skin. · Use a warm, moist towel or a heating pad set on low to relax tight shoulder and neck muscles. · Have someone gently massage your neck and shoulders. · Take pain medicines exactly as directed.   ? If the doctor gave you a prescription medicine for pain, take it as prescribed. ? If you are not taking a prescription pain medicine, ask your doctor if you can take an over-the-counter medicine. · Be careful not to take pain medicine more often than the instructions allow, because you may get worse or more frequent headaches when the medicine wears off. · Do not ignore new symptoms that occur with a headache, such as a fever, weakness or numbness, vision changes, or confusion. These may be signs of a more serious problem. To prevent headaches  · Keep a headache diary so you can figure out what triggers your headaches. Avoiding triggers may help you prevent headaches. Record when each headache began, how long it lasted, and what the pain was like (throbbing, aching, stabbing, or dull). Write down any other symptoms you had with the headache, such as nausea, flashing lights or dark spots, or sensitivity to bright light or loud noise. Note if the headache occurred near your period. List anything that might have triggered the headache, such as certain foods (chocolate, cheese, wine) or odors, smoke, bright light, stress, or lack of sleep. · Find healthy ways to deal with stress. Headaches are most common during or right after stressful times. Take time to relax before and after you do something that has caused a headache in the past.  · Try to keep your muscles relaxed by keeping good posture. Check your jaw, face, neck, and shoulder muscles for tension, and try relaxing them. When sitting at a desk, change positions often, and stretch for 30 seconds each hour. · Get plenty of sleep and exercise. · Eat regularly and well. Long periods without food can trigger a headache. · Treat yourself to a massage. Some people find that regular massages are very helpful in relieving tension. · Limit caffeine by not drinking too much coffee, tea, or soda. But don't quit caffeine suddenly, because that can also give you headaches.   · Reduce eyestrain from computers by blinking frequently and looking away from the computer screen every so often. Make sure you have proper eyewear and that your monitor is set up properly, about an arm's length away. · Seek help if you have depression or anxiety. Your headaches may be linked to these conditions. Treatment can both prevent headaches and help with symptoms of anxiety or depression. When should you call for help? Call 911 anytime you think you may need emergency care. For example, call if:    · You have signs of a stroke. These may include:  ? Sudden numbness, paralysis, or weakness in your face, arm, or leg, especially on only one side of your body. ? Sudden vision changes. ? Sudden trouble speaking. ? Sudden confusion or trouble understanding simple statements. ? Sudden problems with walking or balance. ? A sudden, severe headache that is different from past headaches.    Call your doctor now or seek immediate medical care if:    · You have a new or worse headache.     · Your headache gets much worse. Where can you learn more? Go to http://marija-marianne.info/. Enter M271 in the search box to learn more about \"Headache: Care Instructions. \"  Current as of: March 28, 2019  Content Version: 12.1  © 5937-9711 [employer]. Care instructions adapted under license by [employer] (which disclaims liability or warranty for this information). If you have questions about a medical condition or this instruction, always ask your healthcare professional. [place] [address] any warranty or liability for your use of this information.

## 2019-08-21 NOTE — ED TRIAGE NOTES
Patient Dx with URI one month ago, has been feeling better until this am. Woke up with headache, tingling \"all over\", general fatigue, dizziness, vomited once, has chest pain (with associated cough). Concerned his magnesium may be low (had similar Sx in past).

## 2019-08-21 NOTE — ED NOTES
Saji Robles is a 54 y.o. male that was discharged in stable condition. The patients diagnosis, condition and treatment were explained to  patient and aftercare instructions were given. The patient verbalized understanding. Patient armband removed and shredded.

## 2019-08-22 ENCOUNTER — HOSPITAL ENCOUNTER (OUTPATIENT)
Dept: PHYSICAL THERAPY | Age: 49
Discharge: HOME | End: 2019-08-22
Payer: OTHER GOVERNMENT

## 2019-08-22 LAB
ATRIAL RATE: 104 BPM
CALCULATED P AXIS, ECG09: 33 DEGREES
CALCULATED R AXIS, ECG10: 28 DEGREES
CALCULATED T AXIS, ECG11: 19 DEGREES
DIAGNOSIS, 93000: NORMAL
P-R INTERVAL, ECG05: 156 MS
Q-T INTERVAL, ECG07: 330 MS
QRS DURATION, ECG06: 88 MS
QTC CALCULATION (BEZET), ECG08: 433 MS
VENTRICULAR RATE, ECG03: 104 BPM

## 2019-08-22 PROCEDURE — 97110 THERAPEUTIC EXERCISES: CPT

## 2019-08-22 PROCEDURE — 97140 MANUAL THERAPY 1/> REGIONS: CPT

## 2019-08-22 PROCEDURE — 97530 THERAPEUTIC ACTIVITIES: CPT

## 2019-08-22 NOTE — PROGRESS NOTES
PT DAILY TREATMENT NOTE 10-18    Patient Name: Cat Judd  Date:2019  : 1970  [x]  Patient  Verified  Payor:  / Plan: Carlos Minor 76 / Product Type:  /    In time:422  Out time:518  Total Treatment Time (min): 50  Visit #: 6 of 12    Medicare/BCBS Only   Total Timed Codes (min):   1:1 Treatment Time:         Treatment Area: Pain in right shoulder [M25.511]  Neck pain [M54.2]  Spondylosis without myelopathy or radiculopathy, cervical region [M47.812]  Other muscle spasm [M62.838]  Myalgia, other site [M79.18]    SUBJECTIVE  Pain Level (0-10 scale): 0  Any medication changes, allergies to medications, adverse drug reactions, diagnosis change, or new procedure performed?: [x] No    [] Yes (see summary sheet for update)  Subjective functional status/changes:   [] No changes reported  \"it is feeling better overall.  \"    OBJECTIVE    Modality rationale:     Min Type Additional Details    [] Estim:  []Unatt       []IFC  []Premod                        []Other:  []w/ice   []w/heat  Position:  Location:    [] Estim: []Att    []TENS instruct  []NMES                    []Other:  []w/US   []w/ice   []w/heat  Position:  Location:    []  Traction: [] Cervical       []Lumbar                       [] Prone          []Supine                       []Intermittent   []Continuous Lbs:  [] before manual  [] after manual    []  Ultrasound: []Continuous   [] Pulsed                           []1MHz   []3MHz W/cm2:  Location:    []  Iontophoresis with dexamethasone         Location: [] Take home patch   [] In clinic    []  Ice     []  heat  []  Ice massage  []  Laser   []  Anodyne Position:  Location:    []  Laser with stim  []  Other:  Position:  Location:    []  Vasopneumatic Device Pressure:       [] lo [] med [] hi   Temperature: [] lo [] med [] hi   [] Skin assessment post-treatment:  []intact []redness- no adverse reaction    []redness  adverse reaction:       min []Eval []Re-Eval       15 min Therapeutic Exercise:  [] See flow sheet :   Rationale: increase ROM, increase strength and improve coordination to improve the patients ability to aid with increase tolerance to ADLs and activities    15 min Therapeutic Activity:  []  See flow sheet :   Rationale: increase ROM, increase strength and improve coordination  to improve the patients ability to aid with increase tolerance to ADLs and activities      min Neuromuscular Re-education:  []  See flow sheet :   Rationale:   to improve the patients ability to     20 min Manual Therapy:  LAD, oscillations PROM all planes right shoulder with mobs for ER IR   Rationale: increase ROM and increase tissue extensibility to aid with increase tolerance to ADLs and activities     min Gait Training:  ___ feet with ___ device on level surfaces with ___ level of assist   Rationale: With   [x] TE   [x] TA   [] neuro   [] other: Patient Education: [x] Review HEP    [] Progressed/Changed HEP based on:   [] positioning   [] body mechanics   [] transfers   [] heat/ice application    [] other:      Other Objective/Functional Measures: VC exercises and technique     Pain Level (0-10 scale) post treatment: 0    ASSESSMENT/Changes in Function: tolerated well. Patient will continue to benefit from skilled PT services to modify and progress therapeutic interventions, address ROM deficits, address strength deficits, analyze and address soft tissue restrictions, analyze and cue movement patterns, analyze and modify body mechanics/ergonomics, assess and modify postural abnormalities and instruct in home and community integration to attain remaining goals.      [x]  See Plan of Care  []  See progress note/recertification  []  See Discharge Summary         Progress towards goals / Updated goals:   Short Term Goals: To be accomplished in 5 treatments:               1 patient will have established and be I with HEP to aid with self management of S/S at discharge               EVAL issued               DNDEIZU reports compliance but reports having some pain with exercises 7/24/2019 8/12/19 8/20/19  Met 8/22/19  Long Term Goals: To be accomplished in 12 treatments:               1 patient will have AROM right shoulder F 150 ad  to aid with increase tolerance to work demands and light household activities [address] reaching a shelf at shoulder height               EVAL F 140 and ABD 92, some difficulty               [id number]               2 patient will have MMT right shoulder 5/5 to aid with  Moving a skillet on the stove               EVAL MMT right shoulder F 4, abd 4-, ER 4, much difficulty               [id number]                3 patient will have FOTO 70 to show significant improvement with reaching across his body               EVAL 54, little difficulty               [id number]               4 patient will report overall 50% improvement to aid with increase tolerance to mid range movements of the right UE with activities                EVAL worsens pain               MXMSTSU  25% especially with movement, not pain or strength 8/12/19          PLAN  [x]  Upgrade activities as tolerated     [x]  Continue plan of care  []  Update interventions per flow sheet       []  Discharge due to:_  []  Other:_      Ada Salgado, PT 8/22/2019  5:19 PM    Future Appointments   Date Time Provider Bryant Zavaleta   8/26/2019  6:00 PM Coreen Messina, PT [facility]   8/29/2019  5:00 PM Zohaib Jaramillo, PT [facility]   9/5/2019  3:30 PM [facility]   9/5/2019  4:00 PM MD Ivan Bourne   9/9/2019  3:30 PM Sylvia Cortes  [id number]

## 2019-08-26 ENCOUNTER — HOSPITAL ENCOUNTER (OUTPATIENT)
Dept: PHYSICAL THERAPY | Age: 49
Discharge: HOME | End: 2019-08-26
Payer: OTHER GOVERNMENT

## 2019-08-26 PROCEDURE — 97110 THERAPEUTIC EXERCISES: CPT

## 2019-08-26 PROCEDURE — 97530 THERAPEUTIC ACTIVITIES: CPT

## 2019-08-26 PROCEDURE — 97140 MANUAL THERAPY 1/> REGIONS: CPT

## 2019-08-26 NOTE — PROGRESS NOTES
PT DAILY TREATMENT NOTE 10-18    Patient Name: Cat Judd  Date:2019  : 1970  [x]  Patient  Verified  Payor:  / Plan: Baljinder Ryan / Product Type:  /    In time:544  Out time:636  Total Treatment Time (min): 46  Visit #: 7 of 12    Medicare/BCBS Only   Total Timed Codes (min):   1:1 Treatment Time:         Treatment Area: Pain in right shoulder [M25.511]  Neck pain [M54.2]  Spondylosis without myelopathy or radiculopathy, cervical region [M47.812]  Other muscle spasm [M62.838]  Myalgia, other site [M79.18]    SUBJECTIVE  Pain Level (0-10 scale): 2.5  Any medication changes, allergies to medications, adverse drug reactions, diagnosis change, or new procedure performed?: [x] No    [] Yes (see summary sheet for update)  Subjective functional status/changes:   [] No changes reported  \"It is a little more sore today, I am not sure why. Overall it is better than it was though. I can still sleep on it more. \"    OBJECTIVE    Modality rationale:     Min Type Additional Details    [] Estim:  []Unatt       []IFC  []Premod                        []Other:  []w/ice   []w/heat  Position:  Location:    [] Estim: []Att    []TENS instruct  []NMES                    []Other:  []w/US   []w/ice   []w/heat  Position:  Location:    []  Traction: [] Cervical       []Lumbar                       [] Prone          []Supine                       []Intermittent   []Continuous Lbs:  [] before manual  [] after manual    []  Ultrasound: []Continuous   [] Pulsed                           []1MHz   []3MHz W/cm2:  Location:    []  Iontophoresis with dexamethasone         Location: [] Take home patch   [] In clinic    []  Ice     []  heat  []  Ice massage  []  Laser   []  Anodyne Position:  Location:    []  Laser with stim  []  Other:  Position:  Location:    []  Vasopneumatic Device Pressure:       [] lo [] med [] hi   Temperature: [] lo [] med [] hi   [] Skin assessment post-treatment:  []intact []redness- no adverse reaction    []redness  adverse reaction:       min []Eval                  []Re-Eval       23 min Therapeutic Exercise:  [x] See flow sheet :   Rationale: increase ROM, increase strength and improve coordination to improve the patients ability to aid with increase tolerance to ADLs and activities    8 min Therapeutic Activity:  [x]  See flow sheet :   Rationale: increase ROM, increase strength and improve coordination  to improve the patients ability to aid with increase tolerance to ADLs and activities      min Neuromuscular Re-education:  []  See flow sheet :   Rationale:   to improve the patients ability to     15 min Manual Therapy:    LAD, oscillations PROM all planes right shoulder with mobs for ER IR   Rationale: decrease pain, increase ROM, increase tissue extensibility and decrease trigger points to aid with increase tolerance to ADLS and activities     min Gait Training:  ___ feet with ___ device on level surfaces with ___ level of assist   Rationale: With   [x] TE   [x] TA   [] neuro   [] other: Patient Education: [x] Review HEP    [x] Progressed/Changed HEP based on:   [] positioning   [] body mechanics   [] transfers   [] heat/ice application    [] other:      Other Objective/Functional Measures: VC exercises and technique     MMT   F   4+ ABD 4-,4      ER  4+    IR  5    Pain Level (0-10 scale) post treatment: 2    ASSESSMENT/Changes in Function: tolerated well . Continues with impingement S/S right shoulder mid range horizontal abd although reports overall improvement    Patient will continue to benefit from skilled PT services to modify and progress therapeutic interventions, address ROM deficits, address strength deficits, analyze and address soft tissue restrictions, analyze and cue movement patterns, analyze and modify body mechanics/ergonomics, assess and modify postural abnormalities and instruct in home and community integration to attain remaining goals. [x]  See Plan of Care  [x]  See progress note/recertification  []  See Discharge Summary         Progress towards goals / Updated goals:   Short Term Goals: To be accomplished in 5 treatments:               1 patient will have established and be I with HEP to aid with self management of S/S at discharge               EVAL issued               MSXXQSL reports compliance but reports having some pain with exercises 7/24/2019 8/12/19 8/20/19  Met 8/22/19 8/26/19  Long Term Goals: To be accomplished in 12 treatments:               1 patient will have AROM right shoulder F 150 ad  to aid with increase tolerance to work demands and light household activities [address] reaching a shelf at shoulder height               EVAL F 140 and ABD 92, some difficulty               [id number]               2 patient will have MMT right shoulder 5/5 to aid with  Moving a skillet on the stove               EVAL MMT right shoulder F 4, abd 4-, ER 4, much difficulty               [id number]  MMT   F  4+ ABD 4-,4   ER 4+    IR 5   8/26/19                   3 patient will have FOTO 70 to show significant improvement with reaching across his body               EVAL 54, little difficulty               [id number]               4 patient will report overall 50% improvement to aid with increase tolerance to mid range movements of the right UE with activities                EVAL worsens pain               CPXCVER  25% especially with movement, not pain or strength 8/12/19       PLAN  [x]  Upgrade activities as tolerated     [x]  Continue plan of care  []  Update interventions per flow sheet       []  Discharge due to:_  []  Other:_      Scott Lora, PT 8/26/2019  5:52 PM    Future Appointments   Date Time Provider Bryant Alvaresisti   8/26/2019  6:00 PM Slim Greer, PT [facility] [address]   8/29/2019  5:00 PM Antwan Clemons, SVETA [facility] [address]   9/5/2019  3:30 PM [address]   9/5/2019  4:00 PM Kiara Mera MD [facility]   9/9/2019  3:30 PM [facility]icho Sidhu  [facility]

## 2019-08-27 ENCOUNTER — APPOINTMENT (OUTPATIENT)
Dept: PHYSICAL THERAPY | Age: 49
End: 2019-08-27
Payer: OTHER GOVERNMENT

## 2019-08-29 ENCOUNTER — HOSPITAL ENCOUNTER (OUTPATIENT)
Dept: PHYSICAL THERAPY | Age: 49
Discharge: HOME | End: 2019-08-29
Payer: OTHER GOVERNMENT

## 2019-08-29 PROCEDURE — 97110 THERAPEUTIC EXERCISES: CPT | Performed by: PHYSICAL THERAPIST

## 2019-08-29 PROCEDURE — 97140 MANUAL THERAPY 1/> REGIONS: CPT | Performed by: PHYSICAL THERAPIST

## 2019-08-29 PROCEDURE — 97530 THERAPEUTIC ACTIVITIES: CPT | Performed by: PHYSICAL THERAPIST

## 2019-08-29 NOTE — PROGRESS NOTES
PT DAILY TREATMENT NOTE 10-18    Patient Name: Roseann Porter  Date:2019  : 1970  [x]  Patient  Verified  Payor: CHERI / Plan: Carlos Minor 76 / Product Type:  /    In time:4:52P  Out time:5:35P  Total Treatment Time (min): 42min  Visit #: 8 of 12    Medicare/BCBS Only   Total Timed Codes (min):   1:1 Treatment Time:         Treatment Area: Pain in right shoulder [M25.511]  Neck pain [M54.2]  Spondylosis without myelopathy or radiculopathy, cervical region [M47.812]  Other muscle spasm [M62.838]  Myalgia, other site [M79.18]    SUBJECTIVE  Pain Level (0-10 scale): 2/10  Any medication changes, allergies to medications, adverse drug reactions, diagnosis change, or new procedure performed?: [x] No    [] Yes (see summary sheet for update)  Subjective functional status/changes:   [] No changes reported  Patient states he still has pain in certain positions with certain movements, but overall a bit better. OBJECTIVE    15 min Therapeutic Exercise:  [x] See flow sheet :   Rationale: increase ROM and increase strength to improve the patients ability to A/ROM and decrease pain with movement. 12 min Therapeutic Activity:  [x]  See flow sheet :   Rationale: increase strength and improve coordination  to improve the patients ability to Tolerate basic ADLs and job-related tasks without pain. 15 min Manual Therapy:  Grade 3-4 mobs to R shoulder and prone manual hang distraction   Rationale: increase ROM and increase tissue extensibility to improve overall activity tolerance and active functional range of motion. With   [x] TE   [x] TA   [] neuro   [] other: Patient Education: [x] Review HEP    [x] Progressed/Changed HEP based on:   [x] positioning   [] body mechanics   [] transfers   [] heat/ice application    [] other:      Other Objective/Functional Measures:  Active R shoulder flexion 175 degs without resistance     Pain Level (0-10 scale) post treatment: 0/10    ASSESSMENT/Changes in Function: Patient is making steady progress towards goals of increased movement with less pain. Positive result today to advanced exercises that begin to put load on the R shoulder - see flow sheet. Reported no neck pain at end of session. Patient will continue to benefit from skilled PT services to modify and progress therapeutic interventions, address functional mobility deficits, address ROM deficits, address strength deficits, analyze and address soft tissue restrictions, analyze and modify body mechanics/ergonomics and assess and modify postural abnormalities to attain remaining goals.      [x]  See Plan of Care  []  See progress note/recertification  []  See Discharge Summary         Progress towards goals / Updated goals:  Short Term Goals: To be accomplished in 5 treatments:               1 patient will have established and be I with HEP to aid with self management of S/S at discharge               EVAL issued               XFANSQS reports compliance but reports having some pain with exercises 7/24/2019 8/12/19 8/20/19  Met 8/22/19 8/26/19, doing some on 8/29/19  Long Term Goals: To be accomplished in 12 treatments:               1 patient will have AROM right shoulder F 150 ad  to aid with increase tolerance to work demands and light household activities [address] reaching a shelf at shoulder height               EVAL F 140 and ABD 92, some difficulty               GALBNKL 110 with some difficulty 8/29/19               2 patient will have MMT right shoulder 5/5 to aid with  Moving a skillet on the stove               EVAL MMT right shoulder F 4, abd 4-, ER 4, much difficulty               CBBVEZX  MMT   F  4+ ABD 4-,4   ER 4+    IR 5   8/26/19               3 patient will have FOTO 70 to show significant improvement with reaching across his body               EVAL 54, little difficulty               AOLRYJM               4 patient will report overall 50% improvement to aid with increase tolerance to mid range movements of the right UE with activities                EVAL worsens pain               SMBENDR  25% especially with movement, not pain or strength 8/12/19    PLAN  [x]  Upgrade activities as tolerated     []  Continue plan of care  []  Update interventions per flow sheet       []  Discharge due to:_  []  Other:_      Corey August, PT 8/29/2019  5:50 PM    Future Appointments   Date Time Provider Bryant Zavaleta   [facility]   9/5/2019  4:00 PM Jayde Rojas MD [facility]   9/9/2019  3:30 PM Jose G Bowden  [address]    9/11/2019  5:00 PM Magaly Milian, PT [facility]   9/12/2019  5:00 PM Jos Bailey, PT [facility]   9/24/2019  5:00 PM Jos aBiley, PT [facility]   9/27/2019  4:30 PM Yady Hoskins, PT [facility]

## 2019-09-09 ENCOUNTER — OFFICE VISIT (OUTPATIENT)
Dept: ORTHOPEDIC SURGERY | Age: 49
End: 2019-09-09

## 2019-09-09 VITALS
BODY MASS INDEX: 42.69 KG/M2 | SYSTOLIC BLOOD PRESSURE: 118 MMHG | HEIGHT: 67 IN | WEIGHT: 272 LBS | DIASTOLIC BLOOD PRESSURE: 80 MMHG | HEART RATE: 88 BPM

## 2019-09-09 DIAGNOSIS — G89.29 CHRONIC RIGHT SHOULDER PAIN: Primary | ICD-10-CM

## 2019-09-09 DIAGNOSIS — G89.29 OTHER CHRONIC PAIN: ICD-10-CM

## 2019-09-09 DIAGNOSIS — M79.18 MYOFASCIAL PAIN: ICD-10-CM

## 2019-09-09 DIAGNOSIS — R29.898 RIGHT ARM WEAKNESS: ICD-10-CM

## 2019-09-09 DIAGNOSIS — M25.511 CHRONIC RIGHT SHOULDER PAIN: Primary | ICD-10-CM

## 2019-09-09 NOTE — PATIENT INSTRUCTIONS
Herniated Disc: Exercises  Introduction  Here are some examples of exercises for you to try. The exercises may be suggested for a condition or for rehabilitation. Start each exercise slowly. Ease off the exercises if you start to have pain. You will be told when to start these exercises and which ones will work best for you. How to stay safe  These exercises can help you move easier and feel better. But when you first start doing them, you may have more pain in your back. This is normal. But it is important to pay close attention to your pain during and after each exercise. · Keep doing these exercises if your pain stays the same or moves from your leg and buttock more toward the middle of your spine. Pain moving out of your leg and buttock is a good sign. · Stop doing these exercises if your pain gets worse in your leg and buttock. Stop if you start to have pain in your leg and buttock that you didn't have before. Be sure to do these exercises in the order they appear. Note how your pain changes before you move to the next one. If your pain is much worse right after exercise and stays worse the next day, do not do any of these exercises. 1. Rest on belly  1. Rest on belly    1. Lie on your stomach, with your head turned to the side. ? Keep your arms beside your body. ? If that position bothers your neck, place your hands, one on top of the other, underneath your forehead. This will help support your head and neck. 2. Try to relax your lower back muscles as much as you can. 3. Continue to lie on your stomach for 2 minutes. 2. Press-up back extension    1. Lie on your stomach, with your face down and your elbows tucked into your sides and under your shoulders. 2. Press your elbows down into the floor to raise your upper back. ? As you do this, relax your stomach muscles and allow your back to arch without using your back muscles. ? Let your low back relax completely as you arch up.  Don't let your hips or pelvis come off the floor. 3. Hold this position for 15 to 30 seconds. Then relax, and return to the start position. Over time, work up to staying in the press-up position for up to 2 minutes. 4. Repeat 2 to 4 times. 3. Full press-up back extension    1. Lie on your stomach with your face down, keeping your elbows tucked into your sides and under your shoulders. 2. Straighten your elbows, and push your upper body up as far as you can. Allow your lower back to sag. Keep your hips, pelvis, and legs relaxed. 3. Hold this position for 5 seconds, and then relax. 4. Repeat 10 times. Each time, try to raise your upper body a little higher and hold your arms a bit straighter. 4. Backward bend    1. Stand with your feet hip-width apart, and don't lock your knees. Your toes should be pointing forward. 2. Place your hands in the small of your back. 3. Bend backward as far as you can, keeping your knees straight. Hold this position for 2 to 3 seconds. Then return to your starting position. 4. Repeat 2 to 4 times. Each time, try to bend backward a little farther, until you bend backward as far as you can. Follow-up care is a key part of your treatment and safety. Be sure to make and go to all appointments, and call your doctor if you are having problems. It's also a good idea to know your test results and keep a list of the medicines you take. Where can you learn more? Go to http://marija-marianne.info/. Enter 72100 [id number] in the search box to learn more about \"Herniated Disc: Exercises. \"  Current as of: September 20, 2018  Content Version: 12.1  © 5150-5389 Healthwise, Incorporated. Care instructions adapted under license by "[employer]" (which disclaims liability or warranty for this information).  If you have questions about a medical condition or this instruction, always ask your healthcare professional. Milag[employer] Cheek disclaims any warranty or liability for your use of this information. Shoulder Arthritis: Exercises  Your Care Instructions  Here are some examples of typical rehabilitation exercises for your condition. Start each exercise slowly. Ease off the exercise if you start to have pain. Your doctor or physical therapist will tell you when you can start these exercises and which ones will work best for you. How to do the exercises  Shoulder flexion (lying down)    1. Lie on your back, holding a wand with both hands. Your palms should face down as you hold the wand. 2. Keeping your elbows straight, slowly raise your arms over your head. Raise them until you feel a stretch in your shoulders, upper back, and chest.  3. Hold for 15 to 30 seconds. 4. Repeat 2 to 4 times. Shoulder rotation (lying down)    1. Lie on your back. Hold a wand with both hands with your elbows bent and palms up. 2. Keep your elbows close to your body, and move the wand across your body toward the sore arm. 3. Hold for 8 to 12 seconds. 4. Repeat 2 to 4 times. Shoulder internal rotation with towel    1. Hold a towel above and behind your head with the arm that is not sore. 2. With your sore arm, reach behind your back and grasp the towel. 3. With the arm above your head, pull the towel upward. Do this until you feel a stretch on the front and outside of your sore shoulder. 4. Hold 15 to 30 seconds. 5. Repeat 2 to 4 times. Shoulder blade squeeze    5. Stand with your arms at your sides, and squeeze your shoulder blades together. Do not raise your shoulders up as you squeeze. 6. Hold 6 seconds. 7. Repeat 8 to 12 times. Resisted rows    5. Put the band around a solid object at about waist level. (A bedpost will work well.) Each hand should hold an end of the band. 6. With your elbows at your sides and bent to 90 degrees, pull the band back. Your shoulder blades should move toward each other. Return to the starting position.   7. Repeat 8 to 12 times.    External rotator strengthening exercise    1. Start by tying a piece of elastic exercise material to a doorknob. You can use surgical tubing or Thera-Band. (You may also hold one end of the band in each hand.)  2. Stand or sit with your shoulder relaxed and your elbow bent 90 degrees. Your upper arm should rest comfortably against your side. Squeeze a rolled towel between your elbow and your body for comfort. This will help keep your arm at your side. 3. Hold one end of the elastic band with the hand of the painful arm. 4. Start with your forearm across your belly. Slowly rotate the forearm out away from your body. Keep your elbow and upper arm tucked against the towel roll or the side of your body until you begin to feel tightness in your shoulder. Slowly move your arm back to where you started. 5. Repeat 8 to 12 times. Internal rotator strengthening exercise    1. Start by tying a piece of elastic exercise material to a doorknob. You can use surgical tubing or Thera-Band. 2. Stand or sit with your shoulder relaxed and your elbow bent 90 degrees. Your upper arm should rest comfortably against your side. Squeeze a rolled towel between your elbow and your body for comfort. This will help keep your arm at your side. 3. Hold one end of the elastic band in the hand of the painful arm. 4. Slowly rotate your forearm toward your body until it touches your belly. Slowly move it back to where you started. 5. Keep your elbow and upper arm firmly tucked against the towel roll or at your side. 6. Repeat 8 to 12 times. Pendulum swing    1. Hold on to a table or the back of a chair with your good arm. Then bend forward a little and let your sore arm hang straight down. This exercise does not use the arm muscles. Rather, use your legs and your hips to create movement that makes your arm swing freely.   2. Use the movement from your hips and legs to guide the slightly swinging arm back and forth like a pendulum (or elephant trunk). Then guide it in circles that start small (about the size of a dinner plate). Make the circles a bit larger each day, as your pain allows. 3. Do this exercise for 5 minutes, 5 to 7 times each day. 4. As you have less pain, try bending over a little farther to do this exercise. This will increase the amount of movement at your shoulder. Follow-up care is a key part of your treatment and safety. Be sure to make and go to all appointments, and call your doctor if you are having problems. It's also a good idea to know your test results and keep a list of the medicines you take. Where can you learn more? Go to http://marija-marianne.info/. Enter H562 in the search box to learn more about \"Shoulder Arthritis: Exercises. \"  Current as of: September 20, 2018  Content Version: 12.1  © 1478-8324 Healthwise, Incorporated. Care instructions adapted under license by [employer] (which disclaims liability or warranty for this information). If you have questions about a medical condition or this instruction, always ask your healthcare professional. [address] any warranty or liability for your use of this information.

## 2019-09-09 NOTE — LETTER
9/10/19 Patient: Ajit Benson YOB: 1970 Date of Visit: 9/9/2019 Bridger Cortez MD 
[address] [place] [address] VIA Facsimile: [phone] Dear Bridger Cortez MD, Thank you for referring Mr. Yung Dutton to [facility] for evaluation. My notes for this consultation are attached. If you have questions, please do not hesitate to call me. I look forward to following your patient along with you. Sincerely, Cha Suero MD

## 2019-09-11 ENCOUNTER — HOSPITAL ENCOUNTER (OUTPATIENT)
Dept: PHYSICAL THERAPY | Age: 49
Discharge: HOME | End: 2019-09-11
Payer: OTHER GOVERNMENT

## 2019-09-11 PROCEDURE — 97530 THERAPEUTIC ACTIVITIES: CPT

## 2019-09-11 PROCEDURE — 97110 THERAPEUTIC EXERCISES: CPT

## 2019-09-11 PROCEDURE — 97140 MANUAL THERAPY 1/> REGIONS: CPT

## 2019-09-11 NOTE — PROGRESS NOTES
[facility]  [facility], [address] [place] [address], [address] [place], [place] [address],[address]  [phone] fax    Progress Note  Patient name: Maury Gonzalez Start of Care: 7/15/19   Referral source: Karely Elaine MD : 1970   Medical/Treatment Diagnosis: Pain in right shoulder [M25.511]  Neck pain [M54.2]  Spondylosis without myelopathy or radiculopathy, cervical region [M47.812]  Other muscle spasm [M62.838]  Myalgia, other site [M79.18]  Payor:  / Plan: Carlos Minor 74 / Product Type:  /  Onset Date::late May , early 2019                   Prior Hospitalization: see medical history Provider#: [id number]   Medications: Verified on Patient Summary List      Comorbidities: : DM, arthritis, MFPS, HTN, kidney disease.  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, working, no AD needed, household and community activities,   Visits from [facility]: 9    Missed Visits: 2    Established Goals:         Excellent           Good         Limited           None  [x] Increased ROM   []  [x]  []  []  [x] Increased Strength  []  [x]  []  []  [x] Increased Mobility  []  [x]  []  []   [x] Decreased Pain   []  [x]  []  []  [] Decreased Swelling  []  []  []  []    Key Functional Changes:   Functional Gains: increased ease of lifting litter boxes, pain is better, ROM, strength with arm close to the body, able to sleep on the right side if arm is close to the body  Functional Deficits: pain and strength with right shoulder ABD/scaption, OH reaching/stretching, pain with sleeping on the right side when arm is stretched out  Pain         Best: 0/10                Worst: 3-4/10    Current goals:  Short Term Goals: To be accomplished in 5 treatments:               1 patient will have established and be I with HEP to aid with self management of S/S at discharge               EVAL issued               NMNKUTX progressing 9/11/2019  Long Term Goals: To be accomplished in 12 treatments:               1 patient will have AROM right shoulder F 150 ad  to aid with increase tolerance to work demands and light household activities [address] reaching a shelf at shoulder height               EVAL F 140 and ABD 92, some difficulty               WVCBPSL MET, flex WNL with pain starting around 155 degs, ABD WNL with pain starting around 125 degs and end range 9/11/2019               2 patient will have MMT right shoulder 5/5 to aid with  Moving a skillet on the stove               EVAL MMT right shoulder F 4, abd 4-, ER 4, much difficulty               RZKCUJZ progressing, flex 4+/5, ABD 4+/5 with pain on superior/upper shoulder/UE, ER/IR 4+/5 with soreness 9/11/2019               3 patient will have FOTO 70 to show significant improvement with reaching across his body               EVAL 54, little difficulty               BHEETCF progressing, 66 points 9/11/2019               4 patient will report overall 50% improvement to aid with increase tolerance to mid range movements of the right UE with activities                EVAL worsens pain               PRXFTIK progressing, reports improvements in pain/symptoms but states he continues to have pain with certain movements and with sleeping 9/11/19    Updated Goals: to be achieved in 15 total treatments:  1. Pt will improve FOTO to 70 points to improve ease of ADLs  PN: 66 points  2. Pt will improve right shoulder flex/ABD MMT to 5/5 with minimal to no increased pain/soreness to improve tolerance to overhead reaching. PN: 4+/5, reported pain with ABD  3. Pt will being able to sleep through the night without awakening over the past 3 days secondary to pain in the right shoulder to improve sleep quality.   PN: reports that his right shoulder can awake him at night at times if it is stretched out    ASSESSMENT/RECOMMENDATIONS:  Pt reports/demonstrates improvements in ROM, mobility, strength, and activity tolerance since starting therapy. pt states he continues to feel pain in his right shoulder with certain movements, sleeping, and overhead motions. We will plan on continuing therapy to improve the pt's mobility, ease of ROM, and pain. [x]Continue therapy per initial plan/protocol at a frequency of  1-2 x per week for 15 total treatments  []Continue therapy with the following recommended changes:_____________________      _____________________________________________________________________  []Discontinue therapy progressing towards or have reached established goals  []Discontinue therapy due to lack of appreciable progress towards goals  []Discontinue therapy due to lack of attendance or compliance  []Await Physician's recommendations/decisions regarding therapy  []Other:________________________________________________________________    Thank you for this referral.   Dominic Santana, PT 9/11/2019 4:59 PM  NOTE TO PHYSICIAN:  PLEASE COMPLETE THE ORDERS BELOW AND   FAX TO [facility]: [phone]  If you are unable to process this request in 24 hours please contact our office: [phone]    []  I have read the above report and request that my patient continue as recommended. []  I have read the above report and request that my patient continue therapy with the following changes/special instructions:________________________________________  []I have read the above report and request that my patient be discharged from therapy.     [de-identified] Signature:____________Date:_________TIME:________    [employer], Date and Time must be completed for valid certification **

## 2019-09-12 ENCOUNTER — HOSPITAL ENCOUNTER (OUTPATIENT)
Dept: PHYSICAL THERAPY | Age: 49
Discharge: HOME | End: 2019-09-12
Payer: OTHER GOVERNMENT

## 2019-09-12 PROCEDURE — 97140 MANUAL THERAPY 1/> REGIONS: CPT

## 2019-09-12 PROCEDURE — 97530 THERAPEUTIC ACTIVITIES: CPT

## 2019-09-12 PROCEDURE — 97110 THERAPEUTIC EXERCISES: CPT

## 2019-09-12 NOTE — PROGRESS NOTES
PT DAILY TREATMENT NOTE 10-18    Patient Name: Enrrique Ashley  Date:2019  : 1970  [x]  Patient  Verified  Payor: CHERI / Plan: Carlos Minor 76 / Product Type:  /    In time:423  Out time:505  Total Treatment Time (min): 38  Visit #: 10 of 15    Medicare/BCBS Only   Total Timed Codes (min):   1:1 Treatment Time:         Treatment Area: Pain in right shoulder [M25.511]  Neck pain [M54.2]  Spondylosis without myelopathy or radiculopathy, cervical region [M47.812]  Other muscle spasm [M62.838]  Myalgia, other site [M79.18]    SUBJECTIVE  Pain Level (0-10 scale): sore  Any medication changes, allergies to medications, adverse drug reactions, diagnosis change, or new procedure performed?: [x] No    [] Yes (see summary sheet for update)  Subjective functional status/changes:   [] No changes reported  \"it is doing much better. I have had PT before and this has been the best staff and experience. \"    OBJECTIVE    Modality rationale:    Min Type Additional Details    [] Estim:  []Unatt       []IFC  []Premod                        []Other:  []w/ice   []w/heat  Position:  Location:    [] Estim: []Att    []TENS instruct  []NMES                    []Other:  []w/US   []w/ice   []w/heat  Position:  Location:    []  Traction: [] Cervical       []Lumbar                       [] Prone          []Supine                       []Intermittent   []Continuous Lbs:  [] before manual  [] after manual    []  Ultrasound: []Continuous   [] Pulsed                           []1MHz   []3MHz W/cm2:  Location:    []  Iontophoresis with dexamethasone         Location: [] Take home patch   [] In clinic    []  Ice     []  heat  []  Ice massage  []  Laser   []  Anodyne Position:  Location:    []  Laser with stim  []  Other:  Position:  Location:    []  Vasopneumatic Device Pressure:       [] lo [] med [] hi   Temperature: [] lo [] med [] hi   [] Skin assessment post-treatment:  []intact []redness- no adverse reaction []redness  adverse reaction:       min []Eval                  []Re-Eval       13 min Therapeutic Exercise:  [x] See flow sheet :   Rationale: increase ROM, increase strength and improve coordination to improve the patients ability to tolerate ADLs and activities    10 min Therapeutic Activity:  [x]  See flow sheet :   Rationale: increase ROM, increase strength and improve coordination  to improve the patients ability to tolerate ADLS  And activities      min Neuromuscular Re-education:  []  See flow sheet :   Rationale:   to improve the patients ability to     15 min Manual Therapy:     LAD, oscillations PROM all planes right shoulder with mobs for ER IR   Rationale: decrease pain, increase ROM, increase tissue extensibility and decrease trigger points to tolerate ADLs and activities     min Gait Training:  ___ feet with ___ device on level surfaces with ___ level of assist   Rationale: With   [] TE   [x] TA   [] neuro   [] other: Patient Education: [x] Review HEP    [] Progressed/Changed HEP based on:   [] positioning   [] body mechanics   [] transfers   [] heat/ice application    [x] other: POC     Other Objective/Functional Measures: VC exercises and technique     Pain Level (0-10 scale) post treatment: sore    ASSESSMENT/Changes in Function: tolerated well. Patient will continue to benefit from skilled PT services to modify and progress therapeutic interventions, address functional mobility deficits, address ROM deficits, address strength deficits, analyze and address soft tissue restrictions, analyze and cue movement patterns, analyze and modify body mechanics/ergonomics, assess and modify postural abnormalities and instruct in home and community integration to attain remaining goals.      [x]  See Plan of Care  [x]  See progress note/recertification  []  See Discharge Summary         Progress towards goals / Updated goals:  Short Term Goals: To be accomplished in 5 treatments:               1 patient will have established and be I with HEP to aid with self management of S/S at discharge               EVAL issued               RJBTVTL progressing 9/11/2019 9/12/19  Long Term Goals: To be accomplished in 12 treatments:               1 patient will have AROM right shoulder F 150 ad  to aid with increase tolerance to work demands and light household activities [address] reaching a shelf at shoulder height               EVAL F 140 and ABD 92, some difficulty               TWCLNXE MET, flex WNL with pain starting around 155 degs, ABD WNL with pain starting around 125 degs and end range 9/11/2019               2 patient will have MMT right shoulder 5/5 to aid with  Moving a skillet on the stove               EVAL MMT right shoulder F 4, abd 4-, ER 4, much difficulty               NKNCPIF progressing, flex 4+/5, ABD 4+/5 with pain on superior/upper shoulder/UE, ER/IR 4+/5 with soreness 9/11/2019               3 patient will have FOTO 70 to show significant improvement with reaching across his body               EVAL 54, little difficulty               XLAOLIE progressing, 66 points 9/11/2019               4 patient will report overall 50% improvement to aid with increase tolerance to mid range movements of the right UE with activities                EVAL worsens pain               EJXODWX progressing, reports improvements in pain/symptoms but states he continues to have pain with certain movements and with sleeping 9/11/19       PLAN  [x]  Upgrade activities as tolerated     [x]  Continue plan of care  []  Update interventions per flow sheet       []  Discharge due to:_  []  Other:_      Ines Haines PT 9/12/2019  4:26 PM    Future Appointments   Date Time Provider Bryant Zavaleta   9/12/2019  5:00 PM Casie Donald PT [facility]   9/18/2019  5:30 PM [facility] RM 2 [facility]   9/24/2019  5:00 PM Casie Donald PT [facility]   9/27/2019  4:30 PM Rafa Treadwell PT [facility]   10/7/2019 3:15 PM Hillary Escobedo  [address]   10/31/2019  2:00 PM [facility] Moses Sar[place] [id number]   10/31/2019  2:30 PM MD Yolanda Quinn[place]

## 2019-09-18 ENCOUNTER — HOSPITAL ENCOUNTER (OUTPATIENT)
Age: 49
Discharge: HOME | End: 2019-09-18
Attending: PHYSICAL MEDICINE & REHABILITATION
Payer: OTHER GOVERNMENT

## 2019-09-18 DIAGNOSIS — G89.29 CHRONIC RIGHT SHOULDER PAIN: ICD-10-CM

## 2019-09-18 DIAGNOSIS — R29.898 RIGHT ARM WEAKNESS: ICD-10-CM

## 2019-09-18 DIAGNOSIS — M25.511 CHRONIC RIGHT SHOULDER PAIN: ICD-10-CM

## 2019-09-18 PROCEDURE — 73221 MRI JOINT UPR EXTREM W/O DYE: CPT

## 2019-09-24 ENCOUNTER — HOSPITAL ENCOUNTER (OUTPATIENT)
Dept: PHYSICAL THERAPY | Age: 49
Discharge: HOME | End: 2019-09-24
Payer: OTHER GOVERNMENT

## 2019-09-24 PROCEDURE — 97530 THERAPEUTIC ACTIVITIES: CPT

## 2019-09-24 PROCEDURE — 97016 VASOPNEUMATIC DEVICE THERAPY: CPT

## 2019-09-24 PROCEDURE — 97110 THERAPEUTIC EXERCISES: CPT

## 2019-09-24 NOTE — PROGRESS NOTES
PT DAILY TREATMENT NOTE 10-18    Patient Name: Samantha Dejesus  Date:2019  : 1970  [x]  Patient  Verified  Payor:  / Plan: Betina Buckley / Product Type:  /    In time:415  Out time:500  Total Treatment Time (min): 45  Visit #: 6 of 13   Signed MD note    Medicare/BCBS Only   Total Timed Codes (min):   1:1 Treatment Time:         Treatment Area: Pain in right shoulder [M25.511]  Neck pain [M54.2]  Spondylosis without myelopathy or radiculopathy, cervical region [M47.812]  Other muscle spasm [M62.838]  Myalgia, other site [M79.18]    SUBJECTIVE  Pain Level (0-10 scale): 3 1/2  Any medication changes, allergies to medications, adverse drug reactions, diagnosis change, or new procedure performed?: [x] No    [] Yes (see summary sheet for update)  Subjective functional status/changes:   [] No changes reported  \" I don't know if I did anything to tweek it or if I overdid it. I can't sleep on that right side again.  \"    OBJECTIVE    Modality rationale: decrease pain and increase tissue extensibility to improve the patients ability to tolerate ADLS and activities   Min Type Additional Details    [] Estim:  []Unatt       []IFC  []Premod                        []Other:  []w/ice   []w/heat  Position:  Location:    [] Estim: []Att    []TENS instruct  []NMES                    []Other:  []w/US   []w/ice   []w/heat  Position:  Location:    []  Traction: [] Cervical       []Lumbar                       [] Prone          []Supine                       []Intermittent   []Continuous Lbs:  [] before manual  [] after manual    []  Ultrasound: []Continuous   [] Pulsed                           []1MHz   []3MHz W/cm2:  Location:    []  Iontophoresis with dexamethasone         Location: [] Take home patch   [] In clinic    []  Ice     []  heat  []  Ice massage  []  Laser   []  Anodyne Position:  Location:    []  Laser with stim  []  Other:  Position:  Location:   15 [x]  Vasopneumatic Device Right shoulder Pressure:       [x] lo [] med [] hi   Temperature: [x] lo [] med [] hi   [] Skin assessment post-treatment:  []intact []redness- no adverse reaction    []redness  adverse reaction:       min []Eval                  []Re-Eval       15 min Therapeutic Exercise:  [x] See flow sheet :   Rationale: increase ROM, increase strength and improve coordination to improve the patients ability to tolerate ADLs and activities    15 min Therapeutic Activity:  [x]  See flow sheet :   Rationale: increase ROM, increase strength and improve coordination  to improve the patients ability to tolerate ADLs and activities      min Neuromuscular Re-education:  []  See flow sheet :   Rationale:   to improve the patients ability to      min Manual Therapy:     Rationale:  to      min Gait Training:  ___ feet with ___ device on level surfaces with ___ level of assist   Rationale: With   [x] TE   [x] TA   [] neuro   [] other: Patient Education: [x] Review HEP    [] Progressed/Changed HEP based on:   [] positioning   [] body mechanics   [] transfers   [] heat/ice application    [x] other: POC     Other Objective/Functional Measures: VC exercises and tech. Pain Level (0-10 scale) post treatment: 1    ASSESSMENT/Changes in Function: tolerated well . Increase pain reported today. Noted benefit of vaso post exercises. Held manual due to increase pain    Patient will continue to benefit from skilled PT services to modify and progress therapeutic interventions, address ROM deficits, address strength deficits, analyze and address soft tissue restrictions, analyze and cue movement patterns, analyze and modify body mechanics/ergonomics, assess and modify postural abnormalities, address imbalance/dizziness and instruct in home and community integration to attain remaining goals. [x]  See Plan of Care  [x]  See progress note/recertification  []  See Discharge Summary         Progress towards goals / Updated goals:   1. Pt will improve FOTO to 70 points to improve ease of ADLs  PN: 66 points  CURRENT NA today  2. Pt will improve right shoulder flex/ABD MMT to 5/5 with minimal to no increased pain/soreness to improve tolerance to overhead reaching. PN: 4+/5, reported pain with ABD  CURRENT ongoing 9/24/19  3. Pt will being able to sleep through the night without awakening over the past 3 days secondary to pain in the right shoulder to improve sleep quality.   PN: reports that his right shoulder can awake him at night at times if it is stretched out  CURRENT recent decrease tolerance to Right SL positioning for sleeping due to increase pain 9/24/19       PLAN  [x]  Upgrade activities as tolerated     [x]  Continue plan of care  []  Update interventions per flow sheet       []  Discharge due to:_  []  Other:_      Antoinette Beckwith, PT 9/24/2019  4:25 PM    Future Appointments   Date Time Provider Bryant Meghann   9/24/2019  5:00 PM Iglesia Valenzuela, PT [facility] [address]   9/27/2019  4:30 PM Garrick Kuhn PT [facility] [address]   10/1/2019  6:00 PM Garrick Kuhn PT [facility] [address]   10/3/2019  5:00 PM Iglesia Valenzuela PT [facility] [address]   10/7/2019  3:15 PM Donna Menjivar  [address]   10/14/2019  4:30 PM Iglesia Valenzuela PT [facility] [address]   10/31/2019  2:00 PM [facility]   10/31/2019  2:30 PM Danuta Reyes MD [facility]

## 2019-09-27 ENCOUNTER — HOSPITAL ENCOUNTER (OUTPATIENT)
Dept: PHYSICAL THERAPY | Age: 49
Discharge: HOME | End: 2019-09-27
Payer: OTHER GOVERNMENT

## 2019-09-27 PROCEDURE — 97530 THERAPEUTIC ACTIVITIES: CPT | Performed by: PHYSICAL THERAPIST

## 2019-09-27 PROCEDURE — 97016 VASOPNEUMATIC DEVICE THERAPY: CPT | Performed by: PHYSICAL THERAPIST

## 2019-09-27 PROCEDURE — 97112 NEUROMUSCULAR REEDUCATION: CPT | Performed by: PHYSICAL THERAPIST

## 2019-09-27 PROCEDURE — 97110 THERAPEUTIC EXERCISES: CPT | Performed by: PHYSICAL THERAPIST

## 2019-10-01 ENCOUNTER — HOSPITAL ENCOUNTER (OUTPATIENT)
Dept: PHYSICAL THERAPY | Age: 49
Discharge: HOME | End: 2019-10-01
Payer: OTHER GOVERNMENT

## 2019-10-01 PROCEDURE — 97110 THERAPEUTIC EXERCISES: CPT | Performed by: PHYSICAL THERAPIST

## 2019-10-01 PROCEDURE — 97112 NEUROMUSCULAR REEDUCATION: CPT | Performed by: PHYSICAL THERAPIST

## 2019-10-01 PROCEDURE — 97530 THERAPEUTIC ACTIVITIES: CPT | Performed by: PHYSICAL THERAPIST

## 2019-10-01 NOTE — PROGRESS NOTES
PT DAILY TREATMENT NOTE 10-18    Patient Name: Mckinley Cuellar  Date:10/1/2019  : 1970  [x]  Patient  Verified  Payor:  / Plan: Carlos Minor 74 / Product Type:  /    In time:5:34P  Out time:6:17P  Total Treatment Time (min): 43  Visit #: 13 of 15    Medicare/BCBS Only   Total Timed Codes (min):   1:1 Treatment Time:         Treatment Area: Pain in right shoulder [M25.511]  Neck pain [M54.2]  Spondylosis without myelopathy or radiculopathy, cervical region [M47.812]  Other muscle spasm [M62.838]  Myalgia, other site [M79.18]    SUBJECTIVE  Pain Level (0-10 scale): 2/10, sore  Any medication changes, allergies to medications, adverse drug reactions, diagnosis change, or new procedure performed?: [x] No    [] Yes (see summary sheet for update)  Subjective functional status/changes:   [] No changes reported  Patient states he is a little sore from working out in the yard and washing his car over the weekend. Did not Completely favor the R, but did not use it as much as the L.    OBJECTIVE    23 min Therapeutic Exercise:  [x] See flow sheet :   Rationale: increase ROM and increase strength to improve the patients ability to A/ROM and decrease pain with movement. 10 min Therapeutic Activity:  [x]  See flow sheet :   Rationale: increase strength and improve coordination  to improve the patients ability to Tolerate basic ADLs and job-related tasks without pain. 10 min Neuromuscular Re-education:  [x]  See flow sheet :   Rationale: improve coordination, improve balance and increase proprioception  to improve the patients ability to perform activities with good form and proprioception with tactile and verbal cuing appropriately.           With   [x] TE   [x] TA   [x] neuro   [] other: Patient Education: [x] Review HEP    [x] Progressed/Changed HEP based on:   [] positioning   [] body mechanics   [] transfers   [] heat/ice application    [] other:      Other Objective/Functional Measures: Significant increase in ER following push press exercise in standing, decreased restriction. Pain Level (0-10 scale) post treatment: 0/10    ASSESSMENT/Changes in Function: Patient is progressing towards goals of increased activity tolerance, decreased pain. Spent time this session working on specific movement patterns eliminate the \"stick point\" during overhead movement. Patient will continue to benefit from skilled PT services to modify and progress therapeutic interventions, address functional mobility deficits, address ROM deficits, address strength deficits, analyze and address soft tissue restrictions, analyze and cue movement patterns, analyze and modify body mechanics/ergonomics and assess and modify postural abnormalities to attain remaining goals. [x]  See Plan of Care  []  See progress note/recertification  []  See Discharge Summary         Progress towards goals / Updated goals:  1. Pt will improve FOTO to 70 points to improve ease of ADLs  PN: 66 points  CURRENT NA today 9/27/19, 10/1/19  2. Pt will improve right shoulder flex/ABD MMT to 5/5 with minimal to no increased pain/soreness to improve tolerance to overhead reaching. PN: 4+/5, reported pain with ABD  CURRENT: 4+/5, pain with overhead flexion 10/1/19  3. Pt will being able to sleep through the night without awakening over the past 3 days secondary to pain in the right shoulder to improve sleep quality.   PN: reports that his right shoulder can awake him at night at times if it is stretched out  CURRENT recent decrease tolerance to Right SL positioning for sleeping due to increase pain 9/24/19, continues to wake up at night on 10/1/19    PLAN  [x]  Upgrade activities as tolerated     []  Continue plan of care  []  Update interventions per flow sheet       []  Discharge due to:_  []  Other:_      Vishnu Machuca, PT 10/1/2019  5:45 PM    Future Appointments   Date Time Provider Bryant Zavaleta   10/1/2019  6:00 PM Toi Sullivan, PT [facility]   10/3/2019  5:00 PM Nir Butterfield, PT [facility]   10/7/2019  3:15 PM Josep Arellano  [address]   10/14/2019  4:30 PM Nir Butterfield, PT [facility]   11/4/2019  2:00 PM MITESH CARREON [facility]   11/4/2019  2:15 PM Cait Balbuena MD [facility]

## 2019-10-03 ENCOUNTER — HOSPITAL ENCOUNTER (OUTPATIENT)
Dept: PHYSICAL THERAPY | Age: 49
Discharge: HOME | End: 2019-10-03
Payer: OTHER GOVERNMENT

## 2019-10-03 PROCEDURE — 97110 THERAPEUTIC EXERCISES: CPT

## 2019-10-03 PROCEDURE — 97530 THERAPEUTIC ACTIVITIES: CPT

## 2019-10-03 PROCEDURE — 97112 NEUROMUSCULAR REEDUCATION: CPT

## 2019-10-03 NOTE — PROGRESS NOTES
PT DAILY TREATMENT NOTE 10-18    Patient Name: Bennett Walker  Date:10/3/2019  : 1970  [x]  Patient  Verified  Payor:  / Plan: [facility] / Product Type:  /    In time:445  Out time:529  Total Treatment Time (min): 44  Visit #: 14 of 15    Medicare/BCBS Only   Total Timed Codes (min):    1:1 Treatment Time:          Treatment Area: Pain in right shoulder [M25.511]  Neck pain [M54.2]  Spondylosis without myelopathy or radiculopathy, cervical region [M47.812]  Other muscle spasm [M62.838]  Myalgia, other site [M79.18]    SUBJECTIVE  Pain Level (0-10 scale): sore  Any medication changes, allergies to medications, adverse drug reactions, diagnosis change, or new procedure performed?: [x] No    [] Yes (see summary sheet for update)  Subjective functional status/changes:   [] No changes reported  \"I'm a little sore. \"    OBJECTIVE    Modality rationale: decrease pain and increase tissue extensibility to improve the patients ability to tolerate ADLs and activities   Min Type Additional Details    [] Estim:  []Unatt       []IFC  []Premod                        []Other:  []w/ice   []w/heat  Position:  Location:    [] Estim: []Att    []TENS instruct  []NMES                    []Other:  []w/US   []w/ice   []w/heat  Position:  Location:    []  Traction: [] Cervical       []Lumbar                       [] Prone          []Supine                       []Intermittent   []Continuous Lbs:  [] before manual  [] after manual    []  Ultrasound: []Continuous   [] Pulsed                           []1MHz   []3MHz W/cm2:  Location:    []  Iontophoresis with dexamethasone         Location: [] Take home patch   [] In clinic   10 [x]  Ice post     []  heat  []  Ice massage  []  Laser   []  Anodyne Position:reclined  Location:right shoulder    []  Laser with stim  []  Other:  Position:  Location:    []  Vasopneumatic Device Pressure:       [] lo [] med [] hi   Temperature: [] lo [] med [] hi   [] Skin assessment post-treatment:  []intact []redness- no adverse reaction    []redness  adverse reaction:       min []Eval                  []Re-Eval       14 min Therapeutic Exercise:  [x] See flow sheet :   Rationale: increase ROM, increase strength and improve coordination to improve the patients ability to tolerate ADLs and activities    10 min Therapeutic Activity:  [x]  See flow sheet :   Rationale: increase ROM, increase strength and improve coordination  to improve the patients ability to tolerate ADLs and activities     10 min Neuromuscular Re-education:  [x]  See flow sheet :   Rationale: increase ROM, increase strength and improve coordination  to improve the patients ability to tolerate ADLs and activities     min Manual Therapy:     Rationale:  to      min Gait Training:  ___ feet with ___ device on level surfaces with ___ level of assist   Rationale: With   [x] TE   [x] TA   [] neuro   [] other: Patient Education: [x] Review HEP    [x] Progressed/Changed HEP based on:   [] positioning   [] body mechanics   [] transfers   [] heat/ice application    [x] other: POC     Other Objective/Functional Measures: VC exercises and tech     Pain Level (0-10 scale) post treatment: 0    ASSESSMENT/Changes in Function: tolelrated well    Patient will continue to benefit from skilled PT services to modify and progress therapeutic interventions, address ROM deficits, address strength deficits, analyze and address soft tissue restrictions, analyze and cue movement patterns, analyze and modify body mechanics/ergonomics, assess and modify postural abnormalities and instruct in home and community integration to attain remaining goals. [x]  See Plan of Care  [x]  See progress note/recertification  []  See Discharge Summary         Progress towards goals / Updated goals:  1. Pt will improve FOTO to 70 points to improve ease of ADLs  PN: 66 points  CURRENT NA today 10/3/19  2.  Pt will improve right shoulder flex/ABD MMT to 5/5 with minimal to no increased pain/soreness to improve tolerance to overhead reaching. PN: 4+/5, reported pain with ABD  CURRENT: 4+/5, pain with overhead flexion 10/1/19  3. Pt will being able to sleep through the night without awakening over the past 3 days secondary to pain in the right shoulder to improve sleep quality.   PN: reports that his right shoulder can awake him at night at times if it is stretched out  CURRENT recent decrease tolerance to Right SL positioning for sleeping due to increase pain 9/24/19, continues to wake up at night on 10/1/19       PLAN  [x]  Upgrade activities as tolerated     [x]  Continue plan of care  []  Update interventions per flow sheet       []  Discharge due to:_  []  Other:_      Michael Robison, SVETA 10/3/2019  4:43 PM    Future Appointments   Date Time Provider Bryant Zavaleta   10/3/2019  5:00 PM Delon Bedolla, PT [facility]   10/7/2019  3:15 PM Daniela Crowe  [address]    10/14/2019  4:30 PM Delon Bedolla, PT [facility]   11/4/2019  2:00 PM MITESH Love   11/4/2019  2:15 PM MD Ivan Clarke

## 2019-10-07 ENCOUNTER — OFFICE VISIT (OUTPATIENT)
Dept: ORTHOPEDIC SURGERY | Age: 49
End: 2019-10-07

## 2019-10-07 VITALS
BODY MASS INDEX: 42.85 KG/M2 | WEIGHT: 273 LBS | SYSTOLIC BLOOD PRESSURE: 124 MMHG | DIASTOLIC BLOOD PRESSURE: 85 MMHG | HEIGHT: 67 IN | HEART RATE: 92 BPM

## 2019-10-07 DIAGNOSIS — M25.511 CHRONIC RIGHT SHOULDER PAIN: Primary | ICD-10-CM

## 2019-10-07 DIAGNOSIS — M24.111 LABRAL TEAR OF SHOULDER, DEGENERATIVE, RIGHT: ICD-10-CM

## 2019-10-07 DIAGNOSIS — G89.29 CHRONIC RIGHT SHOULDER PAIN: Primary | ICD-10-CM

## 2019-10-07 DIAGNOSIS — S43.431A SUPERIOR LABRUM ANTERIOR-TO-POSTERIOR (SLAP) TEAR OF RIGHT SHOULDER: ICD-10-CM

## 2019-10-07 NOTE — LETTER
10/9/19 Patient: Ramya Haji YOB: 1970 Date of Visit: 10/7/2019 Kellie Sorensen MD 
[address] [place] [address] VIA Facsimile: [phone] Dear Kellie Sorensen MD, Thank you for referring Mr. Brice Schmidt to [facility] for evaluation. My notes for this consultation are attached. If you have questions, please do not hesitate to call me. I look forward to following your patient along with you. Sincerely, Taina Sunshine MD

## 2019-10-07 NOTE — PATIENT INSTRUCTIONS
Shoulder Arthritis: Exercises  Introduction  Here are some examples of exercises for you to try. The exercises may be suggested for a condition or for rehabilitation. Start each exercise slowly. Ease off the exercises if you start to have pain. You will be told when to start these exercises and which ones will work best for you. How to do the exercises  Shoulder flexion (lying down)    1. Lie on your back, holding a wand with both hands. Your palms should face down as you hold the wand. 2. Keeping your elbows straight, slowly raise your arms over your head. Raise them until you feel a stretch in your shoulders, upper back, and chest.  3. Hold for 15 to 30 seconds. 4. Repeat 2 to 4 times. Shoulder rotation (lying down)    1. Lie on your back. Hold a wand with both hands with your elbows bent and palms up. 2. Keep your elbows close to your body, and move the wand across your body toward the sore arm. 3. Hold for 8 to 12 seconds. 4. Repeat 2 to 4 times. Shoulder internal rotation with towel    1. Hold a towel above and behind your head with the arm that is not sore. 2. With your sore arm, reach behind your back and grasp the towel. 3. With the arm above your head, pull the towel upward. Do this until you feel a stretch on the front and outside of your sore shoulder. 4. Hold 15 to 30 seconds. 5. Repeat 2 to 4 times. Shoulder blade squeeze    1. Stand with your arms at your sides, and squeeze your shoulder blades together. Do not raise your shoulders up as you squeeze. 2. Hold 6 seconds. 3. Repeat 8 to 12 times. Resisted rows    1. Put the band around a solid object at about waist level. (A bedpost will work well.) Each hand should hold an end of the band. 2. With your elbows at your sides and bent to 90 degrees, pull the band back. Your shoulder blades should move toward each other. Return to the starting position. 3. Repeat 8 to 12 times.     External rotator strengthening exercise    1. Start by tying a piece of elastic exercise material to a doorknob. You can use surgical tubing or Thera-Band. (You may also hold one end of the band in each hand.)  2. Stand or sit with your shoulder relaxed and your elbow bent 90 degrees. Your upper arm should rest comfortably against your side. Squeeze a rolled towel between your elbow and your body for comfort. This will help keep your arm at your side. 3. Hold one end of the elastic band with the hand of the painful arm. 4. Start with your forearm across your belly. Slowly rotate the forearm out away from your body. Keep your elbow and upper arm tucked against the towel roll or the side of your body until you begin to feel tightness in your shoulder. Slowly move your arm back to where you started. 5. Repeat 8 to 12 times. Internal rotator strengthening exercise    1. Start by tying a piece of elastic exercise material to a doorknob. You can use surgical tubing or Thera-Band. 2. Stand or sit with your shoulder relaxed and your elbow bent 90 degrees. Your upper arm should rest comfortably against your side. Squeeze a rolled towel between your elbow and your body for comfort. This will help keep your arm at your side. 3. Hold one end of the elastic band in the hand of the painful arm. 4. Slowly rotate your forearm toward your body until it touches your belly. Slowly move it back to where you started. 5. Keep your elbow and upper arm firmly tucked against the towel roll or at your side. 6. Repeat 8 to 12 times. Pendulum swing    1. Hold on to a table or the back of a chair with your good arm. Then bend forward a little and let your sore arm hang straight down. This exercise does not use the arm muscles. Rather, use your legs and your hips to create movement that makes your arm swing freely. 2. Use the movement from your hips and legs to guide the slightly swinging arm back and forth like a pendulum (or elephant trunk).  Then guide it in circles that start small (about the size of a dinner plate). Make the circles a bit larger each day, as your pain allows. 3. Do this exercise for 5 minutes, 5 to 7 times each day. 4. As you have less pain, try bending over a little farther to do this exercise. This will increase the amount of movement at your shoulder. Follow-up care is a key part of your treatment and safety. Be sure to make and go to all appointments, and call your doctor if you are having problems. It's also a good idea to know your test results and keep a list of the medicines you take. Where can you learn more? Go to http://marija-marianne.info/. Enter H562 in the search box to learn more about \"Shoulder Arthritis: Exercises. \"  Current as of: June 26, 2019  Content Version: 12.2  © 0284-3284 [employer]. Care instructions adapted under license by [employer] (which disclaims liability or warranty for this information). If you have questions about a medical condition or this instruction, always ask your healthcare professional. [address] any warranty or liability for your use of this information.

## 2019-10-07 NOTE — PROGRESS NOTES
[facility]  [address] [place], Formerly named [facility] [address]  Phone: [phone]  Fax: [phone]    Pt's YOB: 1970    ASSESSMENT   Diagnoses and all orders for this visit:    1. Chronic right shoulder pain  -     REFERRAL TO ORTHOPEDICS    2. Labral tear of shoulder, degenerative, right  -     REFERRAL TO ORTHOPEDICS    3. Superior labrum anterior-to-posterior (SLAP) tear of right shoulder  -     REFERRAL TO ORTHOPEDICS         IMPRESSION AND PLAN:  Meghann Still is a 54 y.o.  male with history of chronic right shoulder. He complains of right shoulder pain that radiates to his elbow. He admits that he does not sleep well if he stretches his right arm while sleeping, so he generally tries to lay on his left side. Pt reports improvement in his right shoulder range of motion with physical therapy but admits to decreased range of motion with external rotation and abduction of the right shoulder. Pt takes Ultram 50 mg rarely as his pain warrants, Lyrica 100 mg 1 tab BID, and Skelaxin 800 mg 1 tab QHS which helps with sleep. Pt at this time desires to continue with current care. 1) Pt was given information on shoulder arthritis exercises. 2) He was referred to Dr. Yeimi Morgan for right labral tear. 3) He will continue taking Ultram 50 mg,  Lyrica 100 mg 1 tab BID, and Skelaxin 800 mg 1 tab QHS and does not need refills at this time. 4) Mr. Rupert Danielle has a reminder for a \"due or due soon\" health maintenance. I have asked that he contact his primary care provider, Leonor Mcneill MD, for follow-up on this health maintenance. 5)  demonstrated consistency with prescribing. Follow-up and Dispositions    · Return in about 3 months (around 1/7/2020) for Medication follow up.              HISTORY OF PRESENT ILLNESS:  Meghann Still is a 54 y.o.  male with history of chronic right shoulder and lumbar pain and presents to the office today for MRI follow up. He complains of right shoulder pain that radiates to his elbow. He admits that he does not sleep well if he stretches his right arm while sleeping, so he generally tries to lay on his left side. Pt reports improvement in his right shoulder range of motion with physical therapy but admits to decreased range of motion with external rotation and abduction of the right shoulder. Pt takes Ultram 50 mg rarely as his pain warrants, Lyrica 100 mg 1 tab BID, and Skelaxin 800 mg 1 tab QHS which helps with sleep. Pt at this time desires to continue with current care.     Pain Scale: 2/10    PCP: Eri Moralez MD     Past Medical History:   Diagnosis Date    Adverse effect of anesthesia     for back sx-had issues with breathing  with apnea- 6 years ago    Asthma     Chest pain, unspecified     ATYPICAL,POSSIBLE ANGINA,MUSCULAR,CAD,CHEST WALL PAINS PERSISTANT AND RECURRENT    Chronic kidney disease     Diabetes mellitus ([address]     DM (diabetes mellitus) ([address]     GERD (gastroesophageal reflux disease)     HTN (hypertension)     Hypercholesteremia     Hypertension     Kidney stones     Sleep apnea     cpap        Social History     Socioeconomic History    Marital status:      Spouse name: Not on file    Number of children: Not on file    Years of education: Not on file    Highest education level: Not on file   Occupational History    Occupation:    Social Needs    Financial resource strain: Not on file    Food insecurity:     Worry: Not on file     Inability: Not on file    Transportation needs:     Medical: Not on file     Non-medical: Not on file   Tobacco Use    Smoking status: Never Smoker    Smokeless tobacco: Never Used   Substance and Sexual Activity    Alcohol use: No    Drug use: No    Sexual activity: Not on file   Lifestyle    Physical activity:     Days per week: Not on file     Minutes per session: Not on file    Stress: Not on file   Relationships    Social connections:     Talks on phone: Not on file     Gets together: Not on file     Attends Religion service: Not on file     Active member of club or organization: Not on file     Attends meetings of clubs or organizations: Not on file     Relationship status: Not on file    Intimate partner violence:     Fear of current or ex partner: Not on file     Emotionally abused: Not on file     Physically abused: Not on file     Forced sexual activity: Not on file   Other Topics Concern    Not on file   Social History Narrative    ** Merged History Encounter **            Current Outpatient Medications   Medication Sig Dispense Refill    ondansetron (ZOFRAN ODT) 4 mg disintegrating tablet 1 Tab by SubLINGual route every eight (8) hours as needed for Nausea. 10 Tab 0    pregabalin (LYRICA) 100 mg capsule Take 1 Cap by mouth two (2) times a day. Max Daily Amount: 200 mg. Indications: disorder characterized by stiff, tender & painful muscles 180 Cap 1    metaxalone (SKELAXIN) 800 mg tablet 1 po BID-TID prn  Indications: muscle spasm 180 Tab 1    amLODIPine (NORVASC) 5 mg tablet Take 5 mg by mouth daily.  benazepril (LOTENSIN) 20 mg tablet Take 20 mg by mouth daily.  traMADol (ULTRAM) 50 mg tablet 1 po bid prn severe pain 60 Tab 1    hydroCHLOROthiazide (MICROZIDE) 12.5 mg capsule       pravastatin (PRAVACHOL) 20 mg tablet       cpap machine kit by Does Not Apply route.  Potassium Citrate 15 mEq TbER Take  by mouth two (2) times a day.  tamsulosin (FLOMAX) 0.4 mg capsule Take 1 Cap by mouth daily (after dinner). 30 Cap 0    carvedilol (COREG CR) 10 mg CR capsule Take  by mouth daily (with breakfast).  montelukast (SINGULAIR) 10 mg tablet Take 10 mg by mouth daily.  insulin glargine (LANTUS SOLOSTAR) 100 unit/mL (3 mL) pen by SubCUTAneous route.  insulin aspart (NOVOLOG) 100 unit/mL inpn by SubCUTAneous route.  magnesium citrate 100 mg tab Take 300 mg by mouth daily.       aspirin 81 mg chewable tablet Take 81 mg by mouth daily.  fluticasone-salmeterol (ADVAIR DISKUS) 250-50 mcg/dose diskus inhaler Take 1 Puff by inhalation every twelve (12) hours.  Dexlansoprazole (DEXILANT) 60 mg CpDM Take 60 mg by mouth daily.  albuterol (VENTOLIN HFA) 90 mcg/actuation inhaler Take  by inhalation every six (6) hours as needed.  Misc. Devices Kit Please provide the patient following size mask. Mask: Mirage FX large size mask. DME: [place], [place]  Phone: [phone], Fax: [phone] 1 Each 0       No Known Allergies      REVIEW OF SYSTEMS    Constitutional: Negative for fever, chills, or weight change. Respiratory: Negative for cough or shortness of breath. Cardiovascular: Negative for chest pain or palpitations. Gastrointestinal: Negative for acid reflux, change in bowel habits, or constipation. Genitourinary: Negative for dysuria and flank pain. Musculoskeletal: Positive for right shoulder pain. Skin: Negative for rash. Neurological: Negative for headaches, dizziness, or numbness. Endo/Heme/Allergies: Negative for increased bruising. Psychiatric/Behavioral: Negative for difficulty with sleep. PHYSICAL EXAMINATION  Visit Vitals  /85   Pulse 92   Ht 5' 7\" (1.702 m)   Wt 273 lb (123.8 kg)   BMI 42.76 kg/m²       Constitutional: Awake, alert, and in no acute distress. Neurological: 1+ symmetrical DTRs in the upper extremities. 1+ symmetrical DTRs in the lower extremities. Sensation to light touch is intact. Negative Miller's sign bilaterally. Skin: warm, dry, and intact. Musculoskeletal: Tenderness to palpation over the right AC joint. Pain with abduction of the right shoulder. Positive impingement sign on the right. Negative Méndez and Neer's test on the right. Negative empty cant test bilaterally and fair- good strength with resisted abduction and adduction. Mild weakness with the push off test on the right.       Biceps Triceps Deltoids Wrist Ext Wrist Flex Hand Intrin   Right +4/5 +4/5 +4/5 +4/5 +4/5 +4/5   Left +4/5 +4/5 +4/5 +4/5 +4/5 +4/5      Hip Flex  Quads Hamstrings Ankle DF EHL Ankle PF   Right +4/5 +4/5 +4/5 +4/5 +4/5 +4/5   Left +4/5 +4/5 +4/5 +4/5 +4/5 +4/5     IMAGING:  Right shoulder MRI from 09/18/2019 were personally reviewed with the patient and demonstrated:  Multisequence multiplanar MR images of the right shoulder were obtained.     HISTORY: Shoulder pain.     Mild to moderate supraspinatus tendinosis. Moderate infraspinatus tendinosis  with bursal surface fraying. Moderate inflammation in the subacromial bursa. Mild subscapular tendinosis. No muscular atrophy.     No glenohumeral joint effusion. No focal glenohumeral cartilage loss. Moderate  maceration of the superior labrum. Biceps anchor is not defined. Attenuated  remaining biceps tendon within the tendon sheath. Subcortical cystic changes in  the humeral head. No definite Hill-Sachs deformity. Glenoid is intact.     Moderate hypertrophy with mild inflammation at the acromioclavicular joint. Slightly curved acromion undersurface. No abnormal downsloping.     IMPRESSION  IMPRESSION:  1. Rotator cuff tendinosis with moderate bursal surface fraying in the  infraspinatus tendon. No focal tear or muscular atrophy. Subacromial bursitis  present. 2. Complex degenerative tearing or maceration of the superior labrum with likely  torn biceps anchor, attenuated biceps tendon in the bicipital groove. 3. Type II acromion. Hypertrophy and inflammation at the [facility] joint. Right shoulder x-rays from 05/29/2019 were personally reviewed with the patient and demonstrated:  FINDINGS:    BONES: Intact and normally aligned.     SOFT TISSUES: Unremarkable.    _______________    IMPRESSION    No significant abnormality.        Cervical spine MRI from 11/02/2017 was personally reviewed with the patient and demonstrated:  Results from [facility] ([place], [place]) on 11/02/17   MRI CERV SPINE WO CONT     Narrative MR CERVICAL SPINE WITHOUT CONTRAST    CPT CODE: 23610    HISTORY: Chronic cervicalgia progressing over the past 2 months. Left arm pain  and paresthesias. No injury. COMPARISON: October 2013. TECHNIQUE: The following sequences were performed through the cervical spine:    1.  Sagittal and axial T2 weighted images without fat saturation. 2.  Sagittal T1 weighted images without fat saturation. 3.  Sagittal STIR sequence. FINDINGS:     Cervical straightening as before. Normal vertebral body heights. No change in a  small rounded area of T2/STIR bright signal within right C5. Likely some  stippled T1 bright signal within; favors hemangioma. Unremarkable disc space  height. Patent cervical medullary junction. No cervical cord expansion or  atrophy. Small oblong fluid bright focus at the right occipital cervical  articulation anteriorly is unchanged. On axial imaging, findings at each level are as follows:    C2/C3: Patent canal and foramina. C3/C4: Patent canal and foramina. C4/C5: Patent canal and foramina. C5/C6: Mild right paracentral disc protrusion. Patent canal and foramina. C6/C7: Minimal disc bulge on sagittal imaging. Patent canal and foramina. C7/T1: Patent canal and foramina. Incidental imaging of the adjacent soft tissues is unremarkable.              Impression IMPRESSION:    1. Minimal degenerative findings of cervical spine. No substantive change. Patent canal and foramina. No specific source for paresthesias. Thank you for this referral.          Written by Italia Lewis, as dictated by Cornelio Montes MD.  I, Dr. Cornelio Montes confirm that all documentation is accurate.

## 2019-10-14 ENCOUNTER — HOSPITAL ENCOUNTER (OUTPATIENT)
Dept: PHYSICAL THERAPY | Age: 49
Discharge: HOME | End: 2019-10-14
Payer: OTHER GOVERNMENT

## 2019-10-14 PROCEDURE — 97112 NEUROMUSCULAR REEDUCATION: CPT

## 2019-10-14 PROCEDURE — 97110 THERAPEUTIC EXERCISES: CPT

## 2019-10-14 PROCEDURE — 97530 THERAPEUTIC ACTIVITIES: CPT

## 2019-10-14 NOTE — PROGRESS NOTES
Physical Therapy Discharge Instructions      [facility] [address], [address] [place] [address], [address] [phone] fax    Patient: Charity Taylor  : 1970      Continue Home Exercise Program 1-2 times per day for 2-3 weeks, then decrease to 3-5 times per week      Continue with    [x] Ice  as needed   times per day     [x] Heat       Follow up with MD:     [] Upon completion of therapy     [x] As needed  Recommendations:     [x]   Return to activity with home program    []   Return to activity with the following modifications:       []Post Rehab Program    []Join Independent aquatic program     []Return to/join local gym    Additional Comments:  Patient to have ortho consult for right shoulder       Denisse Juan, PT 10/14/2019 4:18 PM

## 2019-10-14 NOTE — PROGRESS NOTES
PT DISCHARGE DAILY NOTE AND [id number]    Patient name: Toby Lopez Start of Care: 7/15/19   Referral source: Ishmael Rosales MD : 1970   Medical/Treatment Diagnosis: Pain in right shoulder [M25.511]  Neck pain [M54.2]  Spondylosis without myelopathy or radiculopathy, cervical region [M47.812]  Other muscle spasm [M62.838]  Myalgia, other site [M79.18] Onset Date:late May , early 2019                  Prior Hospitalization: see medical history Provider#: [id number]   Medications: Verified on Patient Summary List    Comorbidities: : DM, arthritis, MFPS, HTN, kidney disease.  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, working, no AD needed, household and community activities,   Visits from [facility]: 15    Missed Visits: 2    Reporting Period : 19 to 10/14/19    Date:10/14/2019  : 1970  [x]  Patient  Verified  Payor:  / Plan: Farnaz Ramirez / Product Type:  /    In time:413  Out time:511  Total Treatment Time (min): 52  Visit #: 15 of 15    Medicare/BCBS Only   Total Timed Codes (min):   1:1 Treatment Time:          SUBJECTIVE  Pain Level (0-10 scale): sore 1-2  Any medication changes, allergies to medications, adverse drug reactions, diagnosis change, or new procedure performed?: [x] No    [] Yes (see summary sheet for update)  Subjective functional status/changes:   [] No changes reported  \"the strength and the stabilization have improved, it's just getting over the deep ache    OBJECTIVE    Modality rationale: decrease pain and increase tissue extensibility to improve the patients ability to tolerate ADLS and activities   Min Type Additional Details    [] Estim:  []Unatt       []IFC  []Premod                        []Other:  []w/ice   []w/heat  Position:  Location:    [] Estim: []Att    []TENS instruct  []NMES                    []Other:  []w/US   []w/ice []w/heat  Position:  Location:    []  Traction: [] Cervical       []Lumbar                       [] Prone          []Supine                       []Intermittent   []Continuous Lbs:  [] before manual  [] after manual    []  Ultrasound: []Continuous   [] Pulsed                           []1MHz   []3MHz W/cm2:  Location:    []  Iontophoresis with dexamethasone         Location: [] Take home patch   [] In clinic   10 [x]  Ice post     []  heat  []  Ice massage  []  Laser   []  Anodyne Position:reclined  Location: right shoulder    []  Laser with stim  []  Other:  Position:  Location:    []  Vasopneumatic Device Pressure:       [] lo [] med [] hi   Temperature: [] lo [] med [] hi   [] Skin assessment post-treatment:  []intact []redness- no adverse reaction    []redness  adverse reaction:       min []Eval                  []Re-Eval       14 min Therapeutic Exercise:  [x] See flow sheet :   Rationale: increase ROM, increase strength and improve coordination to improve the patients ability to tolerate ADLS and activities    18 min Therapeutic Activity:  [x]  See flow sheet :   Rationale: increase ROM, increase strength and improve coordination  to improve the patients ability to tolerate ADLs and activities     10 min Neuromuscular Re-education:  []  See flow sheet :   Rationale: increase ROM, increase strength and improve coordination  to improve the patients ability to tolerate ADLs and activities     min Manual Therapy:     Rationale:  to      min Gait Training:  ___ feet with ___ device on level surfaces with ___ level of assist   Rationale:           With   [] TE   [] TA   [] neuro   [] other: Patient Education: [x] Review HEP    [] Progressed/Changed HEP based on:   [] positioning   [] body mechanics   [] transfers   [] heat/ice application    [] other:      Other Objective/Functional Measures: VC exercises and tech   FOTO 70,  MMT right shoulder F and ABD 5     Pain Level (0-10 scale) post treatment: sore    Summary of Care:  Goal:Pt will improve FOTO to 70 points to improve ease of ADLs  Status at last note/certification:  Status at discharge: met    Goal:Pt will improve right shoulder flex/ABD MMT to 5/5 with minimal to no increased pain/soreness to improve tolerance to overhead reaching. Status at last note/certification:  Status at discharge: met    Goal:Pt will report being able to sleep through the night without awakening over the past 3 days secondary to pain in the right shoulder to improve sleep quality. Status at last note/certification:last MD note  Status at discharge: met       ASSESSMENT/Changes in Function: tolerated well.      Thank you for this referral!      PLAN  [x]Discontinue therapy: [x]Patient has reached or is progressing toward set goals      []Patient is non-compliant or has abdicated      []Due to lack of appreciable progress towards set goals    Price Diaz PT 10/14/2019  4:17 PM

## 2019-11-05 ENCOUNTER — OFFICE VISIT (OUTPATIENT)
Dept: ORTHOPEDIC SURGERY | Age: 49
End: 2019-11-05

## 2019-11-05 VITALS
DIASTOLIC BLOOD PRESSURE: 84 MMHG | BODY MASS INDEX: 43.03 KG/M2 | OXYGEN SATURATION: 95 % | TEMPERATURE: 98.2 F | SYSTOLIC BLOOD PRESSURE: 143 MMHG | RESPIRATION RATE: 18 BRPM | HEIGHT: 67 IN | WEIGHT: 274.2 LBS | HEART RATE: 93 BPM

## 2019-11-05 DIAGNOSIS — M67.921 TENDINOPATHY OF RIGHT BICEPS TENDON: Primary | ICD-10-CM

## 2019-11-05 DIAGNOSIS — S43.431A TEAR OF RIGHT GLENOID LABRUM, INITIAL ENCOUNTER: ICD-10-CM

## 2019-11-05 NOTE — PROGRESS NOTES
Matilde Bordy  1970   Chief Complaint   Patient presents with    Shoulder Pain     right shoulder pain        HISTORY OF PRESENT ILLNESS  Matilde Brody is a 54 y.o. male who presents today for evaluation of right shoulder pain. He rates his pain 2/10 today. Pain has been present since the end of May. He states he went to catch something that was falling out of his truck and felt a pop in his shoulder. Pt went to the ER and they gave him a sling. Pt has been to PT which provided good relief. Pain wakes patient up at night intermittently. Pt works at [employer]. Pt notes the pain has slightly improved since the initial incident. Patient describes the pain as aching that is Constant in nature. Symptoms are worse with straightening, stretching, bending, Activity and is better with  Exercises. Associated symptoms include nothing. Since problem started, it: has slightly improved. Pain does wake patient up at night. Has taken no meds for the problem. Has tried following treatments: Injections:NO; Brace:NO;  Therapy:YES; Cane/Crutch:NO       No Known Allergies     Past Medical History:   Diagnosis Date    Adverse effect of anesthesia     for back sx-had issues with breathing  with apnea- 6 years ago    Asthma     Chest pain, unspecified     ATYPICAL,POSSIBLE ANGINA,MUSCULAR,CAD,CHEST WALL PAINS PERSISTANT AND RECURRENT    Chronic kidney disease     Diabetes mellitus ([facility] [address]     DM (diabetes mellitus) (HCC)     GERD (gastroesophageal reflux disease)     HTN (hypertension)     Hypercholesteremia     Hypertension     Kidney stones     Sleep apnea     cpap      Social History     Socioeconomic History    Marital status:      Spouse name: Not on file    Number of children: Not on file    Years of education: Not on file    Highest education level: Not on file   Occupational History    Occupation:    Social Needs    Financial resource strain: Not on file    Food insecurity: Worry: Not on file     Inability: Not on file    Transportation needs:     Medical: Not on file     Non-medical: Not on file   Tobacco Use    Smoking status: Never Smoker    Smokeless tobacco: Never Used   Substance and Sexual Activity    Alcohol use: No    Drug use: No    Sexual activity: Not on file   Lifestyle    Physical activity:     Days per week: Not on file     Minutes per session: Not on file    Stress: Not on file   Relationships    Social connections:     Talks on phone: Not on file     Gets together: Not on file     Attends Baptism service: Not on file     Active member of club or organization: Not on file     Attends meetings of clubs or organizations: Not on file     Relationship status: Not on file    Intimate partner violence:     Fear of current or ex partner: Not on file     Emotionally abused: Not on file     Physically abused: Not on file     Forced sexual activity: Not on file   Other Topics Concern    Not on file   Social History Narrative    ** Merged History Encounter **           Past Surgical History:   Procedure Laterality Date    HX BACK SURGERY      laminectomy L5-S1    HX BACK SURGERY      HX TONSILLECTOMY      HX UROLOGICAL  3/24/2016    [address], Dr. Alverto Howell, Cystoscopy, Right Retrograde Ureteroscopy, Holmium Laser Lithotripsy, double j cath      Family History   Problem Relation Age of Onset    Diabetes Mother     Hypertension Mother     Heart Disease Mother     Heart Attack Father         MI in 45s    Stroke Father     Diabetes Father       Current Outpatient Medications   Medication Sig    ondansetron (ZOFRAN ODT) 4 mg disintegrating tablet 1 Tab by SubLINGual route every eight (8) hours as needed for Nausea.  pregabalin (LYRICA) 100 mg capsule Take 1 Cap by mouth two (2) times a day. Max Daily Amount: 200 mg.  Indications: disorder characterized by stiff, tender & painful muscles    metaxalone (SKELAXIN) 800 mg tablet 1 po BID-TID prn  Indications: muscle spasm    amLODIPine (NORVASC) 5 mg tablet Take 5 mg by mouth daily.  benazepril (LOTENSIN) 20 mg tablet Take 20 mg by mouth daily.  traMADol (ULTRAM) 50 mg tablet 1 po bid prn severe pain    hydroCHLOROthiazide (MICROZIDE) 12.5 mg capsule     pravastatin (PRAVACHOL) 20 mg tablet     cpap machine kit by Does Not Apply route.  Potassium Citrate 15 mEq TbER Take  by mouth two (2) times a day.  tamsulosin (FLOMAX) 0.4 mg capsule Take 1 Cap by mouth daily (after dinner).  carvedilol (COREG CR) 10 mg CR capsule Take  by mouth daily (with breakfast).  montelukast (SINGULAIR) 10 mg tablet Take 10 mg by mouth daily.  insulin glargine (LANTUS SOLOSTAR) 100 unit/mL (3 mL) pen by SubCUTAneous route.  insulin aspart (NOVOLOG) 100 unit/mL inpn by SubCUTAneous route.  magnesium citrate 100 mg tab Take 300 mg by mouth daily.  aspirin 81 mg chewable tablet Take 81 mg by mouth daily.  fluticasone-salmeterol (ADVAIR DISKUS) 250-50 mcg/dose diskus inhaler Take 1 Puff by inhalation every twelve (12) hours.  Dexlansoprazole (DEXILANT) 60 mg CpDM Take 60 mg by mouth daily.  albuterol (VENTOLIN HFA) 90 mcg/actuation inhaler Take  by inhalation every six (6) hours as needed.  Misc. Devices Kit Please provide the patient following size mask. Mask: Mirage FX large size mask. DME: [place], [place]  Phone: [phone], Fax: [phone]     No current facility-administered medications for this visit. REVIEW OF SYSTEM   Patient denies: Weight loss, Fever/Chills, HA, Visual changes, Fatigue, Chest pain, SOB, Abdominal pain, N/V/D/C, Blood in stool or urine, Edema. Pertinent positive as above in HPI.  All others were negative    PHYSICAL EXAM:   Visit Vitals  /84   Pulse 93   Temp 98.2 °F (36.8 °C) (Oral)   Resp 18   Ht 5' 7\" (1.702 m)   Wt 274 lb 3.2 oz (124.4 kg)   SpO2 95%   BMI 42.95 kg/m²     The patient is a well-developed, well-nourished male   in no acute distress. The patient is alert and oriented times three. The patient is alert and oriented times three. Mood and affect are normal.  LYMPHATIC: lymph nodes are not enlarged and are within normal limits  SKIN: normal in color and non tender to palpation. There are no bruises or abrasions noted. NEUROLOGICAL: Motor sensory exam is within normal limits. Reflexes are equal bilaterally. There is normal sensation to pinprick and light touch  MUSCULOSKELETAL:  Examination Right shoulder   Skin Intact   AC joint tenderness -   Biceps tenderness +   Forward flexion/Elevation    Active abduction    Glenohumeral abduction 90   External rotation ROM 45   Internal rotation ROM 30   Apprehension -   Shellys Relocation -   Jerk -   Load and Shift -   Obriens -   Speeds -   Impingement sign +   Supraspinatus/Empty Can +   External Rotation Strength -, 5/5   Lift Off/Belly Press -, 5/5   Neurovascular Intact     IMAGING: MRI of right shoulder dated 9/18/19 was reviewed and read by Dr. Sumeet Suarez: IMPRESSION:  1. Rotator cuff tendinosis with moderate bursal surface fraying in the  infraspinatus tendon. No focal tear or muscular atrophy. Subacromial bursitis  present. 2. Complex degenerative tearing or maceration of the superior labrum with likely  torn biceps anchor, attenuated biceps tendon in the bicipital groove. 3. Type II acromion. Hypertrophy and inflammation at the [facility] joint. IMPRESSION:      ICD-10-CM ICD-9-CM    1. Tendinopathy of right biceps tendon M67.921 727.9    2. Tear of right glenoid labrum, initial encounter S43.431A 840.8         PLAN:  1. I discussed the risks and benefits and potential adverse outcomes of both operative vs non operative treatment of right biceps tendinopathy with the patient. Patient wishes to proceed with arthroscopic right biceps tenodesis.      Risks of operative intervention include but not limited to bleeding, infection, deep vein thrombosis, pulmonary embolism, death, limb length discrepancy, reflexive sympathetic dystrophy, fat embolism syndrome,damage to blood vessels and nerves, malunion, non-union, delayed union, failure of hardware, post traumatic arthritis, stroke, heart attack, and death. Patient understands that infection may arise and may require numerous surgeries. History and physical exam scheduled for a later date. Risk factors include: dm, htn, BMI>40  2. No ultrasound exam indicated today  3. No cortisone injection indicated today   4. No Physical/Occupational Therapy indicated today  5. No diagnostic test indicated today:   6. No durable medical equipment indicated today  7. No referral indicated today   8. No medications indicated today:   9. No Narcotic indicated today      RTC H&P      Scribed by Fidelina Dailey Hence) as dictated by Obie Espinal MD    I, Dr. Obie Espinal, confirm that all documentation is accurate.     Obie Espinal M.D.   [facility]

## 2019-11-05 NOTE — PATIENT INSTRUCTIONS
Dr. Karol Ferrara Tenodesis    What is the surgery? - This is an outpatient procedure at either [facility] [address] or [address] [place], [place] [address] will be completely asleep for the procedure. Dr. Laina Meier will make somewhere between 2-5 small incisions in your shoulder depending on the amount of work to be completed. He will take a tour of your shoulder with the camera and fix everything that needs to be fixed during your surgery. - Total surgery takes about 45 mins to an hour and half depending on how much needed to be repaired    What can you expect after surgery? - You will have a bulky dressing on your shoulder that you can remove 2 days after surgery. You will be able to shower 2 days after surgery but no soaking in a bath, hot tub, ocean or pool x 2 weeks to allow for full wound healing  - You will be in a sling for 4-5 weeks depending on your repair. You will wear this sling whenever you are active, up moving around, and sleeping at night. This sling is to keep you from moving your arm on your own. You are essentially one armed until you are out of your sling. This means no reaching, pulling, grabbing or lifting with the operative arm.   - Dr. Laina Meier will start physical therapy for you the first business day after surgery. While you cannot move your arm we allow physical therapy to gently move your shoulder. We call this passive range of motion. The goal of this is to decrease your stiffness and in turn decrease your post operative pain. - Plan on being in physical therapy for 10-12 weeks    When can I return to work? - Most patients return to desk work only after 2 weeks. You are able to type but there is no overhead work or lifting  - You will start some gentle lifting up to 5-10lbs at about 8-10 weeks post operatively.  You will gradually increase how much you are able to lift after this point under the guidance of Dr. Laina Meier, his physician assistant and physical therapy. - At 6 months you are able to do all activities as tolerated but it may take you a full 9-12 months to fully recover from your surgery    Not all shoulder arthroscopies are the same. The specifics of your individual case will be discussed at length with you by Dr. Aldo Fletcher and his Physician Assistant. Jonathon Stewart  Surgical Coordinator  [address] [place] [address].  [address] [place] [address], [email or web address]  P: [phone]  F: [phone]

## 2019-11-05 NOTE — PROGRESS NOTES
1. Have you been to the ER, urgent care clinic since your last visit? Hospitalized since your last visit? No    2. Have you seen or consulted any other health care providers outside of the [address] [place] [address] since your last visit? Include any pap smears or colon screening.  No

## 2019-11-11 DIAGNOSIS — M67.921 TENDINOPATHY OF RIGHT BICEPS TENDON: ICD-10-CM

## 2019-11-11 DIAGNOSIS — Z01.818 PREOP EXAMINATION: Primary | ICD-10-CM

## 2019-12-03 ENCOUNTER — HOSPITAL ENCOUNTER (OUTPATIENT)
Dept: LAB | Age: 49
Discharge: HOME | End: 2019-12-03
Payer: OTHER GOVERNMENT

## 2019-12-03 DIAGNOSIS — Z01.818 PREOP EXAMINATION: ICD-10-CM

## 2019-12-03 LAB
ANION GAP SERPL CALC-SCNC: 3 MMOL/L (ref 3–18)
BASOPHILS # BLD: 0 K/UL (ref 0–0.1)
BASOPHILS NFR BLD: 0 % (ref 0–2)
BUN SERPL-MCNC: 17 MG/DL (ref 7–18)
BUN/CREAT SERPL: 15 (ref 12–20)
CALCIUM SERPL-MCNC: 9.2 MG/DL (ref 8.5–10.1)
CHLORIDE SERPL-SCNC: 106 MMOL/L (ref 100–111)
CO2 SERPL-SCNC: 31 MMOL/L (ref 21–32)
CREAT SERPL-MCNC: 1.13 MG/DL (ref 0.6–1.3)
DIFFERENTIAL METHOD BLD: ABNORMAL
EOSINOPHIL # BLD: 0.2 K/UL (ref 0–0.4)
EOSINOPHIL NFR BLD: 2 % (ref 0–5)
ERYTHROCYTE [DISTWIDTH] IN BLOOD BY AUTOMATED COUNT: 13.4 % (ref 11.6–14.5)
GLUCOSE SERPL-MCNC: 79 MG/DL (ref 74–99)
HCT VFR BLD AUTO: 45.5 % (ref 36–48)
HGB BLD-MCNC: 15.5 G/DL (ref 13–16)
LYMPHOCYTES # BLD: 3.1 K/UL (ref 0.9–3.6)
LYMPHOCYTES NFR BLD: 30 % (ref 21–52)
MCH RBC QN AUTO: 28.1 PG (ref 24–34)
MCHC RBC AUTO-ENTMCNC: 34.1 G/DL (ref 31–37)
MCV RBC AUTO: 82.4 FL (ref 74–97)
MONOCYTES # BLD: 1 K/UL (ref 0.05–1.2)
MONOCYTES NFR BLD: 10 % (ref 3–10)
NEUTS SEG # BLD: 6 K/UL (ref 1.8–8)
NEUTS SEG NFR BLD: 58 % (ref 40–73)
PLATELET # BLD AUTO: 242 K/UL (ref 135–420)
PMV BLD AUTO: 11.5 FL (ref 9.2–11.8)
POTASSIUM SERPL-SCNC: 4 MMOL/L (ref 3.5–5.5)
RBC # BLD AUTO: 5.52 M/UL (ref 4.7–5.5)
SODIUM SERPL-SCNC: 140 MMOL/L (ref 136–145)
WBC # BLD AUTO: 10.3 K/UL (ref 4.6–13.2)

## 2019-12-03 PROCEDURE — 85025 COMPLETE CBC W/AUTO DIFF WBC: CPT

## 2019-12-03 PROCEDURE — 36415 COLL VENOUS BLD VENIPUNCTURE: CPT

## 2019-12-03 PROCEDURE — 80048 BASIC METABOLIC PNL TOTAL CA: CPT

## 2019-12-03 PROCEDURE — 93005 ELECTROCARDIOGRAM TRACING: CPT

## 2019-12-04 LAB
ATRIAL RATE: 79 BPM
CALCULATED P AXIS, ECG09: 34 DEGREES
CALCULATED R AXIS, ECG10: 21 DEGREES
CALCULATED T AXIS, ECG11: 8 DEGREES
DIAGNOSIS, 93000: NORMAL
P-R INTERVAL, ECG05: 168 MS
Q-T INTERVAL, ECG07: 390 MS
QRS DURATION, ECG06: 92 MS
QTC CALCULATION (BEZET), ECG08: 447 MS
VENTRICULAR RATE, ECG03: 79 BPM

## 2019-12-13 ENCOUNTER — OFFICE VISIT (OUTPATIENT)
Dept: ORTHOPEDIC SURGERY | Age: 49
End: 2019-12-13

## 2019-12-13 VITALS
WEIGHT: 285 LBS | SYSTOLIC BLOOD PRESSURE: 136 MMHG | RESPIRATION RATE: 16 BRPM | DIASTOLIC BLOOD PRESSURE: 85 MMHG | HEIGHT: 67 IN | TEMPERATURE: 98 F | HEART RATE: 83 BPM | BODY MASS INDEX: 44.73 KG/M2 | OXYGEN SATURATION: 98 %

## 2019-12-13 DIAGNOSIS — M67.921 TENDINOPATHY OF RIGHT BICEPS TENDON: Primary | ICD-10-CM

## 2019-12-13 RX ORDER — ACETAMINOPHEN 325 MG/1
1000 TABLET ORAL ONCE
Status: CANCELLED | OUTPATIENT
Start: 2019-12-13 | End: 2019-12-13

## 2019-12-13 RX ORDER — OXYCODONE HYDROCHLORIDE 5 MG/1
5 TABLET ORAL
Qty: 40 TAB | Refills: 0 | Status: SHIPPED | OUTPATIENT
Start: 2019-12-13 | End: 2019-12-20

## 2019-12-13 RX ORDER — CELECOXIB 100 MG/1
400 CAPSULE ORAL ONCE
Status: CANCELLED | OUTPATIENT
Start: 2019-12-13 | End: 2019-12-13

## 2019-12-13 NOTE — H&P (VIEW-ONLY)
HISTORY AND PHYSICAL Patient: Dede Wallace                MRN: [id number]       SSN: [id number] YOB: 1970          AGE: 54 y.o. SEX: male Patient scheduled for:  Right shoulder arthroscopic biceps tenodesis Surgeon: Megan Talamantes MD 
 
ANESTHESIA TYPE:  General 
 
HISTORY:  
 
The patient was seen in the office today for a preoperative history and physical for an upcoming above listed surgery. The patient is a pleasant 54 y.o. male who has a history of right shoulder pain . He rates his pain 2/10 today. Pain has been present since the end of May. He states he went to catch something that was falling out of his truck and felt a pop in his shoulder. Pt went to the ER and they gave him a sling. Pt has been to PT which provided good relief. Pain wakes patient up at night intermittently. Pt works at [employer]. Pt notes the pain has slightly improved since the initial incident. Patient describes the pain as aching that is Constant in nature. Symptoms are worse with straightening, stretching, bending, Activity and is better with  Exercises. Associated symptoms include nothing. Since problem started, it: has slightly improved. Pain does wake patient up at night. Has taken no meds for the problem. Has tried following treatments: Injections:NO; Brace:NO; Therapy:YES; Cane/Crutch:NO Due to the current findings, affected activity of daily living and continued pain and discomfort, surgical intervention is indicated.  The alternatives, risks, and complications, including but not limited to infection, blood loss, need for blood transfusion, neurovascular damage, sylvia-incisional numbness, subcutaneous hematoma, bone fracture, anesthetic complications, DVT, PE, death, RSD, postoperative stiffness and pain, possible surgical scar, delayed healing and nonhealing, reflexive sympathetic dystrophy, damage to blood vessels and nerves, need for more surgery, MI, and stroke,  failure of hardware, gait disturbances,have been discussed. The patient understands and wishes to proceed with surgery. PAST MEDICAL HISTORY:  
 
Past Medical History:  
Diagnosis Date  Adverse effect of anesthesia   
 for back sx-had issues with breathing  with apnea- 6 years ago  Asthma  Chest pain, unspecified ATYPICAL,POSSIBLE ANGINA,MUSCULAR,CAD,CHEST WALL PAINS PERSISTANT AND RECURRENT  Chronic kidney disease  Diabetes mellitus ([address]  DM (diabetes mellitus) [facility] [address]  GERD (gastroesophageal reflux disease)  HTN (hypertension)  Hypercholesteremia  Hypertension  Kidney stones  Sleep apnea   
 cpap CURRENT MEDICATIONS:  
 
Current Outpatient Medications Medication Sig Dispense Refill  oxyCODONE IR (ROXICODONE) 5 mg immediate release tablet Take 1 Tab by mouth every four (4) hours as needed for Pain for up to 7 days. Max Daily Amount: 30 mg. DO NOT TAKE UNTIL AFTER SURGERY (for acute post operative pain) 40 Tab 0  
 ondansetron (ZOFRAN ODT) 4 mg disintegrating tablet 1 Tab by SubLINGual route every eight (8) hours as needed for Nausea. 10 Tab 0  pregabalin (LYRICA) 100 mg capsule Take 1 Cap by mouth two (2) times a day. Max Daily Amount: 200 mg. Indications: disorder characterized by stiff, tender & painful muscles 180 Cap 1  
 metaxalone (SKELAXIN) 800 mg tablet 1 po BID-TID prn  Indications: muscle spasm 180 Tab 1  
 amLODIPine (NORVASC) 5 mg tablet Take 5 mg by mouth daily.  benazepril (LOTENSIN) 20 mg tablet Take 20 mg by mouth daily.  traMADol (ULTRAM) 50 mg tablet 1 po bid prn severe pain 60 Tab 1  
 hydroCHLOROthiazide (MICROZIDE) 12.5 mg capsule  pravastatin (PRAVACHOL) 20 mg tablet  cpap machine kit by Does Not Apply route.  Potassium Citrate 15 mEq TbER Take  by mouth two (2) times a day.  tamsulosin (FLOMAX) 0.4 mg capsule Take 1 Cap by mouth daily (after dinner).  30 Cap 0  
  carvedilol (COREG CR) 10 mg CR capsule Take  by mouth daily (with breakfast).  montelukast (SINGULAIR) 10 mg tablet Take 10 mg by mouth daily.  insulin glargine (LANTUS SOLOSTAR) 100 unit/mL (3 mL) pen by SubCUTAneous route.  insulin aspart (NOVOLOG) 100 unit/mL inpn by SubCUTAneous route.  magnesium citrate 100 mg tab Take 300 mg by mouth daily.  aspirin 81 mg chewable tablet Take 81 mg by mouth daily.  fluticasone-salmeterol (ADVAIR DISKUS) 250-50 mcg/dose diskus inhaler Take 1 Puff by inhalation every twelve (12) hours.  Dexlansoprazole (DEXILANT) 60 mg CpDM Take 60 mg by mouth daily.  albuterol (VENTOLIN HFA) 90 mcg/actuation inhaler Take  by inhalation every six (6) hours as needed.  Misc. Devices Kit Please provide the patient following size mask. Mask: Mirage FX large size mask. DME: [place], [place] Phone: [phone], Fax: [phone] 1 Each 0 ALLERGIES:  
 
No Known Allergies SURGICAL HISTORY:  
 
Past Surgical History:  
Procedure Laterality Date  HX BACK SURGERY    
 laminectomy L5-S1  
 HX BACK SURGERY    
 HX TONSILLECTOMY  HX UROLOGICAL  3/24/2016 [facility], Dr. Vipul Walls, Cystoscopy, Right Retrograde Ureteroscopy, Holmium Laser Lithotripsy, double j cath SOCIAL HISTORY:  
 
Social History Socioeconomic History  Marital status:  Spouse name: Not on file  Number of children: Not on file  Years of education: Not on file  Highest education level: Not on file Occupational History  Occupation:  Tobacco Use  Smoking status: Never Smoker  Smokeless tobacco: Never Used Substance and Sexual Activity  Alcohol use: No  
 Drug use: No  
Social History Narrative ** Merged History Encounter ** FAMILY HISTORY:  
 
Family History Problem Relation Age of Onset  Diabetes Mother  Hypertension Mother  Heart Disease Mother  Heart Attack Father MI in 45s  Stroke Father  Diabetes Father REVIEW OF SYSTEMS:  
 
Negative for fevers, chills, chest pain, shortness of breath, weight loss, recent illness General: Negative for fever and chills. No unexpected change in weight. Denies fatigue. No change in appetite. Skin: Negative for rash or itching. HEENT: Negative for congestion, sore throat, neck pain and neck stiffness. No change in vision or hearing. Hasn't noted any enlarged lymph nodes in the neck. Cardiovascular:  Negative for chest pain and palpitations. Has not noted pedal edema. Respiratory: Negative for cough, colds, sinus, hemoptysis, shortness of breath and wheezing. Gastrointestinal: Negative for nausea and vomiting, rectal bleeding, coffee ground emesis, abdominal pain, diarrhea and constipation. Genitourinary: Negative for dysuria, frequency urgency, or burning on micturition. No flank pain, no foul smelling urine, no difficulty with initiating urination. Hematological: Negative for bleeding or easy bruising. Musculoskeletal: Negative  for arthralgias, back pain or neck pain. Neurological: Negative for dizziness, seizures or syncopal episodes. Denies headaches. Endocrine: Denies excessive thirst.  No heat/cold intolerance. Psychiatric: Negative for depression or insomnia. PHYSICAL EXAMINATION:  
 
VITALS:  
Visit Vitals /85 Pulse 83 Temp 98 °F (36.7 °C) (Oral) Resp 16 Ht 5' 7\" (1.702 m) Wt 285 lb (129.3 kg) SpO2 98% BMI 44.64 kg/m² GEN:  Well developed, well nourished 54 y.o. male in no acute distress. HEENT: Normocephalic and atraumatic. Eyes: Conjunctivae and EOM are normal.Pupils are equal, round, and reactive to light. External ear normal appearance, external nose normal appearing. Mouth/Throat: Oropharynx is clear and moist, able to handle oral secretions w/out difficulty, airway patent NECK: Supple. Normal ROM, No lymphadenopathy. Trachea is midline. No bruising, swelling or deformity RESP: Clear to auscultation bilaterally. No wheezes, rales, rhonchi. Normal effort and breath sounds. No respiratory distress CARDIO:  Normal rate, regular rhythm and normal heart sounds. No MGR. ABDOMEN: Soft, non-tender, non-distended, normoactive bowel sounds in all four quadrants. There is no tenderness. There is no rebound and no guarding. BACK: No CVA or spinal tenderness BREAST:  Deferred PELVIC:    Deferred RECTAL:  Deferred :           Deferred EXTREMITIES: EXAMINATION OF: right shoulder Examination Right shoulder Skin Intact AC joint tenderness - Biceps tenderness + Forward flexion/Elevation  Active abduction  Glenohumeral abduction 90 External rotation ROM 45 Internal rotation ROM 30 Apprehension -  
Shellys Relocation -  
Jerk - Load and Shift -  
Mejia Neer - Speeds - Impingement sign + Supraspinatus/Empty Can + External Rotation Strength -, 5/5 Lift Off/Belly Press -, 5/5 Neurovascular Intact NEUROVASCULAR: Sensation intact to light touch and strength grossly intact and symmetrical. No nystagmus. Positive distal pulses and capillary refill. DVT ASSESSMENT:  There is not  calf tenderness. No evidence of DVT seen on physical exam. 
MOTOR: In tact PSYCH: Alert an oriented to person, place and time. Mood, memory, affect, behavior and judgment normal 
  
 
RADIOGRAPHS & DIAGNOSTIC STUDIES:  
 
MRI/xray reveals : IMAGING: MRI of right shoulder dated 9/18/19 was reviewed and read by Dr. Charles Vera: IMPRESSION: 
1. Rotator cuff tendinosis with moderate bursal surface fraying in the 
infraspinatus tendon. No focal tear or muscular atrophy. Subacromial bursitis 
present. 2. Complex degenerative tearing or maceration of the superior labrum with likely 
torn biceps anchor, attenuated biceps tendon in the bicipital groove. 3. Type II acromion. Hypertrophy and inflammation at the [facility] joint. LABS:  
 
 
@ 
CBC:  
Lab Results Component Value Date/Time WBC 10.3 12/03/2019 04:01 PM  
 RBC 5.52 (H) 12/03/2019 04:01 PM  
 HGB 15.5 12/03/2019 04:01 PM  
 HCT 45.5 12/03/2019 04:01 PM  
 PLATELET 450 25/19/5342 04:01 PM  
 and BMP:  
Lab Results Component Value Date/Time Glucose 79 12/03/2019 04:01 PM  
 Sodium 140 12/03/2019 04:01 PM  
 Potassium 4.0 12/03/2019 04:01 PM  
 Chloride 106 12/03/2019 04:01 PM  
 CO2 31 12/03/2019 04:01 PM  
 BUN 17 12/03/2019 04:01 PM  
 Creatinine 1.13 12/03/2019 04:01 PM  
 Calcium 9.2 12/03/2019 04:01 PM  
@ Preoperative labs were reviewed and are substantially within normal limits EKG: Normal sinus rhythm Normal ECG When compared with ECG of 21-AUG-2019 08:01, No significant change was found Confirmed by Benigno Conde (David) on 12/4/2019 1:26:36 PM  
 
 
ASSESSMENT:  
 
 
Encounter Diagnosis Name Primary?  Tendinopathy of right biceps tendon Yes PLAN:  
 
Again, the alternatives, risks, and complications, as well as expected outcome were discussed. The patient understands and agrees to proceed with right shoulder arthroscopic biceps tenodesis. Patient given orders listed below: 
 
Orders Placed This Encounter  oxyCODONE IR (ROXICODONE) 5 mg immediate release tablet Pedro Pablo Flood PA-C 
12/13/2019 
2:24 PM

## 2019-12-13 NOTE — PATIENT INSTRUCTIONS
Dr. Golden Castilol Repair    What is the surgery? - This is an outpatient procedure at either [facility] or [address] [place] [address] Cristobal Uribe will be completely asleep for the procedure. Dr. Tremayne Herrera will make somewhere between 2-5 small incisions in your shoulder depending on the amount of work to be completed. He will take a tour of your shoulder with the camera and fix everything that needs to be fixed during your surgery. - Total surgery takes about 45 mins to an hour and half depending on how much needed to be repaired    What can you expect after surgery? - You will have a bulky dressing on your shoulder that you can remove 2 days after surgery. You will be able to shower 2 days after surgery but no soaking in a bath, hot tub, ocean or pool x 2 weeks to allow for full wound healing  - You will be in a sling for 5-6 weeks depending on your repair. You will wear this sling whenever you are active, up moving around, and sleeping at night. This sling is to keep you from moving your arm on your own. You are essentially one armed until you are out of your sling. This means no reaching, pulling, grabbing or lifting with the operative arm.   - Dr. Tremayne Herrera will start physical therapy for you the first business day after surgery. While you cannot move your arm we allow physical therapy to gently move your shoulder. We call this passive range of motion. The goal of this is to decrease your stiffness and in turn decrease your post operative pain. - Plan on being in physical therapy for 10-12 weeks    When can I return to work? - Most patients return to desk work only after 2 weeks. You are able to type but there is no overhead work or lifting  - You will start some gentle lifting up to 5-10lbs at about 8-10 weeks post operatively.  You will gradually increase how much you are able to lift after this point under the guidance of Dr. Tremayne Herrera, his physician assistant and physical therapy. - At 6 months you are able to do all activities as tolerated but it may take you a full 9-12 months to fully recover from your surgery    Not all shoulder arthroscopies are the same. The specifics of your individual case will be discussed at length with you by Dr. Laina Meier and his Physician Assistant. Alpa Esteban  Surgical Coordinator  [address] [place] [address] [email or web address]  P: [phone]  F: [phone]

## 2019-12-13 NOTE — PROGRESS NOTES
1. Have you been to the ER, urgent care clinic since your last visit? Hospitalized since your last visit? No    2. Have you seen or consulted any other health care providers outside of the [address] [place] [address] since your last visit? Include any pap smears or colon screening.  No

## 2019-12-13 NOTE — H&P
HISTORY AND PHYSICAL          Patient: Magalie Dickey                MRN: [id number]       SSN: [id number]  YOB: 1970          AGE: 54 y.o. SEX: male      Patient scheduled for:  Right shoulder arthroscopic biceps tenodesis    Surgeon: Sánchez Chopra MD    ANESTHESIA TYPE:  General    HISTORY:     The patient was seen in the office today for a preoperative history and physical for an upcoming above listed surgery. The patient is a pleasant 54 y.o. male who has a history of right shoulder pain . He rates his pain 2/10 today. Pain has been present since the end of May. He states he went to catch something that was falling out of his truck and felt a pop in his shoulder. Pt went to the ER and they gave him a sling. Pt has been to PT which provided good relief. Pain wakes patient up at night intermittently. Pt works at [employer]. Pt notes the pain has slightly improved since the initial incident. Patient describes the pain as aching that is Constant in nature. Symptoms are worse with straightening, stretching, bending, Activity and is better with  Exercises. Associated symptoms include nothing. Since problem started, it: has slightly improved. Pain does wake patient up at night. Has taken no meds for the problem. Has tried following treatments: Injections:NO; Brace:NO; Therapy:YES; Cane/Crutch:NO   Due to the current findings, affected activity of daily living and continued pain and discomfort, surgical intervention is indicated.  The alternatives, risks, and complications, including but not limited to infection, blood loss, need for blood transfusion, neurovascular damage, sylvia-incisional numbness, subcutaneous hematoma, bone fracture, anesthetic complications, DVT, PE, death, RSD, postoperative stiffness and pain, possible surgical scar, delayed healing and nonhealing, reflexive sympathetic dystrophy, damage to blood vessels and nerves, need for more surgery, MI, and stroke, failure of hardware, gait disturbances,have been discussed. The patient understands and wishes to proceed with surgery. PAST MEDICAL HISTORY:     Past Medical History:   Diagnosis Date    Adverse effect of anesthesia     for back sx-had issues with breathing  with apnea- 6 years ago    Asthma     Chest pain, unspecified     ATYPICAL,POSSIBLE ANGINA,MUSCULAR,CAD,CHEST WALL PAINS PERSISTANT AND RECURRENT    Chronic kidney disease     Diabetes mellitus ([address]     DM (diabetes mellitus) [facility] [address]     GERD (gastroesophageal reflux disease)     HTN (hypertension)     Hypercholesteremia     Hypertension     Kidney stones     Sleep apnea     cpap       CURRENT MEDICATIONS:     Current Outpatient Medications   Medication Sig Dispense Refill    oxyCODONE IR (ROXICODONE) 5 mg immediate release tablet Take 1 Tab by mouth every four (4) hours as needed for Pain for up to 7 days. Max Daily Amount: 30 mg. DO NOT TAKE UNTIL AFTER SURGERY (for acute post operative pain) 40 Tab 0    ondansetron (ZOFRAN ODT) 4 mg disintegrating tablet 1 Tab by SubLINGual route every eight (8) hours as needed for Nausea. 10 Tab 0    pregabalin (LYRICA) 100 mg capsule Take 1 Cap by mouth two (2) times a day. Max Daily Amount: 200 mg. Indications: disorder characterized by stiff, tender & painful muscles 180 Cap 1    metaxalone (SKELAXIN) 800 mg tablet 1 po BID-TID prn  Indications: muscle spasm 180 Tab 1    amLODIPine (NORVASC) 5 mg tablet Take 5 mg by mouth daily.  benazepril (LOTENSIN) 20 mg tablet Take 20 mg by mouth daily.  traMADol (ULTRAM) 50 mg tablet 1 po bid prn severe pain 60 Tab 1    hydroCHLOROthiazide (MICROZIDE) 12.5 mg capsule       pravastatin (PRAVACHOL) 20 mg tablet       cpap machine kit by Does Not Apply route.  Potassium Citrate 15 mEq TbER Take  by mouth two (2) times a day.  tamsulosin (FLOMAX) 0.4 mg capsule Take 1 Cap by mouth daily (after dinner).  30 Cap 0    carvedilol (COREG CR) 10 mg CR capsule Take  by mouth daily (with breakfast).  montelukast (SINGULAIR) 10 mg tablet Take 10 mg by mouth daily.  insulin glargine (LANTUS SOLOSTAR) 100 unit/mL (3 mL) pen by SubCUTAneous route.  insulin aspart (NOVOLOG) 100 unit/mL inpn by SubCUTAneous route.  magnesium citrate 100 mg tab Take 300 mg by mouth daily.  aspirin 81 mg chewable tablet Take 81 mg by mouth daily.  fluticasone-salmeterol (ADVAIR DISKUS) 250-50 mcg/dose diskus inhaler Take 1 Puff by inhalation every twelve (12) hours.  Dexlansoprazole (DEXILANT) 60 mg CpDM Take 60 mg by mouth daily.  albuterol (VENTOLIN HFA) 90 mcg/actuation inhaler Take  by inhalation every six (6) hours as needed.  Misc. Devices Kit Please provide the patient following size mask. Mask: Mirage FX large size mask.   DME: [place], [place]  Phone: [phone], Fax: [phone] Each 0       ALLERGIES:     No Known Allergies      SURGICAL HISTORY:     Past Surgical History:   Procedure Laterality Date    HX BACK SURGERY      laminectomy L5-S1    HX BACK SURGERY      HX TONSILLECTOMY      HX UROLOGICAL  3/24/2016    [facility], Dr. Cornelia Copeland, Cystoscopy, Right Retrograde Ureteroscopy, Holmium Laser Lithotripsy, double j cath       SOCIAL HISTORY:     Social History     Socioeconomic History    Marital status:      Spouse name: Not on file    Number of children: Not on file    Years of education: Not on file    Highest education level: Not on file   Occupational History    Occupation:    Tobacco Use    Smoking status: Never Smoker    Smokeless tobacco: Never Used   Substance and Sexual Activity    Alcohol use: No    Drug use: No   Social History Narrative    ** Merged History Encounter **            FAMILY HISTORY:     Family History   Problem Relation Age of Onset    Diabetes Mother     Hypertension Mother     Heart Disease Mother     Heart Attack Father         MI in 45s    Stroke Father     Diabetes Father        REVIEW OF SYSTEMS:     Negative for fevers, chills, chest pain, shortness of breath, weight loss, recent illness     General: Negative for fever and chills. No unexpected change in weight. Denies fatigue. No change in appetite. Skin: Negative for rash or itching. HEENT: Negative for congestion, sore throat, neck pain and neck stiffness. No change in vision or hearing. Hasn't noted any enlarged lymph nodes in the neck. Cardiovascular:  Negative for chest pain and palpitations. Has not noted pedal edema. Respiratory: Negative for cough, colds, sinus, hemoptysis, shortness of breath and wheezing. Gastrointestinal: Negative for nausea and vomiting, rectal bleeding, coffee ground emesis, abdominal pain, diarrhea and constipation. Genitourinary: Negative for dysuria, frequency urgency, or burning on micturition. No flank pain, no foul smelling urine, no difficulty with initiating urination. Hematological: Negative for bleeding or easy bruising. Musculoskeletal: Negative  for arthralgias, back pain or neck pain. Neurological: Negative for dizziness, seizures or syncopal episodes. Denies headaches. Endocrine: Denies excessive thirst.  No heat/cold intolerance. Psychiatric: Negative for depression or insomnia. PHYSICAL EXAMINATION:     VITALS:   Visit Vitals  /85   Pulse 83   Temp 98 °F (36.7 °C) (Oral)   Resp 16   Ht 5' 7\" (1.702 m)   Wt 285 lb (129.3 kg)   SpO2 98%   BMI 44.64 kg/m²     GEN:  Well developed, well nourished 54 y.o. male in no acute distress. HEENT: Normocephalic and atraumatic. Eyes: Conjunctivae and EOM are normal.Pupils are equal, round, and reactive to light. External ear normal appearance, external nose normal appearing. Mouth/Throat: Oropharynx is clear and moist, able to handle oral secretions w/out difficulty, airway patent  NECK: Supple. Normal ROM, No lymphadenopathy. Trachea is midline.  No bruising, swelling or deformity  RESP: Clear to auscultation bilaterally. No wheezes, rales, rhonchi. Normal effort and breath sounds. No respiratory distress  CARDIO:  Normal rate, regular rhythm and normal heart sounds. No MGR. ABDOMEN: Soft, non-tender, non-distended, normoactive bowel sounds in all four quadrants. There is no tenderness. There is no rebound and no guarding. BACK: No CVA or spinal tenderness  BREAST:  Deferred  PELVIC:    Deferred   RECTAL:  Deferred   :           Deferred  EXTREMITIES: EXAMINATION OF: right shoulder  Examination Right shoulder   Skin Intact   AC joint tenderness -   Biceps tenderness +   Forward flexion/Elevation    Active abduction    Glenohumeral abduction 90   External rotation ROM 45   Internal rotation ROM 30   Apprehension -   Shellys Relocation -   Jerk -   Load and Shift -   Obriens -   Speeds -   Impingement sign +   Supraspinatus/Empty Can +   External Rotation Strength -, 5/5   Lift Off/Belly Press -, 5/5   Neurovascular Intact       NEUROVASCULAR: Sensation intact to light touch and strength grossly intact and symmetrical. No nystagmus. Positive distal pulses and capillary refill. DVT ASSESSMENT:  There is not  calf tenderness. No evidence of DVT seen on physical exam.  MOTOR: In tact  PSYCH: Alert an oriented to person, place and time. Mood, memory, affect, behavior and judgment normal       RADIOGRAPHS & DIAGNOSTIC STUDIES:     MRI/xray reveals : IMAGING: MRI of right shoulder dated 9/18/19 was reviewed and read by Dr. Yuriy Woodward: IMPRESSION:  1. Rotator cuff tendinosis with moderate bursal surface fraying in the  infraspinatus tendon. No focal tear or muscular atrophy. Subacromial bursitis  present. 2. Complex degenerative tearing or maceration of the superior labrum with likely  torn biceps anchor, attenuated biceps tendon in the bicipital groove. 3. Type II acromion. Hypertrophy and inflammation at the [facility] joint.         LABS:       @  CBC:   Lab Results   Component Value Date/Time WBC 10.3 12/03/2019 04:01 PM    RBC 5.52 (H) 12/03/2019 04:01 PM    HGB 15.5 12/03/2019 04:01 PM    HCT 45.5 12/03/2019 04:01 PM    PLATELET 860 94/07/3334 04:01 PM    and BMP:   Lab Results   Component Value Date/Time    Glucose 79 12/03/2019 04:01 PM    Sodium 140 12/03/2019 04:01 PM    Potassium 4.0 12/03/2019 04:01 PM    Chloride 106 12/03/2019 04:01 PM    CO2 31 12/03/2019 04:01 PM    BUN 17 12/03/2019 04:01 PM    Creatinine 1.13 12/03/2019 04:01 PM    Calcium 9.2 12/03/2019 04:01 PM   @    Preoperative labs were reviewed and are substantially within normal limits   EKG: Normal sinus rhythm   Normal ECG   When compared with ECG of 21-AUG-2019 08:01,   No significant change was found   Confirmed by Felice Moreno ([id number]) on 12/4/2019 1:26:36 PM       ASSESSMENT:       Encounter Diagnosis   Name Primary?  Tendinopathy of right biceps tendon Yes       PLAN:     Again, the alternatives, risks, and complications, as well as expected outcome were discussed. The patient understands and agrees to proceed with right shoulder arthroscopic biceps tenodesis.  Patient given orders listed below:    Orders Placed This Encounter    oxyCODONE IR (ROXICODONE) 5 mg immediate release tablet         Angela Loving PA-C  12/13/2019  2:24 PM

## 2019-12-19 ENCOUNTER — ANESTHESIA EVENT (OUTPATIENT)
Dept: SURGERY | Age: 49
End: 2019-12-19
Payer: OTHER GOVERNMENT

## 2019-12-20 ENCOUNTER — ANESTHESIA (OUTPATIENT)
Dept: SURGERY | Age: 49
End: 2019-12-20
Payer: OTHER GOVERNMENT

## 2019-12-20 ENCOUNTER — HOSPITAL ENCOUNTER (OUTPATIENT)
Age: 49
Setting detail: OUTPATIENT SURGERY
Discharge: HOME | End: 2019-12-20
Attending: ORTHOPAEDIC SURGERY | Admitting: ORTHOPAEDIC SURGERY
Payer: OTHER GOVERNMENT

## 2019-12-20 VITALS
RESPIRATION RATE: 20 BRPM | HEIGHT: 67 IN | SYSTOLIC BLOOD PRESSURE: 126 MMHG | BODY MASS INDEX: 44.2 KG/M2 | OXYGEN SATURATION: 92 % | TEMPERATURE: 95.7 F | HEART RATE: 84 BPM | WEIGHT: 281.6 LBS | DIASTOLIC BLOOD PRESSURE: 71 MMHG

## 2019-12-20 LAB
GLUCOSE BLD STRIP.AUTO-MCNC: 131 MG/DL (ref 70–110)
GLUCOSE BLD STRIP.AUTO-MCNC: 137 MG/DL (ref 70–110)

## 2019-12-20 PROCEDURE — 76210000006 HC OR PH I REC 0.5 TO 1 HR: Performed by: ORTHOPAEDIC SURGERY

## 2019-12-20 PROCEDURE — 77030002966 HC SUT PDS J&J -A: Performed by: ORTHOPAEDIC SURGERY

## 2019-12-20 PROCEDURE — 76210000020 HC REC RM PH II FIRST 0.5 HR: Performed by: ORTHOPAEDIC SURGERY

## 2019-12-20 PROCEDURE — 77030039267 HC ADH SKN EXOFIN S2SG -B: Performed by: ORTHOPAEDIC SURGERY

## 2019-12-20 PROCEDURE — 76060000034 HC ANESTHESIA 1.5 TO 2 HR: Performed by: ORTHOPAEDIC SURGERY

## 2019-12-20 PROCEDURE — 77030018836 HC SOL IRR NACL ICUM -A: Performed by: ORTHOPAEDIC SURGERY

## 2019-12-20 PROCEDURE — 77030003598 HC NDL MULT/FIRE ARTH -C: Performed by: ORTHOPAEDIC SURGERY

## 2019-12-20 PROCEDURE — 74011000258 HC RX REV CODE- 258: Performed by: ORTHOPAEDIC SURGERY

## 2019-12-20 PROCEDURE — 77030008683 HC TU ET CUF COVD -A: Performed by: NURSE ANESTHETIST, CERTIFIED REGISTERED

## 2019-12-20 PROCEDURE — 77030017964 HC PRB COAG4 STRY -C: Performed by: ORTHOPAEDIC SURGERY

## 2019-12-20 PROCEDURE — 77030010427: Performed by: ORTHOPAEDIC SURGERY

## 2019-12-20 PROCEDURE — 77030002922 HC SUT FBRWRE ARTH -B: Performed by: ORTHOPAEDIC SURGERY

## 2019-12-20 PROCEDURE — 74011250637 HC RX REV CODE- 250/637: Performed by: NURSE ANESTHETIST, CERTIFIED REGISTERED

## 2019-12-20 PROCEDURE — 74011250636 HC RX REV CODE- 250/636: Performed by: PHYSICIAN ASSISTANT

## 2019-12-20 PROCEDURE — 74011250636 HC RX REV CODE- 250/636: Performed by: ORTHOPAEDIC SURGERY

## 2019-12-20 PROCEDURE — 77030040922 HC BLNKT HYPOTHRM STRY -A: Performed by: ORTHOPAEDIC SURGERY

## 2019-12-20 PROCEDURE — 74011000250 HC RX REV CODE- 250: Performed by: NURSE ANESTHETIST, CERTIFIED REGISTERED

## 2019-12-20 PROCEDURE — 77030013079 HC BLNKT BAIR HGGR 3M -A: Performed by: NURSE ANESTHETIST, CERTIFIED REGISTERED

## 2019-12-20 PROCEDURE — 76010000153 HC OR TIME 1.5 TO 2 HR: Performed by: ORTHOPAEDIC SURGERY

## 2019-12-20 PROCEDURE — 77030002919 HC SUT FBRTAPE ARTH -B: Performed by: ORTHOPAEDIC SURGERY

## 2019-12-20 PROCEDURE — 74011250637 HC RX REV CODE- 250/637: Performed by: PHYSICIAN ASSISTANT

## 2019-12-20 PROCEDURE — C1713 ANCHOR/SCREW BN/BN,TIS/BN: HCPCS | Performed by: ORTHOPAEDIC SURGERY

## 2019-12-20 PROCEDURE — 77030006891 HC BLD SHV RESECT STRY -B: Performed by: ORTHOPAEDIC SURGERY

## 2019-12-20 PROCEDURE — L3650 SO 8 ABD RESTRAINT PRE OTS: HCPCS | Performed by: ORTHOPAEDIC SURGERY

## 2019-12-20 PROCEDURE — 77030040361 HC SLV COMPR DVT MDII -B: Performed by: ORTHOPAEDIC SURGERY

## 2019-12-20 PROCEDURE — 77030003666 HC NDL SPINAL BD -A: Performed by: ORTHOPAEDIC SURGERY

## 2019-12-20 PROCEDURE — 74011250636 HC RX REV CODE- 250/636: Performed by: NURSE ANESTHETIST, CERTIFIED REGISTERED

## 2019-12-20 PROCEDURE — 77030004453 HC BUR SHV STRY -B: Performed by: ORTHOPAEDIC SURGERY

## 2019-12-20 PROCEDURE — 77030031139 HC SUT VCRL2 J&J -A: Performed by: ORTHOPAEDIC SURGERY

## 2019-12-20 PROCEDURE — 77030002933 HC SUT MCRYL J&J -A: Performed by: ORTHOPAEDIC SURGERY

## 2019-12-20 PROCEDURE — 77030026438 HC STYL ET INTUB CARD -A: Performed by: NURSE ANESTHETIST, CERTIFIED REGISTERED

## 2019-12-20 PROCEDURE — 77030008574 HC TBNG SUC IRR STRY -B: Performed by: ORTHOPAEDIC SURGERY

## 2019-12-20 PROCEDURE — 77030019605: Performed by: ORTHOPAEDIC SURGERY

## 2019-12-20 PROCEDURE — 64415 NJX AA&/STRD BRCH PLXS IMG: CPT | Performed by: ANESTHESIOLOGY

## 2019-12-20 PROCEDURE — 82962 GLUCOSE BLOOD TEST: CPT

## 2019-12-20 PROCEDURE — 76942 ECHO GUIDE FOR BIOPSY: CPT | Performed by: ANESTHESIOLOGY

## 2019-12-20 DEVICE — ANCHOR SUT L19.1MM DIA4.75MM BIOCOMPOSITE FULL THRD: Type: IMPLANTABLE DEVICE | Site: SHOULDER | Status: FUNCTIONAL

## 2019-12-20 DEVICE — ANCHOR SUTURE BIOCOMP 4.75X19.1 MM SWIVELOCK C: Type: IMPLANTABLE DEVICE | Site: SHOULDER | Status: FUNCTIONAL

## 2019-12-20 RX ORDER — SUCCINYLCHOLINE CHLORIDE 20 MG/ML
INJECTION INTRAMUSCULAR; INTRAVENOUS AS NEEDED
Status: DISCONTINUED | OUTPATIENT
Start: 2019-12-20 | End: 2019-12-20 | Stop reason: HOSPADM

## 2019-12-20 RX ORDER — LIDOCAINE HYDROCHLORIDE 20 MG/ML
INJECTION, SOLUTION EPIDURAL; INFILTRATION; INTRACAUDAL; PERINEURAL AS NEEDED
Status: DISCONTINUED | OUTPATIENT
Start: 2019-12-20 | End: 2019-12-20 | Stop reason: HOSPADM

## 2019-12-20 RX ORDER — HYDROMORPHONE HYDROCHLORIDE 2 MG/ML
0.5 INJECTION, SOLUTION INTRAMUSCULAR; INTRAVENOUS; SUBCUTANEOUS
Status: DISCONTINUED | OUTPATIENT
Start: 2019-12-20 | End: 2019-12-20 | Stop reason: HOSPADM

## 2019-12-20 RX ORDER — CEFAZOLIN SODIUM 2 G/50ML
2 SOLUTION INTRAVENOUS ONCE
Status: COMPLETED | OUTPATIENT
Start: 2019-12-20 | End: 2019-12-20

## 2019-12-20 RX ORDER — SODIUM CHLORIDE 0.9 % (FLUSH) 0.9 %
5-40 SYRINGE (ML) INJECTION EVERY 8 HOURS
Status: DISCONTINUED | OUTPATIENT
Start: 2019-12-20 | End: 2019-12-20 | Stop reason: HOSPADM

## 2019-12-20 RX ORDER — MIDAZOLAM HYDROCHLORIDE 1 MG/ML
2 INJECTION, SOLUTION INTRAMUSCULAR; INTRAVENOUS ONCE
Status: COMPLETED | OUTPATIENT
Start: 2019-12-20 | End: 2019-12-20

## 2019-12-20 RX ORDER — INSULIN LISPRO 100 [IU]/ML
INJECTION, SOLUTION INTRAVENOUS; SUBCUTANEOUS ONCE
Status: DISCONTINUED | OUTPATIENT
Start: 2019-12-20 | End: 2019-12-20 | Stop reason: HOSPADM

## 2019-12-20 RX ORDER — MIDAZOLAM HYDROCHLORIDE 1 MG/ML
INJECTION, SOLUTION INTRAMUSCULAR; INTRAVENOUS AS NEEDED
Status: DISCONTINUED | OUTPATIENT
Start: 2019-12-20 | End: 2019-12-20 | Stop reason: HOSPADM

## 2019-12-20 RX ORDER — LIDOCAINE HYDROCHLORIDE 10 MG/ML
3 INJECTION, SOLUTION EPIDURAL; INFILTRATION; INTRACAUDAL; PERINEURAL ONCE
Status: DISCONTINUED | OUTPATIENT
Start: 2019-12-20 | End: 2019-12-20 | Stop reason: HOSPADM

## 2019-12-20 RX ORDER — FENTANYL CITRATE 50 UG/ML
100 INJECTION, SOLUTION INTRAMUSCULAR; INTRAVENOUS ONCE
Status: COMPLETED | OUTPATIENT
Start: 2019-12-20 | End: 2019-12-20

## 2019-12-20 RX ORDER — FENTANYL CITRATE 50 UG/ML
INJECTION, SOLUTION INTRAMUSCULAR; INTRAVENOUS AS NEEDED
Status: DISCONTINUED | OUTPATIENT
Start: 2019-12-20 | End: 2019-12-20 | Stop reason: HOSPADM

## 2019-12-20 RX ORDER — MAGNESIUM SULFATE 100 %
4 CRYSTALS MISCELLANEOUS AS NEEDED
Status: DISCONTINUED | OUTPATIENT
Start: 2019-12-20 | End: 2019-12-20 | Stop reason: HOSPADM

## 2019-12-20 RX ORDER — DEXTROSE 50 % IN WATER (D50W) INTRAVENOUS SYRINGE
25-50 AS NEEDED
Status: DISCONTINUED | OUTPATIENT
Start: 2019-12-20 | End: 2019-12-20 | Stop reason: HOSPADM

## 2019-12-20 RX ORDER — DEXAMETHASONE SODIUM PHOSPHATE 4 MG/ML
INJECTION, SOLUTION INTRA-ARTICULAR; INTRALESIONAL; INTRAMUSCULAR; INTRAVENOUS; SOFT TISSUE AS NEEDED
Status: DISCONTINUED | OUTPATIENT
Start: 2019-12-20 | End: 2019-12-20 | Stop reason: HOSPADM

## 2019-12-20 RX ORDER — SODIUM CHLORIDE 0.9 % (FLUSH) 0.9 %
5-40 SYRINGE (ML) INJECTION AS NEEDED
Status: DISCONTINUED | OUTPATIENT
Start: 2019-12-20 | End: 2019-12-20 | Stop reason: HOSPADM

## 2019-12-20 RX ORDER — SODIUM CHLORIDE, SODIUM LACTATE, POTASSIUM CHLORIDE, CALCIUM CHLORIDE 600; 310; 30; 20 MG/100ML; MG/100ML; MG/100ML; MG/100ML
75 INJECTION, SOLUTION INTRAVENOUS CONTINUOUS
Status: DISCONTINUED | OUTPATIENT
Start: 2019-12-20 | End: 2019-12-20 | Stop reason: HOSPADM

## 2019-12-20 RX ORDER — DEXMEDETOMIDINE HYDROCHLORIDE 100 UG/ML
INJECTION, SOLUTION INTRAVENOUS AS NEEDED
Status: DISCONTINUED | OUTPATIENT
Start: 2019-12-20 | End: 2019-12-20 | Stop reason: HOSPADM

## 2019-12-20 RX ORDER — EPHEDRINE SULFATE/0.9% NACL/PF 50 MG/5 ML
SYRINGE (ML) INTRAVENOUS AS NEEDED
Status: DISCONTINUED | OUTPATIENT
Start: 2019-12-20 | End: 2019-12-20 | Stop reason: HOSPADM

## 2019-12-20 RX ORDER — FAMOTIDINE 20 MG/1
20 TABLET, FILM COATED ORAL ONCE
Status: COMPLETED | OUTPATIENT
Start: 2019-12-20 | End: 2019-12-20

## 2019-12-20 RX ORDER — CELECOXIB 400 MG/1
400 CAPSULE ORAL ONCE
Status: COMPLETED | OUTPATIENT
Start: 2019-12-20 | End: 2019-12-20

## 2019-12-20 RX ORDER — ACETAMINOPHEN 500 MG
1000 TABLET ORAL ONCE
Status: COMPLETED | OUTPATIENT
Start: 2019-12-20 | End: 2019-12-20

## 2019-12-20 RX ORDER — ONDANSETRON 2 MG/ML
INJECTION INTRAMUSCULAR; INTRAVENOUS AS NEEDED
Status: DISCONTINUED | OUTPATIENT
Start: 2019-12-20 | End: 2019-12-20 | Stop reason: HOSPADM

## 2019-12-20 RX ORDER — PROPOFOL 10 MG/ML
INJECTION, EMULSION INTRAVENOUS AS NEEDED
Status: DISCONTINUED | OUTPATIENT
Start: 2019-12-20 | End: 2019-12-20 | Stop reason: HOSPADM

## 2019-12-20 RX ORDER — ROPIVACAINE HYDROCHLORIDE 5 MG/ML
30 INJECTION, SOLUTION EPIDURAL; INFILTRATION; PERINEURAL
Status: COMPLETED | OUTPATIENT
Start: 2019-12-20 | End: 2019-12-20

## 2019-12-20 RX ORDER — ONDANSETRON 2 MG/ML
4 INJECTION INTRAMUSCULAR; INTRAVENOUS ONCE
Status: DISCONTINUED | OUTPATIENT
Start: 2019-12-20 | End: 2019-12-20 | Stop reason: HOSPADM

## 2019-12-20 RX ADMIN — MIDAZOLAM HYDROCHLORIDE 2 MG: 2 INJECTION, SOLUTION INTRAMUSCULAR; INTRAVENOUS at 07:35

## 2019-12-20 RX ADMIN — PROPOFOL 20 MG: 10 INJECTION, EMULSION INTRAVENOUS at 09:02

## 2019-12-20 RX ADMIN — MIDAZOLAM 2 MG: 1 INJECTION INTRAMUSCULAR; INTRAVENOUS at 07:15

## 2019-12-20 RX ADMIN — ROPIVACAINE HYDROCHLORIDE 150 MG: 5 INJECTION EPIDURAL; INFILTRATION; PERINEURAL at 07:17

## 2019-12-20 RX ADMIN — FAMOTIDINE 20 MG: 20 TABLET, FILM COATED ORAL at 06:38

## 2019-12-20 RX ADMIN — FENTANYL CITRATE 50 MCG: 50 INJECTION INTRAMUSCULAR; INTRAVENOUS at 07:34

## 2019-12-20 RX ADMIN — SODIUM CHLORIDE, SODIUM LACTATE, POTASSIUM CHLORIDE, AND CALCIUM CHLORIDE: 600; 310; 30; 20 INJECTION, SOLUTION INTRAVENOUS at 08:41

## 2019-12-20 RX ADMIN — ACETAMINOPHEN 1000 MG: 500 TABLET ORAL at 06:33

## 2019-12-20 RX ADMIN — FENTANYL CITRATE 50 MCG: 50 INJECTION INTRAMUSCULAR; INTRAVENOUS at 07:45

## 2019-12-20 RX ADMIN — ONDANSETRON 4 MG: 2 INJECTION INTRAMUSCULAR; INTRAVENOUS at 08:52

## 2019-12-20 RX ADMIN — FENTANYL CITRATE 100 MCG: 50 INJECTION, SOLUTION INTRAMUSCULAR; INTRAVENOUS at 07:15

## 2019-12-20 RX ADMIN — DEXMEDETOMIDINE 10 MCG: 100 INJECTION, SOLUTION, CONCENTRATE INTRAVENOUS at 07:34

## 2019-12-20 RX ADMIN — PROPOFOL 200 MG: 10 INJECTION, EMULSION INTRAVENOUS at 07:38

## 2019-12-20 RX ADMIN — CEFAZOLIN 2 G: 10 INJECTION, POWDER, FOR SOLUTION INTRAVENOUS at 07:50

## 2019-12-20 RX ADMIN — CELECOXIB 400 MG: 400 CAPSULE ORAL at 06:33

## 2019-12-20 RX ADMIN — DEXAMETHASONE SODIUM PHOSPHATE 4 MG: 4 INJECTION, SOLUTION INTRAMUSCULAR; INTRAVENOUS at 07:53

## 2019-12-20 RX ADMIN — LIDOCAINE HYDROCHLORIDE 100 MG: 20 INJECTION, SOLUTION EPIDURAL; INFILTRATION; INTRACAUDAL; PERINEURAL at 07:37

## 2019-12-20 RX ADMIN — SUCCINYLCHOLINE CHLORIDE 140 MG: 20 INJECTION, SOLUTION INTRAMUSCULAR; INTRAVENOUS at 07:38

## 2019-12-20 RX ADMIN — Medication 5 MG: at 08:05

## 2019-12-20 RX ADMIN — SODIUM CHLORIDE, SODIUM LACTATE, POTASSIUM CHLORIDE, AND CALCIUM CHLORIDE 75 ML/HR: 600; 310; 30; 20 INJECTION, SOLUTION INTRAVENOUS at 06:32

## 2019-12-20 NOTE — PROGRESS NOTES
Date of Surgery Update:  Dede Wallace was seen and examined. History and physical has been reviewed. The patient has been examined.  There have been no significant clinical changes since the completion of the originally dated History and Physical.    Signed By: Minerva Okeefe MD     December 20, 2019 7:23 AM

## 2019-12-20 NOTE — ANESTHESIA POSTPROCEDURE EVALUATION
Procedure(s):  RIGHT SHOULDER ARTHROSCOPIC SUBSCAPULARIS REPAIR, ROTATOR CUFF REPAIR, ACROMIOPLASTY. general    Anesthesia Post Evaluation      Multimodal analgesia: multimodal analgesia used between 6 hours prior to anesthesia start to PACU discharge  Patient location during evaluation: PACU  Level of consciousness: awake and alert  Pain score: 3  Airway patency: patent  Anesthetic complications: no  Cardiovascular status: acceptable  Respiratory status: acceptable  Hydration status: acceptable  Post anesthesia nausea and vomiting:  none      Vitals Value Taken Time   /79 12/20/2019 10:02 AM   Temp 36.5 °C (97.7 °F) 12/20/2019  9:22 AM   Pulse 90 12/20/2019 10:10 AM   Resp 29 12/20/2019 10:10 AM   SpO2 93 % 12/20/2019 10:10 AM   Vitals shown include unvalidated device data.

## 2019-12-20 NOTE — ANESTHESIA PREPROCEDURE EVALUATION
Relevant Problems   No relevant active problems       Anesthetic History   No history of anesthetic complications            Review of Systems / Medical History  Patient summary reviewed and pertinent labs reviewed    Pulmonary        Sleep apnea: CPAP    Asthma        Neuro/Psych   Within defined limits           Cardiovascular    Hypertension                   GI/Hepatic/Renal     GERD           Endo/Other    Diabetes: type 2, using insulin    Morbid obesity     Other Findings              Physical Exam    Airway  Mallampati: III  TM Distance: 4 - 6 cm  Neck ROM: decreased range of motion, short neck   Mouth opening: Diminished (comment)     Cardiovascular    Rhythm: regular  Rate: normal         Dental    Dentition: Poor dentition     Pulmonary  Breath sounds clear to auscultation               Abdominal  GI exam deferred       Other Findings            Anesthetic Plan    ASA: 3  Anesthesia type: general      Post-op pain plan if not by surgeon: peripheral nerve block single    Induction: Intravenous  Anesthetic plan and risks discussed with: Patient

## 2019-12-20 NOTE — ANESTHESIA PROCEDURE NOTES
Peripheral Block    Start time: 12/20/2019 7:14 AM  End time: 12/20/2019 7:24 AM  Performed by: Barbara Martin MD  Authorized by: Barbara Martin MD       Pre-procedure: Indications: at surgeon's request and post-op pain management    Preanesthetic Checklist: patient identified, risks and benefits discussed, site marked, timeout performed, anesthesia consent given and patient being monitored    Timeout Time: 07:14          Block Type:   Block Type:  Brachial plexus  Laterality:  Right    Assessment:    Injection Assessment:     Single Shot Nerve Block Procedure Note    Patient: Evelio Chase MRN: [id number]  SSN: [id number]   YOB: 1970  Age: 54 y.o. Sex: male      Cardiovascular Function/Vital Signs  There were no vitals taken for this visit. Blocks Interscalene  Referring physician:   : Yury Ricks MD    Indication: Post-operative analgesia per surgeon's request  Location: Preoperative Holding area. Sedation: Midazolam 2 mg, fentanyl 100 mcg  Time out performed, correct patient, side, site, and procedure verified. Patient placed in sitting position. Monitors/oxygen applied; right above clavicle marked and prepped with chloraprep. Target nerves identified by ultrasound, 30 ml's 0.5% Ropivicaine injected in divided doses with negative aspiration through 22 gauge 5 cm Stimuplex insulated needle. Depth of needle at time of injection 3-4 cm. Block Notes:   Incremental injection    Blood aspirated:  NO    Persistent Pain with injection:  NO    Resistance to injection:  NO    Events:  None - Easy and well-tolerated:  YES       Difficult:  NO   Ultrasound guidance used for needle placement  Nerves and surrounding structures ID'd  Perineural injection   No IV injection  Patient tolerated procedure well, vital signs stable throughout, with no apparent complications.   12/20/2019  7:25 AM  Yury Ricks MD

## 2019-12-20 NOTE — PROGRESS NOTES
conducted a pre-op visit with Nazia Espinoza, who is a 54 y.o.,male. The  provided the following Interventions:  Initiated a relationship of care and support. Offered prayer and assurance of continued prayers on patient's behalf. Plan:  Chaplains will continue to follow and will provide pastoral care on an as needed/requested basis.  recommends bedside caregivers page  on duty if patient shows signs of acute spiritual or emotional distress.     [address]  [phone]

## 2019-12-20 NOTE — DISCHARGE INSTRUCTIONS
Dr. Barfield McCreary Shoulder Arthroscopy  Postoperative Information    How to manage your pain after your Surgery:  After your surgery it is expected that you will have some pain. In efforts to reduce the amounts of side effects from pain medications we would like you to follow the below medication regimen after surgery:  1. Take 1000mg of Tylenol by mouth every 8 hours x 5 days. This is 4 tabs of regular strength Tylenol or 2 tabs of Extra Strength Tylenol  2. 300mg of Gabapentin every night x 5 days starting the evening you get home from the hospital  DO NOT TAKE THIS IF YOU ALREADY TAKE LYRICA OR GABAPENTIN (TAKE YOUR NORMAL DOSE) OR HAVE ALLERGY TO GABAPENTIN  3. We would like you to take a NSAID medication for the first 3 days following surgery. In efforts to avoid additional prescription costs we recommend one of the following over the counter medications. 600mg of Ibuprofen every 8 hours x 3 days    OR   500mg of Naproxen (Aleve) every 12 hours x 3 days  If you have a prescription for Meloxicam or Celebrex already you may resume this as previously prescribed instead of the Over the Counter Medications. DO NOT TAKE ANY OF THESE IF YOU HAVE CHRONIC RENAL FAILURE, ARE TAKING A BLOOD THINNER, HAVE A HISTORY OF A GASTRIC BLEED OR ARE ALLERGIC TO ASPIRIN. IT IS OK TO TAKE IF YOU HAVE HISTORY OF GASTRIC BYPASS SURGERY. 4. Then ONLY IF YOUR PAIN IS UNCONTROLLED you may take your Narcotic Pain medication as prescribed. THIS IS THE PRESCRIPTION THAT WAS GIVEN TO YOU IN THE OFFICE. You should place an ice bag over your shoulder to help with pain and reduce swelling. A soft bandage was placed on your shoulder. You may take the bandage off two days after surgery You may have a clear bandage on your incisions that looks like tape. This is surgical superglue. Leave these on unless you are noticing redness or itching. Band-Aids should be used for the first 4-5 days over each incision.     There are 2-6 small incisions in your shoulder and they may be sore and develop bruising over the next several days. The bruising should resolve and no special care will be needed. It is safe to take a shower or bathe two days after surgery. You will be given a sling to use. Continue to wear this at all times, unless you are given different instructions. You will be shown specific instructions when you see your physical therapist. N[place] Simple should start PT the first business day following surgery. Even though your incisions are small, there has been an operation inside and around the shoulder joint. Complete healing may take weeks or several months. If you have a high temperature, unexpected pain, redness or swelling in your shoulder please contact my office immediately. Dr. Royce Albarran office number [phone]    DISCHARGE SUMMARY from Nurse    PATIENT INSTRUCTIONS:    After general anesthesia or intravenous sedation, for 24 hours or while taking prescription Narcotics:  · Limit your activities  · Do not drive and operate hazardous machinery  · Do not make important personal or business decisions  · Do  not drink alcoholic beverages  · If you have not urinated within 8 hours after discharge, please contact your surgeon on call. Report the following to your surgeon:  · Excessive pain, swelling, redness or odor of or around the surgical area  · Temperature over 100.5  · Nausea and vomiting lasting longer than 4 hours or if unable to take medications  · Any signs of decreased circulation or nerve impairment to extremity: change in color, persistent  numbness, tingling, coldness or increase pain  · Any questions    *  Please give a list of your current medications to your Primary Care Provider. *  Please update this list whenever your medications are discontinued, doses are      changed, or new medications (including over-the-counter products) are added.     *  Please carry medication information at all times in case of emergency situations. These are general instructions for a healthy lifestyle:    No smoking/ No tobacco products/ Avoid exposure to second hand smoke  Surgeon General's Warning:  Quitting smoking now greatly reduces serious risk to your health. Obesity, smoking, and sedentary lifestyle greatly increases your risk for illness    A healthy diet, regular physical exercise & weight monitoring are important for maintaining a healthy lifestyle    You may be retaining fluid if you have a history of heart failure or if you experience any of the following symptoms:  Weight gain of 3 pounds or more overnight or 5 pounds in a week, increased swelling in our hands or feet or shortness of breath while lying flat in bed. Please call your doctor as soon as you notice any of these symptoms; do not wait until your next office visit. The discharge information has been reviewed with the patient and spouse. The patient and spouse verbalized understanding. Discharge medications reviewed with the patient and spouse and appropriate educational materials and side effects teaching were provided.   ___________________________________________________________________________________________________________________________________

## 2019-12-20 NOTE — BRIEF OP NOTE
BRIEF OPERATIVE NOTE    Date of Procedure: 12/20/2019   Preoperative Diagnosis: Tendinopathy of right biceps tendon [M67.921]    Postoperative Diagnosis: biceps tendon tear, partial subscap tear, rotator cuff tear, impingment   Procedure(s):  RIGHT SHOULDER ARTHROSCOPIC SUBSCAPULARIS REPAIR, ROTATOR CUFF REPAIR, ACROMIOPLASTY  Surgeon(s) and Role:     James García MD - Primary         Surgical Assistant: Lorenza Das PA-c    Surgical Staff:  Arline High  Physician Assistant: JOE Jaquez  Scrub Tech-1: Carmella Dickerson  Surg Asst-1: Ambreen CURRAN  Event Time In Time Out   Incision Start 5251    Incision Close 0909      Anesthesia: General   Estimated Blood Loss: 10 ml  Specimens: * No specimens in log *   Findings: biceps tendon rupture, partial subscap tear, 1cm rotator cuff tear   Complications: none patient to recovery room stable  Implants:   Implant Name Type Inv.  Item Serial No.  Lot No. LRB No. Used Action   ANCHOR Jolly CONNORVELOCK 4.75MM - [id number]  Vinh Erica 4.75MM  [employer] [id number] Right 1 Implanted   [facility] SL FIBERTAPE 4.75 --  - [id number]  AN[employer]OR [employer] SL FIBERTAPE 4.75 --   [employer] [id number] Right 1 Implanted

## 2019-12-20 NOTE — INTERVAL H&P NOTE
H&P Update: 
Guillermo Rojas was seen and examined. The above patient was independently seen and examined by myself. The case was then discussed and a proper diagnosis/plan was made. I agree with the above assessment.

## 2019-12-21 NOTE — OP NOTES
[address] [place] [address]   OPERATIVE REPORT    Name:  Jessica Siddiqui  MR#:   [id number]  :  1970  ACCOUNT #:  [de-identified]  DATE OF SERVICE:  2019    PREOPERATIVE DIAGNOSES:  Rotator cuff tear, biceps tendinopathy, right shoulder. POSTOPERATIVE DIAGNOSES:  Rotator cuff tear, subscapularis tear, biceps tear, right shoulder. PROCEDURES PERFORMED:  Arthroscopic rotator cuff repair, arthroscopic subscapularis repair, arthroscopic acromioplasty, right shoulder. SURGEON:  Liz Walters MD    ASSISTANT:  Nabeel Skinner PA-C. Nabeel Skinner instrumental in managing the arthroscope while soft tissue and bony work is done. ANESTHESIA:  General.    COMPLICATIONS:  None. SPECIMENS REMOVED:  None. IMPLANTS:  Per brief op note. ESTIMATED BLOOD LOSS:  Minimal.    FINDINGS:  As above. BRIEF HISTORY:  The patient has had significant amount of problems with the right shoulder, no response to conservative treatment, was consented for surgery after having discussed at length possible risks and complications of surgery including infection, bleeding, recurrence of pain among other possible problems. PROCEDURE IN DETAIL:  The patient was taken to the operating room, placed under general endotracheal anesthesia by the anesthesia staff, placed in a standard arthroscopy llanes in lateral decubitus position. The right arm was prepped with ChloraPrep solution and draped as a free sterile field. Posterior portal was used as an arthroscopy portal.  Portals were made with 11-blade followed by blunt trocars.   Once the arthroscope was in place, shoulder was inspected with some fraying in the labrum, but the biceps tendon had been completely torn and not present in the joint with the bicipital groove accessed superior third of the subscapularis through a superior portal.  The bony bed taken to bleeding bone and FiberTape suture placed through rotator cuff tissue, loaded into a SwiveLock and impacted after tensioning effecting a very stable repair. There was a 1-cm tear in the supraspinatus footprint. Articular surface was intact. The fraying of the labrum was taken to a stable base using a shaver. Attention was then placed on subacromial space where a prominent bursa was debrided using a shaver. Prominent undersurface of the acromion was found for which an anterior inferior acromioplasty performed lateral tip to the [facility] joint converting it to a type 1 acromion. The 1-cm tear bony bed taken to bleeding bone using a shaver through a lateral portal.  A FiberTape suture, mattress sutures were placed with the Scorpion passer along with FiberLink which was then loaded into a SwiveLock, which was impacted after tensioning, very stable construct achieved in this manner. Subcutaneous tissues closed with 3-0 Vicryl and the skin with 4-0 Monocryl. Sterile dressings were applied. The patient tolerated the procedure well and was taken to recovery room without problems.       Zay Marino MD      [id number]  D:  12/20/2019 10:22  T:  12/20/2019 22:11  JOB #:  [id number]

## 2019-12-23 ENCOUNTER — HOSPITAL ENCOUNTER (OUTPATIENT)
Dept: PHYSICAL THERAPY | Age: 49
Discharge: HOME | End: 2019-12-23
Payer: OTHER GOVERNMENT

## 2019-12-23 PROCEDURE — 97110 THERAPEUTIC EXERCISES: CPT

## 2019-12-23 PROCEDURE — 97161 PT EVAL LOW COMPLEX 20 MIN: CPT

## 2019-12-23 PROCEDURE — 97530 THERAPEUTIC ACTIVITIES: CPT

## 2019-12-23 NOTE — PROGRESS NOTES
[facility] [address], [address] [place] [address], [address],[place] [address] [phone] fax      Plan of Care/ Statement of Necessity for Physical Therapy Services    Patient name: Tonya Rodriguez Start of Care: 2019   Referral source: Jax IngramNoma : 1970    Medical Diagnosis: Unspecified disorder of synovium and tendon, right upper arm [M67.921]  Payor: CHERI / Plan: Carlos Minor 74 / Product Type: [place] /  Onset Date:May /2019 , now P/O 19    Treatment Diagnosis: right shoulder pain   Prior Hospitalization: see medical history Provider#: [id number]   Medications: Verified on Patient summary List    Comorbidities: DM, arthritis, MFPS, kidney disease, HTN   Prior Level of Function:  I all areas of ADLs and activities, no AD use, working, household and community activities,      The Plan of Care and following information is based on the information from the initial evaluation. Assessment/ key information:  55 YO male diagnosedas above and with S/S consistent with above diagnosis presents to skilled outpatient PT [facility] P/O right shoulder biceps tenodesis with reported RCR 19, presents with P/O sling. He s right hand dominant. Pain today at eval is 2 and is as high as 5-6/10. Reports some tingling in the  right hand digit 4,5 since the nerve block and some elbow pain and anterior shoulder pain. Patient demonstrates the potential to make gains with improved ROM, strength, endurance/activity tolerance, functional FOTO survey score and all within a reasonable time frame so as to increase their functional independence with ADLs and activities for carryover to  Improved quality of life, tolerance to household and community activities, return to work . Patient requires skilled Physical Therapy so as to monitor their response to and modify their treatment plan accordingly.  Patient appears to be an appropriate candidate for skilled outpatient Physical Therapy. Evaluation Complexity History MEDIUM  Complexity : 1-2 comorbidities / personal factors will impact the outcome/ POC ; Examination MEDIUM Complexity : 3 Standardized tests and measures addressing body structure, function, activity limitation and / or participation in recreation  ;Presentation LOW Complexity : Stable, uncomplicated  ;Clinical Decision Making MEDIUM Complexity : FOTO score of 26-74  Overall Complexity Rating: LOW   Problem List: pain affecting function, decrease ROM, decrease strength, decrease ADL/ functional abilitiies, decrease activity tolerance, decrease flexibility/ joint mobility and other FOTO 28   Treatment Plan may include any combination of the following: Therapeutic exercise, Therapeutic activities, Neuromuscular re-education, Physical agent/modality, Manual therapy, Patient education, Self Care training and Home safety training  Patient / Family readiness to learn indicated by: asking questions, trying to perform skills and interest  Persons(s) to be included in education: patient (P)  Barriers to Learning/Limitations: None  Patient Goal (s): recovery to full use of the right arm  Patient Self Reported Health Status: good  Rehabilitation Potential: good    Short Term Goals: To be accomplished in 5 treatments:   1 patient will have established and be I with HEP to aid with self management at discharge   EVAL issued   CURRENT   2 patient will have pain 1-2/10 to aid with increase tolerance to manual therapy for regaining ROM     EVAL 2   CURRENT     Long Term Goals:  To be accomplished in 16 treatments:   1 patient will have FOTO 61 to show significant improved function with light ADLS at home   EVAL 28   CURRENT   2 patient will have PROM /AAROM right shoulder F 135 and abd 135 and ER 65 to aid with increase tolerance to ADLS and activities at AROM phase   EVAL  Right shoulder PROM F 90, abd 90 and ER 53 in slight abduction   CURRENT   3 patient will report overall 50% improvement to aid with increase tolerance to ADLS and work demands   EVAL    CURRENT  Frequency / Duration: Patient to be seen 2-3 times per week for 16 treatments. Patient/ Caregiver education and instruction: Diagnosis, prognosis, self care, brace/ splint application and exercises   [x]  Plan of care has been reviewed with DEBBIE Ramirez, PT 12/23/2019 11:56 AM  ________________________________________________________________________    I certify that the above Therapy Services are being furnished while the patient is under my care. I agree with the treatment plan and certify that this therapy is necessary.     Physician's Signature:____________Date:_________TIME:________    [employer], Date and Time must be completed for valid certification **      Please sign and return to In . Taina Ksawerego [address] [place] [address], [address] [place] [address], [address] [place] [address],[facility] 300  [phone] fax

## 2019-12-23 NOTE — PROGRESS NOTES
PT DAILY TREATMENT NOTE/SHOULDER EVAL 10-18    Patient Name: Tatianna Han  Date:2019  : 1970  [x]  Patient  Verified  Payor:  / Plan: [facility]  [facility] / Product Type:  /    In time:1115  Out time:1210  Total Treatment Time (min): 55  Visit #: 1 of 16    Medicare/BCBS Only   Total Timed Codes (min):    1:1 Treatment Time:          Treatment Area: right shoulder pain  SUBJECTIVE  Pain Level (0-10 scale): 2   [x]constant []intermittent [x]improving []worsening []no change since onset  Worse sleeping position, BETTER ice, medication  Any medication changes, allergies to medications, adverse drug reactions, diagnosis change, or new procedure performed?: [x] No    [] Yes (see summary sheet for update)  Subjective functional status/changes:     PLOF: I all areas of ADLs and activities, no AD use, working, household and community activities,   Limitations to PLOF: P/O right shoulder with pain, LOM, decrease strength  Mechanism of Injury: original injury late May/early 2019 with outpatient PT, now P/O 19  Current symptoms/Complaints: 53 YO male diagnosedas above and with S/S consistent with above diagnosis presents to skilled outpatient PT [facility] P/O right shoulder biceps tenodesis with reported RCR 19, presents with P/O sling. He s right hand dominant. Pain today at eval is 2 and is as high as 5-6/10. Reports some tingling in the  right hand digit 4,5 since the nerve block and some elbow pain and anterior shoulder pain. 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  Presently OOW due to surgery and shut down, anticipates RTW 20  Living Situation: lives in a 2 story house with family  Pt Goals: recovery to full use of the right arm  Barriers: [x]pain []financial []time []transportation []other  Motivation: good  Substance use: [x]Alcohol [x]Tobacco []other:   FABQ Score: []low []elevate  Cognition: A & O x 4    Other:    OBJECTIVE/EXAMINATION  Domestic Life: work, household and community activities  Activity/Recreational Limitations: P/O state   Mobility: I  Self Care: Needs assistance P/O with dressing and bathing cori left side           30 min [x]Eval                  []Re-Eval       15 min Therapeutic Exercise:  [x] See flow sheet :   Rationale: increase ROM, increase strength and improve coordination to improve the patients ability to tolerate ADLs and activities    10 min Therapeutic Activity:  [x]  See flow sheet :   Rationale: increase ROM, increase strength and improve coordination  to improve the patients ability to tolerate ADLs and activities          With   [x] TE   [x] TA   [] neuro   [] other: Patient Education: [x] Review HEP    [x] Progressed/Changed HEP based on:   [] positioning   [] body mechanics   [] transfers   [] heat/ice application    [] other:      Other Objective/Functional Measures:      Physical Therapy Evaluation - Shoulder    Posture: [] Poor    [x] Fair    [] Good    Describe:    ROM:  [] Unable to assess at this time                                           AROM                                                              PROM   Left Right  Left Right   Flexion 165 NA Flexion  90   Extension  NA Extension     Scaption/ NA Scaptin/ABD  90   ER @ 0 Degrees 75 NA ER @ 0 Degrees  53 in slight abduction   ER @ 90 Degrees   ER @ 90 Degrees     IR @ 0 Degrees abdomen NA IR @ 0 Degrees  abdomen     End Feel / Painful Arc: right shoulder painful and tight    Strength:   [x] Unable to assess at this time    Right UE due to surgery                                                                        L (1-5) R (1-5) Pain   Flexors 5  [] Yes   [] No   Abductors 5  [] Yes   [] No   External Rotators 5  [] Yes   [] No   Internal Rotators 5  [] Yes   [] No   Supraspinatus   [] Yes   [] No   Serratus Anterior   [] Yes [] No   Lower Trapezius   [] Yes   [] No   Elbow Flexion 5  [] Yes   [] No   Elbow Extension 5  [] Yes   [] No       Scapulohumoral Control / Rhythm:  Able to eccentrically lower with good control? Left: [x] Yes   [] No     Right: [] Yes   [x] No       Palpation   [x] Min  [x] Mod  [] Severe    Location:generalized TTP right UT and shoulder,   [] Min  [] Mod  [] Severe    Location:  [] Min  [] Mod  [] Severe    Location:       Pain Level (0-10 scale) post treatment: 2    ASSESSMENT/Changes in Function: Patient demonstrates the potential to make gains with improved ROM, strength, endurance/activity tolerance, functional FOTO survey score and all within a reasonable time frame so as to increase their functional independence with ADLs and activities for carryover to  Improved quality of life, tolerance to household and community activities, return to work . Patient requires skilled Physical Therapy so as to monitor their response to and modify their treatment plan accordingly. Patient appears to be an appropriate candidate for skilled outpatient Physical Therapy. Patient will continue to benefit from skilled PT services to modify and progress therapeutic interventions, address ROM deficits, address strength deficits, analyze and address soft tissue restrictions, analyze and cue movement patterns, analyze and modify body mechanics/ergonomics, assess and modify postural abnormalities and instruct in home and community integration to attain remaining goals.      [x]  See Plan of Care  []  See progress note/recertification  []  See Discharge Summary         Progress towards goals / Updated goals:       PLAN  [x]  Upgrade activities as tolerated     [x]  Continue plan of care  []  Update interventions per flow sheet       []  Discharge due to:_  []  Other:Scott Nath PT 12/23/2019  11:19 AM

## 2019-12-27 ENCOUNTER — OFFICE VISIT (OUTPATIENT)
Dept: ORTHOPEDIC SURGERY | Age: 49
End: 2019-12-27

## 2019-12-27 VITALS
HEIGHT: 67 IN | HEART RATE: 80 BPM | TEMPERATURE: 97.8 F | OXYGEN SATURATION: 94 % | WEIGHT: 287 LBS | RESPIRATION RATE: 16 BRPM | BODY MASS INDEX: 45.04 KG/M2 | SYSTOLIC BLOOD PRESSURE: 141 MMHG | DIASTOLIC BLOOD PRESSURE: 91 MMHG

## 2019-12-27 DIAGNOSIS — S46.811A FULL THICKNESS TEAR OF RIGHT SUBSCAPULARIS TENDON: Primary | ICD-10-CM

## 2019-12-27 NOTE — PROGRESS NOTES
1. Have you been to the ER, urgent care clinic since your last visit? Hospitalized since your last visit? No    2. Have you seen or consulted any other health care providers outside of the [address] [place] [address] since your last visit? Include any pap smears or colon screening.  No

## 2019-12-27 NOTE — PATIENT INSTRUCTIONS
You may now shower and get your incisions wet. We recommend starting scar massage in 1 week to your incision(s). Take Vitamin E, Cocoa Butter or Scar Cream and massage the incision 2 times a day. This will help soften your incisions and help de-sensitize the skin as the nerves \"wake up\".

## 2019-12-27 NOTE — PROGRESS NOTES
Bear Cruz  1970     HISTORY OF PRESENT ILLNESS  Bear Cruz is a 54 y.o. male who presents today for evaluation s/p Right shoulder arthroscopic subscapularis repair on 12/20/19. Patient has been going to PT. Describes pain as a 2/10. Has been taking tylneol for pain. Still has night pain. Patient denies any fever, chills, chest pain, shortness of breath or calf pain. The remainder of the review of systems is negative. There are no new illness or injuries to report since last seen in the office. No changes in medications, allergies, social or family history. PHYSICAL EXAM:   Visit Vitals  BP (!) 141/91   Pulse 80   Temp 97.8 °F (36.6 °C) (Oral)   Resp 16   Ht 5' 7\" (1.702 m)   Wt 287 lb (130.2 kg)   SpO2 94%   BMI 44.95 kg/m²      The patient is a well-developed, well-nourished male in no acute distress. The patient is alert and oriented times three. The patient appears to be well groomed. Mood and affect are normal.  ORTHOPEDIC EXAM of right shoulder:  Inspection: swelling present,  Bruising present  Incision, clean, dry, intact, sutures in place  Passive glenohumeral abduction 0-40 degrees  Stability: Stable  Strength: n/a  2+ distal pulses    IMPRESSION:  S/P Right shoulder arthroscopic subscapularis repair    PLAN:   Incisions cleaned. Surgery was discussed at length today. Patient to continue with PROM and PT. Continue wearing sling. Stressed to patient that nothing causes an increase in pain.   RTC 3 weeks    Trenton Holts, PA-C [facility]

## 2020-01-03 ENCOUNTER — HOSPITAL ENCOUNTER (OUTPATIENT)
Dept: PHYSICAL THERAPY | Age: 50
Discharge: HOME | End: 2020-01-03
Payer: OTHER GOVERNMENT

## 2020-01-03 PROCEDURE — 97016 VASOPNEUMATIC DEVICE THERAPY: CPT

## 2020-01-03 PROCEDURE — 97110 THERAPEUTIC EXERCISES: CPT

## 2020-01-03 NOTE — PROGRESS NOTES
PT DAILY TREATMENT NOTE 10-18    Patient Name: Stevie Wetzel  Date:1/3/2020  : 1970  [x]  Patient  Verified  Payor:  / Plan: Carlos Minor 74 / Product Type:  /    In time:432  Out time:515  Total Treatment Time (min): 43  Visit #: 2 of 16    Treatment Area: Right shoulder pain  SUBJECTIVE  Pain Level (0-10 scale): 2  Any medication changes, allergies to medications, adverse drug reactions, diagnosis change, or new procedure performed?: [x] No    [] Yes (see summary sheet for update)  Subjective functional status/changes:   [] No changes reported  Pt reports he sees doctor on Monday and sees surgeon . He presents with sling. He has been doing HEP, but starting feel stiff. He has not been here due to holiday. OBJECTIVE    Modality rationale: decrease inflammation and decrease pain to improve the patients ability to complete functional activities.    Min Type Additional Details    [] Estim:  []Unatt       []IFC  []Premod                        []Other:  []w/ice   []w/heat  Position:  Location:    [] Estim: []Att    []TENS instruct  []NMES                    []Other:  []w/US   []w/ice   []w/heat  Position:  Location:    []  Traction: [] Cervical       []Lumbar                       [] Prone          []Supine                       []Intermittent   []Continuous Lbs:  [] before manual  [] after manual    []  Ultrasound: []Continuous   [] Pulsed                           []1MHz   []3MHz W/cm2:  Location:    []  Iontophoresis with dexamethasone         Location: [] Take home patch   [] In clinic    []  Ice     []  heat  []  Ice massage  []  Laser   []  Anodyne Position:  Location:    []  Laser with stim  []  Other:  Position:  Location:    []  Vasopneumatic Device Pressure:       [] lo [] med [] hi   Temperature: [] lo [] med [] hi   [] Skin assessment post-treatment:  []intact []redness- no adverse reaction    []redness  adverse reaction:     23 min Therapeutic Exercise:  [x] See flow sheet :   Rationale: increase ROM, increase strength and PROM R shoulder F/abd to improve the patients ability to complete functional activities. 5 min Manual Therapy:  Supine R shoulder A/P and inferior joint glides 1-2   Rationale: decrease pain and increase ROM to complete self care          With   [x] TE   [] TA   [] neuro   [] other: Patient Education: [x] Review HEP    [] Progressed/Changed HEP based on:   [] positioning   [] body mechanics   [] transfers   [] heat/ice application    [] other:      Other Objective/Functional Measures:      Pain Level (0-10 scale) post treatment: 1    ASSESSMENT/Changes in Function: Session focused on increasing AROM of cervical and PROM of shoulder with good tolerance. Pt reported decreased tightness after PROM and manual therapy. Verbal cues and demonstration required to use correct body mechanics. Encouraged pt to complete HEP daily to help to continue to progress PT interventions to improve patient's ability to complete functional and community task independently. At the conclusion of the session, pt reported decreased pain after activity and game ready for pain management. Patient will continue to benefit from skilled PT services to modify and progress therapeutic interventions, address functional mobility deficits, address ROM deficits, address strength deficits, analyze and address soft tissue restrictions, analyze and cue movement patterns, analyze and modify body mechanics/ergonomics and assess and modify postural abnormalities to attain remaining goals. [x]  See Plan of Care  []  See progress note/recertification  []  See Discharge Summary         Progress towards goals / Updated goals:  Short Term Goals:  To be accomplished in 5 treatments:               1 patient will have established and be I with HEP to aid with self management at discharge               EVAL issued               CURRENT completing 1/3               2 patient will have pain 1-2/10 to aid with increase tolerance to manual therapy for regaining ROM                 EVAL 2               CURRENT 2 1/3                 Long Term Goals:  To be accomplished in 16 treatments:               1 patient will have FOTO 61 to show significant improved function with light ADLS at home               EVAL 28               CURRENT               2 patient will have PROM /AAROM right shoulder F 135 and abd 135 and ER 65 to aid with increase tolerance to ADLS and activities at AROM phase               EVAL  Right shoulder PROM F 90, abd 90 and ER 53 in slight abduction               CURRENT               3 patient will report overall 50% improvement to aid with increase tolerance to ADLS and work demands               EVAL                CURRENT    PLAN  [x]  Upgrade activities as tolerated     [x]  Continue plan of care  [x]  Update interventions per flow sheet       []  Discharge due to:_  []  Other:_      Ronna Moffett 1/3/2020  4:34 PM    Future Appointments   Date Time Provider Bryant Zavaleta   1/6/2020  3:30 PM Kelin Rothman  [address]   1/7/2020  3:30 PM Rowena Valdez [facility]   1/8/2020  5:30 PM Emilee Bob [facility]   1/14/2020  3:30 PM Emilee Bob [facility]   1/15/2020  5:00 PM Emilee Bob [facility]   1/17/2020  3:00 PM JOE [address]   1/20/2020  4:00 PM Emilee Bob [facility]   1/22/2020  5:00 PM Rowena Valdez [facility]   1/24/2020  4:00 PM Richard Arciniega, SVETA [facility]   1/27/2020  4:30 PM Rowena Valdez [facility]   1/29/2020  5:00 PM Emilee Bob [facility]   1/31/2020  4:00 PM Richard Arciniega, SVETA [facility]

## 2020-01-06 ENCOUNTER — OFFICE VISIT (OUTPATIENT)
Dept: ORTHOPEDIC SURGERY | Age: 50
End: 2020-01-06

## 2020-01-06 VITALS
DIASTOLIC BLOOD PRESSURE: 89 MMHG | TEMPERATURE: 97.9 F | RESPIRATION RATE: 20 BRPM | SYSTOLIC BLOOD PRESSURE: 136 MMHG | BODY MASS INDEX: 46.05 KG/M2 | HEART RATE: 87 BPM | HEIGHT: 67 IN | OXYGEN SATURATION: 91 % | WEIGHT: 293.4 LBS

## 2020-01-06 DIAGNOSIS — M47.812 CERVICAL FACET JOINT SYNDROME: Primary | ICD-10-CM

## 2020-01-06 DIAGNOSIS — M79.18 MYOFASCIAL PAIN: ICD-10-CM

## 2020-01-06 DIAGNOSIS — M62.838 MUSCLE SPASM: ICD-10-CM

## 2020-01-06 RX ORDER — METAXALONE 800 MG/1
800 TABLET ORAL
Qty: 180 TAB | Refills: 1 | Status: SHIPPED | OUTPATIENT
Start: 2020-01-06 | End: 2020-07-06 | Stop reason: SDUPTHER

## 2020-01-06 RX ORDER — PREGABALIN 100 MG/1
100 CAPSULE ORAL 2 TIMES DAILY
Qty: 180 CAP | Refills: 1 | Status: SHIPPED | OUTPATIENT
Start: 2020-01-06 | End: 2020-07-06 | Stop reason: SDUPTHER

## 2020-01-06 NOTE — LETTER
1/8/20 Patient: Anamaria Epstein YOB: 1970 Date of Visit: 1/6/2020 Christina العلي MD 
[address] [place] [address] VIA Facsimile: [phone] Dear Christina العلي MD, Thank you for referring Mr. Vidya Shultz to [facility] for evaluation. My notes for this consultation are attached. If you have questions, please do not hesitate to call me. I look forward to following your patient along with you. Sincerely, Mady Moser MD

## 2020-01-06 NOTE — PATIENT INSTRUCTIONS
Neck Arthritis: Exercises  Introduction  Here are some examples of exercises for you to try. The exercises may be suggested for a condition or for rehabilitation. Start each exercise slowly. Ease off the exercises if you start to have pain. You will be told when to start these exercises and which ones will work best for you. How to do the exercises  Neck stretches to the side    1. This stretch works best if you keep your shoulder down as you lean away from it. To help you remember to do this, start by relaxing your shoulders and lightly holding on to your thighs or your chair. 2. Tilt your head toward your shoulder and hold for 15 to 30 seconds. Let the weight of your head stretch your muscles. 3. Repeat 2 to 4 times toward each shoulder. Chin tuck    1. Lie on the floor with a rolled-up towel under your neck. Your head should be touching the floor. 2. Slowly bring your chin toward your chest.  3. Hold for a count of 6, and then relax for up to 10 seconds. 4. Repeat 8 to 12 times. Active cervical rotation    1. Sit in a firm chair, or stand up straight. 2. Keeping your chin level, turn your head to the right, and hold for 15 to 30 seconds. 3. Turn your head to the left and hold for 15 to 30 seconds. 4. Repeat 2 to 4 times to each side. Shoulder blade squeeze    1. While standing, squeeze your shoulder blades together. 2. Do not raise your shoulders up as you are squeezing. 3. Hold for 6 seconds. 4. Repeat 8 to 12 times. Shoulder rolls    1. Sit comfortably with your feet shoulder-width apart. You can also do this exercise standing up. 2. Roll your shoulders up, then back, and then down in a smooth, circular motion. 3. Repeat 2 to 4 times. Follow-up care is a key part of your treatment and safety. Be sure to make and go to all appointments, and call your doctor if you are having problems.  It's also a good idea to know your test results and keep a list of the medicines you take.  Where can you learn more? Go to http://marija-marianne.info/. Enter Y303 in the search box to learn more about \"Neck Arthritis: Exercises. \"  Current as of: June 26, 2019  Content Version: 12.2  © 9645-0269 [employer]. Care instructions adapted under license by [employer] (which disclaims liability or warranty for this information). If you have questions about a medical condition or this instruction, always ask your healthcare professional. [place] [address] any warranty or liability for your use of this information.

## 2020-01-06 NOTE — PROGRESS NOTES
[facility]  [address] [place], [facility] [address]  Phone: [phone]  Fax: [phone]    Pt's YOB: 1970    ASSESSMENT   Diagnoses and all orders for this visit:    1. Cervical facet joint syndrome    2. Myofascial pain  -     pregabalin (LYRICA) 100 mg capsule; Take 1 Cap by mouth two (2) times a day. Max Daily Amount: 200 mg. Indications: disorder characterized by stiff, tender & painful muscles    3. Muscle spasm  -     metaxalone (SKELAXIN) 800 mg tablet; Take 1 Tab by mouth two (2) times daily as needed for Muscle Spasm(s). IMPRESSION AND PLAN:  Logan Jovel is a 54 y.o. male with history of chronic right shoulder pain. Pt notes that he had a right shoulder arthroscopic surgery and a rotator cuff repair on with Dr. Jaylyn Cisneros on 12/20/2019. He takes Ultram 50 mg sparingly as needed, Lyrica 100 mg BID, and Skelaxin 800 mg intermittently with benefit. 1) Pt was given information on cervical arthritis exercises. 2) He received a refill of Skelaxin 800 mg 1 tab BID prn muscle spasms. 3) Pt also received a refill of Lyrica 100 mg 1 tab BID. 4) He will continue taking Ultram 50 mg as prescribed and does not need a refill at this time. 5) Mr. Tracey Mann has a reminder for a \"due or due soon\" health maintenance. I have asked that he contact his primary care provider, Maggie Phillip MD, for follow-up on this health maintenance. 6)  demonstrated consistency with prescribing. Follow-up and Dispositions    · Return in about 6 months (around 7/6/2020) for Medication follow up. HISTORY OF PRESENT ILLNESS:  Logan Jovel is a 54 y.o. male with history of chronic cervical and right shoulder pain and presents to the office today for follow up. He reports pain in the neck and numbness that extends down the left arm into the ring finger.  Pt notes that he had a right shoulder arthroscopic surgery and a rotator cuff repair on with Dr. Abida Bone on 12/20/2019. He is currently in physical therapy 2-3 x a week . Pt takes Ultram 50 mg sparingly as needed, Lyrica 100 mg BID, and Skelaxin 800 mg intermittently with benefit. He also has had PT with dry needling in the past with significant benefit. Pt at this time desires to continue with current care.     Pain Scale: 7/10    PCP: Elsa Parrish MD     Past Medical History:   Diagnosis Date    Adverse effect of anesthesia     for back sx-had issues with breathing  with apnea- 6 years ago    Asthma     Chest pain, unspecified     ATYPICAL,POSSIBLE ANGINA,MUSCULAR,CAD,CHEST WALL PAINS PERSISTANT AND RECURRENT    Chronic kidney disease     Diabetes [facility]     DM (diabetes mellitus) ([facility]     GERD (gastroesophageal reflux disease)     HTN (hypertension)     Hypercholesteremia     Hypertension     Kidney stones     Sleep apnea     cpap        Social History     Socioeconomic History    Marital status:      Spouse name: Not on file    Number of children: Not on file    Years of education: Not on file    Highest education level: Not on file   Occupational History    Occupation:    Social Needs    Financial resource strain: Not on file    Food insecurity:     Worry: Not on file     Inability: Not on file    Transportation needs:     Medical: Not on file     Non-medical: Not on file   Tobacco Use    Smoking status: Never Smoker    Smokeless tobacco: Never Used   Substance and Sexual Activity    Alcohol use: No    Drug use: No    Sexual activity: Not on file   Lifestyle    Physical activity:     Days per week: Not on file     Minutes per session: Not on file    Stress: Not on file   Relationships    Social connections:     Talks on phone: Not on file     Gets together: Not on file     Attends Sabianist service: Not on file     Active member of club or organization: Not on file     Attends meetings of clubs or organizations: Not on file     Relationship status: Not on file    Intimate partner violence:     Fear of current or ex partner: Not on file     Emotionally abused: Not on file     Physically abused: Not on file     Forced sexual activity: Not on file   Other Topics Concern    Not on file   Social History Narrative    ** Merged History Encounter **            Current Outpatient Medications   Medication Sig Dispense Refill    ondansetron (ZOFRAN ODT) 4 mg disintegrating tablet 1 Tab by SubLINGual route every eight (8) hours as needed for Nausea. 10 Tab 0    pregabalin (LYRICA) 100 mg capsule Take 1 Cap by mouth two (2) times a day. Max Daily Amount: 200 mg. Indications: disorder characterized by stiff, tender & painful muscles 180 Cap 1    metaxalone (SKELAXIN) 800 mg tablet 1 po BID-TID prn  Indications: muscle spasm 180 Tab 1    amLODIPine (NORVASC) 5 mg tablet Take 5 mg by mouth daily.  benazepril (LOTENSIN) 20 mg tablet Take 20 mg by mouth daily.  traMADol (ULTRAM) 50 mg tablet 1 po bid prn severe pain 60 Tab 1    hydroCHLOROthiazide (MICROZIDE) 12.5 mg capsule       pravastatin (PRAVACHOL) 20 mg tablet       cpap machine kit by Does Not Apply route.  Potassium Citrate 15 mEq TbER Take  by mouth two (2) times a day.  tamsulosin (FLOMAX) 0.4 mg capsule Take 1 Cap by mouth daily (after dinner). 30 Cap 0    carvedilol (COREG CR) 10 mg CR capsule Take  by mouth daily (with breakfast).  montelukast (SINGULAIR) 10 mg tablet Take 10 mg by mouth daily.  insulin glargine (LANTUS SOLOSTAR) 100 unit/mL (3 mL) pen by SubCUTAneous route.  insulin aspart (NOVOLOG) 100 unit/mL inpn by SubCUTAneous route.  magnesium citrate 100 mg tab Take 300 mg by mouth daily.  aspirin 81 mg chewable tablet Take 81 mg by mouth daily.  fluticasone-salmeterol (ADVAIR DISKUS) 250-50 mcg/dose diskus inhaler Take 1 Puff by inhalation every twelve (12) hours.       Dexlansoprazole (DEXILANT) 60 mg CpDM Take 60 mg by mouth daily.  albuterol (VENTOLIN HFA) 90 mcg/actuation inhaler Take  by inhalation every six (6) hours as needed.  Misc. Devices Kit Please provide the patient following size mask. Mask: Mirage FX large size mask. DME: [facility], [place], [place]  Phone: [phone], Fax: [phone] 1 Each 0       No Known Allergies      REVIEW OF SYSTEMS    Constitutional: Negative for fever, chills, or weight change. Respiratory: Negative for cough or shortness of breath. Cardiovascular: Negative for chest pain or palpitations. Gastrointestinal: Negative for acid reflux, change in bowel habits, or constipation. Genitourinary: Negative for dysuria and flank pain. Musculoskeletal: Positive for cervical pain. Skin: Negative for rash. Neurological: Negative for headaches or dizziness. Positive for numbness in the left ring finger. Endo/Heme/Allergies: Negative for increased bruising. Psychiatric/Behavioral: Negative for difficulty with sleep. PHYSICAL EXAMINATION  Visit Vitals  /89   Pulse 87   Temp 97.9 °F (36.6 °C) (Oral)   Resp 20   Ht 5' 7\" (1.702 m)   Wt 293 lb 6.4 oz (133.1 kg)   SpO2 91%   BMI 45.95 kg/m²       Constitutional: Awake, alert, and in no acute distress. Neurological: 1+ symmetrical DTRs in the upper extremities. 1+ symmetrical DTRs in the lower extremities. Sensation to light touch is intact. Negative Miller's sign bilaterally. Skin: warm, dry, and intact. Musculoskeletal: No pain with extension, axial loading, or forward flexion. No pain with internal or external rotation of his hips. Negative straight leg raise bilaterally;   The right arm in a sling--      Biceps  Triceps Deltoids Wrist Ext Wrist Flex Hand Intrin   Right   NT   NT   NT +4/5 +4/5 +4/5   Left +4/5 +4/5 +4/5 +4/5 +4/5 +4/5      Hip Flex  Quads Hamstrings Ankle DF EHL Ankle PF   Right +4/5 +4/5 +4/5 +4/5 +4/5 +4/5   Left +4/5 +4/5 +4/5 +4/5 +4/5 +4/5     IMAGING:    Right shoulder MRI from 09/18/2019 were personally reviewed with the patient and demonstrated:  Multisequence multiplanar MR images of the right shoulder were obtained.     HISTORY: Shoulder pain.     Mild to moderate supraspinatus tendinosis. Moderate infraspinatus tendinosis  with bursal surface fraying. Moderate inflammation in the subacromial bursa. Mild subscapular tendinosis. No muscular atrophy.     No glenohumeral joint effusion. No focal glenohumeral cartilage loss. Moderate  maceration of the superior labrum. Biceps anchor is not defined. Attenuated  remaining biceps tendon within the tendon sheath. Subcortical cystic changes in  the humeral head. No definite Hill-Sachs deformity. Glenoid is intact.     Moderate hypertrophy with mild inflammation at the acromioclavicular joint. Slightly curved acromion undersurface. No abnormal downsloping. IMPRESSION:  1. Rotator cuff tendinosis with moderate bursal surface fraying in the  infraspinatus tendon. No focal tear or muscular atrophy. Subacromial bursitis  present. 2. Complex degenerative tearing or maceration of the superior labrum with likely  torn biceps anchor, attenuated biceps tendon in the bicipital groove. 3. Type II acromion. Hypertrophy and inflammation at the [facility] joint.        Right shoulder x-rays from 05/29/2019 were personally reviewed with the patient and demonstrated:  FINDINGS:    BONES: Intact and normally aligned. SOFT TISSUES: Unremarkable.    _______________    IMPRESSION    No significant abnormality.        Cervical spine MRI from 11/02/2017 was personally reviewed with the patient and demonstrated:  Results from [facility] on 11/02/17   MRI CERV SPINE WO CONT     Narrative MR CERVICAL SPINE WITHOUT CONTRAST    CPT CODE: 93133    HISTORY: Chronic cervicalgia progressing over the past 2 months. Left arm pain  and paresthesias. No injury. COMPARISON: October 2013.     TECHNIQUE: The following sequences were performed through the cervical spine: 1.  Sagittal and axial T2 weighted images without fat saturation. 2.  Sagittal T1 weighted images without fat saturation. 3.  Sagittal STIR sequence. FINDINGS:     Cervical straightening as before. Normal vertebral body heights. No change in a  small rounded area of T2/STIR bright signal within right C5. Likely some  stippled T1 bright signal within; favors hemangioma. Unremarkable disc space  height. Patent cervical medullary junction. No cervical cord expansion or  atrophy. Small oblong fluid bright focus at the right occipital cervical  articulation anteriorly is unchanged. On axial imaging, findings at each level are as follows:    C2/C3: Patent canal and foramina. C3/C4: Patent canal and foramina. C4/C5: Patent canal and foramina. C5/C6: Mild right paracentral disc protrusion. Patent canal and foramina. C6/C7: Minimal disc bulge on sagittal imaging. Patent canal and foramina. C7/T1: Patent canal and foramina. Incidental imaging of the adjacent soft tissues is unremarkable.              Impression IMPRESSION:    1. Minimal degenerative findings of cervical spine. No substantive change. Patent canal and foramina. No specific source for paresthesias. Thank you for this referral.       Written by Bryant Leyden, as dictated by Vincent Cruz MD.  I, Dr. Vincent Cruz confirm that all documentation is accurate.

## 2020-01-07 ENCOUNTER — HOSPITAL ENCOUNTER (OUTPATIENT)
Dept: PHYSICAL THERAPY | Age: 50
Discharge: HOME | End: 2020-01-07
Payer: OTHER GOVERNMENT

## 2020-01-07 PROCEDURE — 97110 THERAPEUTIC EXERCISES: CPT | Performed by: PHYSICAL THERAPIST

## 2020-01-07 PROCEDURE — 97140 MANUAL THERAPY 1/> REGIONS: CPT | Performed by: PHYSICAL THERAPIST

## 2020-01-07 PROCEDURE — 97530 THERAPEUTIC ACTIVITIES: CPT | Performed by: PHYSICAL THERAPIST

## 2020-01-07 PROCEDURE — 97016 VASOPNEUMATIC DEVICE THERAPY: CPT | Performed by: PHYSICAL THERAPIST

## 2020-01-07 PROCEDURE — 97112 NEUROMUSCULAR REEDUCATION: CPT | Performed by: PHYSICAL THERAPIST

## 2020-01-08 ENCOUNTER — HOSPITAL ENCOUNTER (OUTPATIENT)
Dept: PHYSICAL THERAPY | Age: 50
Discharge: HOME | End: 2020-01-08
Payer: OTHER GOVERNMENT

## 2020-01-08 PROCEDURE — 97112 NEUROMUSCULAR REEDUCATION: CPT

## 2020-01-08 PROCEDURE — 97140 MANUAL THERAPY 1/> REGIONS: CPT

## 2020-01-08 PROCEDURE — 97016 VASOPNEUMATIC DEVICE THERAPY: CPT

## 2020-01-08 PROCEDURE — 97110 THERAPEUTIC EXERCISES: CPT

## 2020-01-08 NOTE — PROGRESS NOTES
PT DAILY TREATMENT NOTE 10-18    Patient Name: Claudio Mckeon  Date:2020  : 1970  [x]  Patient  Verified  Payor:  / Plan: [address] Minor 74 / Product Type:  /    In time:3:04P  Out time:4:19P  Total Treatment Time (min): 69min  Visit #: 3 of 16    Treatment Area: R Shoulder Pain    SUBJECTIVE  Pain Level (0-10 scale): 1.5/10  Any medication changes, allergies to medications, adverse drug reactions, diagnosis change, or new procedure performed?: [x] No    [] Yes (see summary sheet for update)  Subjective functional status/changes:   [] No changes reported  Patient states he is feeling pretty good overall, just a little soreness. OBJECTIVE    Modality rationale: decrease edema, decrease inflammation and decrease pain to improve the patients ability to decrease exercise-induced muscle soreness.     Min Type Additional Details    [] Estim:  []Unatt       []IFC  []Premod                        []Other:  []w/ice   []w/heat  Position:  Location:    [] Estim: []Att    []TENS instruct  []NMES                    []Other:  []w/US   []w/ice   []w/heat  Position:  Location:    []  Traction: [] Cervical       []Lumbar                       [] Prone          []Supine                       []Intermittent   []Continuous Lbs:  [] before manual  [] after manual    []  Ultrasound: []Continuous   [] Pulsed                           []1MHz   []3MHz W/cm2:  Location:    []  Iontophoresis with dexamethasone         Location: [] Take home patch   [] In clinic    []  Ice     []  heat  []  Ice massage  []  Laser   []  Anodyne Position:  Location:    []  Laser with stim  []  Other:  Position:  Location:   15 [x]  Vasopneumatic Device Pressure:       [x] lo [] med [] hi   Temperature: [x] lo [] med [] hi   [] Skin assessment post-treatment:  []intact []redness- no adverse reaction    []redness  adverse reaction:     14 min Therapeutic Exercise:  [x] See flow sheet :   Rationale: increase ROM and increase strength to improve the patients ability to A/ROM and decrease pain with movement. 10 min Therapeutic Activity:  [x]  See flow sheet :   Rationale: increase strength and improve coordination  to improve the patients ability to Tolerate basic ADLs and job-related tasks without pain. 15 min Neuromuscular Re-education:  [x]  See flow sheet :   Rationale: improve coordination, improve balance and increase proprioception  to improve the patients ability to perform activities with good form and proprioception with tactile and verbal cuing appropriately. 15 min Manual Therapy:  Grade 1-2 mobs to R GH joint in A/P directions, gentle oscillations at available passive range of motion. Rationale: decrease pain, increase ROM and increase tissue extensibility to A/ROM and decrease pain with movement. With   [x] TE   [x] TA   [x] neuro   [] other: Patient Education: [x] Review HEP    [x] Progressed/Changed HEP based on:   [x] positioning   [] body mechanics   [] transfers   [] heat/ice application    [] other:      Other Objective/Functional Measures: P/ROM up to 120 degs     Pain Level (0-10 scale) post treatment: 0/10    ASSESSMENT/Changes in Function: Patient is progressing towards goals of decreased pain and increased activity tolerance within restrictions of protocol. Verbal and tactile cuing needed for proper body positioning, exercise prescription and appropriate exercise progression. Patient will continue to benefit from skilled PT services to modify and progress therapeutic interventions, address functional mobility deficits, address ROM deficits, address strength deficits, analyze and address soft tissue restrictions, analyze and cue movement patterns, analyze and modify body mechanics/ergonomics and assess and modify postural abnormalities to attain remaining goals.      [x]  See Plan of Care  []  See progress note/recertification  []  See Discharge Summary         Progress towards goals / Updated goals:  Short Term Goals: To be accomplished in 5 treatments:               1 patient will have established and be I with HEP to aid with self management at discharge               EVAL issued               [id number] completing 1/3               2 patient will have pain 1-2/10 to aid with increase tolerance to manual therapy for regaining ROM                 EVAL 2               CURRENT 2 1/3     Long Term Goals: To be accomplished in 16 treatments:               1 patient will have FOTO 61 to show significant improved function with light ADLS at home               EVAL 28               CURRENT               2 patient will have PROM /AAROM right shoulder F 135 and abd 135 and ER 65 to aid with increase tolerance to ADLS and activities at AROM phase               EVAL  Right shoulder PROM F 90, abd 90 and ER 53 in slight abduction               [id number]               3 patient will report overall 50% improvement to aid with increase tolerance to ADLS and work demands               EVAL                [id number]    PLAN  [x]  Upgrade activities as tolerated     [x]  Continue plan of care  []  Update interventions per flow sheet       []  Discharge due to:_  []  Other:_      Linda Hoskins, PT 1/7/2020  7:00 PM    Future Appointments   Date Time Provider Bryant Zavaleta   1/8/2020  5:30 PM Shawanda Gonzales [address]   1/14/2020  3:30 PM Karen Walker [facility] [address]   1/15/2020  5:00 PM Karen Walker [facility] [address]   1/17/2020  3:00 PM JOE Arteaga [address]   1/20/2020  4:00 PM Shawanda Gonzales [address]   1/22/2020  5:00 PM Shawanda Gonzales [address]   1/24/2020  4:00 PM Brie Robertson PT [facility] [address]   1/27/2020  4:30 PM Karen Walker [facility] [address]   1/29/2020  5:00 PM Shawanda Gonzales [address]   1/31/2020  4:00 PM Brie Robertson PT [facility] [address]   2/13/2020  2:40 PM [facility] [place] [facility]   [facility]2020  [facility]   7/6/2020  3:30 PM Sammi Apple MD [address]

## 2020-01-08 NOTE — PROGRESS NOTES
PT DAILY TREATMENT NOTE 10-18    Patient Name: Mich Emmanuel  Date:2020  : 1970  [x]  Patient  Verified  Payor:  / Plan: [employer] / Product Type:  /    In time:512  Out time:616  Total Treatment Time (min): 64  Visit #: 4 of 16    Treatment Area: Right shoulder pain  SUBJECTIVE  Pain Level (0-10 scale): 2  Any medication changes, allergies to medications, adverse drug reactions, diagnosis change, or new procedure performed?: [x] No    [] Yes (see summary sheet for update)  Subjective functional status/changes:   [] No changes reported  Pt presents with sling. He has been doing HEP.     OBJECTIVE    Modality rationale: decrease inflammation and decrease pain to improve the patients ability to complete functional activities   Min Type Additional Details    [] Estim:  []Unatt       []IFC  []Premod                        []Other:  []w/ice   []w/heat  Position:  Location:    [] Estim: []Att    []TENS instruct  []NMES                    []Other:  []w/US   []w/ice   []w/heat  Position:  Location:    []  Traction: [] Cervical       []Lumbar                       [] Prone          []Supine                       []Intermittent   []Continuous Lbs:  [] before manual  [] after manual    []  Ultrasound: []Continuous   [] Pulsed                           []1MHz   []3MHz W/cm2:  Location:    []  Iontophoresis with dexamethasone         Location: [] Take home patch   [] In clinic    []  Ice     []  heat  []  Ice massage  []  Laser   []  Anodyne Position:  Location:    []  Laser with stim  []  Other:  Position:  Location:   10 [x]  Vasopneumatic Device  Semi reclined  R shoulder Pressure:       [] lo [x] med [] hi   Temperature: [x] lo [] med [] hi   [x] Skin assessment post-treatment:  [x]intact []redness- no adverse reaction    []redness  adverse reaction:     24 min Therapeutic Exercise:  [x] See flow sheet :   Rationale: increase ROM, increase strength and PROM R shoulder to improve the patients ability to complete functional activities. 15 min Neuromuscular Re-education:  [x]  See flow sheet :   Rationale: increase strength, improve coordination and increase proprioception to improve the patients ability to complete functional task with correct body mechanics    15 min Manual Therapy:  Supine R shoulder inferior and A/P joint mobs; L sidelying: R periscapular mm, UT, LS, STM/DTM   Rationale: decrease pain, increase ROM, increase tissue extensibility and decrease trigger points to complete self care. With   [x] TE   [] TA   [x] neuro   [] other: Patient Education: [x] Review HEP    [] Progressed/Changed HEP based on:   [] positioning   [] body mechanics   [] transfers   [] heat/ice application    [] other:      Other Objective/Functional Measures:      Pain Level (0-10 scale) post treatment: 0    ASSESSMENT/Changes in Function: Tech helped to supervise some of the exercises. Manual therapy and PROM were performed to redue tightness and improve mobility with pt report of decreased tightness after treatment. Pt is progressing well with PROM compared to previous sessions. Verbal cues and demonstration required to use correct body mechanics. Encouraged pt to complete HEP daily to help to continue to progress PT interventions to improve patient's ability to complete functional and community task independently. At the conclusion of the session, pt reported no pain after game ready for pain management. Patient will continue to benefit from skilled PT services to modify and progress therapeutic interventions, address functional mobility deficits, address ROM deficits, address strength deficits, analyze and address soft tissue restrictions, analyze and cue movement patterns, analyze and modify body mechanics/ergonomics and assess and modify postural abnormalities to attain remaining goals.      []  See Plan of Care  []  See progress note/recertification  []  See Discharge Summary Progress towards goals / Updated goals:  Short Term Goals: To be accomplished in 5 treatments:               1 patient will have established and be I with HEP to aid with self management at discharge               EVAL issued               CURRENT completing 1/3 1/8               2 patient will have pain 1-2/10 to aid with increase tolerance to manual therapy for regaining ROM                 EVAL 2               CURRENT 2 1/3 2 1/8     Long Term Goals: To be accomplished in 16 treatments:               1 patient will have FOTO 61 to show significant improved function with light ADLS at home               EVAL 28               CURRENT               2 patient will have PROM /AAROM right shoulder F 135 and abd 135 and ER 65 to aid with increase tolerance to ADLS and activities at AROM phase               EVAL  Right shoulder PROM F 90, abd 90 and ER 53 in slight abduction               [id number]               3 patient will report overall 50% improvement to aid with increase tolerance to ADLS and work demands               EVAL                [id number]    PLAN  [x]  Upgrade activities as tolerated     [x]  Continue plan of care  [x]  Update interventions per flow sheet       []  Discharge due to:_  []  Other:_      Alonzo Bravo 1/8/2020  5:39 PM    Future Appointments   Date Time Provider Bryant Zavaleta   1/14/2020  3:30 PM Wileen Grange [facility]   1/15/2020  5:00 PM Binu Gerhard [facility]   1/17/2020  3:00 PM JOE Jackson 71   1/20/2020  4:00 PM Binu Gerhard [facility]   1/22/2020  5:00 PM Wileen Grange [facility]   1/24/2020  4:00 PM Shannona Sprain, PT [facility]   1/27/2020  4:30 PM Wileen Grange [facility]   1/29/2020  5:00 PM Wileen Grange [facility]   1/31/2020  4:00 PM Rolinda Sprain, PT [facility]   2/13/2020  2:40 PM [facility]   2[facility]13/2020  [facility]   7/6/2020  3:30 PM Foreign Hua  [address]

## 2020-01-14 ENCOUNTER — HOSPITAL ENCOUNTER (OUTPATIENT)
Dept: PHYSICAL THERAPY | Age: 50
Discharge: HOME | End: 2020-01-14
Payer: OTHER GOVERNMENT

## 2020-01-14 PROCEDURE — 97110 THERAPEUTIC EXERCISES: CPT

## 2020-01-14 PROCEDURE — 97140 MANUAL THERAPY 1/> REGIONS: CPT

## 2020-01-14 PROCEDURE — 97016 VASOPNEUMATIC DEVICE THERAPY: CPT

## 2020-01-14 NOTE — PROGRESS NOTES
PT DAILY TREATMENT NOTE 10-18    Patient Name: Caitlin Silva  Date:2020  : 1970  [x]  Patient  Verified  Payor:  / Plan: [facility] / Product Type:  /    In time:332  Out time:425  Total Treatment Time (min): 53  Visit #: 5 of 16    Treatment Area: Right shoulder pain  SUBJECTIVE  Pain Level (0-10 scale): 2  Any medication changes, allergies to medications, adverse drug reactions, diagnosis change, or new procedure performed?: [x] No    [] Yes (see summary sheet for update)  Subjective functional status/changes:   [] No changes reported  Pt reported pain woke him up  morning so he is unsure if he rolled onto it. He feel stiff today. He felt a pop in shoulder and felt better. HEP is being completed. He will see PA on Friday. He is still in shoulder. OBJECTIVE    Modality rationale: decrease inflammation and decrease pain to improve the patients ability to complete functional activities.    Min Type Additional Details    [] Estim:  []Unatt       []IFC  []Premod                        []Other:  []w/ice   []w/heat  Position:  Location:    [] Estim: []Att    []TENS instruct  []NMES                    []Other:  []w/US   []w/ice   []w/heat  Position:  Location:    []  Traction: [] Cervical       []Lumbar                       [] Prone          []Supine                       []Intermittent   []Continuous Lbs:  [] before manual  [] after manual    []  Ultrasound: []Continuous   [] Pulsed                           []1MHz   []3MHz W/cm2:  Location:    []  Iontophoresis with dexamethasone         Location: [] Take home patch   [] In clinic    []  Ice     []  heat  []  Ice massage  []  Laser   []  Anodyne Position:  Location:    []  Laser with stim  []  Other:  Position:  Location:   15 [x]  Vasopneumatic Device  R shoulder  Semi reclined Pressure:       [] lo [x] med [] hi   Temperature: [x] lo [] med [] hi   [x] Skin assessment post-treatment:  [x]intact []redness- no adverse reaction    []redness  adverse reaction:     23 min Therapeutic Exercise:  [x] See flow sheet :   Rationale: increase ROM, increase strength and PROM R shoulder flex/abd to improve the patients ability to complete functional activities. 15 min Manual Therapy:  Supine R shoulder A/P and inferior joint glides grade 1-2; R subscapular/lats mm STM/DTM; L sidelying: STM/DTM infraspinatus/teres minor, lats, lower trap    Rationale: decrease pain, increase ROM, increase tissue extensibility, decrease trigger points and increase postural awareness to complete self care with decreased pain. With   [x] TE   [] TA   [] neuro   [] other: Patient Education: [x] Review HEP    [] Progressed/Changed HEP based on:   [] positioning   [] body mechanics   [] transfers   [] heat/ice application    [] other:      Other Objective/Functional Measures:   Supine PROM R shoulder F 130 Abd 96     Pain Level (0-10 scale) post treatment: 1    ASSESSMENT/Changes in Function: Pt is progressing in PROM with each skilled PT intervention. Pt reported decreased tightness/pain and improved mobility after completion of manual therapy. Progress gripper to increase  strength with good tolerance. Tech helped to supervise some of the exercises. Verbal cues and demonstration required to use correct body mechanics. Encouraged pt to complete HEP daily to help to continue to progress PT interventions to improve patient's ability to complete functional and community task independently. At the conclusion of the session, pt reported decreased pain after game ready device for pain management.     Patient will continue to benefit from skilled PT services to modify and progress therapeutic interventions, address functional mobility deficits, address ROM deficits, address strength deficits, analyze and address soft tissue restrictions, analyze and cue movement patterns, analyze and modify body mechanics/ergonomics and assess and modify postural abnormalities to attain remaining goals.      [x]  See Plan of Care  []  See progress note/recertification  []  See Discharge Summary         Progress towards goals / Updated goals:  Short Term Goals: To be accomplished in 5 treatments:               1 patient will have established and be I with HEP to aid with self management at discharge               EVAL issued               CURRENT completing 1/3 1/8               2 patient will have pain 1-2/10 to aid with increase tolerance to manual therapy for regaining ROM                 EVAL 2               CURRENT 2 1/3 2 1/8     Long Term Goals: To be accomplished in 16 treatments:               1 patient will have FOTO 61 to show significant improved function with light ADLS at home               EVAL 28               CURRENT               2 patient will have PROM /AAROM right shoulder F 135 and abd 135 and ER 65 to aid with increase tolerance to ADLS and activities at AROM phase               EVAL  Right shoulder PROM F 90, abd 90 and ER 53 in slight abduction               [id number] Supine PROM R shoulder F 130 Abd 96 1/14               3 patient will report overall 50% improvement to aid with increase tolerance to ADLS and work demands               EVAL                [id number]    PLAN  [x]  Upgrade activities as tolerated     [x]  Continue plan of care  [x]  Update interventions per flow sheet       []  Discharge due to:_  []  Other:_      Anyi Guerinn 1/14/2020  3:34 PM    Future Appointments   Date Time Provider Bryant Zavaleta   1/15/2020  5[facility]   1/17/2020  3:00 PM JOE Olea Carlos [address]   1/20/2020  4:00 PM Leni Blanchard [facility]   1/22/2020  5:00 PM Leni Blanchard [facility]   1/24/2020  4:00 PM [facility]   1/27/2020  4:30 PM Leni Blanchard [facility]   1/29/2020  5:00 PM Leni Blanchard [facility]   1/31/2020  4:00 PM Gurvinder Tovar, SVETA [facility]   2/13/2020  2:40 PM Leoncio Carrington NURSE [facility]   2/13/2020  3:00 PM [facility] XRAY [facility]   7/6/2020  3:30 PM Kelin Rothman  [address]

## 2020-01-15 ENCOUNTER — HOSPITAL ENCOUNTER (OUTPATIENT)
Dept: PHYSICAL THERAPY | Age: 50
Discharge: HOME | End: 2020-01-15
Payer: OTHER GOVERNMENT

## 2020-01-15 PROCEDURE — 97110 THERAPEUTIC EXERCISES: CPT

## 2020-01-15 PROCEDURE — 97140 MANUAL THERAPY 1/> REGIONS: CPT

## 2020-01-15 NOTE — PROGRESS NOTES
PT DAILY TREATMENT NOTE 10-18    Patient Name: Jeison Morrissey  Date:1/15/2020  : 1970  [x]  Patient  Verified  Payor:  / Plan: [facility]ION / Product Type:  /    In time:450  Out time:540  Total Treatment Time (min): 50  Visit #: 6 of 16    Treatment Area:Right shoulder pain  SUBJECTIVE  Pain Level (0-10 scale): 2  Any medication changes, allergies to medications, adverse drug reactions, diagnosis change, or new procedure performed?: [x] No    [] Yes (see summary sheet for update)  Subjective functional status/changes:   [] No changes reported  Pt reports his arm felt less tight this morning and some soreness. 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 He wore sling the entire day. OBJECTIVE    Modality rationale: decrease pain and increase tissue extensibility to improve the patients ability to complete functional activities.    Min Type Additional Details    [] Estim:  []Unatt       []IFC  []Premod                        []Other:  []w/ice   []w/heat  Position:  Location:    [] Estim: []Att    []TENS instruct  []NMES                    []Other:  []w/US   []w/ice   []w/heat  Position:  Location:    []  Traction: [] Cervical       []Lumbar                       [] Prone          []Supine                       []Intermittent   []Continuous Lbs:  [] before manual  [] after manual    []  Ultrasound: []Continuous   [] Pulsed                           []1MHz   []3MHz W/cm2:  Location:    []  Iontophoresis with dexamethasone         Location: [] Take home patch   [] In clinic   10 []  Ice     [x]  heat  []  Ice massage  []  Laser   []  Anodyne Position: semi reclined  Location: R shoulder    []  Laser with stim  []  Other:  Position:  Location:    []  Vasopneumatic Device Pressure:       [] lo [] med [] hi   Temperature: [] lo [] med [] hi   [x] Skin assessment post-treatment:  [x]intact []redness- no adverse reaction    []redness  adverse reaction:     25 min Therapeutic Exercise:  [x] See flow sheet :   Rationale: increase ROM, increase strength and PROM R shoulder F/ABD to improve the patients ability to complete functional activities. 15 min Manual Therapy:  Supine R shoulder A/P and inferior joint glides grade 2-3, STM/DTM lats, deltoids; L sidelying R infraspinatus, lats, periscapular STM/DTM   Rationale: decrease pain, increase ROM, increase tissue extensibility and decrease trigger points to complete job related task          With   [x] TE   [x] TA   [] neuro   [] other: Patient Education: [x] Review HEP    [] Progressed/Changed HEP based on:   [] positioning   [] body mechanics   [] transfers   [] heat/ice application    [] other:      Other Objective/Functional Measures:   PROM supine F 130 Abd 100     Pain Level (0-10 scale) post treatment: 1    ASSESSMENT/Changes in Function: Pt showed improvement in PROM ABD of R shoulder from yesterday. No change in flexion. Pt reported after manual therapy he had decreased pain and improved mobility. Due to tightness, MHP was used with pt report of feeling better after completion. Verbal cues and demonstration required to use correct body mechanics. Encouraged pt to complete HEP daily to help to continue to progress PT interventions to improve patient's ability to complete functional and community task independently. At the conclusion of the session, pt reported decreased pain. Patient will continue to benefit from skilled PT services to modify and progress therapeutic interventions, address functional mobility deficits, address ROM deficits, address strength deficits, analyze and address soft tissue restrictions, analyze and cue movement patterns, analyze and modify body mechanics/ergonomics and assess and modify postural abnormalities to attain remaining goals.      [x]  See Plan of Care  []  See progress note/recertification  []  See Discharge Summary         Progress towards goals / Updated goals:  Short Term Goals: To be accomplished in 5 treatments:               1 patient will have established and be I with HEP to aid with self management at discharge               EVAL issued               CURRENT completing 1/3 1/8 1/15               2 patient will have pain 1-2/10 to aid with increase tolerance to manual therapy for regaining ROM                 EVAL 2               CURRENT 2 1/3 2 1/8 2 1/15     Long Term Goals: To be accomplished in 16 treatments:               1 patient will have FOTO 61 to show significant improved function with light ADLS at home               EVAL 28               CURRENT               2 patient will have PROM /AAROM right shoulder F 135 and abd 135 and ER 65 to aid with increase tolerance to ADLS and activities at AROM phase               EVAL  Right shoulder PROM F 90, abd 90 and ER 53 in slight abduction               CURRENT Supine PROM R shoulder F 130 Abd 96        1/14 PROM supine F 130 Abd 100 1/15               3 patient will report overall 50% improvement to aid with increase tolerance to ADLS and work demands               EVAL                [id number]    PLAN  [x]  Upgrade activities as tolerated     [x]  Continue plan of care  [x]  Update interventions per flow sheet       []  Discharge due to:_  []  Other:_      Ramu Miller 1/15/2020  5:14 PM    Future Appointments   Date Time Provider Bryant Zavaleta   1/17/2020  3:00 PM Shereen Madison Carlos [address]   1/20/2020  4:00 PM Cassell Moritz [facility]   1/22/2020  5:00 PM Eric Alert [facility]   1/24/2020  4:00 PM Cara Gooden PT [facility]   1/27/2020  4:30 PM Eric Alert [facility]   1/29/2020  5:00 PM Eric Alert [facility]   1/31/2020  4:00 PM Cara Gooden PT [facility]   2/13/2020  2:40 PM [facility] Open[facility]   2/13/2020  3:00 PM [facility]   7/6/2020  3:30 PM Adalberto Mary  [address]

## 2020-01-17 ENCOUNTER — OFFICE VISIT (OUTPATIENT)
Dept: ORTHOPEDIC SURGERY | Age: 50
End: 2020-01-17

## 2020-01-17 VITALS
HEART RATE: 85 BPM | HEIGHT: 67 IN | OXYGEN SATURATION: 98 % | DIASTOLIC BLOOD PRESSURE: 82 MMHG | WEIGHT: 290 LBS | BODY MASS INDEX: 45.52 KG/M2 | TEMPERATURE: 97.6 F | SYSTOLIC BLOOD PRESSURE: 142 MMHG | RESPIRATION RATE: 15 BRPM

## 2020-01-17 DIAGNOSIS — S46.811A FULL THICKNESS TEAR OF RIGHT SUBSCAPULARIS TENDON: Primary | ICD-10-CM

## 2020-01-17 NOTE — PROGRESS NOTES
Glo Morales  1970     HISTORY OF PRESENT ILLNESS  Glo Morales is a 54 y.o. male who presents today for evaluation s/p Right shoulder arthroscopic subscapularis repair on 12/20/19. Patient has been going to PT. Describes pain as a 2/10. Margarita Orestes Patient denies any fever, chills, chest pain, shortness of breath or calf pain. The remainder of the review of systems is negative. There are no new illness or injuries to report since last seen in the office. No changes in medications, allergies, social or family history. PHYSICAL EXAM:   Visit Vitals  /82 (BP 1 Location: Left arm, BP Patient Position: Sitting)   Pulse 85   Temp 97.6 °F (36.4 °C) (Oral)   Resp 15   Ht 5' 7\" (1.702 m)   Wt 290 lb (131.5 kg)   SpO2 98%   BMI 45.42 kg/m²      The patient is a well-developed, well-nourished male in no acute distress. The patient is alert and oriented times three. The patient appears to be well groomed. Mood and affect are normal.  ORTHOPEDIC EXAM of right shoulder:  Inspection: swelling not present,  Bruising not present  Incisions well healed  Passive glenohumeral abduction 0-90 degrees  Stability: Stable  Strength: n/a  2+ distal pulses    IMPRESSION:  S/P Right shoulder arthroscopic subscapularis repair    PLAN:   1. Patient improving post operatively  2. willcont with PT. Start active assist in 1 week. arom in 2 weeks. D/c sling in 2 weeks. 3. Stressed no increases in pain.  No lifting pushing or pulling    RTC 4 weeks    MADELINE Johnson [facility]

## 2020-01-17 NOTE — PROGRESS NOTES
1. Have you been to the ER, urgent care clinic since your last visit? Hospitalized since your last visit? No    2. Have you seen or consulted any other health care providers outside of the [address] [place] [address] since your last visit? Include any pap smears or colon screening.  No

## 2020-01-20 ENCOUNTER — HOSPITAL ENCOUNTER (OUTPATIENT)
Dept: PHYSICAL THERAPY | Age: 50
Discharge: HOME | End: 2020-01-20
Payer: OTHER GOVERNMENT

## 2020-01-20 PROCEDURE — 97530 THERAPEUTIC ACTIVITIES: CPT

## 2020-01-20 PROCEDURE — 97110 THERAPEUTIC EXERCISES: CPT

## 2020-01-20 PROCEDURE — 97140 MANUAL THERAPY 1/> REGIONS: CPT

## 2020-01-20 NOTE — PROGRESS NOTES
PT DAILY TREATMENT NOTE 10-18    Patient Name: Stevie Wetzel  Date:2020  : 1970  [x]  Patient  Verified  Payor:  / Plan: [facility] REGION / Product Type:  /    In time:327  Out time:430  Total Treatment Time (min): 63  Visit #: 7 of 16    Treatment Area: Right shoulder pain  SUBJECTIVE  Pain Level (0-10 scale): 1  Any medication changes, allergies to medications, adverse drug reactions, diagnosis change, or new procedure performed?: [x] No    [] Yes (see summary sheet for update)  Subjective functional status/changes:   [] No changes reported  Pt saw doctor and was given new script. He has had tightness in neck and headache all weekend. Feeling better today. He presents in a sling. OBJECTIVE    Modality rationale: decrease pain and increase tissue extensibility to improve the patients ability to complete functional activities.    Min Type Additional Details    [] Estim:  []Unatt       []IFC  []Premod                        []Other:  []w/ice   []w/heat  Position:  Location:    [] Estim: []Att    []TENS instruct  []NMES                    []Other:  []w/US   []w/ice   []w/heat  Position:  Location:    []  Traction: [] Cervical       []Lumbar                       [] Prone          []Supine                       []Intermittent   []Continuous Lbs:  [] before manual  [] after manual    []  Ultrasound: []Continuous   [] Pulsed                           []1MHz   []3MHz W/cm2:  Location:    []  Iontophoresis with dexamethasone         Location: [] Take home patch   [] In clinic   10 []  Ice     [x]  heat  []  Ice massage  []  Laser   []  Anodyne Position: semi reclined  Location: R shoulder    []  Laser with stim  []  Other:  Position:  Location:    []  Vasopneumatic Device Pressure:       [] lo [] med [] hi   Temperature: [] lo [] med [] hi   [x] Skin assessment post-treatment:  [x]intact []redness- no adverse reaction    []redness  adverse reaction:     28 min Therapeutic Exercise:  [x] See flow sheet :   Rationale: increase ROM, increase strength and PROM R shoulder F/abd to improve the patients ability to complete functional activities. 10 min Therapeutic Activity:  [x]  See flow sheet :   Rationale: increase ROM, increase strength, improve coordination, increase proprioception and reassessment  to improve the patients ability to complete job related task. 15 min Manual Therapy:  Supine: R shoulder A/P, inferior, P/A grade 2-3, STM/DTM cervical suboccipitals, UT, LS, deltoid, lats c/s distraction    Rationale: decrease pain, increase ROM, increase tissue extensibility, decrease trigger points and increase postural awareness to complete self care          With   [x] TE   [x] TA   [] neuro   [] other: Patient Education: [x] Review HEP    [] Progressed/Changed HEP based on:   [] positioning   [] body mechanics   [] transfers   [] heat/ice application    [] other:      Other Objective/Functional Measures:      Pain Level (0-10 scale) post treatment: \"better\"    ASSESSMENT/Changes in Function: Manual therapy was performed to help reduce tightness due to pt report of increased headache occurrences and decreased sleep due to discomfort in c/s and shoulder with pt report of decreased tightness after treatment. Increased PROM was observed after completion of manual therapy. Tech helped to supervise some of the exercises. Verbal cues and demonstration required to use correct body mechanics. Encouraged pt to complete HEP daily to help to continue to progress PT interventions to improve patient's ability to complete functional and community task independently.   At the conclusion of the session, pt reported decreased pain    Patient will continue to benefit from skilled PT services to modify and progress therapeutic interventions, address functional mobility deficits, address ROM deficits, address strength deficits, analyze and address soft tissue restrictions, analyze and cue movement patterns, analyze and modify body mechanics/ergonomics and assess and modify postural abnormalities to attain remaining goals.      [x]  See Plan of Care  []  See progress note/recertification  []  See Discharge Summary         Progress towards goals / Updated goals:  Short Term Goals: To be accomplished in 5 treatments:               1 patient will have established and be I with HEP to aid with self management at discharge               EVAL issued               CURRENT completing 1/3 1/8 1/15 1/20               2 patient will have pain 1-2/10 to aid with increase tolerance to manual therapy for regaining ROM                 EVAL 2               CURRENT 2 1/3 2 1/8 2 1/15 1 1/20     Long Term Goals: To be accomplished in 16 treatments:               1 patient will have FOTO 61 to show significant improved function with light ADLS at home               EVAL 28               CURRENT               2 patient will have PROM /AAROM right shoulder F 135 and abd 135 and ER 65 to aid with increase tolerance to ADLS and activities at AROM phase               EVAL  Right shoulder PROM F 90, abd 90 and ER 53 in slight abduction               [id number] Supine PROM R shoulder F 130 Abd 96        1/14 PROM supine F 130 Abd 100 1/15               3 patient will report overall 50% improvement to aid with increase tolerance to ADLS and work demands               EVAL                [id number]       PLAN  [x]  Upgrade activities as tolerated     [x]  Continue plan of care  [x]  Update interventions per flow sheet       []  Discharge due to:_  [x]  Other:PN ISAIAS Miller 1/20/2020  3:29 PM    Future Appointments   Date Time Provider Bryant Zavaleta   1/20/2020  4:00 PM Cassell Moritz [facility]   1/22/2020  5:00 PM Cassell Moritz [facility]   1/24/2020  4:00 PM Cara Gooden, PT [facility]   1/27/2020  4:30 PM Cassell Moritz [facility]   1/29/2020  5:00 PM Cassell Moritz [facility]   1/31/2020  4:00 PM Cara Gooden, PT [facility]   2/13/2020  2:40 PM [facility]   2/13/2020  3:00 PM [facility] XRAY [facility]   2/14/2020  3:00 PM JOE [address]   7/6/2020  3:30 PM Adalberto Mary  [address]

## 2020-01-22 ENCOUNTER — HOSPITAL ENCOUNTER (OUTPATIENT)
Dept: PHYSICAL THERAPY | Age: 50
Discharge: HOME | End: 2020-01-22
Payer: OTHER GOVERNMENT

## 2020-01-22 PROCEDURE — 97140 MANUAL THERAPY 1/> REGIONS: CPT

## 2020-01-22 PROCEDURE — 97530 THERAPEUTIC ACTIVITIES: CPT

## 2020-01-22 PROCEDURE — 97110 THERAPEUTIC EXERCISES: CPT

## 2020-01-22 PROCEDURE — 97016 VASOPNEUMATIC DEVICE THERAPY: CPT

## 2020-01-22 NOTE — PROGRESS NOTES
PT DAILY TREATMENT NOTE 10-18    Patient Name: Sandeep Valdes  Date:2020  : 1970  [x]  Patient  Verified  Payor:  / Plan: [facility] REGION / Product Type:  /    In time:456  Out time:550  Total Treatment Time (min): 54  Visit #: 8 of 16    Treatment Area: Right shoulder pain  SUBJECTIVE  Pain Level (0-10 scale): \"stiff\"  Any medication changes, allergies to medications, adverse drug reactions, diagnosis change, or new procedure performed?: [x] No    [] Yes (see summary sheet for update)  Subjective functional status/changes:   [] No changes reported  Pt reports 20-25% improvement due to skilled PT interventions with improvements in improve mobility, decreased pain, and improved sleep. OBJECTIVE    Modality rationale: decrease inflammation and decrease pain to improve the patients ability to complete functional activities.    Min Type Additional Details    [] Estim:  []Unatt       []IFC  []Premod                        []Other:  []w/ice   []w/heat  Position:  Location:    [] Estim: []Att    []TENS instruct  []NMES                    []Other:  []w/US   []w/ice   []w/heat  Position:  Location:    []  Traction: [] Cervical       []Lumbar                       [] Prone          []Supine                       []Intermittent   []Continuous Lbs:  [] before manual  [] after manual    []  Ultrasound: []Continuous   [] Pulsed                           []1MHz   []3MHz W/cm2:  Location:    []  Iontophoresis with dexamethasone         Location: [] Take home patch   [] In clinic    []  Ice     []  heat  []  Ice massage  []  Laser   []  Anodyne Position:  Location:    []  Laser with stim  []  Other:  Position:  Location:   15 [x]  Vasopneumatic Device   Pressure:       [] lo [] med [] hi   Temperature: [] lo [] med [] hi   [x] Skin assessment post-treatment:  [x]intact []redness- no adverse reaction    []redness - adverse reaction:     15 min Therapeutic Exercise:  [x] See flow sheet : Rationale: increase ROM, increase strength and PROM R shoulder F/ABD to improve the patients ability to complete functional activities. 14 min Therapeutic Activity:  [x]  See flow sheet :   Rationale: reassessment, FOTO,  strength  to improve the patients ability to complete job related task. 10 min Manual Therapy:  Supine R shoulder A/P, P/A, inferior joint glides grade 2-3, STM to deltoid/biceps   Rationale: decrease pain, increase ROM and decrease trigger points to complete self care          With   [x] TE   [x] TA   [] neuro   [] other: Patient Education: [x] Review HEP    [] Progressed/Changed HEP based on:   [] positioning   [] body mechanics   [] transfers   [] heat/ice application    [] other:      Other Objective/Functional Measures:   PROM R shoulder F 130 Abd 105   After joint mobs F 137 Abd 115    Pain Level (0-10 scale) post treatment: 0    ASSESSMENT/Changes in Function: Pt is showing improvement in PROM compared to previous session. With new doctor script, will begin AAROM next week; pt in agreement. Pt is able to show good tolerance to exercises. Assessed PROM before and after manual with improvement noted. Verbal cues and demonstration required to use correct body mechanics. Encouraged pt to complete HEP daily to help to continue to progress PT interventions to improve patient's ability to complete functional and community task independently. At the conclusion of the session, pt reported no pain. Patient will continue to benefit from skilled PT services to modify and progress therapeutic interventions, address functional mobility deficits, address ROM deficits, address strength deficits, analyze and address soft tissue restrictions, analyze and cue movement patterns, analyze and modify body mechanics/ergonomics and assess and modify postural abnormalities to attain remaining goals.      []  See Plan of Care  [x]  See progress note/recertification  []  See Discharge Summary Progress towards goals / Updated goals:  Short Term Goals: To be accomplished in 5 treatments:               1 patient will have established and be I with HEP to aid with self management at discharge               EVAL issued               CURRENT completing 1/3 1/8 1/15 1/20 MET 1/22               2 patient will have pain 1-2/10 to aid with increase tolerance to manual therapy for regaining ROM                 EVAL 2               CURRENT 2 1/3 2 1/8 2 1/15 1 1/20 worst pain in last week 6/10 average 2/10 1/22 progressing     Long Term Goals: To be accomplished in 16 treatments:               1 patient will have FOTO 61 to show significant improved function with light ADLS at home               EVAL 28               [id number] 1/22               2 patient will have PROM /AAROM right shoulder F 135 and abd 135 and ER 65 to aid with increase tolerance to ADLS and activities at AROM phase               EVAL  Right shoulder PROM F 90, abd 90 and ER 53 in slight abduction               CURRENT Supine PROM R shoulder F 130 Abd 96        1/14 PROM supine F 130 Abd 100 1/15 PROM R shoulder F 130 Abd 105 1/22               3 patient will report overall 50% improvement to aid with increase tolerance to ADLS and work demands               EVAL                [id number] 20-25% progressing 1/22    PLAN  [x]  Upgrade activities as tolerated     [x]  Continue plan of care  [x]  Update interventions per flow sheet       []  Discharge due to:_  []  Other:_      Maxcine Poag 1/22/2020  4:58 PM    Future Appointments   Date Time Provider Bryant Zavaleta   1/22/2020  5:00 PM Pia Samuel [facility]   1/27/2020  4:30 PM Nonnie Sauce [facility]   1/29/2020  5:00 PM Nonnie Sauce [facility]   1/31/2020  1:20 PM [facility]   1/31/2020  1:30 PM [facility]   2/4/2020  5:00 PM Pia Samuel [facility]   2/5/2020  6:00 PM Nonnie Sauce [facility]   2/10/2020  4:30 PM Pia Samuel [address]   2/12/2020  5:30 PM [facility] [address]   2/14/2020  3:00 PM JOE Trejo    7/6/2020  3:30 PM Lourdes Song  [address]

## 2020-01-22 NOTE — PROGRESS NOTES
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], Right shoulder pain  Payor: CHERI / Plan: Carlos Minor 74 / Product Type: [place] /  Onset Date:May /2019 , now P/O 19       Prior Hospitalization: see medical history Provider#: [id number]   Medications: Verified on Patient Summary List    Comorbidities: , arthritis, MFPS, kidney disease, HTN  Prior Level of Function:I all areas of ADLs and activities, no AD use, working, household and community activities    Visits from [facility] Care: 8    Missed Visits: 0    Status at Evaluation/Last Progress Note: Pt has completed 8 skilled PT interventions to address R shoulder pain s/p repair. Pt reports 20-25% improvement due to skilled PT interventions with improvements in improve mobility, decreased pain, and improved sleep. Pt is showing improvement in PROM compared to previous session. With new doctor script, will begin AAROM next week; pt in agreement. Pt is able to show good tolerance to exercises. Improved FOTO score noted. Patient will continue to benefit from skilled PT services to modify and progress therapeutic interventions, address functional mobility deficits, address ROM deficits, address strength deficits, analyze and address soft tissue restrictions, analyze and cue movement patterns, analyze and modify body mechanics/ergonomics and assess and modify postural abnormalities to attain remaining goals.   Progress towards Goals:   Short Term Goals: To be accomplished in 5 treatments:               1 patient will have established and be I with HEP to aid with self management at discharge               EVAL issued               ZSWKLFW [id number] 1/3 1/8 1/15 1/20 MET 1/22               2 patient will have pain 1-2/10 to aid with increase tolerance to manual therapy for regaining ROM                 EVAL 2               CURRENT 2 1/3 2 1/8 2 1/15 1 1/20 worst pain in last week 6/10 average 2/10 1/22 progressing     Long Term Goals: To be accomplished in 16 treatments:               1 patient will have FOTO 61 to show significant improved function with light ADLS at home               EVAL 28               [id number] 1/22               2 patient will have PROM /AAROM right shoulder F 135 and abd 135 and ER 65 to aid with increase tolerance to ADLS and activities at AROM phase               EVAL  Right shoulder PROM F 90, abd 90 and ER 53 in slight abduction               CURRENT Supine PROM R shoulder F 130 Abd 96        1/14 PROM supine F 130 Abd 100 1/15 PROM R shoulder F 130 Abd 105 1/22               3 patient will report overall 50% improvement to aid with increase tolerance to ADLS and work demands               SHOSHANAAL                HOGZOLU 20-25% progressing 1/22  Goals: to be achieved in 8 additional treatments:  1. patient will have FOTO 61 to show significant improved function with light ADLS at home   PN 40  2. patient will have AROM /AAROM right shoulder F 135 and abd 135 and ER 65 to aid with increase tolerance to ADLS and activities at AROM phase   PN will begin next week  3. patient will report overall 60% improvement to aid with increase tolerance to ADLS and work demands   PN: 20-25%  4.  patient will have pain 1-2/10 to aid with increase tolerance to manual therapy for regaining ROM    PN: worst pain in last week 6/10 average 2/10   ASSESSMENT/RECOMMENDATIONS:  []Continue therapy per initial plan/protocol at a frequency of  2 x per week for 8 additional treatments   []Continue therapy with the following recommended changes:_____________________ _____________________________________________________________________  []Discontinue therapy progressing towards or have reached established goals  []Discontinue therapy due to lack of appreciable progress towards goals  []Discontinue therapy due to lack of attendance or compliance  []Await Physician's recommendations/decisions regarding therapy  []Other:________________________________________________________________    Thank you for this referral. Babar Jamilah 1/22/2020 5:53 PM  NOTE TO PHYSICIAN:  [address] AND   FAX TO [facility]: [phone]  If you are unable to process this request in 24 hours please contact our office: [phone]    ? I have read the above report and request that my patient continue as recommended. ? I have read the above report and request that my patient continue therapy with the following changes/special instructions:_____________________________________  ? I have read the above report and request that my patient be discharged from therapy.     [de-identified] Signature:____________Date:_________TIME:________    [employer], Date and Time must be completed for valid certification **

## 2020-01-24 ENCOUNTER — APPOINTMENT (OUTPATIENT)
Dept: PHYSICAL THERAPY | Age: 50
End: 2020-01-24
Payer: OTHER GOVERNMENT

## 2020-01-27 ENCOUNTER — HOSPITAL ENCOUNTER (OUTPATIENT)
Dept: PHYSICAL THERAPY | Age: 50
Discharge: HOME | End: 2020-01-27
Payer: OTHER GOVERNMENT

## 2020-01-27 PROCEDURE — 97140 MANUAL THERAPY 1/> REGIONS: CPT

## 2020-01-27 PROCEDURE — 97530 THERAPEUTIC ACTIVITIES: CPT

## 2020-01-27 PROCEDURE — 97110 THERAPEUTIC EXERCISES: CPT

## 2020-01-27 NOTE — PROGRESS NOTES
PT DAILY TREATMENT NOTE 10-18    Patient Name: Angelita Cortez  Date:2020  : 1970  [x]  Patient  Verified  Payor:  / Plan: [facility]  [facility] REGION / Product Type:  /    In time:434  Out time:535  Total Treatment Time (min): 61  Visit #: 1 of 8    Treatment Area: Right shoulder pain  SUBJECTIVE  Pain Level (0-10 scale): 3.5  Any medication changes, allergies to medications, adverse drug reactions, diagnosis change, or new procedure performed?: [x] No    [] Yes (see summary sheet for update)  Subjective functional status/changes:   [] No changes reported  Pt reported that he has been having R shoulder pain into triceps region since he woke up Saturday morning. He has been doing HEP. He presents with sling. OBJECTIVE    Modality rationale: decrease pain and increase tissue extensibility to improve the patients ability to complete functional activities.    Min Type Additional Details    [] Estim:  []Unatt       []IFC  []Premod                        []Other:  []w/ice   []w/heat  Position:  Location:    [] Estim: []Att    []TENS instruct  []NMES                    []Other:  []w/US   []w/ice   []w/heat  Position:  Location:    []  Traction: [] Cervical       []Lumbar                       [] Prone          []Supine                       []Intermittent   []Continuous Lbs:  [] before manual  [] after manual    []  Ultrasound: []Continuous   [] Pulsed                           []1MHz   []3MHz W/cm2:  Location:    []  Iontophoresis with dexamethasone         Location: [] Take home patch   [] In clinic   15 []  Ice     [x]  heat  []  Ice massage  []  Laser   []  Anodyne Position: supine  Location: R shoulder    []  Laser with stim  []  Other:  Position:  Location:    []  Vasopneumatic Device Pressure:       [] lo [] med [] hi   Temperature: [] lo [] med [] hi   [x] Skin assessment post-treatment:  [x]intact []redness- no adverse reaction    []redness - adverse reaction:     21 min Therapeutic Exercise:  [x] See flow sheet :   Rationale: increase ROM, increase strength and PROM R shoulder F/abd to improve the patients ability to complete functional activities. 10 min Therapeutic Activity:  [x]  See flow sheet :   Rationale: increase ROM, increase strength, improve coordination, increase proprioception and reassessment  to improve the patients ability to complete job related task. 15 min Manual Therapy:  L sidelying: STM/DTM R triceps, lats, teres minor/major, infraspinatus, periscapular mm; supine R shoulder A/P and inferior joint glides grade 3-4   Rationale: decrease pain, increase ROM, increase tissue extensibility, decrease trigger points and increase postural awareness to complete self care          With   [x] TE   [x] TA   [] neuro   [] other: Patient Education: [x] Review HEP    [] Progressed/Changed HEP based on:   [] positioning   [] body mechanics   [] transfers   [] heat/ice application    [] other:      Other Objective/Functional Measures:   Supine PROM R shoulder F 147      Pain Level (0-10 scale) post treatment: \"stiff\"    ASSESSMENT/Changes in Function: Pt is showing improvement in ROM. He has moved to [facility] with slight difficulty noted in abduction. Manual therapy was performed to reduce pain and tightness with pt report of relief after treatment. Verbal cues and demonstration required to use correct body mechanics. Encouraged pt to complete HEP daily to help to continue to progress PT interventions to improve patient's ability to complete functional and community task independently. At the conclusion of the session, pt reported decreased pain.     Patient will continue to benefit from skilled PT services to modify and progress therapeutic interventions, address functional mobility deficits, address ROM deficits, address strength deficits, analyze and address soft tissue restrictions, analyze and cue movement patterns, analyze and modify body mechanics/ergonomics and assess and modify postural abnormalities to attain remaining goals. [x]  See Plan of Care  [x]  See progress note/recertification  []  See Discharge Summary  Goals: to be achieved in 8 additional treatments:  1. patient will have FOTO 61 to show significant improved function with light ADLS at home              PN 40  2. patient will have AROM /AAROM right shoulder F 135 and abd 135 and ER 65 to aid with increase tolerance to ADLS and activities at AROM phase              PN will begin next week  3. patient will report overall 60% improvement to aid with increase tolerance to ADLS and work demands              PN: 20-25%  4.  patient will have pain 1-2/10 to aid with increase tolerance to manual therapy for regaining ROM               PN: worst pain in last week 6/10 average 2/10 3.5 1/27    PLAN  [x]  Upgrade activities as tolerated     [x]  Continue plan of care  [x]  Update interventions per flow sheet       []  Discharge due to:_  []  Other:_      Candelario Jackson 1/27/2020  5:20 PM    Future Appointments   Date Time Provider Bryant Zavaleta   1/29/2020  5:00 PM Zeus Cosby [facility]   1/31/2020  1:20 PM [facility] [place] [facility]   1/31/2020  1:30 PM [facility]   2/4/2020  5:00 PM Zeus Cosby [facility]   2/5/2020  6:00 PM James Watters [facility]   2/12/2020  5:30 PM [facility]   2/14/2020  3:00 PM JOE Walker [id number] [facility]   7/6/2020  3:30 PM Livier Mckeon  [address]

## 2020-01-29 ENCOUNTER — HOSPITAL ENCOUNTER (OUTPATIENT)
Dept: PHYSICAL THERAPY | Age: 50
Discharge: HOME | End: 2020-01-29
Payer: OTHER GOVERNMENT

## 2020-01-29 PROCEDURE — 97110 THERAPEUTIC EXERCISES: CPT

## 2020-01-29 PROCEDURE — 97140 MANUAL THERAPY 1/> REGIONS: CPT

## 2020-01-29 PROCEDURE — 97530 THERAPEUTIC ACTIVITIES: CPT

## 2020-01-29 NOTE — PROGRESS NOTES
PT DAILY TREATMENT NOTE 10-18    Patient Name: Fernanda Grissom  Date:2020  : 1970  [x]  Patient  Verified  Payor:  / Plan: [facility] REGION / Product Type:  /    In time:421  Out time:526  Total Treatment Time (min): 65  Visit #: 2 of 8    Treatment Area: Right shoulder pain  SUBJECTIVE  Pain Level (0-10 scale): stiff  Any medication changes, allergies to medications, adverse drug reactions, diagnosis change, or new procedure performed?: [x] No    [] Yes (see summary sheet for update)  Subjective functional status/changes:   [] No changes reported  Pt is doing better today. HEP is going well. OBJECTIVE    Modality rationale: decrease pain and increase tissue extensibility to improve the patients ability to complete functional activities.    Min Type Additional Details    [] Estim:  []Unatt       []IFC  []Premod                        []Other:  []w/ice   []w/heat  Position:  Location:    [] Estim: []Att    []TENS instruct  []NMES                    []Other:  []w/US   []w/ice   []w/heat  Position:  Location:    []  Traction: [] Cervical       []Lumbar                       [] Prone          []Supine                       []Intermittent   []Continuous Lbs:  [] before manual  [] after manual    []  Ultrasound: []Continuous   [] Pulsed                           []1MHz   []3MHz W/cm2:  Location:    []  Iontophoresis with dexamethasone         Location: [] Take home patch   [] In clinic   15 []  Ice     [x]  heat  []  Ice massage  []  Laser   []  Anodyne Position: semi reclined  Location: R shoulder    []  Laser with stim  []  Other:  Position:  Location:    []  Vasopneumatic Device Pressure:       [] lo [] med [] hi   Temperature: [] lo [] med [] hi   [x] Skin assessment post-treatment:  [x]intact []redness- no adverse reaction    []redness - adverse reaction:     25 min Therapeutic Exercise:  [x] See flow sheet :   Rationale: increase ROM, increase strength and PROM R shoulder F/ABD/ER to improve the patients ability to complete functional activities. 15 min Therapeutic Activity:  [x]  See flow sheet :   Rationale: increase ROM, increase strength, improve coordination, increase proprioception and reassesment  to improve the patients ability to complete job related task. 10 min Manual Therapy:  Supine R shoulder AP and inferior joint glides grade 3-4   Rationale: decrease pain, increase ROM and increase tissue extensibility to complete self care        With   [x] TE   [x] TA   [] neuro   [] other: Patient Education: [x] Review HEP    [] Progressed/Changed HEP based on:   [] positioning   [] body mechanics   [] transfers   [] heat/ice application    [] other:      Other Objective/Functional Measures:      Pain Level (0-10 scale) post treatment: 0    ASSESSMENT/Changes in Function: Added additional AAROM exercises today with good return demo. Pt presented with limited PROM at the start; however after completing joint mobs, improvement was noted and pt reported decreased stiffness. Tech helped to supervise some of the exercises. Verbal cues and demonstration required to use correct body mechanics. Encouraged pt to complete HEP daily to help to continue to progress PT interventions to improve patient's ability to complete functional and community task independently. At the conclusion of the session, pt reported no pain. Patient will continue to benefit from skilled PT services to modify and progress therapeutic interventions, address functional mobility deficits, address ROM deficits, address strength deficits, analyze and address soft tissue restrictions, analyze and cue movement patterns, analyze and modify body mechanics/ergonomics and assess and modify postural abnormalities to attain remaining goals.      []  See Plan of Care  [x]  See progress note/recertification  []  See Discharge Summary         Progress towards goals / Updated goals:  Goals: to be achieved in 8 additional treatments:  1. patient will have FOTO 61 to show significant improved function with light ADLS at home              PN 40  2. patient will have AROM /AAROM right shoulder F 135 and abd 135 and ER 65 to aid with increase tolerance to ADLS and activities at AROM phase              PN will begin next week  3. patient will report overall 60% improvement to aid with increase tolerance to ADLS and work demands              PN: 20-25%  4. patient will have pain 1-2/10 to aid with increase tolerance to manual therapy for regaining ROM               PN: worst pain in last week 6/10 average 2/10 3.5 1/27 stiff 1/29    PLAN  [x]  Upgrade activities as tolerated     [x]  Continue plan of care  [x]  Update interventions per flow sheet       []  Discharge due to:_  []  Other:_      Logan Esparza 1/29/2020  4:41 PM    Future Appointments   Date Time Provider Bryant Zavaleta   1/29/2020  5:00 PM Jim Lara [facility]   1/31/2020  1:20 PM [facility]   [facility]   2/4/2020  5:00 PM Lauren Meraz [facility]   2/5/2020  6:00 PM Ray Watters [facility]   2/12/2020  5:30 PM Jim Lara [facility]   2/14/2020  3:00 PM JOE Rabgao [address]   7/6/2020  3:30 PM Rosalind Perea MD [facility]

## 2020-01-31 ENCOUNTER — APPOINTMENT (OUTPATIENT)
Dept: PHYSICAL THERAPY | Age: 50
End: 2020-01-31
Payer: OTHER GOVERNMENT

## 2020-02-04 ENCOUNTER — HOSPITAL ENCOUNTER (OUTPATIENT)
Dept: PHYSICAL THERAPY | Age: 50
Discharge: HOME | End: 2020-02-04
Payer: OTHER GOVERNMENT

## 2020-02-04 PROCEDURE — 97110 THERAPEUTIC EXERCISES: CPT

## 2020-02-04 PROCEDURE — 97140 MANUAL THERAPY 1/> REGIONS: CPT

## 2020-02-04 PROCEDURE — 97530 THERAPEUTIC ACTIVITIES: CPT

## 2020-02-04 NOTE — PROGRESS NOTES
PT DAILY TREATMENT NOTE 10-18    Patient Name: Claire Singh  Date:2020  : 1970  [x]  Patient  Verified  Payor: CHERI / Plan: [facility] / Product Type:  /    In time:441  Out time:545  Total Treatment Time (min): 59  Visit #: 3 of 8    Treatment Area: Right shoulder pain  SUBJECTIVE  Pain Level (0-10 scale): stiff  Any medication changes, allergies to medications, adverse drug reactions, diagnosis change, or new procedure performed?: [x] No    [] Yes (see summary sheet for update)  Subjective functional status/changes:   [] No changes reported  Pt reports he is stiff today. He has been doing HEP. He has a sinus infection. He is having a procedure for kidney stones this Thursday. OBJECTIVE    Modality rationale: decrease pain and increase tissue extensibility to improve the patients ability to complete functional activities.     Min Type Additional Details    [] Estim:  []Unatt       []IFC  []Premod                        []Other:  []w/ice   []w/heat  Position:  Location:    [] Estim: []Att    []TENS instruct  []NMES                    []Other:  []w/US   []w/ice   []w/heat  Position:  Location:    []  Traction: [] Cervical       []Lumbar                       [] Prone          []Supine                       []Intermittent   []Continuous Lbs:  [] before manual  [] after manual    []  Ultrasound: []Continuous   [] Pulsed                           []1MHz   []3MHz W/cm2:  Location:    []  Iontophoresis with dexamethasone         Location: [] Take home patch   [] In clinic   15 []  Ice     [x]  heat  []  Ice massage  []  Laser   []  Anodyne Position: semi reclined  Location: R shoulder    []  Laser with stim  []  Other:  Position:  Location:    []  Vasopneumatic Device Pressure:       [] lo [] med [] hi   Temperature: [] lo [] med [] hi   [x] Skin assessment post-treatment:  [x]intact []redness- no adverse reaction    []redness - adverse reaction:     31 min Therapeutic Exercise:  [x] See flow sheet :   Rationale: increase ROM, increase strength and PROM R shoulder F/ABD/ER to improve the patients ability to complete functional activities. 10 min Therapeutic Activity:  [x]  See flow sheet :   Rationale: increase ROM, increase strength, improve coordination, increase proprioception and reassessment  to improve the patients ability to complete job related task. 8 min Manual Therapy:  Supine R shoulder AP, PA, inferior joint mobs grade 3-4   Rationale: decrease pain, increase ROM and increase tissue extensibility to complete self care          With   [x] TE   [x] TA   [] neuro   [] other: Patient Education: [x] Review HEP    [] Progressed/Changed HEP based on:   [] positioning   [] body mechanics   [] transfers   [] heat/ice application    [] other:      Other Objective/Functional Measures:      Pain Level (0-10 scale) post treatment: \"better\"    ASSESSMENT/Changes in Function: Progressed pt to sidelying AROM per MD script with good tolerance. Pt is showing improvement in AAROM with pulleys and wand with each session. Manual therapy was performed to decrease tightness with pt reporting feeling better after treatment. Tech helped to supervise some of the exercises. Verbal cues and demonstration required to use correct body mechanics. Encouraged pt to complete HEP daily to help to continue to progress PT interventions to improve patient's ability to complete functional and community task independently. At the conclusion of the session, pt reported decreased stiffness. Patient will continue to benefit from skilled PT services to modify and progress therapeutic interventions, address functional mobility deficits, address ROM deficits, address strength deficits, analyze and address soft tissue restrictions, analyze and cue movement patterns, analyze and modify body mechanics/ergonomics and assess and modify postural abnormalities to attain remaining goals.      [x]  See Plan of Care  [x]  See progress note/recertification  []  See Discharge Summary         Progress towards goals / Updated goals:  Goals: to be achieved in 8 additional treatments:  1. patient will have FOTO 61 to show significant improved function with light ADLS at home              PN 40  2. patient will have AROM /AAROM right shoulder F 135 and abd 135 and ER 65 to aid with increase tolerance to ADLS and activities at AROM phase              PN will begin next week   CURRENT: sidelying AROM 2/4  3. patient will report overall 60% improvement to aid with increase tolerance to ADLS and work demands              PN: 20-25%  4. patient will have pain 1-2/10 to aid with increase tolerance to manual therapy for regaining ROM               PN: worst pain in last week 6/10 average 2/10 3.5 1/27 stiff 1/29 stiff 2/4    PLAN  [x]  Upgrade activities as tolerated     [x]  Continue plan of care  [x]  Update interventions per flow sheet       []  Discharge due to:_  []  Other:_      Beckey Cough 2/4/2020  5:14 PM    Future Appointments   Date Time Provider Bryant Zavaleta   2/5/2020  6:00 PM Simi Mcneil [address]   2/12/2020  5:30 PM Ysabel Fuller [facility] [address]   2/14/2020  3:00 PM JOE [address]   7/6/2020  3:30 PM Korey Hines  [address]

## 2020-02-05 ENCOUNTER — HOSPITAL ENCOUNTER (OUTPATIENT)
Dept: PHYSICAL THERAPY | Age: 50
Discharge: HOME | End: 2020-02-05
Payer: OTHER GOVERNMENT

## 2020-02-05 PROCEDURE — 97530 THERAPEUTIC ACTIVITIES: CPT

## 2020-02-05 PROCEDURE — 97110 THERAPEUTIC EXERCISES: CPT

## 2020-02-05 NOTE — PROGRESS NOTES
PT DAILY TREATMENT NOTE 10-18    Patient Name: Rupert Honor  Date:2020  : 1970  [x]  Patient  Verified  Payor:  / Plan: [facility]  [facility] REGION / Product Type:  /    In time:515  Out time:610  Total Treatment Time (min): 55  Visit #: 4 of 8    Treatment Area: Right shoulder pain  SUBJECTIVE  Pain Level (0-10 scale): 1  Any medication changes, allergies to medications, adverse drug reactions, diagnosis change, or new procedure performed?: [x] No    [] Yes (see summary sheet for update)  Subjective functional status/changes:   [] No changes reported  Pt reports he is feeling better today. HEP is being completed. OBJECTIVE    38 min Therapeutic Exercise:  [x] See flow sheet :   Rationale: increase ROM, increase strength and PROM R shoulder F/ABD/ER to improve the patients ability to complete functional activities. 10 min Therapeutic Activity:  [x]  See flow sheet :   Rationale: increase ROM, increase strength, improve coordination and increase proprioception  to improve the patients ability to complete functional activities. 7 min Manual Therapy:  supine R shoulder AP, PA, inferior joint mobs grade 3-4   Rationale: decrease pain, increase ROM and increase tissue extensibility to complete self care           With   [x] TE   [x] TA   [x] neuro   [] other: Patient Education: [x] Review HEP    [] Progressed/Changed HEP based on:   [] positioning   [] body mechanics   [] transfers   [] heat/ice application    [] other:      Other Objective/Functional Measures:      Pain Level (0-10 scale) post treatment: \"better\"    ASSESSMENT/Changes in Function: Added additional stretches to improve flexibility with good return demo. Pt was able to tolerate exercises better today compared to previous session and perform more reps which shows improvement. Tech helped to supervise some of the exercises. Verbal cues and demonstration required to use correct body mechanics.   Encouraged pt to complete HEP daily to help to continue to progress PT interventions to improve patient's ability to complete functional and community task independently. At the conclusion of the session, pt reported decreased stiffness. Patient will continue to benefit from skilled PT services to modify and progress therapeutic interventions, address functional mobility deficits, address ROM deficits, address strength deficits, analyze and address soft tissue restrictions, analyze and cue movement patterns, analyze and modify body mechanics/ergonomics and assess and modify postural abnormalities to attain remaining goals.      []  See Plan of Care  [x]  See progress note/recertification  []  See Discharge Summary         Progress towards goals / Updated goals:  Goals: to be achieved in 8 additional treatments:  1. patient will have FOTO 61 to show significant improved function with light ADLS at home              PN 40  2. patient will have AROM /AAROM right shoulder F 135 and abd 135 and ER 65 to aid with increase tolerance to ADLS and activities at AROM phase              PN will begin next week              CURRENT: sidelying AROM 2/4  3. patient will report overall 60% improvement to aid with increase tolerance to ADLS and work demands              PN: 20-25%  4. patient will have pain 1-2/10 to aid with increase tolerance to manual therapy for regaining ROM               PN: worst pain in last week 6/10 average 2/10 3.5 1/27 stiff 1/29 stiff 2/4 stiff 2/5    PLAN  [x]  Upgrade activities as tolerated     [x]  Continue plan of care  [x]  Update interventions per flow sheet       []  Discharge due to:_  []  Other:_      Hector Servin 2/5/2020  5:28 PM    Future Appointments   Date Time Provider Bryant Zavaleta   2/5/2020  6:00 PM Jose Dong [facility]   2/12/2020  5:30 PM Jose Dong [facility]   2/14/2020  3:00 PM JOE Ruiz 71   2/17/2020  6:00 PM Tye Foreman PT [facility]   2/26/2020 5:30 PM Isidra Levi [facility]   3/4/2020  6:00 PM Kenya Garcia [facility]   7/6/2020  3:30 PM Meryl Baldwin  [address]

## 2020-02-10 ENCOUNTER — APPOINTMENT (OUTPATIENT)
Dept: PHYSICAL THERAPY | Age: 50
End: 2020-02-10
Payer: OTHER GOVERNMENT

## 2020-02-12 ENCOUNTER — APPOINTMENT (OUTPATIENT)
Dept: PHYSICAL THERAPY | Age: 50
End: 2020-02-12
Payer: OTHER GOVERNMENT

## 2020-02-17 ENCOUNTER — APPOINTMENT (OUTPATIENT)
Dept: PHYSICAL THERAPY | Age: 50
End: 2020-02-17
Payer: OTHER GOVERNMENT

## 2020-02-21 ENCOUNTER — OFFICE VISIT (OUTPATIENT)
Dept: ORTHOPEDIC SURGERY | Age: 50
End: 2020-02-21

## 2020-02-21 VITALS
TEMPERATURE: 97.9 F | BODY MASS INDEX: 47.88 KG/M2 | DIASTOLIC BLOOD PRESSURE: 95 MMHG | HEART RATE: 86 BPM | SYSTOLIC BLOOD PRESSURE: 142 MMHG | OXYGEN SATURATION: 96 % | RESPIRATION RATE: 16 BRPM | WEIGHT: 287.4 LBS | HEIGHT: 65 IN

## 2020-02-21 DIAGNOSIS — S46.811A FULL THICKNESS TEAR OF RIGHT SUBSCAPULARIS TENDON: Primary | ICD-10-CM

## 2020-02-21 NOTE — PROGRESS NOTES
Kesha Dyson  1970     HISTORY OF PRESENT ILLNESS  Kesha Dyson is a 52 y.o. male who presents today for evaluation s/p Right shoulder arthroscopic subscapularis repair on 12/20/19. Patient has been going to PT. Describes pain as a 2/10. Patient denies any fever, chills, chest pain, shortness of breath or calf pain. The remainder of the review of systems is negative. There are no new illness or injuries to report since last seen in the office. No changes in medications, allergies, social or family history. PHYSICAL EXAM:   Visit Vitals  BP (!) 142/95 (BP 1 Location: Left arm, BP Patient Position: Sitting)   Pulse 86   Temp 97.9 °F (36.6 °C) (Oral)   Resp 16   Ht 5' 5\" (1.651 m)   Wt 287 lb 6.4 oz (130.4 kg)   SpO2 96%   BMI 47.83 kg/m²      The patient is a well-developed, well-nourished male in no acute distress. The patient is alert and oriented times three. The patient appears to be well groomed. Mood and affect are normal.  ORTHOPEDIC EXAM of right shoulder:  Inspection: swelling not present,  Bruising not present  Incisions well healed  Passive glenohumeral abduction 0-90 degrees, able to reach to top of his head  Stability: Stable  Strength: 4+/5  2+ distal pulses    IMPRESSION:  S/P Right shoulder arthroscopic subscapularis repair    PLAN:   1. Patient improving post operatively  2. Will cont with PT. OK for gentle strengthening. Transition to HEP when ready  3.  No lifting pushing or pulling greater than 10-15lbs    RTC 6 weeks    MADELINE Tobias [facility] [address] [facility]

## 2020-02-26 ENCOUNTER — HOSPITAL ENCOUNTER (OUTPATIENT)
Dept: PHYSICAL THERAPY | Age: 50
Discharge: HOME | End: 2020-02-26
Payer: OTHER GOVERNMENT

## 2020-02-26 PROCEDURE — 97140 MANUAL THERAPY 1/> REGIONS: CPT | Performed by: PHYSICAL THERAPIST

## 2020-02-26 PROCEDURE — 97530 THERAPEUTIC ACTIVITIES: CPT | Performed by: PHYSICAL THERAPIST

## 2020-02-26 PROCEDURE — 97110 THERAPEUTIC EXERCISES: CPT | Performed by: PHYSICAL THERAPIST

## 2020-02-26 PROCEDURE — 97112 NEUROMUSCULAR REEDUCATION: CPT | Performed by: PHYSICAL THERAPIST

## 2020-02-26 NOTE — PROGRESS NOTES
PT DAILY TREATMENT NOTE 10-18    Patient Name: Danielle Weathers  Date:2020  : 1970  [x]  Patient  Verified  Payor:  / Plan: [facility]  [facility] REGION / Product Type:  /    In time:11:20A  Out time:1215P  Total Treatment Time (min): 55min  Visit #: 1 of 12    Treatment Area: R Shoulder Pain    SUBJECTIVE  Pain Level (0-10 scale): 1/10  Any medication changes, allergies to medications, adverse drug reactions, diagnosis change, or new procedure performed?: [x] No    [] Yes (see summary sheet for update)  Subjective functional status/changes:   [] No changes reported  Patient reports hiatus in care second to acute appendicitis and resulting appendectomy and subsequent recovery. OBJECTIVE    15 min Therapeutic Exercise:  [x] See flow sheet :   Rationale: increase ROM and increase strength to improve the patients ability to improve A/ROM and decrease pain with movement. 15 min Therapeutic Activity:  [x]  See flow sheet :   Rationale: increase strength and improve coordination  to improve the patients ability to Tolerate basic ADLs and job-related tasks without pain. 10 min Neuromuscular Re-education:  [x]  See flow sheet :   Rationale: improve coordination, improve balance and increase proprioception  to improve the patients ability to perform activities with good form and proprioception with tactile and verbal cuing appropriately. 15 min Manual Therapy:  Grade 2-3 mobs in supine into A/P direction to [facility] joint, Inf/sup mobs to [facility] joint, grade 3-4 to scapulo-thoracic joint in sidelying. Rationale: decrease pain, increase ROM and increase tissue extensibility to A/ROM and decrease pain with movement.       With   [x] TE   [x] TA   [] neuro   [] other: Patient Education: [x] Review HEP    [x] Progressed/Changed HEP based on:   [x] positioning   [] body mechanics   [] transfers   [] heat/ice application    [] other:      Other Objective/Functional Measures: A/ROM of R shoulder flexion up to 160 degs, abduction 150 degs. Pain Level (0-10 scale) post treatment: 0/10    ASSESSMENT/Changes in Function: Good tolerance to advancement in resistance of exercises today to follow MD recommendation of beginning to strengthen R shoulder. continues to need VC and TC to decrease upper trap activation for shoulder flexion. Patient will continue to benefit from skilled PT services to modify and progress therapeutic interventions, address functional mobility deficits, address ROM deficits, address strength deficits, analyze and address soft tissue restrictions, analyze and cue movement patterns, analyze and modify body mechanics/ergonomics, assess and modify postural abnormalities and instruct in home and community integration to attain remaining goals. [x]  See Plan of Care  []  See progress note/recertification  []  See Discharge Summary         Progress towards goals / Updated goals:  1. Patient will achieve full A/ROM of R shoulder flexion and abduction compared to L shoulder. Current: Progressing at 160 and 150 2/26/2020  2. Patient will achieve predicted FOTO scores to demonstrate perception of improved functional activity ability. Current: Not yet assessed 2/26/2020  3. Patient will demonstrate 4+/5 MMT in shoulder flexion, extension, and abduction to facilitate return to full participate in functional activities.    Current: 4-/5 on R shoulder 2/26/2020    PLAN  [x]  Upgrade activities as tolerated     []  Continue plan of care  []  Update interventions per flow sheet       []  Discharge due to:_  []  Other:_      Nishant Baugh, PT 2/26/2020  11:18 AM    Future Appointments   Date Time Provider Bryant Meghann   2/26/2020 11:30 AM [facility] [address]   3/3/2020  5:00 PM Milagroy Client [address]   3/26/2020  3:00 PM Real Seat, NP [facility]   4/3/2020  2:15 PM JOE Flores [address]   7/6/2020  3:30 PM Avery Olivera MD [facility] [address] Daniel

## 2020-02-26 NOTE — PROGRESS NOTES
[facility] [address], [address] [place] [address], [facility] [address]  [phone] fax    Physician Update  [x] Progress Note  [] Discharge Summary  Patient name: Maco Goode Start of Care: 2019   [facility] 1970   Medical/Treatment Diagnosis: R Shoulder Pain  Payor: CHERI / Plan: [facility] Minor 74 / Product Type: Blane Zambrano /  Onset Date:May /2019 , now P/O 19        Prior Hospitalization: see medical history Provider#: [id number]   Medications: Verified on Patient Summary List     Comorbidities: , arthritis, MFPS, kidney disease, HTN  Prior Level of Function:I all areas of ADLs and activities, no AD use, working, household and community activities    Visits from [place] of Care: 14    Missed Visits: 6    Status at Evaluation/Last Progress Note: Patient progressing toward goals of increased activity tolerance and decreased overall pain. Hiatus in care second to acute appendicitis and emergency appendectomy, week long hospital stay followed by a week of recovery. Progress towards Goals:   1. patient will have FOTO 61 to show significant improved function with light ADLS at home              PN 40   Current: not yet assessed: 2020  2. patient will have AROM /AAROM right shoulder F 135 and abd 135 and ER 65 to aid with increase tolerance to ADLS and activities at AROM phase              CURRENT: A/ROM flexion 160 degs, abduction 150 degs  3. patient will report overall 60% improvement to aid with increase tolerance to ADLS and work demand   Current: MET at 75% 2020  4. patient will have pain 1-2/10 to aid with increase tolerance to manual therapy for regaining ROM               Current: MET at a little stiff 2020     Goals: to be achieved in 4 weeks:  1. Patient will achieve full A/ROM of R shoulder flexion and abduction compared to L shoulder.   2. Patient will achieve predicted FOTO scores to demonstrate perception of improved functional activity ability. 3. Patient will demonstrate 4+/5 MMT in shoulder flexion, extension, and abduction to facilitate return to full participate in functional activities. ASSESSMENT/RECOMMENDATIONS:  [x]Continue therapy per initial plan/protocol at a frequency of  2-3 x per week for 4 weeks  []Continue therapy with the following recommended changes:_____________________      _____________________________________________________________________  []Discontinue therapy progressing towards or have reached established goals  []Discontinue therapy due to lack of appreciable progress towards goals  []Discontinue therapy due to lack of attendance or compliance  []Await Physician's recommendations/decisions regarding therapy  []Other:________________________________________________________________    Thank you for this referral. Ja Hoskins, PT 2/26/2020 11:18 AM  NOTE TO PHYSICIAN:  PLEASE COMPLETE THE ORDERS BELOW AND   FAX TO [facility]: [phone]  If you are unable to process this request in 24 hours please contact our office: [phone]    ? I have read the above report and request that my patient continue as recommended. ? I have read the above report and request that my patient continue therapy with the following changes/special instructions:_____________________________________  ? I have read the above report and request that my patient be discharged from therapy.     [de-identified] Signature:____________Date:_________TIME:________    [employer], Date and Time must be completed for valid certification **

## 2020-03-03 ENCOUNTER — HOSPITAL ENCOUNTER (OUTPATIENT)
Dept: PHYSICAL THERAPY | Age: 50
Discharge: HOME | End: 2020-03-03
Payer: OTHER GOVERNMENT

## 2020-03-03 PROCEDURE — 97112 NEUROMUSCULAR REEDUCATION: CPT

## 2020-03-03 PROCEDURE — 97110 THERAPEUTIC EXERCISES: CPT

## 2020-03-03 PROCEDURE — 97530 THERAPEUTIC ACTIVITIES: CPT

## 2020-03-03 NOTE — PROGRESS NOTES
PT DAILY TREATMENT NOTE 10-18    Patient Name: Letty Cabrera  Date:3/3/2020  : 1970  [x]  Patient  Verified  Payor:  / Plan: [facility]  [facility] REGION / Product Type:  /    In time:428  Out time:524  Total Treatment Time (min): 56  Visit #: 1 of 8    Treatment Area: Right shoulder pain  SUBJECTIVE  Pain Level (0-10 scale): 0  Any medication changes, allergies to medications, adverse drug reactions, diagnosis change, or new procedure performed?: [x] No    [] Yes (see summary sheet for update)  Subjective functional status/changes:   [] No changes reported  Pt reports he is feeling really good. He reports he still struggles with AROM without UT activation. He would like to add dry needling to help with ROM. OBJECTIVE    26 min Therapeutic Exercise:  [x] See flow sheet :   Rationale: increase ROM and increase strength to improve the patients ability to complete functional activities. 15 min Therapeutic Activity:  [x]  See flow sheet :   Rationale: increase ROM, increase strength, improve coordination and increase proprioception  to improve the patients ability to complete job related task     15 min Neuromuscular Re-education:  [x]  See flow sheet :   Rationale: increase strength, improve coordination and increase proprioception to improve the patients ability to complete functional task with correct body mechanics      With   [x] TE   [x] TA   [x] neuro   [] other: Patient Education: [x] Review HEP    [] Progressed/Changed HEP based on:   [] positioning   [] body mechanics   [] transfers   [] heat/ice application    [] other:      Other Objective/Functional Measures:      Pain Level (0-10 scale) post treatment: \"sore\"    ASSESSMENT/Changes in Function: Pt still presents with difficulty with AROM above 120 without UT activation. Pt has received dry needling before and would like to add to plan of care. Sent physician the form to sign to add to current plan.  Continue to address strengthening for UE and postural muscles with good return demo. Progressed resistance for strengthening exercises to increase patient's ability to complete job related task. Tech helped to supervise some of the exercises. Verbal cues and demonstration required to use correct body mechanics. Encouraged pt to complete HEP daily to help to continue to progress PT interventions to improve patient's ability to complete functional and community task independently. At the conclusion of the session, pt reported soreness from activity. Pt denied modalities. Patient will continue to benefit from skilled PT services to modify and progress therapeutic interventions, address functional mobility deficits, address ROM deficits, address strength deficits, analyze and address soft tissue restrictions, analyze and cue movement patterns, analyze and modify body mechanics/ergonomics and assess and modify postural abnormalities to attain remaining goals. [x]  See Plan of Care  [x]  See progress note/recertification  []  See Discharge Summary         Progress towards goals / Updated goals:  Goals: to be achieved in 4 weeks:  1. Patient will achieve full A/ROM of R shoulder flexion and abduction compared to L shoulder. 2. Patient will achieve predicted FOTO scores to demonstrate perception of improved functional activity ability. CURRENT: 60 3/3  3. Patient will demonstrate 4+/5 MMT in shoulder flexion, extension, and abduction to facilitate return to full participate in functional activities.      PLAN  [x]  Upgrade activities as tolerated     [x]  Continue plan of care  [x]  Update interventions per flow sheet       []  Discharge due to:_  []  Other:_      Nida Guy 3/3/2020  5:04 PM    Future Appointments   Date Time Provider Bryant Zavaleta   3/6/2020  3:30 PM Liborio Ramirez PT [facility]   3/9/2020  4:30 PM Liborio Ramirez PT [facility]   3/11/2020  4:30 PM Vidal Payan [facility]   3/16/2020  4:30 PM Mackenzie Romo, Shlomo Solis [facility]   3/19/2020  4:30 PM Beverley Zavala, PT [facility]   3/23/2020  4:30 PM Elizabeth Cheek, PT [facility]   3/24/2020  5:00 PM Zeus Cosby [facility]   3/26/2020  3:00 PM Sierra Molina NP [facility]   4/3/2020  2:15 PM JOE Walker [address]   7/6/2020  3:30 PM Livier Mckeon  [address]

## 2020-03-03 NOTE — PROGRESS NOTES
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 Dry needling is a treatment technique utilized in conjunction with other PT interventions to inactivate myofascial trigger points and the pain and dysfunction they cause. Dry Needling is an advanced procedure that requires additional training including greater than 54 hours of intensive course work. Physical Therapists at [address] [place] [address] are trained and/or certified through [employer] for their education. PROCEDURE:   Solid filament sterile needle (typically 0.3mm/30 gauge) inserted into a trigger point   Repeated movements inactivate the trigger points, taking 30-60 seconds per site   Typically consists of 1 dry needling session per week and a possible second treatment including muscle re-education, flexibility, strengthening and other manual techniques to facilitate the benefits of dry needling     BENEFITS:   Inactivation of trigger points   Decreased pain   Increased muscle length   Improved movement patterns   Restoration of function POTENTIAL RISKS:   Post-needling soreness   Infection   Bruising/bleeding   Penetration of a nerve   Pneumothorax   All treating PTs have been thoroughly educated in avoiding adverse reactions    If you agree with this recommendation, please sign this form and fax it to us at [phone]. If you have questions or concerns regarding dry needling or any other treatment we may be providing, please contact us at [phone].     Thank you for allowing us to assist in the care of your patient. Lisa Zamora    3/3/2020 5:04 PM     NOTE TO PHYSICIAN:  PLEASE COMPLETE THE ORDERS BELOW AND   FAX TO [facility]: [phone]  If you are unable to process this request in 24 hours please contact our office:   [phone]    I have read the above request and AGREE to the recommendation of including dry needling as part of the plan of care.       Physicians signature: _________________________Date: _________Time:________

## 2020-03-04 ENCOUNTER — APPOINTMENT (OUTPATIENT)
Dept: PHYSICAL THERAPY | Age: 50
End: 2020-03-04
Payer: OTHER GOVERNMENT

## 2020-03-06 ENCOUNTER — HOSPITAL ENCOUNTER (OUTPATIENT)
Dept: PHYSICAL THERAPY | Age: 50
Discharge: HOME | End: 2020-03-06
Payer: OTHER GOVERNMENT

## 2020-03-06 PROCEDURE — 97530 THERAPEUTIC ACTIVITIES: CPT

## 2020-03-06 PROCEDURE — 97112 NEUROMUSCULAR REEDUCATION: CPT

## 2020-03-06 PROCEDURE — 97110 THERAPEUTIC EXERCISES: CPT

## 2020-03-06 NOTE — PROGRESS NOTES
PT DAILY TREATMENT NOTE 10-18    Patient Name: Gennaro Rush  Date:3/6/2020  : 1970  [x]  Patient  Verified  Payor:  / Plan: [facility] / Product Type:  /    In time:324  Out time:401  Total Treatment Time (min): 37  Visit #: 2 of 8         Treatment Area: right shoulder pain  SUBJECTIVE  Pain Level (0-10 scale): 0  Any medication changes, allergies to medications, adverse drug reactions, diagnosis change, or new procedure performed?: [x] No    [] Yes (see summary sheet for update)  Subjective functional status/changes:   [] No changes reported  \"it is doing really good. I want to thank you for everything you have been doing . \"    OBJECTIVE         25 min Therapeutic Exercise:  [x] See flow sheet :   Rationale: increase ROM, increase strength and improve coordination to improve the patients ability to tolerate ADLs and activities    10 min Therapeutic Activity:  [x]  See flow sheet :   Rationale: increase ROM, increase strength and improve coordination  to improve the patients ability to tolerate ADLs and activities     16 min Neuromuscular Re-education:  [x]  See flow sheet :   Rationale: increase ROM, increase strength and improve coordination  to improve the patients ability to tolerate activities and ADLS               With   [x] TE   [x] TA   [] neuro   [] other: Patient Education: [x] Review HEP    [x] Progressed/Changed HEP based on:   [] positioning   [] body mechanics   [] transfers   [] heat/ice application    [] other:      Other Objective/Functional Measures: VC exercises and tech. Pain Level (0-10 scale) post treatment: 0    ASSESSMENT/Changes in Function: progressing toward established goals.  . Good tolerance to strengthening exercises in supine, SL, standing and with dumb bells and jose    Patient will continue to benefit from skilled PT services to modify and progress therapeutic interventions, address ROM deficits, address strength deficits, analyze and address soft tissue restrictions, analyze and cue movement patterns, analyze and modify body mechanics/ergonomics, assess and modify postural abnormalities, address imbalance/dizziness and instruct in home and community integration to attain remaining goals. [x]  See Plan of Care  [x]  See progress note/recertification  []  See Discharge Summary         Progress towards goals / Updated goals:  Goals: to be achieved in 4 weeks:  1. Patient will achieve full A/ROM of R shoulder flexion and abduction compared to L shoulder. CURRENT progressing across all exercises 3/6/2020  2. Patient will achieve predicted FOTO scores to demonstrate perception of improved functional activity ability. CURRENT: 60 3/3     3.  Patient will demonstrate 4+/5 MMT in shoulder flexion, extension, and abduction to facilitate return to full participate in functional activities  CURRENT ongoing 3/6/2020    PLAN  [x]  Upgrade activities as tolerated     [x]  Continue plan of care  []  Update interventions per flow sheet       []  Discharge due to:_  []  Other:_      Diana Nance, PT 3/6/2020  3:13 PM    Future Appointments   Date Time Provider Bryant Zavaleta   3/6/2020  3:30 PM Henao Bhakta, PT [facility]   3/9/2020  4:30 PM Henao Bhakta, PT [facility]   3/11/2020  4:30 PM Leonel Begun [facility]   3/16/2020  4:30 PM Henao Bhakta, PT [facility]   3/19/2020  4:30 PM Maximiliano Godoy, PT [facility]   3/23/2020  4:30 PM Henao Bhakta, PT [facility]   3/24/2020  5:00 PM Leonel Begun [facility]   3/26/2020  3:00 PM Flora Bartholomew NP [facility]   4/3/2020  2:15 PM JOE Kaminski   7/6/2020  3:30 PM Zaire Dutton  [address]

## 2020-03-09 ENCOUNTER — HOSPITAL ENCOUNTER (OUTPATIENT)
Dept: PHYSICAL THERAPY | Age: 50
Discharge: HOME | End: 2020-03-09
Payer: OTHER GOVERNMENT

## 2020-03-09 PROCEDURE — 97530 THERAPEUTIC ACTIVITIES: CPT

## 2020-03-09 PROCEDURE — 97110 THERAPEUTIC EXERCISES: CPT

## 2020-03-09 PROCEDURE — 97112 NEUROMUSCULAR REEDUCATION: CPT

## 2020-03-09 NOTE — PROGRESS NOTES
PT DAILY TREATMENT NOTE 10-18    Patient Name: Marek Smith  Date:3/9/2020  : 1970  [x]  Patient  Verified  Payor:  / Plan: [facility] / Product Type:  /    In time:422  Out time:512  Total Treatment Time (min): 50  Visit #: 3 of 8     Treatment Area: right shoulder pain  SUBJECTIVE  Pain Level (0-10 scale): 1  Any medication changes, allergies to medications, adverse drug reactions, diagnosis change, or new procedure performed?: [x] No    [] Yes (see summary sheet for update)  Subjective functional status/changes:   [] No changes reported  \"It is a little tender today. The weight lifting was good for it.  \"    OBJECTIVE    Modality rationale:    Min Type Additional Details    [] Estim:  []Unatt       []IFC  []Premod                        []Other:  []w/ice   []w/heat  Position:  Location:    [] Estim: []Att    []TENS instruct  []NMES                    []Other:  []w/US   []w/ice   []w/heat  Position:  Location:    []  Traction: [] Cervical       []Lumbar                       [] Prone          []Supine                       []Intermittent   []Continuous Lbs:  [] before manual  [] after manual    []  Ultrasound: []Continuous   [] Pulsed                           []1MHz   []3MHz W/cm2:  Location:    []  Iontophoresis with dexamethasone         Location: [] Take home patch   [] In clinic    []  Ice     []  heat  []  Ice massage  []  Laser   []  Anodyne Position:  Location:    []  Laser with stim  []  Other:  Position:  Location:    []  Vasopneumatic Device Pressure:       [] lo [] med [] hi   Temperature: [] lo [] med [] hi   [] Skin assessment post-treatment:  []intact []redness- no adverse reaction    []redness  adverse reaction:          25 min Therapeutic Exercise:  [x] See flow sheet :   Rationale: increase ROM, increase strength and improve coordination to improve the patients ability to increase tolerance to ADLs and activities    10 min Therapeutic Activity:  [x]  See flow sheet :   Rationale: increase ROM, increase strength and improve coordination  to improve the patients ability to increase tolerance to ADLs and activities     15 min Neuromuscular Re-education:  [x]  See flow sheet :   Rationale: increase ROM, increase strength and improve coordination  to improve the patients ability to increase tolerance to ADLS and activities               With   [x] TE   [x] TA   [x] neuro   [] other: Patient Education: [x] Review HEP    [] Progressed/Changed HEP based on:   [] positioning   [] body mechanics   [] transfers   [] heat/ice application    [] other:      Other Objective/Functional Measures: VC exercises and tech     Pain Level (0-10 scale) post treatment: 0    ASSESSMENT/Changes in Function: good progress with all strengthening exercises . Some reports of increase tenderness today. Able to tolerate exercises geared for strengthening of rotator cuff. Patient will continue to benefit from skilled PT services to modify and progress therapeutic interventions, address ROM deficits, address strength deficits, analyze and address soft tissue restrictions, analyze and cue movement patterns, analyze and modify body mechanics/ergonomics, assess and modify postural abnormalities and instruct in home and community integration to attain remaining goals. [x]  See Plan of Care  [x]  See progress note/recertification  []  See Discharge Summary         Progress towards goals / Updated goals:  Goals: to be achieved in 4 weeks:  1. Patient will achieve full A/ROM of R shoulder flexion and abduction compared to L shoulder. CURRENT progressing across all exercises 3/9/2020  2.  Patient will achieve predicted FOTO scores to demonstrate perception of improved functional activity ability.               CURRENT: 60 3/3                3. Patient will demonstrate 4+/5 MMT in shoulder flexion, extension, and abduction to facilitate return to full participate in functional activities  CURRENT ongoing 3/9/2020    PLAN  [x]  Upgrade activities as tolerated     [x]  Continue plan of care  []  Update interventions per flow sheet       []  Discharge due to:_  []  Other:_      Orien Severs, PT 3/9/2020  3:55 PM    Future Appointments   Date Time Provider Bryant Zavaleta   3/9/2020  4:30 PM Tye Rank, PT [facility]   3/11/2020  4:30 PM Jose Dong [facility]   3/16/2020  4:30 PM Tye Foreman, PT [facility]   3/19/2020  4:30 PM Yury Lamb, PT [facility]   3/23/2020  4:30 PM Tye Foreman, PT [facility]   3/24/2020  5:00 PM Amveronica Carr [facility]   3/26/2020  3:00 PM Ananya Coombs, [facility]   4/3/2020  2:15 PM JOE Ruiz [address]   7/6/2020  3:30 PM Carlito Winters  [address]

## 2020-03-11 ENCOUNTER — HOSPITAL ENCOUNTER (OUTPATIENT)
Dept: PHYSICAL THERAPY | Age: 50
Discharge: HOME | End: 2020-03-11
Payer: OTHER GOVERNMENT

## 2020-03-11 PROCEDURE — 97110 THERAPEUTIC EXERCISES: CPT

## 2020-03-11 PROCEDURE — 97530 THERAPEUTIC ACTIVITIES: CPT

## 2020-03-11 PROCEDURE — 97112 NEUROMUSCULAR REEDUCATION: CPT

## 2020-03-11 NOTE — PROGRESS NOTES
PT DAILY TREATMENT NOTE 10-18    Patient Name: Marek Smith  Date:3/11/2020  : 1970  [x]  Patient  Verified  Payor:  / Plan: [facility]  [facility] REGION / Product Type:  /    In time:430  Out time:522  Total Treatment Time (min): 52  Visit #: 4 of 8    Treatment Area: Pain in right shoulder [M25.511]    SUBJECTIVE  Pain Level (0-10 scale): 1  Any medication changes, allergies to medications, adverse drug reactions, diagnosis change, or new procedure performed?: [x] No    [] Yes (see summary sheet for update)  Subjective functional status/changes:   [] No changes reported  Pt reports he is feeling better this week. He reports that he still notices upper trap activation with overhead activity. OBJECTIVE    25 min Therapeutic Exercise:  [x] See flow sheet :   Rationale: increase ROM and increase strength to improve the patients ability to complete functional activities. 10 min Therapeutic Activity:  [x]  See flow sheet :   Rationale: increase ROM, increase strength, improve coordination and increase proprioception  to improve the patients ability to complete job related task. 17 min Neuromuscular Re-education:  [x]  See flow sheet :   Rationale: increase strength, improve coordination and increase proprioception to improve the patients ability to complete functional task with correct body mechanics       With   [x] TE   [x] TA   [x] neuro   [] other: Patient Education: [x] Review HEP    [] Progressed/Changed HEP based on:   [] positioning   [] body mechanics   [] transfers   [] heat/ice application    [] other:      Other Objective/Functional Measures:      Pain Level (0-10 scale) post treatment: \"tender\"    ASSESSMENT/Changes in Function: Continued to address strengthening of UE and postural muscles to increase patient's ability to complete functional task with decreased upper trap activation. Pt continues to show improvement with each session.  Added body blade to help with stabilization with pt report of increased challenge. Tech helped to supervise some of the exercises. Verbal cues and demonstration required to use correct body mechanics. Encouraged pt to complete HEP daily to help to continue to progress PT interventions to improve patient's ability to complete functional and community task independently. At the conclusion of the session, pt reported soreness from interventions. Pt denied modalities. Patient will continue to benefit from skilled PT services to modify and progress therapeutic interventions, address functional mobility deficits, address ROM deficits, address strength deficits, analyze and address soft tissue restrictions, analyze and cue movement patterns, analyze and modify body mechanics/ergonomics and assess and modify postural abnormalities to attain remaining goals. [x]  See Plan of Care  [x]  See progress note/recertification  []  See Discharge Summary         Progress towards goals / Updated goals:  Goals: to be achieved in 4 weeks:  1. Patient will achieve full A/ROM of R shoulder flexion and abduction compared to L shoulder. CURRENT progressing across all exercises 3/9/2020  2.  Patient will achieve predicted FOTO scores to demonstrate perception of improved functional activity ability.               CURRENT: 60 3/3   3. Patient will demonstrate 4+/5 MMT in shoulder flexion, extension, and abduction to facilitate return to full participate in functional activities  CURRENT ongoing 3/9/2020    PLAN  [x]  Upgrade activities as tolerated     [x]  Continue plan of care  [x]  Update interventions per flow sheet       []  Discharge due to:_  []  Other:_      Logan Esparza 3/11/2020  4:44 PM    Future Appointments   Date Time Provider Bryant Zavaleta   3/16/2020  4:30 PM Lida Choi, PT [facility] [address]   3/19/2020  4:30 PM Carlos Soto, PT [facility] [address]   3/23/2020  4:30 PM Lida Choi PT [facility] [address]   3/24/2020  5:00 PM Lauren Meraz [address] 3/26/2020  3:00 PM Walter Robbins NP [facility]   4/3/2020  2:15 PM JOE [address]   7/6/2020  3:30 PM Yaquelin Santos  [address]

## 2020-03-16 ENCOUNTER — HOSPITAL ENCOUNTER (OUTPATIENT)
Dept: PHYSICAL THERAPY | Age: 50
Discharge: HOME | End: 2020-03-16
Payer: OTHER GOVERNMENT

## 2020-03-16 PROCEDURE — 97530 THERAPEUTIC ACTIVITIES: CPT

## 2020-03-16 PROCEDURE — 97140 MANUAL THERAPY 1/> REGIONS: CPT

## 2020-03-16 PROCEDURE — 97110 THERAPEUTIC EXERCISES: CPT

## 2020-03-16 PROCEDURE — 97112 NEUROMUSCULAR REEDUCATION: CPT

## 2020-03-16 NOTE — PROGRESS NOTES
PT DAILY TREATMENT NOTE 10-18    Patient Name: Letty Cabrera  Date:3/16/2020  : 1970  [x]  Patient  Verified  Payor: [facility] / Plan: [facility] / Product Type:  /    In time:403  Out time:455  Total Treatment Time (min): 52  Visit #: 5 of 8    Treatment Area: Pain in right shoulder [M25.511]    SUBJECTIVE  Pain Level (0-10 scale): sore  Any medication changes, allergies to medications, adverse drug reactions, diagnosis change, or new procedure performed?: [x] No    [] Yes (see summary sheet for update)  Subjective functional status/changes:   [] No changes reported  Pt reports his shoulder is sore today. OBJECTIVE      15 min Therapeutic Exercise:  [x] See flow sheet :   Rationale: increase ROM and increase strength to improve the patients ability to complete functional activities. 10 min Therapeutic Activity:  [x]  See flow sheet :   Rationale: increase ROM, increase strength, improve coordination and increase proprioception  to improve the patients ability to complete job related task. 15 min Neuromuscular Re-education:  [x]  See flow sheet :   Rationale: increase strength, improve coordination and increase proprioception  to improve the patients ability to complete functional task with correct body mechanics    12 min Manual Therapy:  Supine: R STM/DTM biceps, deltoids, chest, triceps   Rationale: decrease pain, increase ROM, increase tissue extensibility and decrease trigger points to complete self care          With   [x] TE   [x] TA   [x] neuro   [] other: Patient Education: [x] Review HEP    [] Progressed/Changed HEP based on:   [] positioning   [] body mechanics   [] transfers   [] heat/ice application    [] other:      Other Objective/Functional Measures:      Pain Level (0-10 scale) post treatment: \"better\"    ASSESSMENT/Changes in Function: Pt continues to show improvement with each session due to skilled PT interventions.  Continued to address strengthening and postural awareness to increase patient's ability to complete functional task with decreased pain. Manual therapy was performed to help reduce tightness/pain with pt report of feeling better after completion. Pt denied modalities. Verbal cues and demonstration required to use correct body mechanics. Encouraged pt to complete HEP daily to help to continue to progress PT interventions to improve patient's ability to complete functional and community task independently. At the conclusion of the session, pt reported decreased soreness. Patient will continue to benefit from skilled PT services to modify and progress therapeutic interventions, address functional mobility deficits, address ROM deficits, address strength deficits, analyze and address soft tissue restrictions, analyze and cue movement patterns, analyze and modify body mechanics/ergonomics and assess and modify postural abnormalities to attain remaining goals. [x]  See Plan of Care  []  See progress note/recertification  []  See Discharge Summary         Progress towards goals / Updated goals:  . Patient will achieve full A/ROM of R shoulder flexion and abduction compared to L shoulder. CURRENT progressing across all exercises 3/9/2020  2.  Patient will achieve predicted FOTO scores to demonstrate perception of improved functional activity ability.               CURRENT: 60 3/3   3. Patient will demonstrate 4+/5 MMT in shoulder flexion, extension, and abduction to facilitate return to full participate in functional activities  CURRENT ongoing 3/9/2020     PLAN  [x]  Upgrade activities as tolerated     [x]  Continue plan of care  [x]  Update interventions per flow sheet       []  Discharge due to:_  []  Other:_      Nida Guy 3/16/2020  4:21 PM    Future Appointments   Date Time Provider Bryant Daltoni   3/24/2020  5:00 PM Cassius Cowden [facility]   3/26/2020  3:00 PM Hassel Collet, NP [facility]   4/3/2020  2:15 PM Patrizia Levin [facility] Barton [address]   7/6/2020  3:30 PM Sammi Apple  [address]

## 2020-03-19 ENCOUNTER — APPOINTMENT (OUTPATIENT)
Dept: PHYSICAL THERAPY | Age: 50
End: 2020-03-19
Payer: OTHER GOVERNMENT

## 2020-03-23 ENCOUNTER — APPOINTMENT (OUTPATIENT)
Dept: PHYSICAL THERAPY | Age: 50
End: 2020-03-23
Payer: OTHER GOVERNMENT

## 2020-03-24 ENCOUNTER — HOSPITAL ENCOUNTER (OUTPATIENT)
Dept: PHYSICAL THERAPY | Age: 50
Discharge: HOME | End: 2020-03-24
Payer: OTHER GOVERNMENT

## 2020-03-24 PROCEDURE — 97110 THERAPEUTIC EXERCISES: CPT

## 2020-03-24 PROCEDURE — 97530 THERAPEUTIC ACTIVITIES: CPT

## 2020-03-24 PROCEDURE — 20560 NDL INSJ W/O NJX 1 OR 2 MUSC: CPT

## 2020-03-24 PROCEDURE — 97112 NEUROMUSCULAR REEDUCATION: CPT

## 2020-03-24 NOTE — PROGRESS NOTES
Physical Therapy Discharge Instructions      [facility] [address], [address] [address],[place] [address]  [phone] fax    Patient: Samia Lora  : 1970    Continue Home Exercise Program daily stretches/cardio; strengthening 3x/week    Continue with    [x] Ice  as needed 20 min     [x] Heat         Follow up with MD:     [x] Upon completion of therapy     [] As needed    Recommendations:     [x]   Return to activity with home program    []   Return to activity with the following modifications:       []Post Rehab Program    []Join Independent aquatic program     []Return to/join local gym      Additional Comments: Thank you for choosing us for your physical therapy services. It has been a pleasure working with you. Best wishes!     Mihir Lewis 3/24/2020 5:52 PM

## 2020-03-24 NOTE — PROGRESS NOTES
PT DISCHARGE DAILY NOTE AND [id number]  Referral source: Maurizio Bonilla [place], [place] : 1970   Medical/Treatment Diagnosis: R Shoulder Pain  [facility]  [facility]  [facility] / Product Type: [place] /  Onset Date:May /2019 , now P/O 19        Prior Hospitalization: see medical history Provider#: [id number]   Medications: Verified on Patient Summary List     Comorbidities: , arthritis, MFPS, kidney disease, HTN  Prior Level of Function:I all areas of ADLs and activities, no AD use, working, household and community activities    Visits from [facility]: 19    Missed Visits: 1    Reporting Period : 20 to 3/24/20    Date:3/24/2020  : 1970  [x]  Patient  Verified  Payor:  / Plan: Carlos Minor 76 / Product Type:  /    In time:449  Out time:550  Total Treatment Time (min): 61  Visit #: 6 of 8    SUBJECTIVE  Pain Level (0-10 scale): 0  Any medication changes, allergies to medications, adverse drug reactions, diagnosis change, or new procedure performed?: [x] No    [] Yes (see summary sheet for update)  Subjective functional status/changes:   [] No changes reported  Pt reports 75-80% improvement due to skilled PT interventions with improved ROM and strength. He is ready for discharge. OBJECTIVE    15 min Therapeutic Exercise:  [x] See flow sheet :   Rationale: increase ROM, increase strength and updated HEP and education to improve the patients ability to complete functional task.     15 min Therapeutic Activity:  [x]  See flow sheet :   Rationale: increase ROM, increase strength, improve coordination, increase proprioception and reassessment, FOTO, discharge instructions  to improve the patients ability to complete job related task     19 min Neuromuscular Re-education:  [x]  See flow sheet :   Rationale: improve posture and coordination to improve the patients ability to maintain neutral cervical posture with ADLs and functional mobility        With [x] TE   [x] TA   [x] neuro   [] other: Patient Education: [x] Review HEP    [] Progressed/Changed HEP based on:   [] positioning   [] body mechanics   [] transfers   [] heat/ice application    [] other:    . Other Objective/Functional Measures:   10 min Dry Needling:   [x]  CPT 37252:  needle insertion(s) without injection(s); 1 or 2 muscle(s)  []  CPT 07219:  needle insertion(s) without injection(s); 3 or more muscles. Rationale: decrease pain, increase ROM, increase tissue extensibility, decrease trigger points and increase postural awareness to complete household chores    Dry Needling Procedure Note    Procedure: An intramuscular manual therapy (dry needling) and a neuro-muscular re-education treatment was done to deactivate myofascial trigger points with a 30 gauge filament needle under aseptic technique. Indications:  [] Myofascial pain and dysfunction [] Muscled spasms  [] Myalgia/myositis   [] Muscle cramps  [x] Muscle imbalances  [] Temporomandibular Dysfunction  [] Other:    Chart reviewed for the following:  Bella ATKINSON, have reviewed the medical history, summary list and precautions/contraindications for [employer].   TIME OUT performed immediately prior to start of procedure:  Bella ATKINSON, have performed the following reviews on [employer] prior to the start of the session:      [x] Verified patient identification by name and date of birth    [x] Agreement on all muscles being treated was verified   [x] Purpose of dry needling, side effects, possible complications, risks and benefits were explained to the patient   [x] Procedure site(s) verified  [x] Patient was positioned for comfort and draped for privacy  [x] Informed Consent was signed (initial visit) and verified verbally (subsequent visits)  [x] Patient was instructed on the need to report the use of blood thinners and/or immunosuppressant medications  [x] How to respond to possible adverse effects of treatment  [x] Self treatment of post needling soreness: ice, heat (moist heat, heat wraps) and stretching  [x] Opportunity was given to ask any questions, all questions were answered            Time: 10  Date of procedure: 3/24/2020  Treatment: The following muscles were treated today with intramuscular dry needling  [] Left [] Right Masster  [] Left [] Right Temporalis  [] Left [] Right Zygomaticus Major / Minor  [] Left [] Right Lateral Pterygoid  [] Left [] Right Medial Pterygoid  [] Left [] Right Digastric Post / Anterior Belly  [] Left [] Right Sternocleidomastoid  [] Left [] Right Scalene Anterior / Medial / Posterior  [] Left [] Right Extra Laryngeal Muscles  [x] Left [x] Right Upper Trapezius  [] Left [] Right Middle Trapezius  [] Left [] Right Lower Trapezius  [] Left [] Right Oblique Capitis Inferior  [] Left [] Right Splenius Capitis / Cervicis  [] Left [] Right Semispinalis: Capitis / Cervicis  [] Left [] Right Multifidi / Rotatores Cervicis / Thoracic  [] Left [] Right Longissimus Thoracis / Illiocostalis  [] Left [] Right Levator Scapulae  [] Left [x] Right Supraspinatus / Infraspinatus  [] Left [] Right Teres Major / Minor  [] Left [] Right Rhomboids / Serratus posterior superior  [] Left [] Right Pectoralis Major / Minor  [] Left [] Right Serratus Anterior  [] Left [] Right Latissimus Dorsi  [] Left [] Right Subscapularis  [] Left [] Right Coracobrachialis  [] Left [] Right Biceps Brachii  [] Left [] Right Deltoid: Anterior / Medial / Posterior  [] Left [] Right Brachialis  [] Left [] Right Triceps  [] Left [] Right Brachioradialis  [] Left [] Right Extensor Carpi Radialis Brevis / Extensor Carpi Radialis Longus    [] Left [] Right  Extensor digitorum  [] Left [] Right Supinator / Pronator Teres  [] Left [] Right Flexor Carpi Radialis/ Flexor Carpi Ulnaris   [] Left [] Right  Flexor Digitorum Superficialis/ Flexor Digitorum Profundus  [] Left [] Right Flexor Pollicis Longus / Flexor Pollicis Brevis / Palmaris Longus  [] Left [] Right Abductor Pollicis Longus / Abductor Pollicis Brevis  [] Left [] Right Opponens Pollicis / Adductor Pollicis  [] Left [] Right Dorsal / Palmar Interossei / Lumbricalis  [] Left [] Right Abductor Digiti Minimi / Opponens Digiti Minimi    Patient's response to today's treatment:  [x] Latent Twitch Response     [x] Muscle relaxation [] Pain Relief  [x] Post needling soreness    [] without complications  [x] Increased Range of Motion   [] Decreased headaches    [] Decreased Tinnitus  [] Other:     Performed by: Logan Esparza     Pain Level (0-10 scale) post treatment: 0    Other objective measures:  Access Code: [email or web address]   URL: [email or web address]   Date: 03/24/2020   Prepared by: Logan Esparza     Exercises   Shoulder Flexion Serratus Activation with Resistance - 10 reps - 3 sets - 1x daily - 7x weekly   Shoulder Abduction with Dumbbells - Thumbs Up - 10 reps - 3 sets - 1x daily - 7x weekly   Prone Single Arm Shoulder Horizontal Abduction with Scapular Retraction and Palm Down - 10 reps - 3 sets - 1x daily - 7x weekly   Prone Shoulder Extension on Swiss Ball - 10 reps - 3 sets - 1x daily - 7x weekly   Prone Middle Trapezius Strengthening on Swiss Ball - 10 reps - 3 sets - 1x daily - 7x weekly   Bird Dog on The Justin-Wojciech - 10 reps - 3 sets - 1x daily - 7x weekly   Prone Lower Trapezius Strengthening on The Justin-Wojciech - 10 reps - 3 sets - 1x daily - 7x weekly   Standing Shoulder Diagonal Horizontal Abduction 60/120 Degrees with Resistance - 10 reps - 3 sets - 1x daily - 7x weekly   Shoulder Flexion Wall Walk - 10 reps - 3 sets - 1x daily - 7x weekly   Superman on Table - 10 reps - 3 sets - 1x daily - 7x weekly   Seated Shoulder External Rotation in Abduction Supported with Dumbbell - 10 reps - 3 sets - 1x daily - 7x weekly   Single Arm Bent Over Shoulder Extension with Dumbbell - 10 reps - 3 sets - 1x daily - 7x weekly   Isometric Standing Shoulder External Rotation in Abduction - 10 reps - 3 sets - 1x daily - 7x weekly     Summary of Care:  Goal: . Patient will achieve full A/ROM of R shoulder flexion and abduction compared to L shoulder. Status at last note/certification: WFL  Status at discharge: met    Goal:  Patient will achieve predicted FOTO scores to demonstrate perception of improved functional activity ability. Status at last note/certification: 79  Status at discharge: met    Goal:  Patient will demonstrate 4+/5 MMT in shoulder flexion, extension, and abduction to facilitate return to full participate in functional activities  Status at last note/certification: WFL  Status at discharge: met    ASSESSMENT/Changes in Function: Pt has completed 19 of skilled PT interventions to address R shoulder pain s/p surgery. Pt reports 75-80% improvement due to skilled PT interventions with improved ROM and strength. He is ready for discharge. . After re-evaluation, has met all PT goals. Improved FOTO score noted. Pt will be discharged from physical therapy today with recommendations to continue completing HEP daily independently and follow up with physician as needed.     Thank you for this referral!      PLAN  [x]Discontinue therapy: [x]Patient has reached or is progressing toward set goals      []Patient is non-compliant or has abdicated      []Due to lack of appreciable progress towards set goals    Priscilla Moncada 3/24/2020  4:51 PM

## 2020-04-02 ENCOUNTER — VIRTUAL VISIT (OUTPATIENT)
Dept: ORTHOPEDIC SURGERY | Age: 50
End: 2020-04-02

## 2020-04-02 DIAGNOSIS — S46.811A FULL THICKNESS TEAR OF RIGHT SUBSCAPULARIS TENDON: Primary | ICD-10-CM

## 2020-04-02 NOTE — PROGRESS NOTES
Cecy Manning is a 52 y.o. male evaluated via telephone on 4/2/2020. Consent:  He and/or health care decision maker is aware that that he may receive a bill for this telephone service, depending on his insurance coverage, and has provided verbal consent to proceed: Yes      Documentation:  I communicated with the patient and/or health care decision maker about right shoulder. Details of this discussion including any medical advice provided:       I affirm this is a Patient Initiated Episode with an Established Patient who has not had a related appointment within my department in the past 7 days or scheduled within the next 24 hours. Note: not billable if this call serves to triage the patient into an appointment for the relevant concern    Since your last office visit have you had any    1. New medical diagnoses No  2. Changes in your medications  No  3. Surgical procedures No  4. Changes in your Allergies to medication No  5.  What is your pain level today 0/10     Bert Lassiter

## 2020-04-02 NOTE — PROGRESS NOTES
Mich Velez is a 52 y.o. male who was seen by synchronous (real-time) audio-video technology on 4/2/2020 via [email or web address] me. The visit was performed by myself in the office while the patient was at home. Lilibeth Flanagan LPN helped to initiate the visit and was present during entire visit. Subjective:   Mich Velez is a 52 y.o. male who presents today for reevaluation of s/p Right shoulder arthroscopic subscapularis repair on 12/20/19. Patient finished PT on 3/24/20. Pt is continuing to do at home stretching exercises. Pain score 0/10. Patient denies any fever, chills, chest pain, shortness of breath or calf pain. The remainder of the review of systems is negative. There are no new illness or injuries to report since last seen in the office. No changes in medications, allergies, social or family history. Prior to Admission medications    Medication Sig Start Date End Date Taking? Authorizing Provider   magnesium 250 mg tab Take 250 mg by mouth two (2) times a day. Provider, Historical   ondansetron hcl (ZOFRAN) 4 mg tablet  2/13/20   Provider, Historical   pregabalin (LYRICA) 100 mg capsule Take 1 Cap by mouth two (2) times a day. Max Daily Amount: 200 mg. Indications: disorder characterized by stiff, tender & painful muscles 1/6/20   Tierra Davis MD   metaxalone [facility]) 800 mg tablet Take 1 Tab by mouth two (2) times daily as needed for Muscle Spasm(s). 1/6/20   Tierra Davis MD   ondansetron (ZOFRAN ODT) 4 mg disintegrating tablet 1 Tab by SubLINGual route every eight (8) hours as needed for Nausea. 8/21/19   Walter Elias MD   metaxalone (SKELAXIN) 800 mg tablet 1 po BID-TID prn  Indications: muscle spasm 7/3/19   Tierra Davis MD   amLODIPine (NORVASC) 5 mg tablet Take 5 mg by mouth daily. Provider, Historical   benazepril (LOTENSIN) 20 mg tablet Take 20 mg by mouth daily.     Provider, Historical   traMADol (ULTRAM) 50 mg tablet 1 po bid prn severe pain 3/12/18 Addy Cisneros MD   hydroCHLOROthiazide (MICROZIDE) 12.5 mg capsule  8/7/17   Provider, Historical   pravastatin (PRAVACHOL) 20 mg tablet  7/11/17   Provider, Historical   cpap machine kit by Does Not Apply route. Provider, Historical   Potassium Citrate 15 mEq TbER Take  by mouth two (2) times a day. Other, MD Ryland   tamsulosin (FLOMAX) 0.4 mg capsule Take 1 Cap by mouth daily (after dinner). 4/8/16   Nestor Posey MD   carvedilol (COREG CR) 10 mg CR capsule Take  by mouth daily (with breakfast). Provider, Historical   montelukast (SINGULAIR) 10 mg tablet Take 10 mg by mouth daily. Provider, Historical   insulin glargine (LANTUS SOLOSTAR) 100 unit/mL (3 mL) pen by SubCUTAneous route. Other, MD Ryland   insulin aspart (NOVOLOG) 100 unit/mL inpn by SubCUTAneous route. Other, MD Ryland   magnesium citrate 100 mg tab Take 300 mg by mouth daily. Other, MD Ryland   aspirin 81 mg chewable tablet Take 81 mg by mouth daily. Provider, Historical   fluticasone-salmeterol (ADVAIR DISKUS) 250-50 mcg/dose diskus inhaler Take 1 Puff by inhalation every twelve (12) hours. Provider, Historical   Dexlansoprazole (DEXILANT) 60 mg CpDM Take 60 mg by mouth daily. Provider, Historical   albuterol (VENTOLIN HFA) 90 mcg/actuation inhaler Take  by inhalation every six (6) hours as needed. Provider, Historical   Misc. Devices Kit Please provide the patient following size mask. Mask: Mirage FX large size mask.   DME: [place], [place]  Phone: [phone], Fax: [phone] 12/26/12   Arjun Christianson MD     No Known Allergies        ROS      Objective:     General: alert, cooperative, no distress   Mental  status: mental status: alert, oriented to person, place, and time, normal mood, behavior, speech, dress, motor activity, and thought processes   Resp: resp: normal effort and no respiratory distress   Neuro: neuro: no gross deficits   Skin: skin: no discoloration or lesions of concern on visible areas   ORTHO exam of: right shoulder    Due to this being a TeleHealth evaluation, many elements of the physical examination are unable to be assessed. Assessment & Plan:   Diagnoses and all orders for this visit:    1. Full thickness tear of right subscapularis tendon          1. Continue activities as tolerated. Risk factors include:   2. No cortisone injection indicated today   3. No Physical/Occupational Therapy indicated today  4. No diagnostic test indicated today:   5. No durable medical equipment indicated today  6. No referral indicated today   7. No medications indicated today:   8. No Narcotic indicated today for short term acute pain secondary to right shoulder. RTC PRN    We discussed the expected course, resolution and complications of the diagnosis(es) in detail. Medication risks, benefits, costs, interactions, and alternatives were discussed as indicated. I advised him to contact the office if his condition worsens, changes or fails to improve as anticipated. He expressed understanding with the diagnosis(es) and plan. CPT Codes 16982-43479 for Established Patients may apply to this Telehealth Visit  Time-based coding, delete if not needed: I spent at least 15 minutes with this established patient, and >50% of the time was spent counseling and/or coordinating care regarding right shoulder. Pursuant to the emergency declaration under the [facility], 1135 waiver authority and the [employer] General Act, this Virtual  Visit was conducted, with patient's consent, to reduce the patient's risk of exposure to COVID-19 and provide continuity of care for an established patient. Services were provided through a video synchronous discussion virtually to substitute for in-person clinic visit.       [facility]

## 2020-07-06 ENCOUNTER — OFFICE VISIT (OUTPATIENT)
Dept: ORTHOPEDIC SURGERY | Age: 50
End: 2020-07-06

## 2020-07-06 VITALS
TEMPERATURE: 98.5 F | BODY MASS INDEX: 46.15 KG/M2 | OXYGEN SATURATION: 97 % | HEART RATE: 86 BPM | SYSTOLIC BLOOD PRESSURE: 118 MMHG | RESPIRATION RATE: 18 BRPM | WEIGHT: 294 LBS | HEIGHT: 67 IN | DIASTOLIC BLOOD PRESSURE: 74 MMHG

## 2020-07-06 DIAGNOSIS — M79.10 TRIGGER POINT: ICD-10-CM

## 2020-07-06 DIAGNOSIS — M47.812 CERVICAL FACET JOINT SYNDROME: Primary | ICD-10-CM

## 2020-07-06 DIAGNOSIS — M79.18 MYOFASCIAL PAIN: ICD-10-CM

## 2020-07-06 DIAGNOSIS — E66.01 MORBID OBESITY WITH BMI OF 45.0-49.9, ADULT (HCC): ICD-10-CM

## 2020-07-06 DIAGNOSIS — Z98.890 H/O REPAIR OF RIGHT ROTATOR CUFF: ICD-10-CM

## 2020-07-06 DIAGNOSIS — M62.838 MUSCLE SPASM: ICD-10-CM

## 2020-07-06 RX ORDER — OMEPRAZOLE 40 MG/1
40 CAPSULE, DELAYED RELEASE ORAL DAILY
COMMUNITY

## 2020-07-06 RX ORDER — PREGABALIN 100 MG/1
100 CAPSULE ORAL 2 TIMES DAILY
Qty: 180 CAP | Refills: 1 | Status: SHIPPED | OUTPATIENT
Start: 2020-07-06 | End: 2021-01-11 | Stop reason: SDUPTHER

## 2020-07-06 RX ORDER — METAXALONE 800 MG/1
800 TABLET ORAL
Qty: 180 TAB | Refills: 1 | Status: SHIPPED | OUTPATIENT
Start: 2020-07-06 | End: 2020-10-09

## 2020-07-06 RX ORDER — BUPIVACAINE HYDROCHLORIDE 2.5 MG/ML
1 INJECTION, SOLUTION INFILTRATION; PERINEURAL ONCE
Qty: 1 ML | Refills: 0
Start: 2020-07-06 | End: 2020-07-06

## 2020-07-06 RX ORDER — BETAMETHASONE SODIUM PHOSPHATE AND BETAMETHASONE ACETATE 3; 3 MG/ML; MG/ML
6 INJECTION, SUSPENSION INTRA-ARTICULAR; INTRALESIONAL; INTRAMUSCULAR; SOFT TISSUE ONCE
Qty: 1 ML | Refills: 0
Start: 2020-07-06 | End: 2020-07-06

## 2020-07-06 RX ORDER — LIDOCAINE HYDROCHLORIDE 20 MG/ML
1 INJECTION, SOLUTION EPIDURAL; INFILTRATION; INTRACAUDAL; PERINEURAL ONCE
Qty: 1 ML | Refills: 0
Start: 2020-07-06 | End: 2020-07-06

## 2020-07-06 NOTE — PROGRESS NOTES
[facility]  [address] [place] [address], [facility] [address]  Phone: [phone]  Fax: [phone]    Pt's YOB: 1970    ASSESSMENT   Diagnoses and all orders for this visit:    1. Cervical facet joint syndrome    2. Trigger point  -     bupivacaine (Marcaine) 0.25 % (2.5 mg/mL) soln injection; 1 mL by IntraMUSCular route once for 1 dose. -     INJECTION, BUPIVICAINE HYDRO  -     LIDOCAINE INJECTION  -     lidocaine, PF, (XYLOCAINE) 20 mg/mL (2 %) injection; 1 mL by Other route once for 1 dose. -     betamethasone (Celestone Soluspan) 6 mg/mL injection; 1 mL by IntraMUSCular route once for 1 dose.  -     BETAMETHASONE ACETATE & SODIUM PHOSPHATE INJECTION 3 MG EA.  -     MS INJECT TRIGGER POINT, 1 OR 2    3. Muscle spasm  -     metaxalone (Skelaxin) 800 mg tablet; Take 1 Tab by mouth two (2) times daily as needed for Muscle Spasm(s). 4. Myofascial pain  -     pregabalin (LYRICA) 100 mg capsule; Take 1 Cap by mouth two (2) times a day. Max Daily Amount: 200 mg. Indications: disorder characterized by stiff, tender & painful muscles    5. H/O repair of right rotator cuff    6. Morbid obesity with BMI of 45.0-49.9, adult [facility])         IMPRESSION AND PLAN:  Tonya Rodriguez is a 52 y.o. male with history of chronic cervical pain and presents to the office today for follow up. Pt complains of pain in the left upper back/shoulder with headaches. He notes that he continues to do well following a right rotator cuff repair by Dr. Юлия Craig on 12/20/2019. Pt takes Ultram 50 mg rarely as his pain warrants, Lyrica 100 mg 1 cap BID, and Skelaxin 800 mg as needed with benefit. 1) Pt was given information on cervical arthritis exercises. 2) He received a refill of Lyrica 100 mg 1 cap BID. 3) Pt also received a refill of Skelaxin 800 mg 1 tab BID prn muscle spasm.   4) He will continue taking Ultram 50 mg as prescribed and does not need a refill at this time. 5) He received trigger point injections during his office visit today. 6) Mr. Irina Salazar has a reminder for a \"due or due soon\" health maintenance. I have asked that he contact his primary care provider, Abdelrahman Asher MD, for follow-up on this health maintenance. 7)  demonstrated consistency with prescribing. 8) Weight loss recommended  Follow-up and Dispositions    · Return in about 6 months (around 1/6/2021) for Medication follow up. HISTORY OF PRESENT ILLNESS:  Alessia Simental is a 52 y.o. male with history of chronic cervical pain and presents to the office today for follow up. Pt complains of pain in the left upper back/shoulder with headaches. He notes that he continues to do well following a right rotator cuff repair by Dr. Jean Gutierrez on 12/20/2019. Pt attended physical therapy following surgery and notes that he continues to work on the strength in his right shoulder. He notes improved range of motion in the right shoulder. Pt takes Ultram 50 mg rarely as his pain warrants. He also takes Lyrica 100 mg 1 cap BID and Skelaxin 800 mg as needed with benefit. Pt notes that he generally takes the Skelaxin during the week and notes significant improvement when taking the medication. Pt at this time desires to proceed with trigger point injections.     Pain Scale: 1/10    PCP: Abdelrahman Asher MD     Past Medical History:   Diagnosis Date    Adverse effect of anesthesia     for back sx-had issues with breathing  with apnea- 6 years ago    Asthma     Chest pain, unspecified     ATYPICAL,POSSIBLE ANGINA,MUSCULAR,CAD,CHEST WALL PAINS PERSISTANT AND RECURRENT    Chronic kidney disease     Diabetes mellitus ([address]     DM (diabetes mellitus) ([address]     GERD (gastroesophageal reflux disease)     HTN (hypertension)     Hypercholesteremia     Hypertension     Kidney stones     Sleep apnea     cpap        Social History     Socioeconomic History    Marital status:  Spouse name: Not on file    Number of children: Not on file    Years of education: Not on file    Highest education level: Not on file   Occupational History    Occupation:    Social Needs    Financial resource strain: Not on file    Food insecurity     Worry: Not on file     Inability: Not on file   [employer] needs     Medical: Not on file     Non-medical: Not on file   Tobacco Use    Smoking status: Never Smoker    Smokeless tobacco: Never Used   Substance and Sexual Activity    Alcohol use: No    Drug use: No    Sexual activity: Not on file   Lifestyle    Physical activity     Days per week: Not on file     Minutes per session: Not on file    Stress: Not on file   Relationships    Social connections     Talks on phone: Not on file     Gets together: Not on file     Attends Jewish service: Not on file     Active member of club or organization: Not on file     Attends meetings of clubs or organizations: Not on file     Relationship status: Not on file    Intimate partner violence     Fear of current or ex partner: Not on file     Emotionally abused: Not on file     Physically abused: Not on file     Forced sexual activity: Not on file   Other Topics Concern    Not on file   Social History Narrative    ** Merged History Encounter **            Current Outpatient Medications   Medication Sig Dispense Refill    omeprazole (PRILOSEC) 40 mg capsule Take 40 mg by mouth daily.  pregabalin (LYRICA) 100 mg capsule Take 1 Cap by mouth two (2) times a day. Max Daily Amount: 200 mg. Indications: disorder characterized by stiff, tender & painful muscles 180 Cap 1    metaxalone (Skelaxin) 800 mg tablet Take 1 Tab by mouth two (2) times daily as needed for Muscle Spasm(s). 180 Tab 1    bupivacaine (Marcaine) 0.25 % (2.5 mg/mL) soln injection 1 mL by IntraMUSCular route once for 1 dose. 1 mL 0    lidocaine, PF, (XYLOCAINE) 20 mg/mL (2 %) injection 1 mL by Other route once for 1 dose.  1 mL 0    betamethasone (Celestone Soluspan) 6 mg/mL injection 1 mL by IntraMUSCular route once for 1 dose. 1 mL 0    magnesium 250 mg tab Take 250 mg by mouth two (2) times a day.  ondansetron hcl (ZOFRAN) 4 mg tablet       ondansetron (ZOFRAN ODT) 4 mg disintegrating tablet 1 Tab by SubLINGual route every eight (8) hours as needed for Nausea. 10 Tab 0    metaxalone (SKELAXIN) 800 mg tablet 1 po BID-TID prn  Indications: muscle spasm 180 Tab 1    amLODIPine (NORVASC) 5 mg tablet Take 5 mg by mouth daily.  benazepril (LOTENSIN) 20 mg tablet Take 20 mg by mouth daily.  traMADol (ULTRAM) 50 mg tablet 1 po bid prn severe pain 60 Tab 1    hydroCHLOROthiazide (MICROZIDE) 12.5 mg capsule       pravastatin (PRAVACHOL) 20 mg tablet       cpap machine kit by Does Not Apply route.  Potassium Citrate 15 mEq TbER Take  by mouth two (2) times a day.  tamsulosin (FLOMAX) 0.4 mg capsule Take 1 Cap by mouth daily (after dinner). 30 Cap 0    carvedilol (COREG CR) 10 mg CR capsule Take  by mouth daily (with breakfast).  montelukast (SINGULAIR) 10 mg tablet Take 10 mg by mouth daily.  insulin glargine (LANTUS SOLOSTAR) 100 unit/mL (3 mL) pen by SubCUTAneous route.  insulin aspart (NOVOLOG) 100 unit/mL inpn by SubCUTAneous route.  magnesium citrate 100 mg tab Take 300 mg by mouth daily.  aspirin 81 mg chewable tablet Take 81 mg by mouth daily.  fluticasone-salmeterol (ADVAIR DISKUS) 250-50 mcg/dose diskus inhaler Take 1 Puff by inhalation every twelve (12) hours.  albuterol (VENTOLIN HFA) 90 mcg/actuation inhaler Take  by inhalation every six (6) hours as needed.  Misc. Devices Kit Please provide the patient following size mask. Mask: Mirage FX large size mask. DME: [facility], [place], [address] [facility]  Phone: [phone], Fax: [phone] 1 Each 0    Dexlansoprazole (DEXILANT) 60 mg CpDM Take 60 mg by mouth daily.          No Known Allergies      REVIEW OF SYSTEMS    Constitutional: Negative for fever, chills, or weight change. Respiratory: Negative for cough or shortness of breath. Cardiovascular: Negative for chest pain or palpitations. Gastrointestinal: Negative for acid reflux, change in bowel habits, or constipation. Genitourinary: Negative for dysuria and flank pain. Musculoskeletal: Positive for cervical pain. Skin: Negative for rash. Neurological: Positive for headaches. Negative for dizziness and numbness. Endo/Heme/Allergies: Negative for increased bruising. Psychiatric/Behavioral: Negative for difficulty with sleep. PHYSICAL EXAMINATION  Visit Vitals  /74   Pulse 86   Temp 98.5 °F (36.9 °C) (Oral)   Resp 18   Ht 5' 7\" (1.702 m)   Wt 294 lb (133.4 kg)   SpO2 97%   BMI 46.05 kg/m²       Constitutional: Awake, alert, and in no acute distress. Neurological: 1+ symmetrical DTRs in the upper extremities. 1+ symmetrical DTRs in the lower extremities. Sensation to light touch is intact. Negative Miller's sign bilaterally. Skin: warm, dry, and intact. Musculoskeletal: Tightness across the left upper trapezius. No pain with extension, axial loading, or forward flexion. No pain with internal or external rotation of his hips. Negative straight leg raise bilaterally. Biceps  Triceps Deltoids Wrist Ext Wrist Flex Hand Intrin   Right +4/5 +4/5 +4/5 +4/5 +4/5 +4/5   Left +4/5 +4/5 +4/5 +4/5 +4/5 +4/5      Hip Flex  Quads Hamstrings Ankle DF EHL Ankle PF   Right +4/5 +4/5 +4/5 +4/5 +4/5 +4/5   Left +4/5 +4/5 +4/5 +4/5 +4/5 +4/5     PROCEDURES:    I administered 5 trigger point injections to left trapezius using 1 cc Marcaine, 1 cc Lidocaine, and 1 cc Celestone. Pre-injection pain level was 1/10 and post-injection pain level was 1/10    Chart reviewed for the following:   IAngelia MD, have reviewed the History, Physical and updated the Allergic reactions for Lary Abraham.      TIME OUT performed immediately prior to start of procedure:  Wilma Knutson MD, have performed the following reviews on Tonya Rodriguez prior to the start of the procedure:            * Patient was identified by name and date of birth   * Agreement on procedure being performed was verified  * Risks and Benefits explained to the patient  * Procedure site verified and marked as necessary  * Patient was positioned for comfort  * Consent was obtained     Time: 4:35 PM     Date of procedure: 7/6/2020    Procedure performed by: Bety Curiel MD    Mr. Ervin Lema tolerated the procedure well with no complications. IMAGING:    Right shoulder MRI from 09/18/2019 were personally reviewed with the patient and demonstrated:  Multisequence multiplanar MR images of the right shoulder were obtained.     HISTORY: Shoulder pain.     Mild to moderate supraspinatus tendinosis. Moderate infraspinatus tendinosis  with bursal surface fraying. Moderate inflammation in the subacromial bursa. Mild subscapular tendinosis. No muscular atrophy.     No glenohumeral joint effusion. No focal glenohumeral cartilage loss. Moderate  maceration of the superior labrum. Biceps anchor is not defined. Attenuated  remaining biceps tendon within the tendon sheath. Subcortical cystic changes in  the humeral head. No definite Hill-Sachs deformity. Glenoid is intact.     Moderate hypertrophy with mild inflammation at the acromioclavicular joint. Slightly curved acromion undersurface. No abnormal downsloping.     IMPRESSION:  1. Rotator cuff tendinosis with moderate bursal surface fraying in the  infraspinatus tendon. No focal tear or muscular atrophy. Subacromial bursitis  present. 2. Complex degenerative tearing or maceration of the superior labrum with likely  torn biceps anchor, attenuated biceps tendon in the bicipital groove. 3. Type II acromion.  Hypertrophy and inflammation at the [facility] joint.        Right shoulder x-rays from 05/29/2019 were personally reviewed with the patient and demonstrated:  FINDINGS:    BONES: Intact and normally aligned. SOFT TISSUES: Unremarkable.    _______________    IMPRESSION    No significant abnormality.        Cervical spine MRI from 11/02/2017 was personally reviewed with the patient and demonstrated:  Results from [facility] ([place], [place]) on 11/02/17   MRI CERV SPINE WO CONT     Narrative MR CERVICAL SPINE WITHOUT CONTRAST    CPT CODE: 85737    HISTORY: Chronic cervicalgia progressing over the past 2 months. Left arm pain  and paresthesias. No injury. COMPARISON: October 2013. TECHNIQUE: The following sequences were performed through the cervical spine:    1.  Sagittal and axial T2 weighted images without fat saturation. 2.  Sagittal T1 weighted images without fat saturation. 3.  Sagittal STIR sequence. FINDINGS:     Cervical straightening as before. Normal vertebral body heights. No change in a  small rounded area of T2/STIR bright signal within right C5. Likely some  stippled T1 bright signal within; favors hemangioma. Unremarkable disc space  height. Patent cervical medullary junction. No cervical cord expansion or  atrophy. Small oblong fluid bright focus at the right occipital cervical  articulation anteriorly is unchanged. On axial imaging, findings at each level are as follows:    C2/C3: Patent canal and foramina. C3/C4: Patent canal and foramina. C4/C5: Patent canal and foramina. C5/C6: Mild right paracentral disc protrusion. Patent canal and foramina. C6/C7: Minimal disc bulge on sagittal imaging. Patent canal and foramina. C7/T1: Patent canal and foramina. Incidental imaging of the adjacent soft tissues is unremarkable.              Impression IMPRESSION:    1. Minimal degenerative findings of cervical spine. No substantive change. Patent canal and foramina. No specific source for paresthesias.     Thank you for this referral.       Written by Rosana Mcclendon, as dictated by Damien Simmons MD.  I, Dr. Casey huber Suma Pearl confirm that all documentation is accurate.

## 2020-07-06 NOTE — LETTER
7/6/20 Patient: Nela Choe YOB: 1970 Date of Visit: 7/6/2020 Anni Gee MD 
[address] [place] [address] VIA Facsimile: [phone] Dear Anni Gee MD, Thank you for referring Mr. Carlene Patino to [facility] for evaluation. My notes for this consultation are attached. If you have questions, please do not hesitate to call me. I look forward to following your patient along with you. Sincerely, José Miguel Cosme MD

## 2020-07-06 NOTE — PATIENT INSTRUCTIONS
Neck Arthritis: Exercises Introduction Here are some examples of exercises for you to try. The exercises may be suggested for a condition or for rehabilitation. Start each exercise slowly. Ease off the exercises if you start to have pain. You will be told when to start these exercises and which ones will work best for you. How to do the exercises Neck stretches to the side 1. This stretch works best if you keep your shoulder down as you lean away from it. To help you remember to do this, start by relaxing your shoulders and lightly holding on to your thighs or your chair. 2. Tilt your head toward your shoulder and hold for 15 to 30 seconds. Let the weight of your head stretch your muscles. 3. Repeat 2 to 4 times toward each shoulder. Chin tuck 1. Lie on the floor with a rolled-up towel under your neck. Your head should be touching the floor. 2. Slowly bring your chin toward your chest. 
3. Hold for a count of 6, and then relax for up to 10 seconds. 4. Repeat 8 to 12 times. Active cervical rotation 1. Sit in a firm chair, or stand up straight. 2. Keeping your chin level, turn your head to the right, and hold for 15 to 30 seconds. 3. Turn your head to the left and hold for 15 to 30 seconds. 4. Repeat 2 to 4 times to each side. Shoulder blade squeeze 1. While standing, squeeze your shoulder blades together. 2. Do not raise your shoulders up as you are squeezing. 3. Hold for 6 seconds. 4. Repeat 8 to 12 times. Shoulder rolls 1. Sit comfortably with your feet shoulder-width apart. You can also do this exercise standing up. 2. Roll your shoulders up, then back, and then down in a smooth, circular motion. 3. Repeat 2 to 4 times. Follow-up care is a key part of your treatment and safety. Be sure to make and go to all appointments, and call your doctor if you are having problems. It's also a good idea to know your test results and keep a list of the medicines you take. Where can you learn more? Go to http://www.gray.com/ Enter V686 in the search box to learn more about \"Neck Arthritis: Exercises. \" Current as of: March 2, 2020               Content Version: 12.5 © 8560-4314 [employer]. Care instructions adapted under license by [employer] (which disclaims liability or warranty for this information). If you have questions about a medical condition or this instruction, always ask your healthcare professional. [address] any warranty or liability for your use of this information.

## 2020-08-03 ENCOUNTER — OFFICE VISIT (OUTPATIENT)
Dept: ORTHOPEDIC SURGERY | Age: 50
End: 2020-08-03

## 2020-08-03 VITALS
WEIGHT: 293.4 LBS | TEMPERATURE: 98.1 F | OXYGEN SATURATION: 97 % | RESPIRATION RATE: 18 BRPM | HEIGHT: 67 IN | BODY MASS INDEX: 46.05 KG/M2 | DIASTOLIC BLOOD PRESSURE: 78 MMHG | SYSTOLIC BLOOD PRESSURE: 119 MMHG | HEART RATE: 84 BPM

## 2020-08-03 DIAGNOSIS — M54.2 CERVICAL PAIN: ICD-10-CM

## 2020-08-03 DIAGNOSIS — Z98.890 H/O REPAIR OF RIGHT ROTATOR CUFF: ICD-10-CM

## 2020-08-03 DIAGNOSIS — M62.838 MUSCLE SPASM: ICD-10-CM

## 2020-08-03 DIAGNOSIS — M79.18 MYOFASCIAL PAIN: ICD-10-CM

## 2020-08-03 DIAGNOSIS — M47.812 CERVICAL FACET JOINT SYNDROME: Primary | ICD-10-CM

## 2020-08-03 DIAGNOSIS — M79.10 TRIGGER POINT: ICD-10-CM

## 2020-08-03 RX ORDER — BUPIVACAINE HYDROCHLORIDE 2.5 MG/ML
1 INJECTION, SOLUTION INFILTRATION; PERINEURAL ONCE
Qty: 1 ML | Refills: 0
Start: 2020-08-03 | End: 2020-08-03

## 2020-08-03 RX ORDER — LIDOCAINE HYDROCHLORIDE 20 MG/ML
1 INJECTION, SOLUTION EPIDURAL; INFILTRATION; INTRACAUDAL; PERINEURAL ONCE
Qty: 1 ML | Refills: 0
Start: 2020-08-03 | End: 2020-08-03

## 2020-08-03 RX ORDER — BETAMETHASONE SODIUM PHOSPHATE AND BETAMETHASONE ACETATE 3; 3 MG/ML; MG/ML
6 INJECTION, SUSPENSION INTRA-ARTICULAR; INTRALESIONAL; INTRAMUSCULAR; SOFT TISSUE ONCE
Qty: 1 ML | Refills: 0
Start: 2020-08-03 | End: 2020-08-03

## 2020-08-03 NOTE — PROGRESS NOTES
Rinda Closs presents today for   Chief Complaint   Patient presents with    Neck Pain    Back Pain     upper    Shoulder Pain     left    Follow-up     Trigger Point Injection       Is someone accompanying this pt? No    Is the patient using any DME equipment during OV? No    Depression Screening:  3 most recent PHQ Screens 8/3/2020   Little interest or pleasure in doing things Not at all   Feeling down, depressed, irritable, or hopeless Not at all   Total Score PHQ 2 0       Learning Assessment:  Learning Assessment 12/14/2016   PRIMARY LEARNER Patient   PRIMARY LANGUAGE ENGLISH   LEARNER PREFERENCE PRIMARY DEMONSTRATION   ANSWERED BY Patient   RELATIONSHIP SELF       Abuse Screening:  Abuse Screening Questionnaire 8/3/2020   Do you ever feel afraid of your partner? N   Are you in a relationship with someone who physically or mentally threatens you? N   Is it safe for you to go home? Y       OPIOID RISK TOOL  No flowsheet data found. Pt currently taking Antiplatelet therapy? No    Coordination of Care:  1. Have you been to the ER, urgent care clinic since your last visit? No  Hospitalized since your last visit? No    2. Have you seen or consulted any other health care providers outside of the [address] [place] [address] since your last visit? No Include any pap smears or colon screening.  No

## 2020-08-03 NOTE — PROGRESS NOTES
[facility]  [address] [place] [address], [facility] [address]  Phone: [phone]  Fax: [phone]    Pt's YOB: 1970    ASSESSMENT   Diagnoses and all orders for this visit:    1. Cervical facet joint syndrome  -     MRI CERV SPINE WO CONT; Future    2. Trigger point  -     bupivacaine (Marcaine) 0.25 % (2.5 mg/mL) soln injection; 1 mL by IntraMUSCular route once for 1 dose. -     INJECTION, BUPIVICAINE HYDRO  -     LIDOCAINE INJECTION  -     lidocaine, PF, (XYLOCAINE) 20 mg/mL (2 %) injection; 1 mL by Other route once for 1 dose. -     betamethasone (Celestone Soluspan) 6 mg/mL injection; 1 mL by IntraMUSCular route once for 1 dose.  -     BETAMETHASONE ACETATE & SODIUM PHOSPHATE INJECTION 3 MG EA.  -     RI INJECT TRIGGER POINT, 1 OR 2  -     MRI CERV SPINE WO CONT; Future    3. Muscle spasm  -     MRI CERV SPINE WO CONT; Future    4. Myofascial pain  -     MRI CERV SPINE WO CONT; Future    5. H/O repair of right rotator cuff    6. Cervical pain  -     AMB POC XRAY, SPINE, CERVICAL; 2 OR 3         IMPRESSION AND PLAN:  Gwendolyn Ren is a 52 y.o. male with history of chronic cervical pain. Pt complains of pain in the neck, L>R, that extends into the occipital region with headaches that radiate forward. He complains of pain radiating down the neck and extends in the shoulders, upper back, anterior chest wall, and into the forearms when his pain is more severe. Pt admits to relief when he previously underwent physical therapy with dry needling with Yuriy Sleek. He takes Ultram 50 mg rarely as his pain warrants, Lyrica 100 mg 1 cap BID, and Skelaxin 800 mg as needed with benefit. Pt at this time desires to proceed with trigger point injections and a cervical MRI. 1) Pt was given information on cervical arthritis exercises. 2) He will continue taking Lyrica 100 mg and Skelaxin 800 mg as prescribed and does not need refills at this time.    3) Pt received trigger point injections today during his office visit. 4) A cervical MRI was ordered. He has progressive cervical pain with radicular symptoms despite extensive physical therapy and using NSAID's. Mor Herrmannin 5) Pt was prescribed a Medrol Dosepak. 6) Mr. Ty Viera has a reminder for a \"due or due soon\" health maintenance. I have asked that he contact his primary care provider, Christopher Francois MD, for follow-up on this health maintenance. 7)  demonstrated consistency with prescribing. Follow-up and Dispositions    · Return in about 4 weeks (around 8/31/2020) for Diagnostic Test follow up, Medication follow up. HISTORY OF PRESENT ILLNESS:  Oscar Stephens is a 52 y.o. male with history of chronic cervical pain and presents to the office today for follow up. Pt complains of pain in the neck, L>R, that extends into the occipital region with headaches that radiate forward. He complains of pain radiating down the neck and extends in the shoulders, upper back, anterior chest wall, and into the forearms when his pain is more severe. Pt also notes nausea, dizziness, and occasional flank pain when his pain is severe. He had a previous right rotator cuff repair in 09/2019 and notes intermittent right shoulder pain when is neck pain is more severe. Pt admits to intermittent weakness in the hands and notes that he occasionally wakes up shaking his hands secondary to numbness. Pt notes that on Saturday, 08/01/2020, he experienced tightness in the neck. He had his wife rub on topical ointment but about 1.5 hours his neck locked up and he experienced headaches. Pt admits to shooting pain that radiates up the spine into the head when he turns his neck in certain directions. He admits to difficulty sleeping this past weekend when his pain was severe. Pt denies any weakness in the arms or issues with balance.  He reports that his symptoms generally improve when he lays down and rests and returns when is more active. Pt previously underwent dry needling with Basia Mahajan with significant relief but he notes less relief when he last underwent dry needling in 2018. He takes Ultram 50 mg rarely as his pain warrants, Lyrica 100 mg 1 cap BID, and Skelaxin 800 mg as needed with benefit. Pt at this time desires to proceed with trigger point injections and a cervical MRI.     Pain Scale: 4/10    PCP: Jean Nayak MD     Past Medical History:   Diagnosis Date    Adverse effect of anesthesia     for back sx-had issues with breathing  with apnea- 6 years ago    Asthma     Chest pain, unspecified     ATYPICAL,POSSIBLE ANGINA,MUSCULAR,CAD,CHEST WALL PAINS PERSISTANT AND RECURRENT    Chronic kidney disease     Diabetes mellitus ([facility]     DM (diabetes mellitus) ([facility]     GERD (gastroesophageal reflux disease)     HTN (hypertension)     Hypercholesteremia     Hypertension     Kidney stones     Sleep apnea     cpap        Social History     Socioeconomic History    Marital status:      Spouse name: Not on file    Number of children: Not on file    Years of education: Not on file    Highest education level: Not on file   Occupational History    Occupation:    Social Needs    Financial resource strain: Not on file    Food insecurity     Worry: Not on file     Inability: Not on file   Zimplistic needs     Medical: Not on file     Non-medical: Not on file   Tobacco Use    Smoking status: Never Smoker    Smokeless tobacco: Never Used   Substance and Sexual Activity    Alcohol use: No    Drug use: No    Sexual activity: Not on file   Lifestyle    Physical activity     Days per week: Not on file     Minutes per session: Not on file    Stress: Not on file   Relationships    Social connections     Talks on phone: Not on file     Gets together: Not on file     Attends Cheondoism service: Not on file     Active member of club or organization: Not on file     Attends meetings of clubs or organizations: Not on file     Relationship status: Not on file    Intimate partner violence     Fear of current or ex partner: Not on file     Emotionally abused: Not on file     Physically abused: Not on file     Forced sexual activity: Not on file   Other Topics Concern    Not on file   Social History Narrative    ** Merged History Encounter **            Current Outpatient Medications   Medication Sig Dispense Refill    omeprazole (PRILOSEC) 40 mg capsule Take 40 mg by mouth daily.  pregabalin (LYRICA) 100 mg capsule Take 1 Cap by mouth two (2) times a day. Max Daily Amount: 200 mg. Indications: disorder characterized by stiff, tender & painful muscles 180 Cap 1    metaxalone (Skelaxin) 800 mg tablet Take 1 Tab by mouth two (2) times daily as needed for Muscle Spasm(s). 180 Tab 1    magnesium 250 mg tab Take 250 mg by mouth two (2) times a day.  ondansetron hcl (ZOFRAN) 4 mg tablet       ondansetron (ZOFRAN ODT) 4 mg disintegrating tablet 1 Tab by SubLINGual route every eight (8) hours as needed for Nausea. 10 Tab 0    amLODIPine (NORVASC) 5 mg tablet Take 5 mg by mouth daily.  benazepril (LOTENSIN) 20 mg tablet Take 20 mg by mouth daily.  traMADol (ULTRAM) 50 mg tablet 1 po bid prn severe pain 60 Tab 1    hydroCHLOROthiazide (MICROZIDE) 12.5 mg capsule       pravastatin (PRAVACHOL) 20 mg tablet       cpap machine kit by Does Not Apply route.  Potassium Citrate 15 mEq TbER Take  by mouth two (2) times a day.  tamsulosin (FLOMAX) 0.4 mg capsule Take 1 Cap by mouth daily (after dinner). 30 Cap 0    carvedilol (COREG CR) 10 mg CR capsule Take  by mouth daily (with breakfast).  montelukast (SINGULAIR) 10 mg tablet Take 10 mg by mouth daily.  insulin glargine (LANTUS SOLOSTAR) 100 unit/mL (3 mL) pen by SubCUTAneous route.  insulin aspart (NOVOLOG) 100 unit/mL inpn by SubCUTAneous route.  magnesium citrate 100 mg tab Take 300 mg by mouth daily.       aspirin 81 mg chewable tablet Take 81 mg by mouth daily.  fluticasone-salmeterol (ADVAIR DISKUS) 250-50 mcg/dose diskus inhaler Take 1 Puff by inhalation every twelve (12) hours.  albuterol (VENTOLIN HFA) 90 mcg/actuation inhaler Take  by inhalation every six (6) hours as needed.  Misc. Devices Kit Please provide the patient following size mask. Mask: Mirage FX large size mask. DME: [facility]  Phone: [phone], Fax: [phone] 1 Each 0    metaxalone (SKELAXIN) 800 mg tablet 1 po BID-TID prn  Indications: muscle spasm 180 Tab 1    Dexlansoprazole (DEXILANT) 60 mg CpDM Take 60 mg by mouth daily. No Known Allergies      REVIEW OF SYSTEMS    Constitutional: Negative for fever, chills, or weight change. Respiratory: Negative for cough or shortness of breath. Cardiovascular: Negative for chest pain or palpitations. Gastrointestinal: Negative for acid reflux, change in bowel habits, or constipation. Genitourinary: Negative for dysuria and flank pain. Musculoskeletal: Positive for cervical pain. Neurological: Negative for headaches, dizziness, or numbness. Psychiatric/Behavioral: Negative for difficulty with sleep. As per HPI    PHYSICAL EXAMINATION  Visit Vitals  /78   Pulse 84   Temp 98.1 °F (36.7 °C) (Oral)   Resp 18   Ht 5' 7\" (1.702 m)   Wt 293 lb 6.4 oz (133.1 kg)   SpO2 97%   BMI 45.95 kg/m²       Constitutional: Awake, alert, and in no acute distress. Neurological: 1+ symmetrical DTRs in the upper extremities. 1+ symmetrical DTRs in the lower extremities. Sensation to light touch is intact. Negative Miller's sign bilaterally. Skin: warm, dry, and intact. Musculoskeletal:Tightness across the left upper trapezius with scattered trigger points. No pain with extension, axial loading, or forward flexion. No pain with internal or external rotation of his hips. Negative straight leg raise bilaterally.       Biceps  Triceps Deltoids Wrist Ext Wrist Flex Hand Intrin   Right +4/5 +4/5 +4/5 +4/5 +4/5 +4/5   Left +4/5 +4/5 +4/5 +4/5 +4/5 +4/5     PROCEDURES:    I administered 5 trigger point injections to left upper trapezius and 2 on the right using 1 cc Marcaine, 1 cc Lidocaine, and 1 cc Celestone. Pre-injection pain level was 4/10 and post-injection pain level was 3/10    Chart reviewed for the following:   Patricia ATKINSON MD, have reviewed the History, Physical and updated the Allergic reactions for Po Gates. TIME OUT performed immediately prior to start of procedure:  Patricia ATKINSON MD, have performed the following reviews on Po Gates prior to the start of the procedure:            * Patient was identified by name and date of birth   * Agreement on procedure being performed was verified  * Risks and Benefits explained to the patient  * Procedure site verified and marked as necessary  * Patient was positioned for comfort  * Consent was obtained     Time: 3:35 PM     Date of procedure: 8/5/2020    Procedure performed by: Miya Shin MD    Mr. Philly Renee tolerated the procedure well with no complications. IMAGING:    Cervical spine 2V x-rays from 08/03/2020 were personally reviewed with the patient and demonstrated:  Straightening of the cervical spine and degenerative facets. Right shoulder MRI from 09/18/2019 were personally reviewed with the patient and demonstrated:  Multisequence multiplanar MR images of the right shoulder were obtained.     HISTORY: Shoulder pain.     Mild to moderate supraspinatus tendinosis. Moderate infraspinatus tendinosis  with bursal surface fraying. Moderate inflammation in the subacromial bursa. Mild subscapular tendinosis. No muscular atrophy.     No glenohumeral joint effusion. No focal glenohumeral cartilage loss. Moderate  maceration of the superior labrum. Biceps anchor is not defined. Attenuated  remaining biceps tendon within the tendon sheath.  Subcortical cystic changes in  the humeral head. No definite Hill-Sachs deformity. Glenoid is intact.     Moderate hypertrophy with mild inflammation at the acromioclavicular joint. Slightly curved acromion undersurface. No abnormal downsloping.     IMPRESSION:  1. Rotator cuff tendinosis with moderate bursal surface fraying in the  infraspinatus tendon. No focal tear or muscular atrophy. Subacromial bursitis  present. 2. Complex degenerative tearing or maceration of the superior labrum with likely  torn biceps anchor, attenuated biceps tendon in the bicipital groove. 3. Type II acromion. Hypertrophy and inflammation at the [facility] joint.        Right shoulder x-rays from 05/29/2019 were personally reviewed with the patient and demonstrated:  FINDINGS:    BONES: Intact and normally aligned. SOFT TISSUES: Unremarkable.    _______________    IMPRESSION    No significant abnormality.        Cervical spine MRI from 11/02/2017 was personally reviewed with the patient and demonstrated:  Results from [facility] on 11/02/17   MRI CERV SPINE WO CONT     Narrative MR CERVICAL SPINE WITHOUT CONTRAST    CPT CODE: 76402    HISTORY: Chronic cervicalgia progressing over the past 2 months. Left arm pain  and paresthesias. No injury. COMPARISON: October 2013. TECHNIQUE: The following sequences were performed through the cervical spine:    1.  Sagittal and axial T2 weighted images without fat saturation. 2.  Sagittal T1 weighted images without fat saturation. 3.  Sagittal STIR sequence. FINDINGS:     Cervical straightening as before. Normal vertebral body heights. No change in a  small rounded area of T2/STIR bright signal within right C5. Likely some  stippled T1 bright signal within; favors hemangioma. Unremarkable disc space  height. Patent cervical medullary junction. No cervical cord expansion or  atrophy. Small oblong fluid bright focus at the right occipital cervical  articulation anteriorly is unchanged.     On axial imaging, findings at each level are as follows:    C2/C3: Patent canal and foramina. C3/C4: Patent canal and foramina. C4/C5: Patent canal and foramina. C5/C6: Mild right paracentral disc protrusion. Patent canal and foramina. C6/C7: Minimal disc bulge on sagittal imaging. Patent canal and foramina. C7/T1: Patent canal and foramina. Incidental imaging of the adjacent soft tissues is unremarkable.              Impression IMPRESSION:    1. Minimal degenerative findings of cervical spine. No substantive change. Patent canal and foramina. No specific source for paresthesias. Thank you for this referral.       Written by Smiley Yung, as dictated by Angelia Bailey MD.  I, Dr. Angelia Bailey confirm that all documentation is accurate.

## 2020-08-03 NOTE — LETTER
8/5/20 Patient: Oscar Stephens YOB: 1970 Date of Visit: 8/3/2020 Letha Canavan, MD 
[address] [place] [address] VIA Facsimile: [phone] Dear Letha Canavan, MD, Thank you for referring Mr. Roberta Juan to [facility] for evaluation. My notes for this consultation are attached. If you have questions, please do not hesitate to call me. I look forward to following your patient along with you. Sincerely, Santos Woody MD

## 2020-08-03 NOTE — PATIENT INSTRUCTIONS
Neck Arthritis: Exercises Introduction Here are some examples of exercises for you to try. The exercises may be suggested for a condition or for rehabilitation. Start each exercise slowly. Ease off the exercises if you start to have pain. You will be told when to start these exercises and which ones will work best for you. How to do the exercises Neck stretches to the side 1. This stretch works best if you keep your shoulder down as you lean away from it. To help you remember to do this, start by relaxing your shoulders and lightly holding on to your thighs or your chair. 2. Tilt your head toward your shoulder and hold for 15 to 30 seconds. Let the weight of your head stretch your muscles. 3. Repeat 2 to 4 times toward each shoulder. Chin tuck 1. Lie on the floor with a rolled-up towel under your neck. Your head should be touching the floor. 2. Slowly bring your chin toward your chest. 
3. Hold for a count of 6, and then relax for up to 10 seconds. 4. Repeat 8 to 12 times. Active cervical rotation 1. Sit in a firm chair, or stand up straight. 2. Keeping your chin level, turn your head to the right, and hold for 15 to 30 seconds. 3. Turn your head to the left and hold for 15 to 30 seconds. 4. Repeat 2 to 4 times to each side. Shoulder blade squeeze 1. While standing, squeeze your shoulder blades together. 2. Do not raise your shoulders up as you are squeezing. 3. Hold for 6 seconds. 4. Repeat 8 to 12 times. Shoulder rolls 1. Sit comfortably with your feet shoulder-width apart. You can also do this exercise standing up. 2. Roll your shoulders up, then back, and then down in a smooth, circular motion. 3. Repeat 2 to 4 times. Follow-up care is a key part of your treatment and safety. Be sure to make and go to all appointments, and call your doctor if you are having problems. It's also a good idea to know your test results and keep a list of the medicines you take. Where can you learn more? Go to http://marija-marianne.info/ Enter J797 in the search box to learn more about \"Neck Arthritis: Exercises. \" Current as of: March 2, 2020               Content Version: 12.5 © 0643-7655 [employer]orporated. Care instructions adapted under license by [employer] (which disclaims liability or warranty for this information). If you have questions about a medical condition or this instruction, always ask your healthcare professional. [place] [address] any warranty or liability for your use of this information.

## 2020-08-19 ENCOUNTER — HOSPITAL ENCOUNTER (OUTPATIENT)
Age: 50
Discharge: HOME | End: 2020-08-19
Attending: PHYSICAL MEDICINE & REHABILITATION
Payer: OTHER GOVERNMENT

## 2020-08-19 DIAGNOSIS — M79.10 TRIGGER POINT: ICD-10-CM

## 2020-08-19 DIAGNOSIS — M47.812 CERVICAL FACET JOINT SYNDROME: ICD-10-CM

## 2020-08-19 DIAGNOSIS — M62.838 MUSCLE SPASM: ICD-10-CM

## 2020-08-19 DIAGNOSIS — M79.18 MYOFASCIAL PAIN: ICD-10-CM

## 2020-08-19 PROCEDURE — 72141 MRI NECK SPINE W/O DYE: CPT

## 2020-08-31 ENCOUNTER — PATIENT MESSAGE (OUTPATIENT)
Dept: ORTHOPEDIC SURGERY | Age: 50
End: 2020-08-31

## 2020-09-04 ENCOUNTER — TELEPHONE (OUTPATIENT)
Dept: ORTHOPEDIC SURGERY | Age: 50
End: 2020-09-04

## 2020-09-04 NOTE — TELEPHONE ENCOUNTER
I spoke with Dr Ana Montes (?sp) from radiology and asked him to review the current MRI with the cystic structure at C1/C2. He reviewed 2 previous MRI's along with this one and stated this was stable over nearly 10 years and did not warrant any further studies. I asked about repeating the MRI at some point, but he stated due to the long stability and benign appearance of the cyst, it was not needed. I will call pt to inform him of this discussion.

## 2020-09-04 NOTE — TELEPHONE ENCOUNTER
Pt aware of MRI report and follow up information from radiology. The cyst has been present and stable over 9 years and no further studies are recommended at this time. An addendum will be made for the review of the cyst at C1/C2 -- He does not have any further questions or concerns.

## 2020-09-14 ENCOUNTER — OFFICE VISIT (OUTPATIENT)
Dept: ORTHOPEDIC SURGERY | Age: 50
End: 2020-09-14

## 2020-09-14 VITALS
BODY MASS INDEX: 46.3 KG/M2 | HEIGHT: 67 IN | WEIGHT: 295 LBS | HEART RATE: 87 BPM | TEMPERATURE: 97.5 F | RESPIRATION RATE: 20 BRPM | OXYGEN SATURATION: 99 %

## 2020-09-14 DIAGNOSIS — M47.812 CERVICAL FACET JOINT SYNDROME: Primary | ICD-10-CM

## 2020-09-14 DIAGNOSIS — M79.10 TRIGGER POINT: ICD-10-CM

## 2020-09-14 DIAGNOSIS — M71.30 SYNOVIAL CYST: ICD-10-CM

## 2020-09-14 DIAGNOSIS — M79.18 MYOFASCIAL PAIN: ICD-10-CM

## 2020-09-14 DIAGNOSIS — M62.838 MUSCLE SPASM: ICD-10-CM

## 2020-09-14 RX ORDER — METHYLPREDNISOLONE 4 MG/1
TABLET ORAL
Qty: 1 DOSE PACK | Refills: 0 | Status: SHIPPED | OUTPATIENT
Start: 2020-09-14 | End: 2022-01-19 | Stop reason: ALTCHOICE

## 2020-09-14 NOTE — PATIENT INSTRUCTIONS
Neck Arthritis: Exercises Introduction Here are some examples of exercises for you to try. The exercises may be suggested for a condition or for rehabilitation. Start each exercise slowly. Ease off the exercises if you start to have pain. You will be told when to start these exercises and which ones will work best for you. How to do the exercises Neck stretches to the side 1. This stretch works best if you keep your shoulder down as you lean away from it. To help you remember to do this, start by relaxing your shoulders and lightly holding on to your thighs or your chair. 2. Tilt your head toward your shoulder and hold for 15 to 30 seconds. Let the weight of your head stretch your muscles. 3. Repeat 2 to 4 times toward each shoulder. Chin tuck 1. Lie on the floor with a rolled-up towel under your neck. Your head should be touching the floor. 2. Slowly bring your chin toward your chest. 
3. Hold for a count of 6, and then relax for up to 10 seconds. 4. Repeat 8 to 12 times. Active cervical rotation 1. Sit in a firm chair, or stand up straight. 2. Keeping your chin level, turn your head to the right, and hold for 15 to 30 seconds. 3. Turn your head to the left and hold for 15 to 30 seconds. 4. Repeat 2 to 4 times to each side. Shoulder blade squeeze 1. While standing, squeeze your shoulder blades together. 2. Do not raise your shoulders up as you are squeezing. 3. Hold for 6 seconds. 4. Repeat 8 to 12 times. Shoulder rolls 1. Sit comfortably with your feet shoulder-width apart. You can also do this exercise standing up. 2. Roll your shoulders up, then back, and then down in a smooth, circular motion. 3. Repeat 2 to 4 times. Follow-up care is a key part of your treatment and safety. Be sure to make and go to all appointments, and call your doctor if you are having problems. It's also a good idea to know your test results and keep a list of the medicines you take. Where can you learn more? Go to http://marija-marianne.info/ Enter O139 in the search box to learn more about \"Neck Arthritis: Exercises. \" Current as of: March 2, 2020               Content Version: 12.6 © 6949-9807 [employer]. Care instructions adapted under license by [employer] (which disclaims liability or warranty for this information). If you have questions about a medical condition or this instruction, always ask your healthcare professional. [place] [address] any warranty or liability for your use of this information.

## 2020-09-14 NOTE — LETTER
9/16/20 Patient: Criss May YOB: 1970 Date of Visit: 9/14/2020 Rene Rivas MD 
[address] [place] [address] VIA Facsimile: [phone] Dear Rene Rivas MD, Thank you for referring Mr. Raúl Hermosillo to [facility] for evaluation. My notes for this consultation are attached. If you have questions, please do not hesitate to call me. I look forward to following your patient along with you. Sincerely, Kayden De La Torre MD

## 2020-09-14 NOTE — PROGRESS NOTES
[facility]  [address] [place], [facility] [address]  Phone: [phone]  Fax: [phone]    Pt's YOB: 1970    ASSESSMENT   Diagnoses and all orders for this visit:    1. Cervical facet joint syndrome  -     methylPREDNISolone (MEDROL DOSEPACK) 4 mg tablet; Per dose pack instructions    2. Trigger point    3. Muscle spasm    4. Myofascial pain    5. Synovial cyst         IMPRESSION AND PLAN:  Dede Wallace is a 52 y.o. male with history of chronic cervical pain. Pt complains of pain in the neck, L>R, that extends into the occipital region with headaches that radiate forward, unchanged since his last office visit. He takes Ultram 50 mg rarely as his pain warrants, Lyrica 100 mg 1 cap BID, and Skelaxin 800 mg as needed with benefit. 1) Pt was given information on cervical arthritis exercises. 2) He was prescribed a Medrol Dosepak prn severe pain. 3) Mr. Eden Nath has a reminder for a \"due or due soon\" health maintenance. I have asked that he contact his primary care provider, Kayla Mcknight MD, for follow-up on this health maintenance. 4)  demonstrated consistency with prescribing. 5) He will continue with the Lyrica 100 mg bid and does not need a refill. Follow-up and Dispositions    · Return in about 4 months (around 1/14/2021) for Medication follow up. HISTORY OF PRESENT ILLNESS:  Dede Wallace is a 52 y.o. male with history of chronic cervical pain and presents to the office today for MRI follow up. Pt complains of pain in the neck, L>R, that extends into the occipital region with headaches that radiate forward, unchanged since his last office visit. He takes Ultram 50 mg rarely as his pain warrants, Lyrica 100 mg 1 cap BID, and Skelaxin 800 mg as needed with benefit. He notes that he never received the Medrol Dosepak prescribed at his last office visit.  Pt admits to relief when using a percussion gun massager. Pt at this time desires to continue with current care.       Pain Scale: 3/10    PCP: Miroslava Beltran MD     Past Medical History:   Diagnosis Date    Adverse effect of anesthesia     for back sx-had issues with breathing  with apnea- 6 years ago    Asthma     Chest pain, unspecified     ATYPICAL,POSSIBLE ANGINA,MUSCULAR,CAD,CHEST WALL PAINS PERSISTANT AND RECURRENT    Chronic kidney disease     Diabetes mellitus ([facility]     DM (diabetes mellitus) ([facility]     GERD (gastroesophageal reflux disease)     HTN (hypertension)     Hypercholesteremia     Hypertension     Kidney stones     Sleep apnea     cpap        Social History     Socioeconomic History    Marital status:      Spouse name: Not on file    Number of children: Not on file    Years of education: Not on file    Highest education level: Not on file   Occupational History    Occupation:    Social Needs    Financial resource strain: Not on file    Food insecurity     Worry: Not on file     Inability: Not on file   [employer] needs     Medical: Not on file     Non-medical: Not on file   Tobacco Use    Smoking status: Never Smoker    Smokeless tobacco: Never Used   Substance and Sexual Activity    Alcohol use: No    Drug use: No    Sexual activity: Not on file   Lifestyle    Physical activity     Days per week: Not on file     Minutes per session: Not on file    Stress: Not on file   Relationships    Social connections     Talks on phone: Not on file     Gets together: Not on file     Attends Yarsani service: Not on file     Active member of club or organization: Not on file     Attends meetings of clubs or organizations: Not on file     Relationship status: Not on file    Intimate partner violence     Fear of current or ex partner: Not on file     Emotionally abused: Not on file     Physically abused: Not on file     Forced sexual activity: Not on file   Other Topics Concern    Not on file   Social History Narrative    ** Merged History Encounter **            Current Outpatient Medications   Medication Sig Dispense Refill    methylPREDNISolone (MEDROL DOSEPACK) 4 mg tablet Per dose pack instructions 1 Dose Pack 0    omeprazole (PRILOSEC) 40 mg capsule Take 40 mg by mouth daily.  pregabalin (LYRICA) 100 mg capsule Take 1 Cap by mouth two (2) times a day. Max Daily Amount: 200 mg. Indications: disorder characterized by stiff, tender & painful muscles 180 Cap 1    magnesium 250 mg tab Take 250 mg by mouth two (2) times a day.  ondansetron hcl (ZOFRAN) 4 mg tablet       ondansetron (ZOFRAN ODT) 4 mg disintegrating tablet 1 Tab by SubLINGual route every eight (8) hours as needed for Nausea. 10 Tab 0    metaxalone (SKELAXIN) 800 mg tablet 1 po BID-TID prn  Indications: muscle spasm 180 Tab 1    amLODIPine (NORVASC) 5 mg tablet Take 5 mg by mouth daily.  benazepril (LOTENSIN) 20 mg tablet Take 20 mg by mouth daily.  traMADol (ULTRAM) 50 mg tablet 1 po bid prn severe pain 60 Tab 1    hydroCHLOROthiazide (MICROZIDE) 12.5 mg capsule Take 12.5 mg by mouth daily.  pravastatin (PRAVACHOL) 20 mg tablet Take 20 mg by mouth daily.  cpap machine kit by Does Not Apply route.  Potassium Citrate 15 mEq TbER Take  by mouth two (2) times a day.  tamsulosin (FLOMAX) 0.4 mg capsule Take 1 Cap by mouth daily (after dinner). 30 Cap 0    carvedilol (COREG CR) 10 mg CR capsule Take  by mouth daily (with breakfast).  montelukast (SINGULAIR) 10 mg tablet Take 10 mg by mouth daily.  insulin glargine (LANTUS SOLOSTAR) 100 unit/mL (3 mL) pen by SubCUTAneous route.  insulin aspart (NOVOLOG) 100 unit/mL inpn by SubCUTAneous route.  magnesium citrate 100 mg tab Take 300 mg by mouth daily.  aspirin 81 mg chewable tablet Take 81 mg by mouth daily.  fluticasone-salmeterol (ADVAIR DISKUS) 250-50 mcg/dose diskus inhaler Take 1 Puff by inhalation every twelve (12) hours.  albuterol (VENTOLIN HFA) 90 mcg/actuation inhaler Take  by inhalation every six (6) hours as needed.  Misc. Devices Kit Please provide the patient following size mask. Mask: Mirage FX large size mask. DME: [place], [place]  Phone: [phone], Fax: [phone] 1 Each 0    metaxalone (Skelaxin) 800 mg tablet Take 1 Tab by mouth two (2) times daily as needed for Muscle Spasm(s). (Patient not taking: Reported on 9/14/2020) 180 Tab 1    Dexlansoprazole (DEXILANT) 60 mg CpDM Take 60 mg by mouth daily. No Known Allergies      REVIEW OF SYSTEMS    Constitutional: Negative for fever, chills, or weight change. Respiratory: Negative for cough or shortness of breath. Cardiovascular: Negative for chest pain or palpitations. Gastrointestinal: Negative for acid reflux, change in bowel habits, or constipation. Genitourinary: Negative for dysuria and flank pain. Musculoskeletal: Positive for cervical pain  Neurological: Negative for headaches, dizziness, or numbness. Psychiatric/Behavioral: Negative for difficulty with sleep. As per HPI    PHYSICAL EXAMINATION  Visit Vitals  Pulse 87   Temp 97.5 °F (36.4 °C) (Oral)   Resp 20   Ht 5' 7\" (1.702 m)   Wt 295 lb (133.8 kg)   SpO2 99%   BMI 46.20 kg/m²       Constitutional: Awake, alert, and in no acute distress. Neurological: 1+ symmetrical DTRs in the upper extremities. 1+ symmetrical DTRs in the lower extremities. Sensation to light touch is intact. Negative Miller's sign bilaterally. Skin: warm, dry, and intact. Musculoskeletal: Tightness across the left upper trapezius with scattered trigger points. No pain with extension, axial loading, or forward flexion. No pain with internal or external rotation of his hips. Negative straight leg raise bilaterally.       Biceps  Triceps Deltoids Wrist Ext Wrist Flex Hand Intrin   Right +4/5 +4/5 +4/5 +4/5 +4/5 +4/5   Left +4/5 +4/5 +4/5 +4/5 +4/5 +4/5     IMAGING:    Cervical spine MRI from 8/19/2020 was personally reviewed with the patient and demonstrated:  Results from [place] encounter on 08/19/20   MRI CERV SPINE WO CONT    Addendum Addendum: Comparison is made to MRI's dated December 6 2011 and October 26 2013: The right-sided cystic lesion at C1-C2 level is most likely a benign  synovial cyst, essentially stable since 2011. No surrounding edema or destruction. No additional workup indicated unless new symptoms arise. Discussed with Dr. Yuriy Apple. Chuckie Alcala MD 9/4/2020  3:53 PM          Narrative MR CERVICAL SPINE WITHOUT CONTRAST    HISTORY: Progressive cervical pain with radicular symptoms despite extensive  physical therapy and using NSAID's.    COMPARISON: None    TECHNIQUE: Multisequence multiplanar imaging through the cervical spine. FINDINGS:    Alignment: Mild straightening of the normal cervical lordosis  Vertebral body height: Normal  Marrow signal: Unremarkable  Disc spaces: Preserved height and signal intensity    Cervicomedullary Junction: Patent  Cervical cord: No cord expansion or atrophy. On axial imaging, findings at each level are as follows:  C1-2 on the right side at the edge of the diagnostic information there is the  suggestion of a possibly fluid-filled or cystic structure measuring 1.2 x 1.1  cm. This appears to be in part invaginating between the occipital condyle on the  lateral mass of C1 this is unfortunately only seen in the sagittal views. The  bony structures are otherwise unremarkable for any focal destruction. May represent a small amount of degenerative cystic formation arising from the  joint space between the occipital condyle and the lateral mass additional  imaging is necessary to demonstrate this. C2/C3: Patent canal and foramina. C3/C4: Minor signal loss within the disc Patent canal and foramina.     C4/C5: C5 demonstrates a benign vertebral body hemangioma there is a patent  canal and foramina C5/C6: Small amount of anterior osteophyte identified. Patent  canal and foramina. C6/C7: Patent canal and foramina. C7/T1: Patent canal and foramina. Other structures: As can be identified cervical visceral spaces show no evidence  of a mass and there is no evidence of any  adenopathy  . Impression IMPRESSION:  There is a suggestion of a right-sided occipital mass possibly representing  degenerative change or possibility of a small tumor arising from the soft tissue  or bone. Unfortunately this is only demonstrated in the extreme right sagittal images in  the sagittal plane only. May Wish to acquire additional imaging through this area to exclude above  concerns. The remainder of the cervical spine demonstrates only  Minor degenerative features present no central canal stenosis cord and nerve  roots are not compressed. Benign vertebral body hemangioma C5 level. Thank you for enabling us to participate in the care of this patient. Right shoulder MRI from 09/18/2019 were personally reviewed with the patient and demonstrated:  Multisequence multiplanar MR images of the right shoulder were obtained.     HISTORY: Shoulder pain.     Mild to moderate supraspinatus tendinosis. Moderate infraspinatus tendinosis  with bursal surface fraying. Moderate inflammation in the subacromial bursa. Mild subscapular tendinosis. No muscular atrophy.     No glenohumeral joint effusion. No focal glenohumeral cartilage loss. Moderate  maceration of the superior labrum. Biceps anchor is not defined. Attenuated  remaining biceps tendon within the tendon sheath. Subcortical cystic changes in  the humeral head. No definite Hill-Sachs deformity. Glenoid is intact.     Moderate hypertrophy with mild inflammation at the acromioclavicular joint. Slightly curved acromion undersurface. No abnormal downsloping.     IMPRESSION:  1. Rotator cuff tendinosis with moderate bursal surface fraying in the  infraspinatus tendon.  No focal tear or muscular atrophy. Subacromial bursitis  present. 2. Complex degenerative tearing or maceration of the superior labrum with likely  torn biceps anchor, attenuated biceps tendon in the bicipital groove. 3. Type II acromion. Hypertrophy and inflammation at the [facility] joint.       Written by Bambi Servin, as dictated by Marco Bruno MD.  I, Dr. Marco Bruno confirm that all documentation is accurate.

## 2020-10-09 DIAGNOSIS — M62.838 MUSCLE SPASM: ICD-10-CM

## 2020-10-09 RX ORDER — METAXALONE 800 MG/1
TABLET ORAL
Qty: 180 TAB | Refills: 3 | Status: SHIPPED | OUTPATIENT
Start: 2020-10-09 | End: 2021-01-11 | Stop reason: SDUPTHER

## 2021-01-11 ENCOUNTER — OFFICE VISIT (OUTPATIENT)
Dept: ORTHOPEDIC SURGERY | Age: 51
End: 2021-01-11
Payer: OTHER GOVERNMENT

## 2021-01-11 VITALS
SYSTOLIC BLOOD PRESSURE: 145 MMHG | RESPIRATION RATE: 18 BRPM | HEIGHT: 67 IN | WEIGHT: 301.6 LBS | DIASTOLIC BLOOD PRESSURE: 91 MMHG | BODY MASS INDEX: 47.34 KG/M2 | HEART RATE: 96 BPM | OXYGEN SATURATION: 100 % | TEMPERATURE: 98 F

## 2021-01-11 DIAGNOSIS — Z98.890 H/O REPAIR OF RIGHT ROTATOR CUFF: ICD-10-CM

## 2021-01-11 DIAGNOSIS — G89.29 OTHER CHRONIC PAIN: ICD-10-CM

## 2021-01-11 DIAGNOSIS — M62.838 MUSCLE SPASM: ICD-10-CM

## 2021-01-11 DIAGNOSIS — M47.812 CERVICAL FACET JOINT SYNDROME: ICD-10-CM

## 2021-01-11 DIAGNOSIS — E66.01 MORBID OBESITY WITH BMI OF 45.0-49.9, ADULT (HCC): ICD-10-CM

## 2021-01-11 DIAGNOSIS — M79.10 TRIGGER POINT: Primary | ICD-10-CM

## 2021-01-11 DIAGNOSIS — M79.18 MYOFASCIAL PAIN: ICD-10-CM

## 2021-01-11 PROCEDURE — 20552 NJX 1/MLT TRIGGER POINT 1/2: CPT | Performed by: PHYSICAL MEDICINE & REHABILITATION

## 2021-01-11 PROCEDURE — 99214 OFFICE O/P EST MOD 30 MIN: CPT | Performed by: PHYSICAL MEDICINE & REHABILITATION

## 2021-01-11 RX ORDER — BUPIVACAINE HYDROCHLORIDE 2.5 MG/ML
2.5 INJECTION, SOLUTION INFILTRATION; PERINEURAL ONCE
Status: COMPLETED | OUTPATIENT
Start: 2021-01-11 | End: 2021-01-11

## 2021-01-11 RX ORDER — LIDOCAINE HYDROCHLORIDE 20 MG/ML
1 INJECTION, SOLUTION INFILTRATION; PERINEURAL ONCE
Status: COMPLETED | OUTPATIENT
Start: 2021-01-11 | End: 2021-01-11

## 2021-01-11 RX ORDER — BETAMETHASONE SODIUM PHOSPHATE AND BETAMETHASONE ACETATE 3; 3 MG/ML; MG/ML
6 INJECTION, SUSPENSION INTRA-ARTICULAR; INTRALESIONAL; INTRAMUSCULAR; SOFT TISSUE ONCE
Status: COMPLETED | OUTPATIENT
Start: 2021-01-11 | End: 2021-01-11

## 2021-01-11 RX ORDER — METAXALONE 800 MG/1
TABLET ORAL
Qty: 180 TAB | Refills: 1 | Status: SHIPPED | OUTPATIENT
Start: 2021-01-11 | End: 2022-01-19 | Stop reason: SDUPTHER

## 2021-01-11 RX ORDER — PREGABALIN 100 MG/1
100 CAPSULE ORAL 2 TIMES DAILY
Qty: 180 CAP | Refills: 1 | Status: SHIPPED | OUTPATIENT
Start: 2021-01-11 | End: 2021-07-13

## 2021-01-11 RX ADMIN — BETAMETHASONE SODIUM PHOSPHATE AND BETAMETHASONE ACETATE 6 MG: 3; 3 INJECTION, SUSPENSION INTRA-ARTICULAR; INTRALESIONAL; INTRAMUSCULAR; SOFT TISSUE at 17:18

## 2021-01-11 RX ADMIN — LIDOCAINE HYDROCHLORIDE 20 MG: 20 INJECTION, SOLUTION INFILTRATION; PERINEURAL at 17:18

## 2021-01-11 RX ADMIN — BUPIVACAINE HYDROCHLORIDE 2.5 MG: 2.5 INJECTION, SOLUTION INFILTRATION; PERINEURAL at 17:19

## 2021-01-11 NOTE — PATIENT INSTRUCTIONS
Neck Arthritis: Exercises Introduction Here are some examples of exercises for you to try. The exercises may be suggested for a condition or for rehabilitation. Start each exercise slowly. Ease off the exercises if you start to have pain. You will be told when to start these exercises and which ones will work best for you. How to do the exercises Neck stretches to the side 1. This stretch works best if you keep your shoulder down as you lean away from it. To help you remember to do this, start by relaxing your shoulders and lightly holding on to your thighs or your chair. 2. Tilt your head toward your shoulder and hold for 15 to 30 seconds. Let the weight of your head stretch your muscles. 3. Repeat 2 to 4 times toward each shoulder. Chin tuck 1. Lie on the floor with a rolled-up towel under your neck. Your head should be touching the floor. 2. Slowly bring your chin toward your chest. 
3. Hold for a count of 6, and then relax for up to 10 seconds. 4. Repeat 8 to 12 times. Active cervical rotation 1. Sit in a firm chair, or stand up straight. 2. Keeping your chin level, turn your head to the right, and hold for 15 to 30 seconds. 3. Turn your head to the left and hold for 15 to 30 seconds. 4. Repeat 2 to 4 times to each side. Shoulder blade squeeze 1. While standing, squeeze your shoulder blades together. 2. Do not raise your shoulders up as you are squeezing. 3. Hold for 6 seconds. 4. Repeat 8 to 12 times. Shoulder rolls 1. Sit comfortably with your feet shoulder-width apart. You can also do this exercise standing up. 2. Roll your shoulders up, then back, and then down in a smooth, circular motion. 3. Repeat 2 to 4 times. Follow-up care is a key part of your treatment and safety. Be sure to make and go to all appointments, and call your doctor if you are having problems. It's also a good idea to know your test results and keep a list of the medicines you take. Where can you learn more? Go to http://www.gray.com/ Enter J611 in the search box to learn more about \"Neck Arthritis: Exercises. \" Current as of: March 2, 2020               Content Version: 12.6 © 1653-9809 [employer]. Care instructions adapted under license by [employer] (which disclaims liability or warranty for this information). If you have questions about a medical condition or this instruction, always ask your healthcare professional. [place] [address] any warranty or liability for your use of this information.

## 2021-01-11 NOTE — LETTER
1/15/2021 Patient: Gopi Smith YOB: 1970 Date of Visit: 1/11/2021 Cecelia Bass MD 
[address] [id number] [address] [address] [address] Fax: [phone] Dear Cecelia Bass MD, Thank you for referring Mr. Mei Noel to [facility] for evaluation. My notes for this consultation are attached. If you have questions, please do not hesitate to call me. I look forward to following your patient along with you. Sincerely, King Joaquin MD

## 2021-01-11 NOTE — PROGRESS NOTES
[facility]  [address], [facility] [address]  Phone: [phone]  Fax: [phone]    Pt's YOB: 1970    ASSESSMENT   Diagnoses and all orders for this visit:    1. Trigger point  -     betamethasone (CELESTONE) injection 6 mg  -     bupivacaine (MARCAINE) 0.25 % (2.5 mg/mL) injection 2.5 mg  -     lidocaine (XYLOCAINE) 20 mg/mL (2 %) injection 20 mg  -     INJECT TRIGGER POINT, 1 OR 2    2. Muscle spasm  -     metaxalone (SKELAXIN) 800 mg tablet; TAKE 1 TABLET TWICE A DAY AS NEEDED FOR MUSCLE SPASMS    3. Myofascial pain  -     pregabalin (LYRICA) 100 mg capsule; Take 1 Cap by mouth two (2) times a day. Max Daily Amount: 200 mg. Indications: disorder characterized by stiff, tender & painful muscles    4. Cervical facet joint syndrome    5. Other chronic pain    6. Morbid obesity with BMI of 45.0-49.9, adult ([address]    7. H/O repair of right rotator cuff         IMPRESSION AND PLAN:  Mina Gray is a 52 y.o.  male with history of chronic cervical pain. Pt denies any change since his last office visit. He takes Ultram 50 mg rarely as his pain warrants, Lyrica 100 mg 1 cap BID, and Skelaxin 800 mg daily as needed with benefit. 1) Pt was given information on cervical arthritis exercises. 2) He received a refill of Lyrica 100 mg 1 cap BID. 3) Pt also received a refill of Skelaxin 800 mg 1 tab BID prn muscle spasm. 4) He may take his previously prescribed Medrol Dosepak if needed. 5) Pt received trigger point injections in the office today. 6) Mr. Doreen Osei has a reminder for a \"due or due soon\" health maintenance. I have asked that he contact his primary care provider, Valeria Baird MD, for follow-up on this health maintenance. 7)  demonstrated consistency with prescribing. 8) Weight loss recommended  Follow-up and Dispositions    · Return in about 3 months (around 4/11/2021) for Medication follow up. HISTORY OF PRESENT ILLNESS:  Chapo Santana is a 52 y.o.  male with history of chronic cervical pain and presents to the office today for follow up. Pt complains of pain in the neck, L>R, that extends into the occipital region with headaches that radiate forward, unchanged since his last office visit. He takes Ultram 50 mg rarely as his pain warrants, Lyrica 100 mg 1 cap BID, and Skelaxin 800 mg daily as needed with benefit. Pt notes that he did not take the Medrol Dosepak prescribed at his last office visit. He notes that he followed up with his urologist and was told to take vitamin D but reports that since he has kidney disease he was told not to take any supplements by his nephrologist. Pt at this time desires to continue with current care.       Pain Scale: 5/10    PCP: Jenn Mercedes MD     Past Medical History:   Diagnosis Date    Adverse effect of anesthesia     for back sx-had issues with breathing  with apnea- 6 years ago    Asthma     Chest pain, unspecified     ATYPICAL,POSSIBLE ANGINA,MUSCULAR,CAD,CHEST WALL PAINS PERSISTANT AND RECURRENT    Chronic kidney disease     Diabetes mellitus ([facility] [address]     DM (diabetes mellitus) ([facility] [address]     GERD (gastroesophageal reflux disease)     HTN (hypertension)     Hypercholesteremia     Hypertension     Kidney stones     Sleep apnea     cpap        Social History     Socioeconomic History    Marital status:      Spouse name: Not on file    Number of children: Not on file    Years of education: Not on file    Highest education level: Not on file   Occupational History    Occupation:    Social Needs    Financial resource strain: Not on file    Food insecurity     Worry: Not on file     Inability: Not on file   [employer] needs     Medical: Not on file     Non-medical: Not on file   Tobacco Use    Smoking status: Never Smoker    Smokeless tobacco: Never Used   Substance and Sexual Activity    Alcohol use: No    Drug use: No    Sexual activity: Not on file   Lifestyle    Physical activity     Days per week: Not on file     Minutes per session: Not on file    Stress: Not on file   Relationships    Social connections     Talks on phone: Not on file     Gets together: Not on file     Attends Rastafari service: Not on file     Active member of club or organization: Not on file     Attends meetings of clubs or organizations: Not on file     Relationship status: Not on file    Intimate partner violence     Fear of current or ex partner: Not on file     Emotionally abused: Not on file     Physically abused: Not on file     Forced sexual activity: Not on file   Other Topics Concern    Not on file   Social History Narrative    ** Merged History Encounter **            Current Outpatient Medications   Medication Sig Dispense Refill    pregabalin (LYRICA) 100 mg capsule Take 1 Cap by mouth two (2) times a day. Max Daily Amount: 200 mg. Indications: disorder characterized by stiff, tender & painful muscles 180 Cap 1    metaxalone (SKELAXIN) 800 mg tablet TAKE 1 TABLET TWICE A DAY AS NEEDED FOR MUSCLE SPASMS 180 Tab 1    omeprazole (PRILOSEC) 40 mg capsule Take 40 mg by mouth daily.  magnesium 250 mg tab Take 250 mg by mouth two (2) times a day.  ondansetron hcl (ZOFRAN) 4 mg tablet       ondansetron (ZOFRAN ODT) 4 mg disintegrating tablet 1 Tab by SubLINGual route every eight (8) hours as needed for Nausea. 10 Tab 0    metaxalone (SKELAXIN) 800 mg tablet 1 po BID-TID prn  Indications: muscle spasm 180 Tab 1    amLODIPine (NORVASC) 5 mg tablet Take 5 mg by mouth daily.  benazepril (LOTENSIN) 20 mg tablet Take 20 mg by mouth daily.  traMADol (ULTRAM) 50 mg tablet 1 po bid prn severe pain 60 Tab 1    hydroCHLOROthiazide (MICROZIDE) 12.5 mg capsule Take 12.5 mg by mouth daily.  pravastatin (PRAVACHOL) 20 mg tablet Take 20 mg by mouth daily.  cpap machine kit by Does Not Apply route.       Potassium Citrate 15 mEq TbER Take  by mouth two (2) times a day.  tamsulosin (FLOMAX) 0.4 mg capsule Take 1 Cap by mouth daily (after dinner). 30 Cap 0    carvedilol (COREG CR) 10 mg CR capsule Take  by mouth daily (with breakfast).  montelukast (SINGULAIR) 10 mg tablet Take 10 mg by mouth daily.  insulin glargine (LANTUS SOLOSTAR) 100 unit/mL (3 mL) pen by SubCUTAneous route.  insulin aspart (NOVOLOG) 100 unit/mL inpn by SubCUTAneous route.  aspirin 81 mg chewable tablet Take 81 mg by mouth daily.  fluticasone-salmeterol (ADVAIR DISKUS) 250-50 mcg/dose diskus inhaler Take 1 Puff by inhalation every twelve (12) hours.  albuterol (VENTOLIN HFA) 90 mcg/actuation inhaler Take  by inhalation every six (6) hours as needed.  Misc. Devices Kit Please provide the patient following size mask. Mask: Mirage FX large size mask. DME: [place], [place]  Phone: [phone], Fax: [phone] 1 Each 0    methylPREDNISolone (MEDROL DOSEPACK) 4 mg tablet Per dose pack instructions 1 Dose Pack 0    Dexlansoprazole (DEXILANT) 60 mg CpDM Take 60 mg by mouth daily. No Known Allergies      REVIEW OF SYSTEMS    Constitutional: Negative for fever, chills, or weight change. Respiratory: Negative for cough or shortness of breath. Cardiovascular: Negative for chest pain or palpitations. Gastrointestinal: Negative for acid reflux, change in bowel habits, or constipation. Genitourinary: Negative for dysuria and flank pain. Musculoskeletal: Positive for cervical pain. Neurological: Negative for headaches, dizziness, or numbness. Psychiatric/Behavioral: Negative for difficulty with sleep.     As per HPI    PHYSICAL EXAMINATION  Visit Vitals  BP (!) 145/91 (BP 1 Location: Left arm, BP Patient Position: Sitting)   Pulse 96   Temp 98 °F (36.7 °C) (Temporal)   Resp 18   Ht 5' 7\" (1.702 m)   Wt 301 lb 9.6 oz (136.8 kg)   SpO2 100%   BMI 47.24 kg/m²       Constitutional: Awake, alert, and in no acute distress. Neurological: 1+ symmetrical DTRs in the upper extremities. 1+ symmetrical DTRs in the lower extremities. Sensation to light touch is intact. Negative Miller's sign bilaterally. Skin: warm, dry, and intact. Musculoskeletal: Tightness across the left upper trapezius with scattered trigger points. No pain with extension, axial loading, or forward flexion. No pain with internal or external rotation of his hips. Negative straight leg raise bilaterally. Biceps  Triceps Deltoids Wrist Ext Wrist Flex Hand Intrin   Right +4/5 +4/5 +4/5 +4/5 +4/5 +4/5   Left +4/5 +4/5 +4/5 +4/5 +4/5 +4/5     PROCEDURES:    I administered 5 trigger point injections to the left upper trapezius using 1 cc Marcaine, 1 cc Lidocaine, and 1 cc Celestone. Pre-injection pain level was 6/10 and post-injection pain level was 5/10    Chart reviewed for the following:   Pasha ATKINSON MD, have reviewed the History, Physical and updated the Allergic reactions for Vivian Lizarraga. TIME OUT performed immediately prior to start of procedure:  Pasha ATKINSON MD, have performed the following reviews on Vivian iLzarraga prior to the start of the procedure:            * Patient was identified by name and date of birth   * Agreement on procedure being performed was verified  * Risks and Benefits explained to the patient  * Procedure site verified and marked as necessary  * Patient was positioned for comfort  * Consent was obtained     Time: 5:17 PM     Date of procedure: 1/15/2021    Procedure performed by: Jeffrey Gurrola MD    Mr. Hollie Celestin tolerated the procedure well with no complications. IMAGING:    Cervical spine MRI from 8/19/2020 was personally reviewed with the patient and demonstrated:       Results from [place] encounter on 08/19/20   MRI CERV SPINE WO CONT     Addendum Addendum: Comparison is made to MRI's dated December 6 2011 and October 26 2013:  The right-sided cystic lesion at C1-C2 level is most likely a benign  synovial cyst, essentially stable since 2011. No surrounding edema or destruction. No additional workup indicated unless new symptoms arise. Discussed with Dr. Michelle Eason. Maxi Sidhu MD 9/4/2020  3:53 PM           Narrative MR CERVICAL SPINE WITHOUT CONTRAST     HISTORY: Progressive cervical pain with radicular symptoms despite extensive  physical therapy and using NSAID's.     COMPARISON: None     TECHNIQUE: Multisequence multiplanar imaging through the cervical spine.     FINDINGS:     Alignment: Mild straightening of the normal cervical lordosis  Vertebral body height: Normal  Marrow signal: Unremarkable  Disc spaces: Preserved height and signal intensity     Cervicomedullary Junction: Patent  Cervical cord: No cord expansion or atrophy.      On axial imaging, findings at each level are as follows:  C1-2 on the right side at the edge of the diagnostic information there is the  suggestion of a possibly fluid-filled or cystic structure measuring 1.2 x 1.1  cm. This appears to be in part invaginating between the occipital condyle on the  lateral mass of C1 this is unfortunately only seen in the sagittal views. The  bony structures are otherwise unremarkable for any focal destruction.     May represent a small amount of degenerative cystic formation arising from the  joint space between the occipital condyle and the lateral mass additional  imaging is necessary to demonstrate this.        C2/C3: Patent canal and foramina.     C3/C4: Minor signal loss within the disc Patent canal and foramina.     C4/C5: C5 demonstrates a benign vertebral body hemangioma there is a patent  canal and foramina C5/C6: Small amount of anterior osteophyte identified.  Patent  canal and foramina.     C6/C7: Patent canal and foramina.     C7/T1: Patent canal and foramina.     Other structures: As can be identified cervical visceral spaces show no evidence  of a mass and there is no evidence of any adenopathy  .     Impression IMPRESSION:  There is a suggestion of a right-sided occipital mass possibly representing  degenerative change or possibility of a small tumor arising from the soft tissue  or bone.     Unfortunately this is only demonstrated in the extreme right sagittal images in  the sagittal plane only. May Wish to acquire additional imaging through this area to exclude above  concerns.         The remainder of the cervical spine demonstrates only  Minor degenerative features present no central canal stenosis cord and nerve  roots are not compressed.     Benign vertebral body hemangioma C5 level.     Thank you for enabling us to participate in the care of this patient.      Right shoulder MRI from 09/18/2019 were personally reviewed and demonstrated:  Multisequence multiplanar MR images of the right shoulder were obtained.     HISTORY: Shoulder pain.     Mild to moderate supraspinatus tendinosis. Moderate infraspinatus tendinosis  with bursal surface fraying. Moderate inflammation in the subacromial bursa. Mild subscapular tendinosis. No muscular atrophy.     No glenohumeral joint effusion. No focal glenohumeral cartilage loss. Moderate  maceration of the superior labrum. Biceps anchor is not defined. Attenuated  remaining biceps tendon within the tendon sheath. Subcortical cystic changes in  the humeral head. No definite Hill-Sachs deformity. Glenoid is intact.     Moderate hypertrophy with mild inflammation at the acromioclavicular joint. Slightly curved acromion undersurface. No abnormal downsloping.     IMPRESSION:  1. Rotator cuff tendinosis with moderate bursal surface fraying in the  infraspinatus tendon. No focal tear or muscular atrophy. Subacromial bursitis present. 2. Complex degenerative tearing or maceration of the superior labrum with likely torn biceps anchor, attenuated biceps tendon in the bicipital groove. 3. Type II acromion.  Hypertrophy and inflammation at the [facility] joint.    Written by Geovanny Donovan, as dictated by Julián Menjivar MD.  I, Dr. Julián Menjivra confirm that all documentation is accurate.

## 2021-03-07 ENCOUNTER — HOSPITAL ENCOUNTER (EMERGENCY)
Age: 51
Discharge: HOME | End: 2021-03-07
Attending: EMERGENCY MEDICINE
Payer: OTHER GOVERNMENT

## 2021-03-07 VITALS
HEART RATE: 84 BPM | RESPIRATION RATE: 18 BRPM | DIASTOLIC BLOOD PRESSURE: 90 MMHG | TEMPERATURE: 98 F | BODY MASS INDEX: 46.15 KG/M2 | SYSTOLIC BLOOD PRESSURE: 144 MMHG | WEIGHT: 294 LBS | HEIGHT: 67 IN | OXYGEN SATURATION: 96 %

## 2021-03-07 DIAGNOSIS — R10.9 ACUTE RIGHT FLANK PAIN: Primary | ICD-10-CM

## 2021-03-07 LAB
APPEARANCE UR: CLEAR
BILIRUB UR QL: NEGATIVE
COLOR UR: YELLOW
EPITH CASTS URNS QL MICRO: NORMAL /LPF (ref 0–5)
GLUCOSE UR STRIP.AUTO-MCNC: NEGATIVE MG/DL
HGB UR QL STRIP: NEGATIVE
HYALINE CASTS URNS QL MICRO: NORMAL /LPF (ref 0–2)
KETONES UR QL STRIP.AUTO: NEGATIVE MG/DL
LEUKOCYTE ESTERASE UR QL STRIP.AUTO: NEGATIVE
NITRITE UR QL STRIP.AUTO: NEGATIVE
PH UR STRIP: 6.5 [PH] (ref 5–8)
PROT UR STRIP-MCNC: 300 MG/DL
RBC #/AREA URNS HPF: NORMAL /HPF (ref 0–5)
SP GR UR REFRACTOMETRY: 1.01 (ref 1–1.03)
UROBILINOGEN UR QL STRIP.AUTO: 0.2 EU/DL (ref 0.2–1)
WBC URNS QL MICRO: NORMAL /HPF (ref 0–4)

## 2021-03-07 PROCEDURE — 81001 URINALYSIS AUTO W/SCOPE: CPT

## 2021-03-07 PROCEDURE — 99284 EMERGENCY DEPT VISIT MOD MDM: CPT

## 2021-03-07 PROCEDURE — 96374 THER/PROPH/DIAG INJ IV PUSH: CPT

## 2021-03-07 PROCEDURE — 74011250636 HC RX REV CODE- 250/636: Performed by: EMERGENCY MEDICINE

## 2021-03-07 PROCEDURE — 96375 TX/PRO/DX INJ NEW DRUG ADDON: CPT

## 2021-03-07 RX ORDER — OXYCODONE AND ACETAMINOPHEN 5; 325 MG/1; MG/1
1 TABLET ORAL
Qty: 12 TAB | Refills: 0 | Status: SHIPPED | OUTPATIENT
Start: 2021-03-07 | End: 2021-03-07

## 2021-03-07 RX ORDER — OXYCODONE AND ACETAMINOPHEN 5; 325 MG/1; MG/1
1 TABLET ORAL
Qty: 12 TAB | Refills: 0 | Status: SHIPPED | OUTPATIENT
Start: 2021-03-07 | End: 2021-03-10

## 2021-03-07 RX ORDER — KETOROLAC TROMETHAMINE 15 MG/ML
15 INJECTION, SOLUTION INTRAMUSCULAR; INTRAVENOUS
Status: COMPLETED | OUTPATIENT
Start: 2021-03-07 | End: 2021-03-07

## 2021-03-07 RX ORDER — MORPHINE SULFATE 4 MG/ML
4 INJECTION INTRAVENOUS
Status: COMPLETED | OUTPATIENT
Start: 2021-03-07 | End: 2021-03-07

## 2021-03-07 RX ADMIN — KETOROLAC TROMETHAMINE 15 MG: 15 INJECTION, SOLUTION INTRAMUSCULAR; INTRAVENOUS at 07:41

## 2021-03-07 RX ADMIN — MORPHINE SULFATE 4 MG: 4 INJECTION, SOLUTION INTRAMUSCULAR; INTRAVENOUS at 08:43

## 2021-03-07 NOTE — DISCHARGE INSTRUCTIONS
1. Urinalysis is negative. 2.  Symptoms still most consistent with recurrent kidney stone. 3.  Manage pain at home with Percocet as prescribed. 4.  Follow-up with [facility] [place] this week should symptoms persist.  5.  Return to the emergency department for acutely worsening pain, high fever, or other acute medical emergencies as needed.

## 2021-03-07 NOTE — ED PROVIDER NOTES
79-year-old male history of diabetes, hypertension, hyperlipidemia, asthma, multiple prior kidney stones presents for evaluation of right flank pain. Onset over the last 48 hours with waxing waning course. Typical prior kidney stones. No radiation into the scrotum. No voiding symptoms. No abdominal pain. No chest pain or shortness of breath. No fever. Patient reports \"50-60\" prior kidney stones. Required lithotripsy for treatment of same about a year ago.            Past Medical History:   Diagnosis Date    Adverse effect of anesthesia     for back sx-had issues with breathing  with apnea- 6 years ago    Asthma     Chest pain, unspecified     ATYPICAL,POSSIBLE ANGINA,MUSCULAR,CAD,CHEST WALL PAINS PERSISTANT AND RECURRENT    Chronic kidney disease     Diabetes mellitus ([facility] [address]     DM (diabetes mellitus) (HCC)     GERD (gastroesophageal reflux disease)     HTN (hypertension)     Hypercholesteremia     Hypertension     Kidney stones     Sleep apnea     cpap       Past Surgical History:   Procedure Laterality Date    HX BACK SURGERY  2010    laminectomy L5-S1    HX BACK SURGERY      HX TONSILLECTOMY      HX UROLOGICAL  3/24/2016    [facility], Dr. Winifred Floyd, Cystoscopy, Right Retrograde Ureteroscopy, Holmium Laser Lithotripsy, double j cath         Family History:   Problem Relation Age of Onset    Diabetes Mother     Hypertension Mother     Heart Disease Mother     Heart Attack Father         MI in 45s    Stroke Father     Diabetes Father        Social History     Socioeconomic History    Marital status:      Spouse name: Not on file    Number of children: Not on file    Years of education: Not on file    Highest education level: Not on file   Occupational History    Occupation:    Social Needs    Financial resource strain: Not on file    Food insecurity     Worry: Not on file     Inability: Not on file    Transportation needs     Medical: Not on file     Non-medical: Not on file   Tobacco Use    Smoking status: Never Smoker    Smokeless tobacco: Never Used   Substance and Sexual Activity    Alcohol use: No    Drug use: No    Sexual activity: Not on file   Lifestyle    Physical activity     Days per week: Not on file     Minutes per session: Not on file    Stress: Not on file   Relationships    Social connections     Talks on phone: Not on file     Gets together: Not on file     Attends Ep[employer] service: Not on file     Active member of club or organization: Not on file     Attends meetings of clubs or organizations: Not on file     Relationship status: Not on file    Intimate partner violence     Fear of current or ex partner: Not on file     Emotionally abused: Not on file     Physically abused: Not on file     Forced sexual activity: Not on file   Other Topics Concern    Not on file   Social History Narrative    ** Merged History Encounter **              ALLERGIES: Patient has no known allergies. Review of Systems   Constitutional: Negative for fever. Respiratory: Negative for shortness of breath. Cardiovascular: Negative for chest pain. Gastrointestinal: Negative for abdominal pain. Genitourinary: Positive for flank pain. Negative for dysuria and hematuria. All other systems reviewed and are negative. Vitals:    03/07/21 0640   BP: (!) 147/97   Pulse: 84   Resp: 18   Temp: 98 °F (36.7 °C)   SpO2: 99%   Weight: 133.4 kg (294 lb)   Height: 5' 7\" (1.702 m)            Physical Exam  Vitals signs and nursing note reviewed. Constitutional:       General: He is not in acute distress. Appearance: He is well-developed. HENT:      Head: Normocephalic and atraumatic. Eyes:      General: No scleral icterus. Cardiovascular:      Rate and Rhythm: Normal rate. Pulmonary:      Effort: Pulmonary effort is normal.      Breath sounds: No wheezing or rhonchi. Abdominal:      Tenderness: There is no abdominal tenderness.  There is no guarding or rebound. Musculoskeletal:      Comments: No midline lumbar tenderness. Mild tenderness over the right flank. No palpable spasm. Skin:     General: Skin is warm and dry. Neurological:      Mental Status: He is alert and oriented to person, place, and time. MDM  Number of Diagnoses or Management Options  Acute right flank pain  Diagnosis management comments: Impression: Right flank pain in patient with history of recurrent kidney stones. Suspect recurrence of same. IV established and Toradol administered. Urinalysis reviewed. Symptoms typical for prior episodes of renal colic. Will forego imaging due to cumulative radiation risk. Prescription issued for limited number of Percocet tablets for pain control. Patient has urology follow-up. Referred for same. Pain improved prior to discharge. No abdominal component. Procedures      Diagnosis:   1. Acute right flank pain      1. Urinalysis is negative. 2.  Symptoms still most consistent with recurrent kidney stone. 3.  Manage pain at home with Percocet as prescribed. 4.  Follow-up with [facility] [place] this week should symptoms persist.  5.  Return to the emergency department for acutely worsening pain, high fever, or other acute medical emergencies as needed. Disposition: home    Follow-up Information     Follow up With Specialties Details Why Contact Info    [facility]  Schedule an appointment as soon as possible for a visit  for recheck of ongoing symptoms [address]  [address] [facility] EMERGENCY DEPT Emergency Medicine  As needed, If symptoms worsen [address] [facility]  [phone]          Patient's Medications   Start Taking    OXYCODONE-ACETAMINOPHEN (PERCOCET) 5-325 MG PER TABLET    Take 1 Tab by mouth every four (4) hours as needed for Pain for up to 3 days. Max Daily Amount: 6 Tabs.    Continue Taking    ALBUTEROL (VENTOLIN HFA) 90 MCG/ACTUATION INHALER    Take  by inhalation every six (6) hours as needed. AMLODIPINE (NORVASC) 5 MG TABLET    Take 5 mg by mouth daily. ASPIRIN 81 MG CHEWABLE TABLET    Take 81 mg by mouth daily. BENAZEPRIL (LOTENSIN) 20 MG TABLET    Take 20 mg by mouth daily. CARVEDILOL (COREG CR) 10 MG CR CAPSULE    Take  by mouth daily (with breakfast). CPAP MACHINE KIT    by Does Not Apply route. DEXLANSOPRAZOLE (DEXILANT) 60 MG CPDM    Take 60 mg by mouth daily. FLUTICASONE-SALMETEROL (ADVAIR DISKUS) 250-50 MCG/DOSE DISKUS INHALER    Take 1 Puff by inhalation every twelve (12) hours. HYDROCHLOROTHIAZIDE (MICROZIDE) 12.5 MG CAPSULE    Take 12.5 mg by mouth daily. INSULIN ASPART (NOVOLOG) 100 UNIT/ML INPN    by SubCUTAneous route. INSULIN GLARGINE (LANTUS SOLOSTAR) 100 UNIT/ML (3 ML) PEN    by SubCUTAneous route. MAGNESIUM 250 MG TAB    Take 250 mg by mouth two (2) times a day. METAXALONE (SKELAXIN) 800 MG TABLET    1 po BID-TID prn  Indications: muscle spasm    METAXALONE (SKELAXIN) 800 MG TABLET    TAKE 1 TABLET TWICE A DAY AS NEEDED FOR MUSCLE SPASMS    METHYLPREDNISOLONE (MEDROL DOSEPACK) 4 MG TABLET    Per dose pack instructions    MISC. DEVICES KIT    Please provide the patient following size mask. Mask: Mirage FX large size mask. DME: [place], [place]  Phone: [phone], Fax: [phone]    MONTELUKAST (SINGULAIR) 10 MG TABLET    Take 10 mg by mouth daily. OMEPRAZOLE (PRILOSEC) 40 MG CAPSULE    Take 40 mg by mouth daily. ONDANSETRON (ZOFRAN ODT) 4 MG DISINTEGRATING TABLET    1 Tab by SubLINGual route every eight (8) hours as needed for Nausea. ONDANSETRON HCL (ZOFRAN) 4 MG TABLET        POTASSIUM CITRATE 15 MEQ TBER    Take  by mouth two (2) times a day. PRAVASTATIN (PRAVACHOL) 20 MG TABLET    Take 20 mg by mouth daily. PREGABALIN (LYRICA) 100 MG CAPSULE    Take 1 Cap by mouth two (2) times a day. Max Daily Amount: 200 mg.  Indications: disorder characterized by stiff, tender & painful muscles    TAMSULOSIN (FLOMAX) 0.4 MG CAPSULE    Take 1 Cap by mouth daily (after dinner).     TRAMADOL (ULTRAM) 50 MG TABLET    1 po bid prn severe pain   These Medications have changed    No medications on file   Stop Taking    No medications on file

## 2021-05-10 ENCOUNTER — OFFICE VISIT (OUTPATIENT)
Dept: ORTHOPEDIC SURGERY | Age: 51
End: 2021-05-10
Payer: OTHER GOVERNMENT

## 2021-05-10 VITALS
HEIGHT: 67 IN | TEMPERATURE: 97.8 F | WEIGHT: 302.8 LBS | HEART RATE: 92 BPM | OXYGEN SATURATION: 97 % | DIASTOLIC BLOOD PRESSURE: 81 MMHG | RESPIRATION RATE: 20 BRPM | BODY MASS INDEX: 47.53 KG/M2 | SYSTOLIC BLOOD PRESSURE: 138 MMHG

## 2021-05-10 DIAGNOSIS — M47.812 CERVICAL FACET JOINT SYNDROME: ICD-10-CM

## 2021-05-10 DIAGNOSIS — Z98.890 H/O REPAIR OF RIGHT ROTATOR CUFF: ICD-10-CM

## 2021-05-10 DIAGNOSIS — M62.838 MUSCLE SPASM: ICD-10-CM

## 2021-05-10 DIAGNOSIS — E66.01 MORBID OBESITY WITH BMI OF 45.0-49.9, ADULT (HCC): ICD-10-CM

## 2021-05-10 DIAGNOSIS — G89.29 OTHER CHRONIC PAIN: ICD-10-CM

## 2021-05-10 DIAGNOSIS — M79.10 TRIGGER POINT: Primary | ICD-10-CM

## 2021-05-10 DIAGNOSIS — M79.18 MYOFASCIAL PAIN: ICD-10-CM

## 2021-05-10 PROCEDURE — 20552 NJX 1/MLT TRIGGER POINT 1/2: CPT | Performed by: PHYSICAL MEDICINE & REHABILITATION

## 2021-05-10 PROCEDURE — 99213 OFFICE O/P EST LOW 20 MIN: CPT | Performed by: PHYSICAL MEDICINE & REHABILITATION

## 2021-05-10 RX ORDER — LIDOCAINE HYDROCHLORIDE 20 MG/ML
1 INJECTION, SOLUTION INFILTRATION; PERINEURAL ONCE
Status: COMPLETED | OUTPATIENT
Start: 2021-05-10 | End: 2021-05-10

## 2021-05-10 RX ORDER — BETAMETHASONE SODIUM PHOSPHATE AND BETAMETHASONE ACETATE 3; 3 MG/ML; MG/ML
6 INJECTION, SUSPENSION INTRA-ARTICULAR; INTRALESIONAL; INTRAMUSCULAR; SOFT TISSUE ONCE
Status: COMPLETED | OUTPATIENT
Start: 2021-05-10 | End: 2021-05-10

## 2021-05-10 RX ORDER — BUPIVACAINE HYDROCHLORIDE 2.5 MG/ML
2.5 INJECTION, SOLUTION INFILTRATION; PERINEURAL ONCE
Status: COMPLETED | OUTPATIENT
Start: 2021-05-10 | End: 2021-05-10

## 2021-05-10 RX ORDER — HYDROCHLOROTHIAZIDE 25 MG/1
1 TABLET ORAL DAILY
COMMUNITY
Start: 2021-05-03

## 2021-05-10 RX ADMIN — BUPIVACAINE HYDROCHLORIDE 2.5 MG: 2.5 INJECTION, SOLUTION INFILTRATION; PERINEURAL at 17:03

## 2021-05-10 RX ADMIN — BETAMETHASONE SODIUM PHOSPHATE AND BETAMETHASONE ACETATE 6 MG: 3; 3 INJECTION, SUSPENSION INTRA-ARTICULAR; INTRALESIONAL; INTRAMUSCULAR; SOFT TISSUE at 17:02

## 2021-05-10 RX ADMIN — LIDOCAINE HYDROCHLORIDE 20 MG: 20 INJECTION, SOLUTION INFILTRATION; PERINEURAL at 17:04

## 2021-05-10 NOTE — PATIENT INSTRUCTIONS
Neck Arthritis: Exercises Introduction Here are some examples of exercises for you to try. The exercises may be suggested for a condition or for rehabilitation. Start each exercise slowly. Ease off the exercises if you start to have pain. You will be told when to start these exercises and which ones will work best for you. How to do the exercises Neck stretches to the side 1. This stretch works best if you keep your shoulder down as you lean away from it. To help you remember to do this, start by relaxing your shoulders and lightly holding on to your thighs or your chair. 2. Tilt your head toward your shoulder and hold for 15 to 30 seconds. Let the weight of your head stretch your muscles. 3. Repeat 2 to 4 times toward each shoulder. Chin tuck 1. Lie on the floor with a rolled-up towel under your neck. Your head should be touching the floor. 2. Slowly bring your chin toward your chest. 
3. Hold for a count of 6, and then relax for up to 10 seconds. 4. Repeat 8 to 12 times. Active cervical rotation 1. Sit in a firm chair, or stand up straight. 2. Keeping your chin level, turn your head to the right, and hold for 15 to 30 seconds. 3. Turn your head to the left and hold for 15 to 30 seconds. 4. Repeat 2 to 4 times to each side. Shoulder blade squeeze 1. While standing, squeeze your shoulder blades together. 2. Do not raise your shoulders up as you are squeezing. 3. Hold for 6 seconds. 4. Repeat 8 to 12 times. Shoulder rolls 1. Sit comfortably with your feet shoulder-width apart. You can also do this exercise standing up. 2. Roll your shoulders up, then back, and then down in a smooth, circular motion. 3. Repeat 2 to 4 times. Follow-up care is a key part of your treatment and safety. Be sure to make and go to all appointments, and call your doctor if you are having problems. It's also a good idea to know your test results and keep a list of the medicines you take.  
Where can you learn more? Go to http://www.gray.com/ Enter B211 in the search box to learn more about \"Neck Arthritis: Exercises. \" Current as of: November 16, 2020               Content Version: 12.8 © 6579-3670 [employer]. Care instructions adapted under license by "[employer]" (which disclaims liability or warranty for this information). If you have questions about a medical condition or this instruction, always ask your healthcare professional. [address] any warranty or liability for your use of this information.

## 2021-05-10 NOTE — LETTER
5/13/2021 Patient: Susan Ansari YOB: 1970 Date of Visit: 5/10/2021 Yaniv Otero MD 
[address] [id number] [address] [address] Via Fax: [phone] Dear Yaniv Otero MD, Thank you for referring Mr. Woody Silver to [facility] for evaluation. My notes for this consultation are attached. If you have questions, please do not hesitate to call me. I look forward to following your patient along with you. Sincerely, Shiloh Borja MD

## 2021-05-10 NOTE — PROGRESS NOTES
[facility]  [address] [place] [address], [facility] [address]  Phone: [phone]  Fax: [phone]    Pt's YOB: 1970    ASSESSMENT   Diagnoses and all orders for this visit:    1. Trigger point  -     betamethasone (CELESTONE) injection 6 mg  -     bupivacaine HCl (MARCAINE) 0.25 % (2.5 mg/mL) injection 2.5 mg  -     lidocaine (XYLOCAINE) 20 mg/mL (2 %) injection 20 mg  -     INJECT TRIGGER POINT, 1 OR 2    2. Muscle spasm    3. Myofascial pain    4. Cervical facet joint syndrome    5. Other chronic pain    6. Morbid obesity with BMI of 45.0-49.9, adult ([address]    7. H/O repair of right rotator cuff         IMPRESSION AND PLAN:  Seng Vann is a 54 y.o. male with history of chronic cervical pain. Pt denies any change in symptoms since his last office visit. He takes Ultram 50 mg rarely as his pain warrants, Lyrica 100 mg 1 cap BID, and Skelaxin 800 mg 1 tab QHS with benefit. 1) Pt was given information on cervical arthritis exercises. 2) He received trigger point injections during his office visit. 3) Pt will continue taking Skelaxin 800 mg, Lyrica 100 mg, and Ultram 50 mg as his pain warrants. 4) Mr. Sandi Alexis has a reminder for a \"due or due soon\" health maintenance. I have asked that he contact his primary care provider, Farzaneh Cox DO, for follow-up on this health maintenance. 5)  demonstrated consistency with prescribing. Follow-up and Dispositions    · Return in about 3 months (around 8/10/2021) for Medication follow up. HISTORY OF PRESENT ILLNESS:  Seng Vann is a 54 y.o. male with history of chronic cervical pain and presents to the office today for follow up. Pt denies any change in symptoms since his last office visit. He reports tightness in the left upper back and notes relief with trigger points at his last office visit.  Of note, he had right shoulder surgery in 12/2019 by Dr. Maxine Cedeño and reports occasional pain in the right shoulder when sleeping in certain positions. He takes Ultram 50 mg rarely as his pain warrants and Lyrica 100 mg 1 cap BID. Pt has been taking Skelaxin 800 mg 1 tab QHS since his shoulder surgery and notes it helps with sleep. Pt received both doses of the COVID-19 vaccination since his last office visit. Pt at this time desires to proceed with trigger point injections.     Pain Scale: 6/10    PCP: Corey Andino DO     Past Medical History:   Diagnosis Date    Adverse effect of anesthesia     for back sx-had issues with breathing  with apnea- 6 years ago    Asthma     Chest pain, unspecified     ATYPICAL,POSSIBLE ANGINA,MUSCULAR,CAD,CHEST WALL PAINS PERSISTANT AND RECURRENT    Chronic kidney disease     Diabetes mellitus ([facility]     DM (diabetes mellitus) ([facility]     GERD (gastroesophageal reflux disease)     HTN (hypertension)     Hypercholesteremia     Hypertension     Kidney stones     Sleep apnea     cpap        Social History     Socioeconomic History    Marital status:      Spouse name: Not on file    Number of children: Not on file    Years of education: Not on file    Highest education level: Not on file   Occupational History    Occupation:    Social Needs    Financial resource strain: Not on file    Food insecurity     Worry: Not on file     Inability: Not on file   [employer] needs     Medical: Not on file     Non-medical: Not on file   Tobacco Use    Smoking status: Never Smoker    Smokeless tobacco: Never Used   Substance and Sexual Activity    Alcohol use: No    Drug use: No    Sexual activity: Not on file   Lifestyle    Physical activity     Days per week: Not on file     Minutes per session: Not on file    Stress: Not on file   Relationships    Social connections     Talks on phone: Not on file     Gets together: Not on file     Attends Restorationist service: Not on file     Active member of club or organization: Not on file Attends meetings of clubs or organizations: Not on file     Relationship status: Not on file    Intimate partner violence     Fear of current or ex partner: Not on file     Emotionally abused: Not on file     Physically abused: Not on file     Forced sexual activity: Not on file   Other Topics Concern    Not on file   Social History Narrative    ** Merged History Encounter **            Current Outpatient Medications   Medication Sig Dispense Refill    hydroCHLOROthiazide (HYDRODIURIL) 25 mg tablet Take 1 Tab by mouth daily.  pregabalin (LYRICA) 100 mg capsule Take 1 Cap by mouth two (2) times a day. Max Daily Amount: 200 mg. Indications: disorder characterized by stiff, tender & painful muscles 180 Cap 1    metaxalone (SKELAXIN) 800 mg tablet TAKE 1 TABLET TWICE A DAY AS NEEDED FOR MUSCLE SPASMS 180 Tab 1    omeprazole (PRILOSEC) 40 mg capsule Take 40 mg by mouth daily.  magnesium 250 mg tab Take 250 mg by mouth two (2) times a day.  ondansetron (ZOFRAN ODT) 4 mg disintegrating tablet 1 Tab by SubLINGual route every eight (8) hours as needed for Nausea. 10 Tab 0    amLODIPine (NORVASC) 5 mg tablet Take 5 mg by mouth daily.  benazepril (LOTENSIN) 20 mg tablet Take 20 mg by mouth daily.  traMADol (ULTRAM) 50 mg tablet 1 po bid prn severe pain 60 Tab 1    pravastatin (PRAVACHOL) 20 mg tablet Take 20 mg by mouth daily.  cpap machine kit by Does Not Apply route.  Potassium Citrate 15 mEq TbER Take  by mouth two (2) times a day.  tamsulosin (FLOMAX) 0.4 mg capsule Take 1 Cap by mouth daily (after dinner). 30 Cap 0    carvedilol (COREG CR) 10 mg CR capsule Take  by mouth daily (with breakfast).  montelukast (SINGULAIR) 10 mg tablet Take 10 mg by mouth daily.  insulin glargine (LANTUS SOLOSTAR) 100 unit/mL (3 mL) pen by SubCUTAneous route.  insulin aspart (NOVOLOG) 100 unit/mL inpn by SubCUTAneous route.       aspirin 81 mg chewable tablet Take 81 mg by mouth daily.      fluticasone-salmeterol (ADVAIR DISKUS) 250-50 mcg/dose diskus inhaler Take 1 Puff by inhalation every twelve (12) hours.  albuterol (VENTOLIN HFA) 90 mcg/actuation inhaler Take  by inhalation every six (6) hours as needed.  Misc. Devices Kit Please provide the patient following size mask. Mask: Mirage FX large size mask. DME: [facility], [place], [place]  Phone: [phone], Fax: [phone] 1 Each 0    methylPREDNISolone (MEDROL DOSEPACK) 4 mg tablet Per dose pack instructions 1 Dose Pack 0    Dexlansoprazole (DEXILANT) 60 mg CpDM Take 60 mg by mouth daily. No Known Allergies      REVIEW OF SYSTEMS    Constitutional: Negative for fever, chills, or weight change. Respiratory: Negative for cough or shortness of breath. Cardiovascular: Negative for chest pain or palpitations. Gastrointestinal: Negative for acid reflux, change in bowel habits, or constipation. Genitourinary: Negative for dysuria and flank pain. Musculoskeletal: Positive for cervical pain. Neurological: Negative for headaches, dizziness, or numbness. Endo/Heme/Allergies: Negative for increased bruising. Psychiatric/Behavioral: Negative for difficulty with sleep. As per HPI    PHYSICAL EXAMINATION  Visit Vitals  /81   Pulse 92   Temp 97.8 °F (36.6 °C) (Temporal)   Resp 20   Ht 5' 7\" (1.702 m)   Wt 302 lb 12.8 oz (137.3 kg)   SpO2 97%   BMI 47.43 kg/m²       Constitutional: Awake, alert, and in no acute distress. Neurological: 1+ symmetrical DTRs in the upper extremities. 1+ symmetrical DTRs in the lower extremities. Sensation to light touch is intact. Negative Miller's sign bilaterally. Skin: warm, dry, and intact. Musculoskeletal: Tightness across the left upper trapezius with scattered trigger points. No pain with extension, axial loading, or forward flexion. No pain with internal or external rotation of his hips. Negative straight leg raise bilaterally.       Biceps  Triceps Deltoids Wrist Ext Wrist Flex Hand Intrin   Right +4/5 +4/5 +4/5 +4/5 +4/5 +4/5   Left +4/5 +4/5 +4/5 +4/5 +4/5 +4/5     PROCEDURES:    I administered 5 trigger point injections to left upper trapezius using 1 cc Marcaine, 1 cc Lidocaine, and 1 cc Celestone. Pre-injection pain level was 6/10 and post-injection pain level was 5/10    Chart reviewed for the following:   Km ATKINSON MD, have reviewed the History, Physical and updated the Allergic reactions for Tsang Stains. TIME OUT performed immediately prior to start of procedure:  Km ATKINSON MD, have performed the following reviews on Tsang Stains prior to the start of the procedure:            * Patient was identified by name and date of birth   * Agreement on procedure being performed was verified  * Risks and Benefits explained to the patient  * Procedure site verified and marked as necessary  * Patient was positioned for comfort  * Consent was obtained     Time: 4:50 PM     Date of procedure: 5/10/2021    Procedure performed by: Olena Ansari MD    Mr. [facility] tolerated the procedure well with no complications. IMAGING:    Cervical spine MRI from 8/19/2020 was personally reviewed with the patient and demonstrated:          Results from [place] encounter on 08/19/20   MRI CERV SPINE WO CONT     Addendum Addendum: Comparison is made to MRI's dated December 6 2011 and October 26 2013: The right-sided cystic lesion at C1-C2 level is most likely a benign  synovial cyst, essentially stable since 2011.  No surrounding edema or destruction.  No additional workup indicated unless new symptoms arise.  Discussed with Dr. Back. Nae Stover MD 9/4/2020  3:53 PM           Narrative MR CERVICAL SPINE WITHOUT CONTRAST     HISTORY: Progressive cervical pain with radicular symptoms despite extensive  physical therapy and using NSAID's.     COMPARISON: None     TECHNIQUE: Multisequence multiplanar imaging through the cervical spine.     FINDINGS:     Alignment: Mild straightening of the normal cervical lordosis  Vertebral body height: Normal  Marrow signal: Unremarkable  Disc spaces: Preserved height and signal intensity     Cervicomedullary Junction: Patent  Cervical cord: No cord expansion or atrophy.      On axial imaging, findings at each level are as follows:  C1-2 on the right side at the edge of the diagnostic information there is the  suggestion of a possibly fluid-filled or cystic structure measuring 1.2 x 1.1  cm. This appears to be in part invaginating between the occipital condyle on the  lateral mass of C1 this is unfortunately only seen in the sagittal views. The  bony structures are otherwise unremarkable for any focal destruction.     May represent a small amount of degenerative cystic formation arising from the  joint space between the occipital condyle and the lateral mass additional  imaging is necessary to demonstrate this.        C2/C3: Patent canal and foramina.     C3/C4: Minor signal loss within the disc Patent canal and foramina.     C4/C5: C5 demonstrates a benign vertebral body hemangioma there is a patent  canal and foramina C5/C6: Small amount of anterior osteophyte identified. Patent  canal and foramina.     C6/C7: Patent canal and foramina.     C7/T1: Patent canal and foramina.     Other structures: As can be identified cervical visceral spaces show no evidence  of a mass and there is no evidence of any  adenopathy  .     Impression IMPRESSION:  There is a suggestion of a right-sided occipital mass possibly representing  degenerative change or possibility of a small tumor arising from the soft tissue  or bone.     Unfortunately this is only demonstrated in the extreme right sagittal images in  the sagittal plane only.   May Wish to acquire additional imaging through this area to exclude above  concerns.         The remainder of the cervical spine demonstrates only  Minor degenerative features present no central canal stenosis cord and nerve  roots are not compressed.     Benign vertebral body hemangioma C5 level.     Thank you for enabling us to participate in the care of this patient.      Right shoulder MRI from 09/18/2019 were personally reviewed and demonstrated:  Multisequence multiplanar MR images of the right shoulder were obtained.     HISTORY: Shoulder pain.     Mild to moderate supraspinatus tendinosis. Moderate infraspinatus tendinosis  with bursal surface fraying. Moderate inflammation in the subacromial bursa. Mild subscapular tendinosis. No muscular atrophy.     No glenohumeral joint effusion. No focal glenohumeral cartilage loss. Moderate  maceration of the superior labrum. Biceps anchor is not defined. Attenuated  remaining biceps tendon within the tendon sheath. Subcortical cystic changes in  the humeral head. No definite Hill-Sachs deformity. Glenoid is intact.     Moderate hypertrophy with mild inflammation at the acromioclavicular joint. Slightly curved acromion undersurface. No abnormal downsloping.     IMPRESSION:  1. Rotator cuff tendinosis with moderate bursal surface fraying in the  infraspinatus tendon. No focal tear or muscular atrophy. Subacromial bursitis present. 2. Complex degenerative tearing or maceration of the superior labrum with likely torn biceps anchor, attenuated biceps tendon in the bicipital groove. 3. Type II acromion. Hypertrophy and inflammation at the [facility] joint. Written by Vilma Vee, as dictated by Aissatou Tejada MD.  I, Dr. Aissatou Tejada confirm that all documentation is accurate.

## 2021-07-13 DIAGNOSIS — M79.18 MYOFASCIAL PAIN: ICD-10-CM

## 2021-07-13 RX ORDER — PREGABALIN 100 MG/1
CAPSULE ORAL
Qty: 180 CAPSULE | Refills: 0 | Status: SHIPPED | OUTPATIENT
Start: 2021-07-13 | End: 2021-09-13 | Stop reason: SDUPTHER

## 2021-08-09 ENCOUNTER — VIRTUAL VISIT (OUTPATIENT)
Dept: ORTHOPEDIC SURGERY | Age: 51
End: 2021-08-09
Payer: OTHER GOVERNMENT

## 2021-08-09 DIAGNOSIS — M79.18 MYOFASCIAL PAIN: Primary | ICD-10-CM

## 2021-08-09 DIAGNOSIS — M79.10 TRIGGER POINT: ICD-10-CM

## 2021-08-09 DIAGNOSIS — M25.562 ACUTE PAIN OF LEFT KNEE: ICD-10-CM

## 2021-08-09 DIAGNOSIS — M62.838 MUSCLE SPASM: ICD-10-CM

## 2021-08-09 PROCEDURE — 99213 OFFICE O/P EST LOW 20 MIN: CPT | Performed by: PHYSICAL MEDICINE & REHABILITATION

## 2021-08-09 NOTE — PROGRESS NOTES
[facility]  [address] [place], [address]  Phone: [phone]  Fax: [phone]    Pt's YOB: 1970    ASSESSMENT   Diagnoses and all orders for this visit:    1. Myofascial pain    2. Trigger point    3. Muscle spasm    4. Acute pain of left knee         IMPRESSION AND PLAN:  Sakina Sifuentes is a 54 y.o.  male with history of myofascial pain and is seen from his home by synchronous (real-time) audio-video technology at the [facility] location via [employer] on 8/9/2021    1)  Pt will continue with Lyrica BID  2) Pt will be scheduled for trigger point injections in the next few weeks  3) Further evaluation for his knee will also be done at his follow up visit. 4) Mr. Arpita Richardson Allan Davion has a reminder for a \"due or due soon\" health maintenance. I have asked that he contact his primary care provider, Kvng Cox DO, for follow-up on this health maintenance. 5)  demonstrated consistency with prescribing. Follow-up and Dispositions    · Return in about 22 days (around 8/31/2021) for trigger point injections. CPT Codes 78906-20262 for Established Patients may apply to this TeleHealth Visit. Due to this being a TeleHealth evaluation, many elements of the physical examination are unable to be assessed. Pursuant to the emergency declaration under the [facility], [facility] waiver authority and the [employer] Act, this Virtual Visit was conducted, with patient's consent, to reduce the patient's risk of exposure to COVID-19 and provide continuity of care for an established patient. Services were provided through a video synchronous discussion virtually to substitute for in-person clinic visit.     HISTORY OF PRESENT ILLNESS:  Sakina Sifuentes is a 54 y.o.  male with history of myofascial pain and is seen from his home by synchronous (real-time) audio-video technology at the [facility] location via [employer] on 8/9/2021. Pt reports he continues to take Lyrica 100 mg bid with benefit and uses Skelaxin as needed. He also had significant benefit with previous trigger point injections and would like to schedule some more. He also reports some left knee pain which is new for him. He states it is worse with inactivity such as sitting and it comes and goes. He denies any swelling or warmth. Pain Scale: /10    PCP: Ethan Colon DO     Past Medical History:   Diagnosis Date    Adverse effect of anesthesia     for back sx-had issues with breathing  with apnea- 6 years ago    Asthma     Chest pain, unspecified     ATYPICAL,POSSIBLE ANGINA,MUSCULAR,CAD,CHEST WALL PAINS PERSISTANT AND RECURRENT    Chronic kidney disease     Diabetes mellitus ([address]     DM (diabetes mellitus) ([address]     GERD (gastroesophageal reflux disease)     HTN (hypertension)     Hypercholesteremia     Hypertension     Kidney stones     Sleep apnea     cpap        Social History     Socioeconomic History    Marital status:      Spouse name: Not on file    Number of children: Not on file    Years of education: Not on file    Highest education level: Not on file   Occupational History    Occupation:    Tobacco Use    Smoking status: Never Smoker    Smokeless tobacco: Never Used   Substance and Sexual Activity    Alcohol use: No    Drug use: No    Sexual activity: Not on file   Other Topics Concern    Not on file   Social History Narrative    ** Merged History Encounter **          Social Determinants of Health     Financial Resource Strain:     Difficulty of Paying Living Expenses:    Food Insecurity:     Worried About Running Out of Food in the Last Year:     Ran Out of Food in the Last Year:    Transportation Needs:     Lack of Transportation (Medical):      Lack of Transportation (Non-Medical):    Physical Activity:     Days of Exercise per Week:  Minutes of Exercise per Session:    Stress:     Feeling of Stress :    Social Connections:     Frequency of Communication with Friends and Family:     Frequency of Social Gatherings with Friends and Family:     Attends [employer] Services:     Active Member of Clubs or Organizations:     Attends Club or Organization Meetings:     Marital Status:    Intimate Partner Violence:     Fear of Current or Ex-Partner:     Emotionally Abused:     Physically Abused:     Sexually Abused:        Current Outpatient Medications   Medication Sig Dispense Refill    pregabalin (LYRICA) 100 mg capsule TAKE 1 CAPSULE BY MOUTH TWICE DAILY. MAX DAILY AMOUNT: 200 MG. INDICATIONS: DISORDER CHARACTERIZED BY STIFF, TENDER AND PAINFUL MUSCLES 180 Capsule 0    hydroCHLOROthiazide (HYDRODIURIL) 25 mg tablet Take 1 Tab by mouth daily.  metaxalone (SKELAXIN) 800 mg tablet TAKE 1 TABLET TWICE A DAY AS NEEDED FOR MUSCLE SPASMS 180 Tab 1    methylPREDNISolone (MEDROL DOSEPACK) 4 mg tablet Per dose pack instructions 1 Dose Pack 0    omeprazole (PRILOSEC) 40 mg capsule Take 40 mg by mouth daily.  magnesium 250 mg tab Take 250 mg by mouth two (2) times a day.  ondansetron (ZOFRAN ODT) 4 mg disintegrating tablet 1 Tab by SubLINGual route every eight (8) hours as needed for Nausea. 10 Tab 0    amLODIPine (NORVASC) 5 mg tablet Take 5 mg by mouth daily.  benazepril (LOTENSIN) 20 mg tablet Take 20 mg by mouth daily.  traMADol (ULTRAM) 50 mg tablet 1 po bid prn severe pain 60 Tab 1    pravastatin (PRAVACHOL) 20 mg tablet Take 20 mg by mouth daily.  cpap machine kit by Does Not Apply route.  Potassium Citrate 15 mEq TbER Take  by mouth two (2) times a day.  tamsulosin (FLOMAX) 0.4 mg capsule Take 1 Cap by mouth daily (after dinner). 30 Cap 0    carvedilol (COREG CR) 10 mg CR capsule Take  by mouth daily (with breakfast).  montelukast (SINGULAIR) 10 mg tablet Take 10 mg by mouth daily.       insulin glargine (LANTUS SOLOSTAR) 100 unit/mL (3 mL) pen by SubCUTAneous route.  insulin aspart (NOVOLOG) 100 unit/mL inpn by SubCUTAneous route.  aspirin 81 mg chewable tablet Take 81 mg by mouth daily.  fluticasone-salmeterol (ADVAIR DISKUS) 250-50 mcg/dose diskus inhaler Take 1 Puff by inhalation every twelve (12) hours.  Dexlansoprazole (DEXILANT) 60 mg CpDM Take 60 mg by mouth daily.  albuterol (VENTOLIN HFA) 90 mcg/actuation inhaler Take  by inhalation every six (6) hours as needed.  Misc. Devices Kit Please provide the patient following size mask. Mask: Mirage FX large size mask. DME: [facility], [facility]  Phone: [phone], Fax: [phone] 1 Each 0       No Known Allergies      REVIEW OF SYSTEMS    Constitutional: Negative for fever, chills, or weight change. Respiratory: Negative for cough or shortness of breath. Cardiovascular: Negative for chest pain or palpitations. Gastrointestinal: Negative for acid reflux, change in bowel habits, or constipation. Genitourinary: Negative for dysuria and flank pain. Musculoskeletal: Positive for upper back and knee pain  Neurological: Negative for headaches, dizziness, or numbness. Endo/Heme/Allergies: Negative for increased bruising. Psychiatric/Behavioral: Negative for difficulty with sleep. IMAGING:  Cervical spine MRI from 8/19/2020 was personally reviewed with the patient and demonstrated:          Results from [place] encounter on 08/19/20   MRI CERV SPINE WO CONT     Addendum Addendum: Comparison is made to MRI's dated December 6 2011 and October 26 2013: The right-sided cystic lesion at C1-C2 level is most likely a benign  synovial cyst, essentially stable since 2011.  No surrounding edema or destruction.  No additional workup indicated unless new symptoms arise.  Discussed with Dr. Ruben Turner. Christina Shepard MD 9/4/2020  3:53 PM           Narrative MR CERVICAL SPINE WITHOUT CONTRAST     HISTORY: Progressive cervical pain with radicular symptoms despite extensive  physical therapy and using NSAID's.     COMPARISON: None     TECHNIQUE: Multisequence multiplanar imaging through the cervical spine.     FINDINGS:     Alignment: Mild straightening of the normal cervical lordosis  Vertebral body height: Normal  Marrow signal: Unremarkable  Disc spaces: Preserved height and signal intensity     Cervicomedullary Junction: Patent  Cervical cord: No cord expansion or atrophy.      On axial imaging, findings at each level are as follows:  C1-2 on the right side at the edge of the diagnostic information there is the  suggestion of a possibly fluid-filled or cystic structure measuring 1.2 x 1.1  cm. This appears to be in part invaginating between the occipital condyle on the  lateral mass of C1 this is unfortunately only seen in the sagittal views. The  bony structures are otherwise unremarkable for any focal destruction.     May represent a small amount of degenerative cystic formation arising from the  joint space between the occipital condyle and the lateral mass additional  imaging is necessary to demonstrate this.        C2/C3: Patent canal and foramina.     C3/C4: Minor signal loss within the disc Patent canal and foramina.     C4/C5: C5 demonstrates a benign vertebral body hemangioma there is a patent  canal and foramina C5/C6: Small amount of anterior osteophyte identified.  Patent  canal and foramina.     C6/C7: Patent canal and foramina.     C7/T1: Patent canal and foramina.     Other structures: As can be identified cervical visceral spaces show no evidence  of a mass and there is no evidence of any  adenopathy  .     Impression IMPRESSION:  There is a suggestion of a right-sided occipital mass possibly representing  degenerative change or possibility of a small tumor arising from the soft tissue  or bone.     Unfortunately this is only demonstrated in the extreme right sagittal images in  the sagittal plane only. May Wish to acquire additional imaging through this area to exclude above  concerns.         The remainder of the cervical spine demonstrates only  Minor degenerative features present no central canal stenosis cord and nerve  roots are not compressed.     Benign vertebral body hemangioma C5 level.     Thank you for enabling us to participate in the care of this patient.      Right shoulder MRI from 09/18/2019 were personally reviewed and demonstrated:  Multisequence multiplanar MR images of the right shoulder were obtained.     HISTORY: Shoulder pain.     Mild to moderate supraspinatus tendinosis. Moderate infraspinatus tendinosis  with bursal surface fraying. Moderate inflammation in the subacromial bursa. Mild subscapular tendinosis. No muscular atrophy.     No glenohumeral joint effusion. No focal glenohumeral cartilage loss. Moderate  maceration of the superior labrum. Biceps anchor is not defined. Attenuated  remaining biceps tendon within the tendon sheath. Subcortical cystic changes in  the humeral head. No definite Hill-Sachs deformity. Glenoid is intact.     Moderate hypertrophy with mild inflammation at the acromioclavicular joint. Slightly curved acromion undersurface. No abnormal downsloping.     IMPRESSION:  1. Rotator cuff tendinosis with moderate bursal surface fraying in the  infraspinatus tendon. No focal tear or muscular atrophy. Subacromial bursitis present. 2. Complex degenerative tearing or maceration of the superior labrum with likely torn biceps anchor, attenuated biceps tendon in the bicipital groove. 3. Type II acromion.  Hypertrophy and inflammation at the [facility].

## 2021-09-13 ENCOUNTER — OFFICE VISIT (OUTPATIENT)
Dept: ORTHOPEDIC SURGERY | Age: 51
End: 2021-09-13
Payer: OTHER GOVERNMENT

## 2021-09-13 VITALS
OXYGEN SATURATION: 96 % | HEIGHT: 67 IN | TEMPERATURE: 97.5 F | RESPIRATION RATE: 16 BRPM | DIASTOLIC BLOOD PRESSURE: 78 MMHG | SYSTOLIC BLOOD PRESSURE: 122 MMHG | BODY MASS INDEX: 46.24 KG/M2 | HEART RATE: 82 BPM | WEIGHT: 294.6 LBS

## 2021-09-13 DIAGNOSIS — E66.01 MORBID OBESITY WITH BMI OF 45.0-49.9, ADULT (HCC): ICD-10-CM

## 2021-09-13 DIAGNOSIS — M79.18 MYOFASCIAL PAIN: Primary | ICD-10-CM

## 2021-09-13 DIAGNOSIS — M47.812 CERVICAL FACET JOINT SYNDROME: ICD-10-CM

## 2021-09-13 DIAGNOSIS — M62.838 MUSCLE SPASM: ICD-10-CM

## 2021-09-13 DIAGNOSIS — M79.10 TRIGGER POINT: ICD-10-CM

## 2021-09-13 PROCEDURE — 20552 NJX 1/MLT TRIGGER POINT 1/2: CPT | Performed by: PHYSICAL MEDICINE & REHABILITATION

## 2021-09-13 PROCEDURE — 99214 OFFICE O/P EST MOD 30 MIN: CPT | Performed by: PHYSICAL MEDICINE & REHABILITATION

## 2021-09-13 RX ORDER — BUPIVACAINE HYDROCHLORIDE 2.5 MG/ML
2.5 INJECTION, SOLUTION INFILTRATION; PERINEURAL ONCE
Status: COMPLETED | OUTPATIENT
Start: 2021-09-13 | End: 2021-09-13

## 2021-09-13 RX ORDER — BETAMETHASONE SODIUM PHOSPHATE AND BETAMETHASONE ACETATE 3; 3 MG/ML; MG/ML
6 INJECTION, SUSPENSION INTRA-ARTICULAR; INTRALESIONAL; INTRAMUSCULAR; SOFT TISSUE ONCE
Status: COMPLETED | OUTPATIENT
Start: 2021-09-13 | End: 2021-09-13

## 2021-09-13 RX ORDER — PREGABALIN 100 MG/1
CAPSULE ORAL
Qty: 180 CAPSULE | Refills: 1 | Status: SHIPPED | OUTPATIENT
Start: 2021-10-10 | End: 2022-03-28

## 2021-09-13 RX ORDER — LIDOCAINE HYDROCHLORIDE 20 MG/ML
1 INJECTION, SOLUTION INFILTRATION; PERINEURAL ONCE
Status: COMPLETED | OUTPATIENT
Start: 2021-09-13 | End: 2021-09-13

## 2021-09-13 RX ADMIN — BETAMETHASONE SODIUM PHOSPHATE AND BETAMETHASONE ACETATE 6 MG: 3; 3 INJECTION, SUSPENSION INTRA-ARTICULAR; INTRALESIONAL; INTRAMUSCULAR; SOFT TISSUE at 09:58

## 2021-09-13 RX ADMIN — BUPIVACAINE HYDROCHLORIDE 2.5 MG: 2.5 INJECTION, SOLUTION INFILTRATION; PERINEURAL at 09:59

## 2021-09-13 RX ADMIN — LIDOCAINE HYDROCHLORIDE 20 MG: 20 INJECTION, SOLUTION INFILTRATION; PERINEURAL at 09:59

## 2021-09-13 NOTE — PROGRESS NOTES
Cain Noguera presents today for   Chief Complaint   Patient presents with    Follow-up       Is someone accompanying this pt? no    Is the patient using any DME equipment during OV? no    Coordination of Care:  1. Have you been to the ER, urgent care clinic since your last visit? Hospitalized since your last visit? no    2. Have you seen or consulted any other health care providers outside of the [address] [place] [address] since your last visit? Include any pap smears or colon screening.  no

## 2021-09-13 NOTE — PROGRESS NOTES
[facility]  [address] [place], [facility] [address]  Phone: [phone]  Fax: [phone]    Pt's YOB: 1970    ASSESSMENT   Diagnoses and all orders for this visit:    1. Myofascial pain  -     pregabalin (LYRICA) 100 mg capsule; TAKE 1 CAPSULE BY MOUTH TWICE DAILY. MAX DAILY AMOUNT: 200 MG. 2. Trigger point  -     betamethasone (CELESTONE) injection 6 mg  -     bupivacaine HCl (MARCAINE) 0.25 % (2.5 mg/mL) injection 2.5 mg  -     lidocaine (XYLOCAINE) 20 mg/mL (2 %) injection 20 mg  -     INJECT TRIGGER POINT, 1 OR 2    3. Muscle spasm    4. Cervical facet joint syndrome    5. Morbid obesity with BMI of 45.0-49.9, adult [facility])         IMPRESSION AND PLAN:  Shavonne Damon is a 54 y.o. male with history of cervical and myofascial pain and presents to the office today for follow up. Pt denies any change in symptoms since his last office visit. He reports tightness in the left upper back and notes temporary relief with trigger points at his last office visit. Pt continues to take Lyrica 100 mg 1 cap BID and Skelaxin 800 mg 1 tab QHS. 1) Pt was given information on upper back exercises. 2) He received a refill of Lyrica 100 mg 1 cap BID. 3) Pt received trigger point injections in the left trapezius. 4) Mr. Daniel Staples has a reminder for a \"due or due soon\" health maintenance. I have asked that he contact his primary care provider, Bernabe Hernandez DO, for follow-up on this health maintenance. 5)  demonstrated consistency with prescribing. 6) Weight loss recommended  7) Will do sooner follow up for re-evaluation for TP injections  Follow-up and Dispositions    · Return in about 5 weeks (around 10/18/2021) for trigger point injections. HISTORY OF PRESENT ILLNESS:  Shavonne Damon is a 54 y.o. male with history of cervical and myofascial pain and presents to the office today for follow up.  Pt denies any change in symptoms since his last office visit. He reports tightness in the left upper back and notes temporary relief with trigger points at his last office visit. Of note, he had right shoulder surgery in 12/2019 by Dr. Stiven Dee and reports occasional pain in the right shoulder when sleeping in certain positions. Pt continues to take Lyrica 100 mg 1 cap BID and Skelaxin 800 mg 1 tab QHS since his shoulder surgery which helps with sleep. He also takes vitamin D 1000 units daily. Pt has a history of diabetes mellitus and manages his blood sugars with insulin. Pt at this time desires to proceed with trigger point injections.     Pain Scale: 3/10    PCP: Leo Sellers DO     Past Medical History:   Diagnosis Date    Adverse effect of anesthesia     for back sx-had issues with breathing  with apnea- 6 years ago    Asthma     Chest pain, unspecified     ATYPICAL,POSSIBLE ANGINA,MUSCULAR,CAD,CHEST WALL PAINS PERSISTANT AND RECURRENT    Chronic kidney disease     Diabetes mellitus ([address]     DM (diabetes mellitus) ([address]     GERD (gastroesophageal reflux disease)     HTN (hypertension)     Hypercholesteremia     Hypertension     Kidney stones     Sleep apnea     cpap        Social History     Socioeconomic History    Marital status:      Spouse name: Not on file    Number of children: Not on file    Years of education: Not on file    Highest education level: Not on file   Occupational History    Occupation:    Tobacco Use    Smoking status: Never Smoker    Smokeless tobacco: Never Used   Substance and Sexual Activity    Alcohol use: No    Drug use: No    Sexual activity: Not on file   Other Topics Concern    Not on file   Social History Narrative    ** Merged History Encounter **          Social Determinants of Health     Financial Resource Strain:     Difficulty of Paying Living Expenses:    Food Insecurity:     Worried About Running Out of Food in the Last Year:     [address] in the Last Year: Transportation Needs:     Lack of Transportation (Medical):  Lack of Transportation (Non-Medical):    Physical Activity:     Days of Exercise per Week:     Minutes of Exercise per Session:    Stress:     Feeling of Stress :    Social Connections:     Frequency of Communication with Friends and Family:     Frequency of Social Gatherings with Friends and Family:     Attends Jainism Services:     Active Member of Clubs or Organizations:     Attends Club or Organization Meetings:     Marital Status:    Intimate Partner Violence:     Fear of Current or Ex-Partner:     Emotionally Abused:     Physically Abused:     Sexually Abused:        Current Outpatient Medications   Medication Sig Dispense Refill    [START ON 10/10/2021] pregabalin (LYRICA) 100 mg capsule TAKE 1 CAPSULE BY MOUTH TWICE DAILY. MAX DAILY AMOUNT: 200 MG. 180 Capsule 1    hydroCHLOROthiazide (HYDRODIURIL) 25 mg tablet Take 1 Tab by mouth daily.  metaxalone (SKELAXIN) 800 mg tablet TAKE 1 TABLET TWICE A DAY AS NEEDED FOR MUSCLE SPASMS 180 Tab 1    methylPREDNISolone (MEDROL DOSEPACK) 4 mg tablet Per dose pack instructions 1 Dose Pack 0    omeprazole (PRILOSEC) 40 mg capsule Take 40 mg by mouth daily.  magnesium 250 mg tab Take 250 mg by mouth two (2) times a day.  ondansetron (ZOFRAN ODT) 4 mg disintegrating tablet 1 Tab by SubLINGual route every eight (8) hours as needed for Nausea. 10 Tab 0    amLODIPine (NORVASC) 5 mg tablet Take 5 mg by mouth daily.  benazepril (LOTENSIN) 20 mg tablet Take 20 mg by mouth daily.  traMADol (ULTRAM) 50 mg tablet 1 po bid prn severe pain 60 Tab 1    pravastatin (PRAVACHOL) 20 mg tablet Take 20 mg by mouth daily.  cpap machine kit by Does Not Apply route.  Potassium Citrate 15 mEq TbER Take  by mouth two (2) times a day.  tamsulosin (FLOMAX) 0.4 mg capsule Take 1 Cap by mouth daily (after dinner).  30 Cap 0    carvedilol (COREG CR) 10 mg CR capsule Take  by mouth daily (with breakfast).  montelukast (SINGULAIR) 10 mg tablet Take 10 mg by mouth daily.  insulin glargine (LANTUS SOLOSTAR) 100 unit/mL (3 mL) pen by SubCUTAneous route.  insulin aspart (NOVOLOG) 100 unit/mL inpn by SubCUTAneous route.  aspirin 81 mg chewable tablet Take 81 mg by mouth daily.  fluticasone-salmeterol (ADVAIR DISKUS) 250-50 mcg/dose diskus inhaler Take 1 Puff by inhalation every twelve (12) hours.  albuterol (VENTOLIN HFA) 90 mcg/actuation inhaler Take  by inhalation every six (6) hours as needed.  Misc. Devices Kit Please provide the patient following size mask. Mask: Mirage FX large size mask. DME: [place], [place]  Phone: [phone], Fax: [phone] 1 Each 0    Dexlansoprazole (DEXILANT) 60 mg CpDM Take 60 mg by mouth daily. (Patient not taking: Reported on 9/13/2021)         No Known Allergies      REVIEW OF SYSTEMS    Constitutional: Negative for fever, chills, or weight change. Respiratory: Negative for cough or shortness of breath. Cardiovascular: Negative for chest pain or palpitations. Gastrointestinal: Negative for acid reflux, change in bowel habits, or constipation. Genitourinary: Negative for dysuria and flank pain. Musculoskeletal: Positive for upper back pain. Skin: Negative for rash. Neurological: Negative for headaches, dizziness, or numbness. Endo/Heme/Allergies: Negative for increased bruising. Psychiatric/Behavioral: Positive for difficulty with sleep. As per HPI    PHYSICAL EXAMINATION  Visit Vitals  /78 (BP 1 Location: Right upper arm, BP Patient Position: Sitting)   Pulse 82   Temp 97.5 °F (36.4 °C) (Temporal)   Resp 16   Ht 5' 7\" (1.702 m)   Wt 294 lb 9.6 oz (133.6 kg)   SpO2 96%   BMI 46.14 kg/m²       Constitutional: Awake, alert, and in no acute distress. Neurological: 1+ symmetrical DTRs in the upper extremities. 1+ symmetrical DTRs in the lower extremities.  Sensation to light touch is intact. Negative Miller's sign bilaterally. Skin: warm, dry, and intact. Musculoskeletal: Tight across the upper trapezius bilaterally with scattered trigger points on the left. No pain with extension, axial loading, or forward flexion. No pain with internal or external rotation of his hips. Negative straight leg raise bilaterally. Biceps  Triceps Deltoids Wrist Ext Wrist Flex Hand Intrin   Right +4/5 +4/5 +4/5 +4/5 +4/5 +4/5   Left +4/5 +4/5 +4/5 +4/5 +4/5 +4/5     PROCEDURES:    I administered 4 trigger point injections to left upper trapezius using 1 cc Marcaine, 1 cc Lidocaine, and 1 cc Celestone. Pre-injection pain level was 3/10 and post-injection pain level was 3/10    Chart reviewed for the following:   Iam ATKINSON MD, have reviewed the History, Physical and updated the Allergic reactions for Juliane Christianson. TIME OUT performed immediately prior to start of procedure:  Iam ATKINSON MD, have performed the following reviews on Juliane Christianson prior to the start of the procedure:            * Patient was identified by name and date of birth   * Agreement on procedure being performed was verified  * Risks and Benefits explained to the patient  * Procedure site verified and marked as necessary  * Patient was positioned for comfort  * Consent was obtained     Time: 10:18 AM     Date of procedure: 9/13/2021    Procedure performed by: Shaji Oh MD    Mr. Addison Rowell tolerated the procedure well with no complications. IMAGING:    Cervical spine MRI from 8/19/2020 was personally reviewed with the patient and demonstrated:          Results from [place] encounter on 08/19/20   MRI CERV SPINE WO CONT     Addendum Addendum: Comparison is made to MRI's dated December 6 2011 and October 26 2013: The right-sided cystic lesion at C1-C2 level is most likely a benign  synovial cyst, essentially stable since 2011.  No surrounding edema or destruction.  No additional workup indicated unless new symptoms arise.  Discussed with Dr. Lupe Triana. Desiree Smalls MD 9/4/2020  3:53 PM           Narrative MR CERVICAL SPINE WITHOUT CONTRAST     HISTORY: Progressive cervical pain with radicular symptoms despite extensive  physical therapy and using NSAID's.     COMPARISON: None     TECHNIQUE: Multisequence multiplanar imaging through the cervical spine.     FINDINGS:     Alignment: Mild straightening of the normal cervical lordosis  Vertebral body height: Normal  Marrow signal: Unremarkable  Disc spaces: Preserved height and signal intensity     Cervicomedullary Junction: Patent  Cervical cord: No cord expansion or atrophy.      On axial imaging, findings at each level are as follows:  C1-2 on the right side at the edge of the diagnostic information there is the  suggestion of a possibly fluid-filled or cystic structure measuring 1.2 x 1.1  cm. This appears to be in part invaginating between the occipital condyle on the  lateral mass of C1 this is unfortunately only seen in the sagittal views. The  bony structures are otherwise unremarkable for any focal destruction.     May represent a small amount of degenerative cystic formation arising from the  joint space between the occipital condyle and the lateral mass additional  imaging is necessary to demonstrate this.        C2/C3: Patent canal and foramina.     C3/C4: Minor signal loss within the disc Patent canal and foramina.     C4/C5: C5 demonstrates a benign vertebral body hemangioma there is a patent  canal and foramina C5/C6: Small amount of anterior osteophyte identified.  Patent  canal and foramina.     C6/C7: Patent canal and foramina.     C7/T1: Patent canal and foramina.     Other structures: As can be identified cervical visceral spaces show no evidence  of a mass and there is no evidence of any  adenopathy  .     Impression IMPRESSION:  There is a suggestion of a right-sided occipital mass possibly representing  degenerative change or possibility of a small tumor arising from the soft tissue  or bone.     Unfortunately this is only demonstrated in the extreme right sagittal images in  the sagittal plane only. May Wish to acquire additional imaging through this area to exclude above  concerns.         The remainder of the cervical spine demonstrates only  Minor degenerative features present no central canal stenosis cord and nerve  roots are not compressed.     Benign vertebral body hemangioma C5 level.     Thank you for enabling us to participate in the care of this patient.      Right shoulder MRI from 09/18/2019 were personally reviewed and demonstrated:  Multisequence multiplanar MR images of the right shoulder were obtained.     HISTORY: Shoulder pain.     Mild to moderate supraspinatus tendinosis. Moderate infraspinatus tendinosis  with bursal surface fraying. Moderate inflammation in the subacromial bursa. Mild subscapular tendinosis. No muscular atrophy.     No glenohumeral joint effusion. No focal glenohumeral cartilage loss. Moderate  maceration of the superior labrum. Biceps anchor is not defined. Attenuated  remaining biceps tendon within the tendon sheath. Subcortical cystic changes in  the humeral head. No definite Hill-Sachs deformity. Glenoid is intact.     Moderate hypertrophy with mild inflammation at the acromioclavicular joint. Slightly curved acromion undersurface. No abnormal downsloping.     IMPRESSION:  1. Rotator cuff tendinosis with moderate bursal surface fraying in the  infraspinatus tendon. No focal tear or muscular atrophy. Subacromial bursitis present. 2. Complex degenerative tearing or maceration of the superior labrum with likely torn biceps anchor, attenuated biceps tendon in the bicipital groove. 3. Type II acromion. Hypertrophy and inflammation at the [facility] joint.       Written by Pradeep Albrecht, as dictated by Tramaine Henry MD.  I, Dr. Tarmaine Henry confirm that all documentation is accurate.

## 2021-09-13 NOTE — LETTER
9/13/2021    Patient: Juliane Christianson   YOB: 1970   Date of Visit: 9/13/2021     [facility]  [address]  [address] [address] [address]  Via Fax: [phone]    Dear Taylor Houston DO,      Thank you for referring Mr. Nicolasa Acosta to [address] [place] [address] [facility] for evaluation. My notes for this consultation are attached. If you have questions, please do not hesitate to call me. I look forward to following your patient along with you.       Sincerely,    Shaji Oh MD

## 2021-09-13 NOTE — PATIENT INSTRUCTIONS
Healthy Upper Back: Exercises  Introduction  Here are some examples of exercises for your upper back. Start each exercise slowly. Ease off the exercise if you start to have pain. Your doctor or physical therapist will tell you when you can start these exercises and which ones will work best for you. How to do the exercises  Lower neck and upper back stretch   1. Stretch your arms out in front of your body. Clasp one hand on top of your other hand. 2. Gently reach out so that you feel your shoulder blades stretching away from each other. 3. Gently bend your head forward. 4. Hold for 15 to 30 seconds. 5. Repeat 2 to 4 times. Midback stretch   If you have knee pain, do not do this exercise. 1. Kneel on the floor, and sit back on your ankles. 2. Lean forward, place your hands on the floor, and stretch your arms out in front of you. Rest your head between your arms. 3. Gently push your chest toward the floor, reaching as far in front of you as possible. 4. Hold for 15 to 30 seconds. 5. Repeat 2 to 4 times. Shoulder rolls   1. Sit comfortably with your feet shoulder-width apart. You can also do this exercise while standing. 2. Roll your shoulders up, then back, and then down in a smooth, circular motion. 3. Repeat 2 to 4 times. Wall push-up   1. Stand against a wall with your feet about 12 to 24 inches back from the wall. If you feel any pain when you do this exercise, stand closer to the wall. 2. Place your hands on the wall slightly wider apart than your shoulders, and lean forward. 3. Gently lean your body toward the wall. Then push back to your starting position. Keep the motion smooth and controlled. 4. Repeat 8 to 12 times. Resisted shoulder blade squeeze   For this exercise, you will need elastic exercise material, such as surgical tubing or Thera-Band. 1. Sit or stand, holding the band in both hands in front of you. Keep your elbows close to your sides, bent at a 90-degree angle. Your palms should face up. 2. Squeeze your shoulder blades together, and move your arms to the outside, stretching the band. Be sure to keep your elbows at your sides while you do this. 3. Relax. 4. Repeat 8 to 12 times. Resisted rows   For this exercise, you will need elastic exercise material, such as surgical tubing or Thera-Band. 1. Put the band around a solid object, such as a bedpost, at about waist level. Hold one end of the band in each hand. 2. With your elbows at your sides and bent to 90 degrees, pull the band back to move your shoulder blades toward each other. Return to the starting position. 3. Repeat 8 to 12 times. Follow-up care is a key part of your treatment and safety. Be sure to make and go to all appointments, and call your doctor if you are having problems. It's also a good idea to know your test results and keep a list of the medicines you take. Where can you learn more? Go to http://www.gray.com/  Enter O927 in the search box to learn more about \"Healthy Upper Back: Exercises. \"  Current as of: November 16, 2020               Content Version: 12.8  © 8923-9559 Healthwise, Incorporated. Care instructions adapted under license by [employer] (which disclaims liability or warranty for this information). If you have questions about a medical condition or this instruction, always ask your healthcare professional. [place] [address] any warranty or liability for your use of this information.

## 2021-10-19 ENCOUNTER — OFFICE VISIT (OUTPATIENT)
Dept: ORTHOPEDIC SURGERY | Age: 51
End: 2021-10-19
Payer: OTHER GOVERNMENT

## 2021-10-19 VITALS
BODY MASS INDEX: 46.46 KG/M2 | HEIGHT: 67 IN | SYSTOLIC BLOOD PRESSURE: 128 MMHG | TEMPERATURE: 95.4 F | HEART RATE: 82 BPM | RESPIRATION RATE: 16 BRPM | WEIGHT: 296 LBS | DIASTOLIC BLOOD PRESSURE: 73 MMHG | OXYGEN SATURATION: 97 %

## 2021-10-19 DIAGNOSIS — M25.511 ACUTE PAIN OF RIGHT SHOULDER: ICD-10-CM

## 2021-10-19 DIAGNOSIS — E66.01 MORBID OBESITY WITH BMI OF 45.0-49.9, ADULT (HCC): ICD-10-CM

## 2021-10-19 DIAGNOSIS — M79.18 MYOFASCIAL PAIN: Primary | ICD-10-CM

## 2021-10-19 DIAGNOSIS — M79.10 TRIGGER POINT: ICD-10-CM

## 2021-10-19 DIAGNOSIS — M62.838 MUSCLE SPASM: ICD-10-CM

## 2021-10-19 DIAGNOSIS — M47.812 CERVICAL FACET JOINT SYNDROME: ICD-10-CM

## 2021-10-19 PROCEDURE — 99213 OFFICE O/P EST LOW 20 MIN: CPT | Performed by: PHYSICAL MEDICINE & REHABILITATION

## 2021-10-19 PROCEDURE — 20552 NJX 1/MLT TRIGGER POINT 1/2: CPT | Performed by: PHYSICAL MEDICINE & REHABILITATION

## 2021-10-19 RX ORDER — DICLOFENAC SODIUM 10 MG/G
GEL TOPICAL
Qty: 500 G | Refills: 3 | Status: SHIPPED | OUTPATIENT
Start: 2021-10-19 | End: 2022-07-12 | Stop reason: SDUPTHER

## 2021-10-19 RX ORDER — BETAMETHASONE SODIUM PHOSPHATE AND BETAMETHASONE ACETATE 3; 3 MG/ML; MG/ML
6 INJECTION, SUSPENSION INTRA-ARTICULAR; INTRALESIONAL; INTRAMUSCULAR; SOFT TISSUE ONCE
Status: COMPLETED | OUTPATIENT
Start: 2021-10-19 | End: 2021-10-19

## 2021-10-19 RX ORDER — BUPIVACAINE HYDROCHLORIDE 2.5 MG/ML
2.5 INJECTION, SOLUTION INFILTRATION; PERINEURAL ONCE
Status: COMPLETED | OUTPATIENT
Start: 2021-10-19 | End: 2021-10-19

## 2021-10-19 RX ORDER — LIDOCAINE HYDROCHLORIDE 20 MG/ML
1 INJECTION, SOLUTION INFILTRATION; PERINEURAL ONCE
Status: COMPLETED | OUTPATIENT
Start: 2021-10-19 | End: 2021-10-19

## 2021-10-19 RX ADMIN — BUPIVACAINE HYDROCHLORIDE 2.5 MG: 2.5 INJECTION, SOLUTION INFILTRATION; PERINEURAL at 11:26

## 2021-10-19 RX ADMIN — BETAMETHASONE SODIUM PHOSPHATE AND BETAMETHASONE ACETATE 6 MG: 3; 3 INJECTION, SUSPENSION INTRA-ARTICULAR; INTRALESIONAL; INTRAMUSCULAR; SOFT TISSUE at 11:23

## 2021-10-19 RX ADMIN — LIDOCAINE HYDROCHLORIDE 20 MG: 20 INJECTION, SOLUTION INFILTRATION; PERINEURAL at 11:27

## 2021-10-19 NOTE — LETTER
10/19/2021    Patient: Terri Kessler   YOB: 1970   Date of Visit: 10/19/2021     [facility] [address]  [address] [address] [address]  Via Fax: [phone]    Dear Jakob Early DO,      Thank you for referring Mr. Fritz Saucedo to [place] [facility] for evaluation. My notes for this consultation are attached. If you have questions, please do not hesitate to call me. I look forward to following your patient along with you.       Sincerely,    Casey Prasad MD

## 2021-10-19 NOTE — PATIENT INSTRUCTIONS
Neck Arthritis: Exercises  Introduction  Here are some examples of exercises for you to try. The exercises may be suggested for a condition or for rehabilitation. Start each exercise slowly. Ease off the exercises if you start to have pain. You will be told when to start these exercises and which ones will work best for you. How to do the exercises  Neck stretches to the side    1. This stretch works best if you keep your shoulder down as you lean away from it. To help you remember to do this, start by relaxing your shoulders and lightly holding on to your thighs or your chair. 2. Tilt your head toward your shoulder and hold for 15 to 30 seconds. Let the weight of your head stretch your muscles. 3. Repeat 2 to 4 times toward each shoulder. Chin tuck    1. Lie on the floor with a rolled-up towel under your neck. Your head should be touching the floor. 2. Slowly bring your chin toward your chest.  3. Hold for a count of 6, and then relax for up to 10 seconds. 4. Repeat 8 to 12 times. Active cervical rotation    1. Sit in a firm chair, or stand up straight. 2. Keeping your chin level, turn your head to the right, and hold for 15 to 30 seconds. 3. Turn your head to the left and hold for 15 to 30 seconds. 4. Repeat 2 to 4 times to each side. Shoulder blade squeeze    1. While standing, squeeze your shoulder blades together. 2. Do not raise your shoulders up as you are squeezing. 3. Hold for 6 seconds. 4. Repeat 8 to 12 times. Shoulder rolls    1. Sit comfortably with your feet shoulder-width apart. You can also do this exercise standing up. 2. Roll your shoulders up, then back, and then down in a smooth, circular motion. 3. Repeat 2 to 4 times. Follow-up care is a key part of your treatment and safety. Be sure to make and go to all appointments, and call your doctor if you are having problems. It's also a good idea to know your test results and keep a list of the medicines you take.   Where can you learn more? Go to http://www.gray.com/  Enter A827 in the search box to learn more about \"Neck Arthritis: Exercises. \"  Current as of: July 1, 2021               Content Version: 13.0  © 5510-5334 Healthwise, Incorporated. Care instructions adapted under license by [employer] (which disclaims liability or warranty for this information). If you have questions about a medical condition or this instruction, always ask your healthcare professional. [place] [address] any warranty or liability for your use of this information.

## 2021-10-19 NOTE — PROGRESS NOTES
[facility]  [address] [place], [facility] [address]  Phone: [phone]  Fax: [phone]    Pt's YOB: 1970    ASSESSMENT   Diagnoses and all orders for this visit:    1. Myofascial pain    2. Trigger point  -     betamethasone (CELESTONE) injection 6 mg  -     bupivacaine HCl (MARCAINE) 0.25 % (2.5 mg/mL) injection 2.5 mg  -     lidocaine (XYLOCAINE) 20 mg/mL (2 %) injection 20 mg  -     INJECT TRIGGER POINT, 1 OR 2    3. Muscle spasm    4. Acute pain of right shoulder  -     diclofenac (Voltaren) 1 % gel; Apply 4 grams to right shoulder up to 4 times per day, maximum 16 grams per joint per day. Dispense 5 100 gram tubes    5. Cervical facet joint syndrome    6. Morbid obesity with BMI of 45.0-49.9, adult [facility])         IMPRESSION AND PLAN:  Niecy Zelaya is a 54 y.o. male with history of cervical pain. Pt reports tightness in the left upper back and notes temporary relief with trigger points at his last office visit. He also reports pain in the right shoulder that intermittently radiates down the arm. Pt continues to take Lyrica 100 mg 1 cap BID and Skelaxin 800 mg 1 tab QHS as needed. 1) Pt was given information on cervical arthritis exercises. 2) He will continue taking Lyrica 100 mg as prescribed and does not need a refill at this time. Discussed increasing from BID-TID to better manage his neuropathic symptoms. 3) Discussed increasing his Skelaxin 800 mg from as needed to 1 tab daily-BID to better manage his symptoms. 4) Pt was prescribed Voltaren 1% gel for shoulder pain. 5) Mr. Martir Munson has a reminder for a \"due or due soon\" health maintenance. I have asked that he contact his primary care provider, Twan Bruno DO, for follow-up on this health maintenance. 6)  demonstrated consistency with prescribing. Follow-up and Dispositions    · Return in about 3 months (around 1/19/2022) for Medication follow up. HISTORY OF PRESENT ILLNESS:  Odessa Sanchez is a 54 y.o. male with history of cervical pain and presents to the office today for follow up. Pt reports tightness in the left upper back and notes temporary relief with trigger points at his last office visit. He admits to increased pain with colder weather. Of note, he had right shoulder surgery in 12/2019 by Dr. Carlos Michel and reports occasional pain in the right shoulder when sleeping in certain positions. Pt notes the pain in his right shoulder started to intermittently radiate down the right arm to the elbow about 2 weeks without any change in his activity. He denies any weakness in the right arm and notes his pain generally improves when he uses ice and rests the arm. Pt continues to take Lyrica 100 mg 1 cap BID and Skelaxin 800 mg 1 tab QHS as needed and does not need refills. He uses Rishabh Emu ointment on his arm as needed. Pt has a history of diabetes mellitus and notes that his blood sugars are well controlled at this time. Pt at this time desires to proceed with trigger point injections and medication evaluation.     Pain Scale: 2/10    PCP: Evelyn Vigil DO     Past Medical History:   Diagnosis Date    Adverse effect of anesthesia     for back sx-had issues with breathing  with apnea- 6 years ago    Asthma     Chest pain, unspecified     ATYPICAL,POSSIBLE ANGINA,MUSCULAR,CAD,CHEST WALL PAINS PERSISTANT AND RECURRENT    Chronic kidney disease     Diabetes mellitus ([address]     DM (diabetes mellitus) ([address]     GERD (gastroesophageal reflux disease)     HTN (hypertension)     Hypercholesteremia     Hypertension     Kidney stones     Sleep apnea     cpap        Social History     Socioeconomic History    Marital status:      Spouse name: Not on file    Number of children: Not on file    Years of education: Not on file    Highest education level: Not on file   Occupational History    Occupation:    Tobacco Use    Smoking status: Never Smoker    Smokeless tobacco: Never Used   Substance and Sexual Activity    Alcohol use: No    Drug use: No    Sexual activity: Not on file   Other Topics Concern    Not on file   Social History Narrative    ** Merged History Encounter **          Social Determinants of Health     Financial Resource Strain:     Difficulty of Paying Living Expenses:    Food Insecurity:     Worried About Running Out of Food in the Last Year:     Ran Out of Food in the Last Year:    Transportation Needs:     Lack of Transportation (Medical):  Lack of Transportation (Non-Medical):    Physical Activity:     Days of Exercise per Week:     Minutes of Exercise per Session:    Stress:     Feeling of Stress :    Social Connections:     Frequency of Communication with Friends and Family:     Frequency of Social Gatherings with Friends and Family:     Attends Mandaen Services:     Active Member of Clubs or Organizations:     Attends Club or Organization Meetings:     Marital Status:    Intimate Partner Violence:     Fear of Current or Ex-Partner:     Emotionally Abused:     Physically Abused:     Sexually Abused:        Current Outpatient Medications   Medication Sig Dispense Refill    diclofenac (Voltaren) 1 % gel Apply 4 grams to right shoulder up to 4 times per day, maximum 16 grams per joint per day. Dispense 5 100 gram tubes 500 g 3    pregabalin (LYRICA) 100 mg capsule TAKE 1 CAPSULE BY MOUTH TWICE DAILY. MAX DAILY AMOUNT: 200 MG. 180 Capsule 1    hydroCHLOROthiazide (HYDRODIURIL) 25 mg tablet Take 1 Tab by mouth daily.  metaxalone (SKELAXIN) 800 mg tablet TAKE 1 TABLET TWICE A DAY AS NEEDED FOR MUSCLE SPASMS 180 Tab 1    methylPREDNISolone (MEDROL DOSEPACK) 4 mg tablet Per dose pack instructions 1 Dose Pack 0    omeprazole (PRILOSEC) 40 mg capsule Take 40 mg by mouth daily.  magnesium 250 mg tab Take 250 mg by mouth two (2) times a day.       ondansetron (ZOFRAN ODT) 4 mg disintegrating tablet 1 Tab by SubLINGual route every eight (8) hours as needed for Nausea. 10 Tab 0    amLODIPine (NORVASC) 5 mg tablet Take 5 mg by mouth daily.  benazepril (LOTENSIN) 20 mg tablet Take 20 mg by mouth daily.  traMADol (ULTRAM) 50 mg tablet 1 po bid prn severe pain 60 Tab 1    pravastatin (PRAVACHOL) 20 mg tablet Take 20 mg by mouth daily.  cpap machine kit by Does Not Apply route.  Potassium Citrate 15 mEq TbER Take  by mouth two (2) times a day.  tamsulosin (FLOMAX) 0.4 mg capsule Take 1 Cap by mouth daily (after dinner). 30 Cap 0    carvedilol (COREG CR) 10 mg CR capsule Take  by mouth daily (with breakfast).  montelukast (SINGULAIR) 10 mg tablet Take 10 mg by mouth daily.  insulin glargine (LANTUS SOLOSTAR) 100 unit/mL (3 mL) pen by SubCUTAneous route.  insulin aspart (NOVOLOG) 100 unit/mL inpn by SubCUTAneous route.  aspirin 81 mg chewable tablet Take 81 mg by mouth daily.  fluticasone-salmeterol (ADVAIR DISKUS) 250-50 mcg/dose diskus inhaler Take 1 Puff by inhalation every twelve (12) hours.  albuterol (VENTOLIN HFA) 90 mcg/actuation inhaler Take  by inhalation every six (6) hours as needed.  Misc. Devices Kit Please provide the patient following size mask. Mask: Mirage FX large size mask. DME: [facility], [facility], [address] [facility]  Phone: [phone], Fax: [phone] 1 Each 0    Dexlansoprazole (DEXILANT) 60 mg CpDM Take 60 mg by mouth daily. (Patient not taking: Reported on 9/13/2021)         No Known Allergies      REVIEW OF SYSTEMS    Constitutional: Negative for fever, chills, or weight change. Respiratory: Negative for cough or shortness of breath. Cardiovascular: Negative for chest pain or palpitations. Gastrointestinal: Negative for acid reflux, change in bowel habits, or constipation. Genitourinary: Negative for dysuria and flank pain.    Musculoskeletal: Positive for upper back and right shoulder pain.  Neurological: Negative for headaches, dizziness, or numbness. Endo/Heme/Allergies: Negative for increased bruising. Psychiatric/Behavioral: Positive for difficulty with sleep. As per HPI    PHYSICAL EXAMINATION  Visit Vitals  /73 (BP 1 Location: Right arm, BP Patient Position: Sitting, BP Cuff Size: Adult)   Pulse 82   Temp (!) 95.4 °F (35.2 °C) (Tympanic)   Resp 16   Ht 5' 7\" (1.702 m)   Wt 296 lb (134.3 kg)   SpO2 97%   BMI 46.36 kg/m²       Constitutional: Awake, alert, and in no acute distress. Neurological: Sensation to light touch is intact. Skin: warm, dry, and intact. Musculoskeletal: Tight across the upper trapezius bilaterally with scattered trigger points on the left. Good range of motion in the cervical spine on all planes. Good range of motion in both shoulders. Negative empty can test bilaterally. Good strength with resisted abduction and adduction. Biceps  Triceps Deltoids Wrist Ext Wrist Flex Hand Intrin   Right +4/5 +4/5 +4/5 +4/5 +4/5 +4/5   Left +4/5 +4/5 +4/5 +4/5 +4/5 +4/5     PROCEDURES:    I administered 6 trigger point injections to the left upper trapezius using 1 cc Marcaine, 1 cc Lidocaine, and 1 cc Celestone. Pre-injection pain level was 2/10 and post-injection pain level was 1/10    Chart reviewed for the following:   Bandar ATKINSON MD, have reviewed the History, Physical and updated the Allergic reactions for Candy Heath.      TIME OUT performed immediately prior to start of procedure:  Bandar ATKINSON MD, have performed the following reviews on Candy Heath prior to the start of the procedure:            * Patient was identified by name and date of birth   * Agreement on procedure being performed was verified  * Risks and Benefits explained to the patient  * Procedure site verified and marked as necessary  * Patient was positioned for comfort  * Consent was obtained     Time: 9:30 AM     Date of procedure: 10/19/2021    Procedure performed by: Rossi Meadows MD    Mr. Vipul Buenrostro tolerated the procedure well with no complications. IMAGING:    Cervical spine MRI from 8/19/2020 was personally reviewed with the patient and demonstrated:          Results from [place] encounter on 08/19/20   MRI CERV SPINE WO CONT     Addendum Addendum: Comparison is made to MRI's dated December 6 2011 and October 26 2013: The right-sided cystic lesion at C1-C2 level is most likely a benign  synovial cyst, essentially stable since 2011. No surrounding edema or destruction.  No additional workup indicated unless new symptoms arise.  Discussed with Dr. Haleigh Moreno. Brodie Canales MD 9/4/2020  3:53 PM           Narrative MR CERVICAL SPINE WITHOUT CONTRAST     HISTORY: Progressive cervical pain with radicular symptoms despite extensive  physical therapy and using NSAID's.     COMPARISON: None     TECHNIQUE: Multisequence multiplanar imaging through the cervical spine.     FINDINGS:     Alignment: Mild straightening of the normal cervical lordosis  Vertebral body height: Normal  Marrow signal: Unremarkable  Disc spaces: Preserved height and signal intensity     Cervicomedullary Junction: Patent  Cervical cord: No cord expansion or atrophy.      On axial imaging, findings at each level are as follows:  C1-2 on the right side at the edge of the diagnostic information there is the  suggestion of a possibly fluid-filled or cystic structure measuring 1.2 x 1.1  cm. This appears to be in part invaginating between the occipital condyle on the  lateral mass of C1 this is unfortunately only seen in the sagittal views.  The  bony structures are otherwise unremarkable for any focal destruction.     May represent a small amount of degenerative cystic formation arising from the  joint space between the occipital condyle and the lateral mass additional  imaging is necessary to demonstrate this.        C2/C3: Patent canal and foramina.     C3/C4: Minor signal loss within the disc Patent canal and foramina.     C4/C5: C5 demonstrates a benign vertebral body hemangioma there is a patent  canal and foramina C5/C6: Small amount of anterior osteophyte identified. Patent  canal and foramina.     C6/C7: Patent canal and foramina.     C7/T1: Patent canal and foramina.     Other structures: As can be identified cervical visceral spaces show no evidence  of a mass and there is no evidence of any  adenopathy  .     Impression IMPRESSION:  There is a suggestion of a right-sided occipital mass possibly representing  degenerative change or possibility of a small tumor arising from the soft tissue  or bone.     Unfortunately this is only demonstrated in the extreme right sagittal images in  the sagittal plane only. May Wish to acquire additional imaging through this area to exclude above  concerns.         The remainder of the cervical spine demonstrates only  Minor degenerative features present no central canal stenosis cord and nerve  roots are not compressed.     Benign vertebral body hemangioma C5 level.     Thank you for enabling us to participate in the care of this patient.      Right shoulder MRI from 09/18/2019 were personally reviewed and demonstrated:  Multisequence multiplanar MR images of the right shoulder were obtained.     HISTORY: Shoulder pain.     Mild to moderate supraspinatus tendinosis. Moderate infraspinatus tendinosis  with bursal surface fraying. Moderate inflammation in the subacromial bursa. Mild subscapular tendinosis. No muscular atrophy.     No glenohumeral joint effusion. No focal glenohumeral cartilage loss. Moderate  maceration of the superior labrum. Biceps anchor is not defined. Attenuated  remaining biceps tendon within the tendon sheath. Subcortical cystic changes in  the humeral head. No definite Hill-Sachs deformity. Glenoid is intact.     Moderate hypertrophy with mild inflammation at the acromioclavicular joint. Slightly curved acromion undersurface.  No abnormal downsloping.     IMPRESSION:  1. Rotator cuff tendinosis with moderate bursal surface fraying in the  infraspinatus tendon. No focal tear or muscular atrophy. Subacromial bursitis present. 2. Complex degenerative tearing or maceration of the superior labrum with likely torn biceps anchor, attenuated biceps tendon in the bicipital groove. 3. Type II acromion. Hypertrophy and inflammation at the [facility] joint. Written by Sheri Herbert, as dictated by Marcia Montoya MD.  I, Dr. Marcia Montoya confirm that all documentation is accurate.

## 2021-10-19 NOTE — PROGRESS NOTES
Chief Complaint   Patient presents with    Follow-up       Pt preferred language for health care discussion is english. Is someone accompanying this pt? no    Is the patient using any DME equipment during OV? no    Depression Screening:  3 most recent [facility] Screens 9/13/2021 5/10/2021 9/14/2020 8/3/2020 7/6/2020 1/6/2020 12/27/2019   Little interest or pleasure in doing things Not at all Not at all Not at all Not at all Not at all Not at all Not at all   Feeling down, depressed, irritable, or hopeless Not at all Not at all Not at all Not at all Not at all Not at all Not at all   Total Score PHQ 2 0 0 0 0 0 0 0       Learning Assessment:  Learning Assessment 12/14/2016   PRIMARY LEARNER Patient   PRIMARY LANGUAGE ENGLISH   LEARNER PREFERENCE PRIMARY DEMONSTRATION   ANSWERED BY Patient   [place] Maintenance reviewed and discussed per provider. Yes        Advance Directive:  1. Do you have an advance directive in place? Patient Reply:no    2. If not, would you like material regarding how to put one in place? Patient Reply: no    Coordination of Care:  1. Have you been to the ER, urgent care clinic since your last visit? Hospitalized since your last visit? no    2. Have you seen or consulted any other health care providers outside of the [address] [place] [address] since your last visit? Include any pap smears or colon screening.  no

## 2021-11-30 NOTE — PROGRESS NOTES
"Chief Complaint   Patient presents with   • Anemia     review lab results   • Sinus Pain     x 10 days, improving     HPI:  68 year old female with eosinophilic esophagitis, hld, hypotension, restless legs here for follow up with abnormal labs. She has low wbc, neutrophils, and macrocytic anemia. She has continued with drinking regularly. She currently has a sinus infection. With sinus pressure and no pain.  No fevers chills or cough. It is improving.   Patient denies weight loss or night sweats.    Allergies   Allergen Reactions   • Doxycycline      \"stiff joints\"   • Codeine Vomiting and Nausea   • Dilaudid [Hydromorphone Hcl] Hives   • Fentanyl Vomiting and Nausea   • Percocet [Oxycodone-Acetaminophen] Vomiting   • Vicoprofen [Hydrocodone-Ibuprofen]      sweating     Current Outpatient Medications   Medication Sig Dispense Refill   • carisoprodol (SOMA) 350 MG Tab Take 1 Tablet by mouth 1 time a day as needed for Muscle Spasms for up to 30 days. 30 Tablet 0   • diclofenac sodium (VOLTAREN) 1 % Gel APPLY 2 G TO SKIN AS DIRECTED 4 TIMES A DAY AS NEEDED. 100 g 0   • gabapentin (NEURONTIN) 300 MG Cap TAKE 1 CAPSULE BY MOUTH 3 TIMES A DAY FOR 90 DAYS. 270 Capsule 0   • levothyroxine (SYNTHROID) 88 MCG Tab TAKE 1 TAB BY MOUTH EVERY MORNING ON AN EMPTY STOMACH. 90 tablet 1   • ROPINIRole (REQUIP) 0.5 MG Tab TAKE 2 TABS BY MOUTH AT BEDTIME AS NEEDED FOR UP  DAYS. 180 tablet 1   • omeprazole (PRILOSEC) 20 MG delayed-release capsule Take 20 mg by mouth every day.     • B Complex Vitamins (B COMPLEX PO) Take  by mouth.     • Multiple Vitamins-Minerals (MULTIVITAMIN ADULT PO) Take  by mouth.     • Ascorbic Acid (VITAMIN C PO) Take  by mouth every day.     • Multiple Vitamins-Minerals (ZINC PO) Take 1 Tab by mouth every day.     • POTASSIUM PO Take 1 Tab by mouth every day. OTC     • Calcium-Magnesium-Vitamin D (CALCIUM MAGNESIUM PO) Take 1 Tab by mouth every day.     • estrogens, conjugated (PREMARIN) 0.625 MG/GM Cream " PT DAILY TREATMENT NOTE     Patient Name: Erasmo Louise  Date:2018  : 1970  [x]  Patient  Verified  Payor:  / Plan: [facility]  ACTIVE DUTY AND DEPENDENTS / Product Type:  /    In time:915  Out time:1008  Total Treatment Time (min): 48  Visit #: 4 of 12    Treatment Area: Myalgia [M79.1]  Other specific arthropathies, not elsewhere classified, other specified site [M12.88]    SUBJECTIVE  Pain Level (0-10 scale): 1/10  Any medication changes, allergies to medications, adverse drug reactions, diagnosis change, or new procedure performed?: [x] No    [] Yes (see summary sheet for update)  Subjective functional status/changes:   [] No changes reported  Pt reports feeling better with some increase in L chest mm tightness and arthritis pain but overall things are feeling better    OBJECTIVE  Dry Needling Procedure Note    Procedure: An intramuscular manual therapy (dry needling) and a neuro-muscular re-education treatment was done to deactivate myofascial trigger points with a 30 gauge filament needle under aseptic technique. Indications:  [x] Myofascial pain and dysfunction [x] Muscled spasms  [] Myalgia/myositis   [] Muscle cramps  [x] Muscle imbalances  [] Temporomandibular Dysfunction  [] Other:    Chart reviewed for the following:  Darlin ATKINSON, PT, have reviewed the medical history, summary list and precautions/contraindications for Erasmo Span.   TIME OUT performed immediately prior to start of procedure:  Darlin ATKINSON PT, have performed the following reviews on Erasmo Span prior to the start of the session:      [x] Verified patient identification by name and date of birth    [x] Agreement on all muscles being treated was verified   [x] Purpose of dry needling, side effects, possible complications, risks and benefits were explained to the patient   [x] Procedure site(s) verified  [x] Patient was positioned for comfort and draped for privacy  [x] Informed "APPLY 1 PEA SIZED AMOUNT VAGINALLY DAILY X2 WEEKS, EVERY OTHER DAY X1WEEK, 2 TIMES PER WEEK X1WK, THEN ONCE WEEKLY (Patient not taking: Reported on 2021) 30 g 1     No current facility-administered medications for this visit.     Social History     Tobacco Use   • Smoking status: Former Smoker     Packs/day: 0.50     Years: 8.00     Pack years: 4.00     Types: Cigarettes     Quit date: 1988     Years since quittin.9   • Smokeless tobacco: Never Used   • Tobacco comment: quit in her 20s    Vaping Use   • Vaping Use: Never used   Substance Use Topics   • Alcohol use: Yes     Alcohol/week: 1.2 - 3.0 oz     Types: 2 - 5 Standard drinks or equivalent per week   • Drug use: No     Family History   Problem Relation Age of Onset   • Diabetes Mother    • Stroke Maternal Grandfather        /64   Pulse 82   Temp 36.2 °C (97.1 °F)   Resp 12   Ht 1.753 m (5' 9\")   Wt 59 kg (130 lb)   SpO2 99%  Body mass index is 19.2 kg/m².  Review of Systems   Constitutional: Negative for chills, fever and malaise/fatigue.   HENT: Negative for hearing loss and tinnitus.    Eyes: Negative for double vision and pain.   Respiratory: Negative for cough, shortness of breath and wheezing.    Cardiovascular: Negative for chest pain, palpitations and leg swelling.   Gastrointestinal: Negative for abdominal pain, diarrhea, nausea and vomiting.   Genitourinary: Negative for dysuria and frequency.   Musculoskeletal: Negative for joint pain and myalgias.   Skin: Negative for itching and rash.   Neurological: Negative for dizziness and headaches.     Physical Exam  Constitutional:       General: He is not in acute distress.     Appearance: He is not diaphoretic.   HENT:      Head: Normocephalic and atraumatic.   Eyes:      General: No scleral icterus.        Right eye: No discharge.         Left eye: No discharge.      Conjunctiva/sclera: Conjunctivae normal.      Pupils: Pupils are equal, round, and reactive to light.   Neck:      " Consent was signed (initial visit) and verified verbally (subsequent visits)  [x] Patient was instructed on the need to report the use of blood thinners and/or immunosuppressant medications  [x] How to respond to possible adverse effects of treatment  [x] Self treatment of post needling soreness: ice, heat (moist heat, heat wraps) and stretching  [x] Opportunity was given to ask any questions, all questions were answered            Time: 942  Date of procedure: 1/9/2018  Treatment: The following muscles were treated today with intramuscular dry needling  [] Left [] Right Masster  [] Left [] Right Temporalis  [] Left [] Right Zygomaticus Major / Minor  [] Left [] Right Lateral Pterygoid  [] Left [] Right Medial Pterygoid  [] Left [] Right Digastric Post / Anterior Belly  [] Left [] Right Sternocleidomastoid  [] Left [] Right Scalene Anterior / Medial / Posterior  [] Left [] Right Extra Laryngeal Muscles  [x] Left [] Right Upper Trapezius  [x] Left [] Right Middle Trapezius  [x] Left [] Right Lower Trapezius  [] Left [] Right Oblique Capitis Inferior  [] Left [] Right Splenius Capitis / Cervicis  [] Left [] Right Semispinalis: Capitis / Cervicis  [] Left [] Right Multifidi / Rotatores Cervicis / Thoracic  [] Left [] Right Longissimus Thoracis / Illiocostalis  [] Left [] Right Levator Scapulae  [] Left [] Right Supraspinatus / Infraspinatus  [] Left [] Right Teres Major / Minor  [] Left [] Right Rhomboids / Serratus posterior superior  [x] Left [] Right Pectoralis Major  / Minor  [] Left [] Right Serratus Anterior  [] Left [] Right Latissimus Dorsi  [] Left [] Right Subscapularis  [] Left [] Right Coracobrachialis  [] Left [] Right Biceps Brachii  [] Left [] Right Deltoid: Anterior / Medial / Posterior  [] Left [] Right Brachialis  [] Left [] Right Triceps  [] Left [] Right Brachioradialis  [] Left [] Right Extensor Carpi Radialis Brevis / Extensor Carpi Radialis Longus    [] Left [] Right  Extensor digitorum  [] Left Thyroid: No thyromegaly.   Pulmonary:      Effort: Pulmonary effort is normal. No respiratory distress.   Abdominal:      General: There is no distension.   Skin:     General: Skin is warm and dry.   Neurological:      Mental Status: He is alert.   Psychiatric:         Mood and Affect: Affect normal.         Judgment: Judgment normal.       All labs (last 1 month):   Hospital Outpatient Visit on 11/24/2021   Component Date Value Ref Range Status   • Folate -Folic Acid 11/24/2021 28.5  >4.0 ng/mL Final    Results obtained by dilution.   • Vitamin B12 -True Cobalamin 11/24/2021 888  211 - 911 pg/mL Final   • Iron 11/24/2021 98  40 - 170 ug/dL Final   • Total Iron Binding 11/24/2021 286  250 - 450 ug/dL Final   • Unsat Iron Binding 11/24/2021 188  110 - 370 ug/dL Final   • % Saturation 11/24/2021 34  15 - 55 % Final   • WBC 11/24/2021 2.8* 4.8 - 10.8 K/uL Final   • RBC 11/24/2021 4.31  4.20 - 5.40 M/uL Final   • Hemoglobin 11/24/2021 13.9  12.0 - 16.0 g/dL Final   • Hematocrit 11/24/2021 43.5  37.0 - 47.0 % Final   • MCV 11/24/2021 100.9* 81.4 - 97.8 fL Final   • MCH 11/24/2021 32.3  27.0 - 33.0 pg Final   • MCHC 11/24/2021 32.0* 33.6 - 35.0 g/dL Final   • RDW 11/24/2021 48.7  35.9 - 50.0 fL Final   • Platelet Count 11/24/2021 215  164 - 446 K/uL Final   • MPV 11/24/2021 8.8* 9.0 - 12.9 fL Final   • Neutrophils-Polys 11/24/2021 51.10  44.00 - 72.00 % Final   • Lymphocytes 11/24/2021 33.20  22.00 - 41.00 % Final   • Monocytes 11/24/2021 10.00  0.00 - 13.40 % Final   • Eosinophils 11/24/2021 5.00  0.00 - 6.90 % Final   • Basophils 11/24/2021 0.70  0.00 - 1.80 % Final   • Immature Granulocytes 11/24/2021 0.00  0.00 - 0.90 % Final   • Nucleated RBC 11/24/2021 0.00  /100 WBC Final   • Neutrophils (Absolute) 11/24/2021 1.43* 2.00 - 7.15 K/uL Final    Includes immature neutrophils, if present.   • Lymphs (Absolute) 11/24/2021 0.93* 1.00 - 4.80 K/uL Final   • Monos (Absolute) 11/24/2021 0.28  0.00 - 0.85 K/uL Final   • Eos  [] Right Supinator / Pronator Teres  [] Left [] Right Flexor Carpi Radialis/ Flexor Carpi Ulnaris   [] Left [] Right  Flexor Digitorum Superficialis/ Flexor Digitorum Profundus  [] Left [] Right Flexor Pollicis Longus / Flexor Pollicis Brevis / Palmaris Longus  [] Left [] Right Abductor Pollicis Longus / Abductor Pollicis Brevis  [] Left [] Right Opponens Pollicis / Adductor Pollicis  [] Left [] Right Dorsal / Palmar Interossei / Lumbricalis  [] Left [] Right Abductor Digiti Minimi / Opponens Digiti Minimi    Patient's response to today's treatment:  [] Latent Twitch Response     [] Muscle relaxation [] Pain Relief  [] Post needling soreness    [] without complications  [] Increased Range of Motion   [] Decreased headaches    [] Decreased Tinnitus  [] Other:     Performed by: Aimee Lugo PT      Modality rationale: decrease edema, decrease inflammation and decrease pain to improve the patients ability to decrease post needling soreness   Min Type Additional Details    [] Estim:  []Unatt       []IFC  []Premod                        []Other:  []w/ice   []w/heat  Position:  Location:    [] Estim: []Att    []TENS instruct  []NMES                    []Other:  []w/US   []w/ice   []w/heat  Position:  Location:    []  Traction: [] Cervical       []Lumbar                       [] Prone          []Supine                       []Intermittent   []Continuous Lbs:  [] before manual  [] after manual    []  Ultrasound: []Continuous   [] Pulsed                           []1MHz   []3MHz W/cm2:  Location:    []  Iontophoresis with dexamethasone         Location: [] Take home patch   [] In clinic   10 [x]  Ice     []  heat  []  Ice massage  []  Laser   []  Anodyne Position: supine  Location: L neck and chest    []  Laser with stim  []  Other:  Position:  Location:    []  Vasopneumatic Device Pressure:       [] lo [] med [] hi   Temperature: [] lo [] med [] hi   [] Skin assessment post-treatment:  []intact []redness- no adverse (Absolute) 11/24/2021 0.14  0.00 - 0.51 K/uL Final   • Baso (Absolute) 11/24/2021 0.02  0.00 - 0.12 K/uL Final   • Immature Granulocytes (abs) 11/24/2021 0.00  0.00 - 0.11 K/uL Final   • NRBC (Absolute) 11/24/2021 0.00  K/uL Final   • Phosphorus 11/24/2021 3.1  2.5 - 4.5 mg/dL Final   • Magnesium 11/24/2021 2.2  1.5 - 2.5 mg/dL Final       Lipids:   Lab Results   Component Value Date/Time    CHOLSTRLTOT 205 (H) 04/22/2021 07:28 AM    TRIGLYCERIDE 46 04/22/2021 07:28 AM    HDL 98 04/22/2021 07:28 AM    LDL 98 04/22/2021 07:28 AM       Imaging: No results found.    A/P  -Leukopenia trending down last 2.8 one year ago 4.3. neutrophils and lyphms low  -Macrocytic anemia 100 and hg 11.9. folate, b12, iron panel, phos, and mag normal.   -Drinks about 9-15 drinks per week. Hep c neg one year ago  -stop etoh get repeat cbc, if still abnormal continue to rest of labs, referral, and imaging.   Return if symptoms worsen or fail to improve, for annual.    Problem List Items Addressed This Visit     Cervical pain    Relevant Medications    carisoprodol (SOMA) 350 MG Tab    Lymphopenia     Trending down. Will cut out etoh completely and obtain repeat cbc. If it has not improved will do full workup and referral to heme.          Relevant Orders    SPEP W/REFLEX TO MIR, A, G, M    RETICULOCYTE COUNT MANUAL    LDH    JOSEFINA REFLEXIVE PROFILE    COPPER, SERUM    HAPTOGLOBIN    PERIPHERAL SMEAR REVIEW    EBV CHRONIC/ACTIVE INFECTION    HIV AG/AB COMBO ASSAY DIAGNOSTIC    CBC WITH DIFFERENTIAL    US-RUQ    Referral to Hematology Oncology    Macrocytic anemia     Most likely all of her blood dyscrasias are from alcohol. I recommend 2 months abstinece.          Relevant Orders    SPEP W/REFLEX TO MIR, A, G, M    RETICULOCYTE COUNT MANUAL    LDH    JOSEFINA REFLEXIVE PROFILE    COPPER, SERUM    HAPTOGLOBIN    PERIPHERAL SMEAR REVIEW    EBV CHRONIC/ACTIVE INFECTION    HIV AG/AB COMBO ASSAY DIAGNOSTIC    CBC WITH DIFFERENTIAL    US-RUQ    Referral  reaction    []redness  adverse reaction:      min []Eval                  []Re-Eval       27 min Therapeutic Exercise:  [x] See flow sheet : increased per flow sheet    Rationale: increase ROM, increase strength, improve coordination and increase proprioception to improve the patients ability to decrease pain and improve activity tolerance      min Therapeutic Activity:  []  See flow sheet :   Rationale:   to improve the patients ability to       min Neuromuscular Re-education:  []  See flow sheet :   Rationale:   to improve the patients ability to     16 min Manual Therapy:  IMDN as listed above to include education, palpation, hemostasis, and STM/stretching to treated areas, t/s PA's and rib springs, grade 4 manip w/ min cavitation   Rationale: decrease pain, increase ROM, increase tissue extensibility, decrease trigger points and increase postural awareness to improve activity tolerance and mobility      min Gait Training:  ___ feet with ___ device on level surfaces with ___ level of assist   Rationale: With   [] TE   [] TA   [] neuro   [] other: Patient Education: [x] Review HEP    [] Progressed/Changed HEP based on:   [] positioning   [] body mechanics   [] transfers   [] heat/ice application    [] other:      Other Objective/Functional Measures:      Pain Level (0-10 scale) post treatment: 0/10    ASSESSMENT/Changes in Function: pt progressing well and noted improved thoracic mobility following ex's. IMDN as listed above and pt reported decreased L thumb numbness. Educated on core activation w/ TA iso to improve anterior rib positioning and stability.      Patient will continue to benefit from skilled PT services to modify and progress therapeutic interventions, address functional mobility deficits, address ROM deficits, address strength deficits, analyze and address soft tissue restrictions, analyze and cue movement patterns, analyze and modify body mechanics/ergonomics, assess and modify postural abnormalities and instruct in home and community integration to attain remaining goals. []  See Plan of Care  []  See progress note/recertification  []  See Discharge Summary         Progress towards goals / Updated goals:  Short Term Goals: To be accomplished in 1 weeks:  1. Pt will be independent and compliant w/ HEP to progress gains in PT. At eval: initiated HEP  Current: met per pt report 12/26/17  Long Term Goals: To be accomplished in 6 weeks:  1. Pt will improve FOTO to > or = to 65 to demo improved function. At eval: FOTO = 59  2. Pt will increase L ulnar nerve mobility as demo'd by (-) Ulnar NTT for decreased n/t in the L hand. At eval: (+) L ulnar NTT  3. Pt will improve posture as demo'd by correct shoulder positioning to decrease impingement symptoms. At eval: rounded shoulders in sitting  4. Pt will report > or = to 75% improvement in symptoms for ease w/ return to normal ADLs.    At eval: 0%    PLAN  [x]  Upgrade activities as tolerated     [x]  Continue plan of care  []  Update interventions per flow sheet       []  Discharge due to:_  []  Other:_      Irish Nieves, PT 1/9/2018  9:21 AM    Future Appointments  Date Time Provider Bryant Zavaleta   1/9/2018 9:30 AM Irish Nieves, PT [facility]   1/11/2018 8:00 AM Irish Nieves PT [facility]   1/17/2018 8:00 AM Irish Nieves PT [facility]   1/19/2018 7:30 AM Irish Nieves PT [facility]   1/22/2018 6:00 PM Irish Nieves, PT [facility]   1/26/2018 7:30 AM Irish Nieves PT [facility]   1/30/2018 6:00 PM Irish Nieves PT [facility]   2/1/2018 5:30 PM Fernando Allen, PT [facility]   3/12/2018 3:30 PM Yenifer Garcias  [address]   8/29/2018 9:00 AM Coco Ibrahim  [address]   8/29/2018 9:00 AM [facility] [place] to Hematology Oncology      Other Visit Diagnoses     Neutropenia, unspecified type (HCC)        Relevant Orders    SPEP W/REFLEX TO MIR, A, G, M    RETICULOCYTE COUNT MANUAL    LDH    JOSEFINA REFLEXIVE PROFILE    COPPER, SERUM    HAPTOGLOBIN    PERIPHERAL SMEAR REVIEW    EBV CHRONIC/ACTIVE INFECTION    HIV AG/AB COMBO ASSAY DIAGNOSTIC    CBC WITH DIFFERENTIAL    US-RUQ    Referral to Hematology Oncology          Portions of this note may be dictated using Dragon [id number] voice recognition software.  Variances in spelling and vocabulary are possible and unintentional.  Not all areas may be caught/corrected.  Please notify me if any discrepancies are noted or if the meaning of any statement is not correct/clear.

## 2021-12-16 NOTE — PROGRESS NOTES
Impression: S/P Cataract Extraction by phacoemulsification with IOL placement; ORA OS - 30 Days. Presence of intraocular lens  Z96.1. Excellent post op course   Post operative instructions reviewed - Plan: 12 month f/u --Advised patient to use artificial tears for comfort. PT DAILY TREATMENT NOTE 10-18    Patient Name: Maury Gonzalez  Date:2019  : 1970  [x]  Patient  Verified  Payor:  / Plan: Carlos Minor 76 / Product Type:  /    In time: 4:50   Out time: 5:42  Total Treatment Time (min): 52  Visit #: 9 of 12    Medicare/BCBS Only   Total Timed Codes (min): n/a 1:1 Treatment Time:  n/a       Treatment Area: Pain in right shoulder [M25.511]  Neck pain [M54.2]  Spondylosis without myelopathy or radiculopathy, cervical region [M47.812]  Other muscle spasm [M62.838]  Myalgia, other site [M79.18]    SUBJECTIVE  Pain Level (0-10 scale): 1.5  Any medication changes, allergies to medications, adverse drug reactions, diagnosis change, or new procedure performed?: [x] No    [] Yes (see summary sheet for update)  Subjective functional status/changes:   [] No changes reported  Pt reports he is feeling better overall with his pain but states he continues to have pain when raising his arm up overhead. Pt states that the MD wants to order a MRI. OBJECTIVE    25 min Therapeutic Exercise:  [x] See flow sheet :   Rationale: increase ROM and increase strength to improve the patients ability to A/ROM and decrease pain with movement. 15 min Therapeutic Activity:  [x]  See flow sheet : goal reassessment   Rationale: increase strength and improve coordination  to improve the patients ability to Tolerate basic ADLs and job-related tasks without pain. 12 min Manual Therapy: DTM/TPR right deltoid, infraspinatus, supraspinatus, pec minor   Rationale: increase ROM and increase tissue extensibility to improve overall activity tolerance and active functional range of motion.           With   [x] TE   [x] TA   [] neuro   [] other: Patient Education: [x] Review HEP    [x] Progressed/Changed HEP based on:   [] positioning   [] body mechanics   [] transfers   [] heat/ice application    [] other:      Other Objective/Functional Measures: See goals below.    Functional Gains: increased ease of lifting litter boxes, pain is better, ROM, strength with arm close to the body, able to sleep on the right side if arm is close to the body  Functional Deficits: pain and strength with right shoulder ABD/scaption, OH reaching/stretching, pain with sleeping on the right side when arm is stretched out  Pain        Best: 0/10     Worst: 3-4/10    Pain Level (0-10 scale) post treatment: 0    ASSESSMENT/Changes in Function: See PN. Reported no pain post session and improvement in tightness/tenderness post manual interventions. Pt reports/demonstrates improvements in ROM, mobility, strength, and activity tolerance since starting therapy. pt states he continues to feel pain in his right shoulder with certain movements, sleeping, and overhead motions. We will plan on continuing therapy to improve the pt's mobility, ease of ROM, and pain. Patient will continue to benefit from skilled PT services to modify and progress therapeutic interventions, address functional mobility deficits, address ROM deficits, address strength deficits, analyze and address soft tissue restrictions, analyze and modify body mechanics/ergonomics and assess and modify postural abnormalities to attain remaining goals.      []  See Plan of Care  [x]  See progress note/recertification  []  See Discharge Summary         Progress towards goals / Updated goals:  Short Term Goals: To be accomplished in 5 treatments:               1 patient will have established and be I with HEP to aid with self management of S/S at discharge               EVAL issued               FIRNMWN progressing 9/11/2019  Long Term Goals: To be accomplished in 12 treatments:               1 patient will have AROM right shoulder F 150 ad  to aid with increase tolerance to work demands and light household activities [address] reaching a shelf at shoulder height               EVAL F 140 and ABD 92, some difficulty               ZUZJGGO MET, flex WNL with pain starting around 155 degs, ABD WNL with pain starting around 125 degs and end range 9/11/2019               2 patient will have MMT right shoulder 5/5 to aid with  Moving a skillet on the stove               EVAL MMT right shoulder F 4, abd 4-, ER 4, much difficulty               LCKMAOL progressing, flex 4+/5, ABD 4+/5 with pain on superior/upper shoulder/UE, ER/IR 4+/5 with soreness 9/11/2019               3 patient will have FOTO 70 to show significant improvement with reaching across his body               EVAL 54, little difficulty               MPFUZVD progressing, 66 points 9/11/2019               4 patient will report overall 50% improvement to aid with increase tolerance to mid range movements of the right UE with activities                EVAL worsens pain               UQWKABC progressing, reports improvements in pain/symptoms but states he continues to have pain with certain movements and with sleeping 9/11/19    PLAN  [x]  Upgrade activities as tolerated     [x]  Continue plan of care  [x]  Update interventions per flow sheet       []  Discharge due to:_  []  Other:_      Kelin Foreman, PT 9/11/2019  4:56 PM    Future Appointments   Date Time Provider Bryant Zavaleta   9/12/2019  5:00 PM Redd Rothman, PT [facility]   9/24/2019  5:00 PM Redd Rothman, PT [facility]   9/27/2019  4:30 PM Nick Burton, PT [facility]   10/7/2019  3:15 PM Maxx Banda  [address]   10/31/2019  2:00 PM [facility] [address] [facility] [address] [facility] [address]   10/31/2019  2:30 PM Vijay Tolentino MD [address] [facility]

## 2022-01-19 ENCOUNTER — OFFICE VISIT (OUTPATIENT)
Dept: ORTHOPEDIC SURGERY | Age: 52
End: 2022-01-19
Payer: OTHER GOVERNMENT

## 2022-01-19 VITALS
TEMPERATURE: 97.5 F | BODY MASS INDEX: 47.56 KG/M2 | OXYGEN SATURATION: 96 % | HEART RATE: 95 BPM | HEIGHT: 67 IN | WEIGHT: 303 LBS

## 2022-01-19 DIAGNOSIS — M62.838 MUSCLE SPASM: ICD-10-CM

## 2022-01-19 PROCEDURE — 99213 OFFICE O/P EST LOW 20 MIN: CPT | Performed by: NURSE PRACTITIONER

## 2022-01-19 RX ORDER — METAXALONE 800 MG/1
800 TABLET ORAL
Qty: 90 TABLET | Refills: 1 | Status: SHIPPED | OUTPATIENT
Start: 2022-01-19

## 2022-01-19 NOTE — PROGRESS NOTES
Chief complaint   Chief Complaint   Patient presents with    Neck Pain    Shoulder Pain     bilateral, mostly left       History of Present Illness:  Jadon Childress is a  54 y.o.  male who comes in today for follow-up of his chronic neck pain due to cervical facet syndrome and myofascial pain. He has posterior neck pain that radiates to the left shoulder. He has had trigger point injections in the past which did help but last visit he was given Voltaren gel and that seems to really keep things calm down and he would like to continue with that. He is diabetic and he states his blood sugar is running about 140 for right now so again he would like to avoid steroids if possible. He also takes Lyrica 100 mg twice a day and Skelaxin 800 at night. Physical Exam: The patient is a 32-year-old male well-developed well-nourished who is alert and oriented with a normal mood and affect  He has a full weightbearing antalgic gait. He did not use any assist device. He has normal tandem gait. He has 5/5 strength of his bilateral upper extremities. Negative William's. He has normal tandem gait. Assessment and Plan: This is a patient who has cervical facet syndrome that gives him chronic neck pain and muscle spasms. I have refilled his Skelaxin. He has enough of his Lyrica and Voltaren gel. We will see him back in 6 months sooner if needed. Medications:  Current Outpatient Medications   Medication Sig Dispense Refill    diclofenac (Voltaren) 1 % gel Apply 4 grams to right shoulder up to 4 times per day, maximum 16 grams per joint per day. Dispense 5 100 gram tubes 500 g 3    pregabalin (LYRICA) 100 mg capsule TAKE 1 CAPSULE BY MOUTH TWICE DAILY. MAX DAILY AMOUNT: 200 MG. 180 Capsule 1    hydroCHLOROthiazide (HYDRODIURIL) 25 mg tablet Take 1 Tab by mouth daily.  omeprazole (PRILOSEC) 40 mg capsule Take 40 mg by mouth daily.  magnesium 250 mg tab Take 250 mg by mouth two (2) times a day.       ondansetron (ZOFRAN ODT) 4 mg disintegrating tablet 1 Tab by SubLINGual route every eight (8) hours as needed for Nausea. 10 Tab 0    amLODIPine (NORVASC) 5 mg tablet Take 5 mg by mouth daily.  benazepril (LOTENSIN) 20 mg tablet Take 20 mg by mouth daily.  traMADol (ULTRAM) 50 mg tablet 1 po bid prn severe pain 60 Tab 1    pravastatin (PRAVACHOL) 20 mg tablet Take 20 mg by mouth daily.  cpap machine kit by Does Not Apply route.  Potassium Citrate 15 mEq TbER Take  by mouth two (2) times a day.  tamsulosin (FLOMAX) 0.4 mg capsule Take 1 Cap by mouth daily (after dinner). (Patient taking differently: Take 0.4 mg by mouth daily as needed.) 30 Cap 0    carvedilol (COREG CR) 10 mg CR capsule Take  by mouth daily (with breakfast).  montelukast (SINGULAIR) 10 mg tablet Take 10 mg by mouth daily.  insulin glargine (LANTUS SOLOSTAR) 100 unit/mL (3 mL) pen 50 Units by SubCUTAneous route nightly.  insulin aspart (NOVOLOG) 100 unit/mL inpn 18 Units by SubCUTAneous route Before breakfast, lunch, and dinner.  aspirin 81 mg chewable tablet Take 81 mg by mouth daily.  fluticasone-salmeterol (ADVAIR DISKUS) 250-50 mcg/dose diskus inhaler Take 1 Puff by inhalation every twelve (12) hours.  albuterol (VENTOLIN HFA) 90 mcg/actuation inhaler Take  by inhalation every six (6) hours as needed.  Misc. Devices Kit Please provide the patient following size mask. Mask: Mirage FX large size mask. DME: [place], [place]  Phone: [phone], Fax: [phone] 1 Each 0    metaxalone (SKELAXIN) 800 mg tablet Take 1 Tablet by mouth nightly. TAKE 1 TABLET TWICE A DAY AS NEEDED FOR MUSCLE SPASMS 90 Tablet 1    Dexlansoprazole (DEXILANT) 60 mg CpDM Take 60 mg by mouth daily.  (Patient not taking: Reported on 9/13/2021)             Review of systems:    Past Medical History:   Diagnosis Date    Adverse effect of anesthesia     for back sx-had issues with breathing with apnea- 6 years ago    Asthma     Chest pain, unspecified     ATYPICAL,POSSIBLE ANGINA,MUSCULAR,CAD,CHEST WALL PAINS PERSISTANT AND RECURRENT    Chronic kidney disease     Diabetes mellitus [facility]     DM (diabetes mellitus) ([facility]     GERD (gastroesophageal reflux disease)     HTN (hypertension)     Hypercholesteremia     Hypertension     Kidney stones     Sleep apnea     cpap     Past Surgical History:   Procedure Laterality Date    HX BACK SURGERY  2010    laminectomy L5-S1    HX BACK SURGERY      HX TONSILLECTOMY      HX UROLOGICAL  3/24/2016    [facility], Dr. Elza Slade, Cystoscopy, Right Retrograde Ureteroscopy, Holmium Laser Lithotripsy, double j cath     Social History     Socioeconomic History    Marital status:      Spouse name: Not on file    Number of children: Not on file    Years of education: Not on file    Highest education level: Not on file   Occupational History    Occupation:    Tobacco Use    Smoking status: Never Smoker    Smokeless tobacco: Never Used   Substance and Sexual Activity    Alcohol use: No    Drug use: No    Sexual activity: Not on file   Other Topics Concern    Not on file   Social History Narrative    ** Merged History Encounter **          Social Determinants of Health     Financial Resource Strain:     Difficulty of Paying Living Expenses: Not on file   Food Insecurity:     Worried About [address] [employer] in the Last Year: Not on file    [address] in the Last Year: Not on file   Transportation Needs:     Lack of Transportation (Medical): Not on file    Lack of Transportation (Non-Medical):  Not on file   Physical Activity:     Days of Exercise per Week: Not on file    Minutes of Exercise per Session: Not on file   Stress:     Feeling of Stress : Not on file   Social Connections:     Frequency of Communication with Friends and Family: Not on file    Frequency of Social Gatherings with Friends and Family: Not on file   Nadya Hernandez Attends [employer] Services: Not on file    Active Member of Clubs or Organizations: Not on file    Attends Club or Organization Meetings: Not on file    Marital Status: Not on file   Intimate Partner Violence:     Fear of Current or Ex-Partner: Not on file    Emotionally Abused: Not on file    Physically Abused: Not on file    Sexually Abused: Not on file   Housing Stability:     Unable to Pay for Housing in the Last Year: Not on file    Number of [place] in the Last Year: Not on file    Unstable Housing in the Last Year: Not on file     Family History   Problem Relation Age of Onset    Diabetes Mother     Hypertension Mother     Heart Disease Mother     Heart Attack Father         MI in 45s    Stroke Father     Diabetes Father        Physical Exam:  Visit Vitals  Pulse 95   Temp 97.5 °F (36.4 °C) (Tympanic)   Ht 5' 7\" (1.702 m)   Wt 303 lb (137.4 kg)   SpO2 96% Comment: RA   BMI 47.46 kg/m²     Pain Scale: 1/10       has been . reviewed and is appropriate          Diagnoses and all orders for this visit:    1. Muscle spasm  -     metaxalone (SKELAXIN) 800 mg tablet; Take 1 Tablet by mouth nightly. TAKE 1 TABLET TWICE A DAY AS NEEDED FOR MUSCLE SPASMS            Follow-up and Dispositions    · Return in about 6 months (around 7/19/2022) for with NP Ankur.              We have informed Adolfo Villarreal to notify us for immediate appointment if he has any worsening neurogical symptoms or if an emergency situation presents, then call 911

## 2022-01-19 NOTE — PROGRESS NOTES
Yeimi Pillai presents today for   Chief Complaint   Patient presents with    Neck Pain    Shoulder Pain     bilateral, mostly left       Is someone accompanying this pt? no    Is the patient using any DME equipment during OV? no    Depression Screening:  3 most recent PHQ Screens 9/13/2021   Little interest or pleasure in doing things Not at all   Feeling down, depressed, irritable, or hopeless Not at all   Total Score PHQ 2 0       Learning Assessment:  Learning Assessment 12/14/2016   PRIMARY LEARNER Patient   PRIMARY LANGUAGE ENGLISH   LEARNER PREFERENCE PRIMARY DEMONSTRATION   ANSWERED BY Patient   RELATIONSHIP SELF       Abuse Screening:  Abuse Screening Questionnaire 8/3/2020   Do you ever feel afraid of your partner? N   Are you in a relationship with someone who physically or mentally threatens you? N   Is it safe for you to go home? Y         Coordination of Care:  1. Have you been to the ER, urgent care clinic since your last visit? no  Hospitalized since your last visit? no    2. Have you seen or consulted any other health care providers outside of the [address] [place] [address] since your last visit? no Include any pap smears or colon screening.  no

## 2022-01-20 ENCOUNTER — TELEPHONE (OUTPATIENT)
Dept: ORTHOPEDIC SURGERY | Age: 52
End: 2022-01-20

## 2022-03-19 PROBLEM — E66.01 OBESITY, MORBID (HCC): Status: ACTIVE | Noted: 2017-12-05

## 2022-03-27 DIAGNOSIS — M79.18 MYOFASCIAL PAIN: ICD-10-CM

## 2022-03-28 RX ORDER — PREGABALIN 100 MG/1
CAPSULE ORAL
Qty: 180 CAPSULE | Refills: 1 | Status: SHIPPED | OUTPATIENT
Start: 2022-03-28 | End: 2022-09-12 | Stop reason: SDUPTHER

## 2022-03-28 NOTE — TELEPHONE ENCOUNTER
Last Visit: 1/19/22 with NP Ankur  Next Appointment: 7/19/22 with NP Ankur  Previous Refill Encounter(s): 10/10/21 #180 with 1 refill    Requested Prescriptions     Pending Prescriptions Disp Refills    pregabalin (LYRICA) 100 mg capsule [Pharmacy Med Name: PREGABALIN 100MG CAPSULES] 180 Capsule 1     Sig: TAKE 1 CAPSULE BY MOUTH TWICE DAILY.  MAX DAILY AMOUNT: 200 MG

## 2022-07-12 ENCOUNTER — OFFICE VISIT (OUTPATIENT)
Dept: ORTHOPEDIC SURGERY | Age: 52
End: 2022-07-12
Payer: OTHER GOVERNMENT

## 2022-07-12 VITALS
HEART RATE: 86 BPM | HEIGHT: 67 IN | WEIGHT: 299 LBS | OXYGEN SATURATION: 96 % | SYSTOLIC BLOOD PRESSURE: 136 MMHG | BODY MASS INDEX: 46.93 KG/M2 | RESPIRATION RATE: 16 BRPM | TEMPERATURE: 97.5 F | DIASTOLIC BLOOD PRESSURE: 80 MMHG

## 2022-07-12 DIAGNOSIS — M47.812 CERVICAL FACET JOINT SYNDROME: ICD-10-CM

## 2022-07-12 DIAGNOSIS — M25.511 CHRONIC RIGHT SHOULDER PAIN: Primary | ICD-10-CM

## 2022-07-12 DIAGNOSIS — M79.10 TRIGGER POINT: ICD-10-CM

## 2022-07-12 DIAGNOSIS — M62.838 MUSCLE SPASM: ICD-10-CM

## 2022-07-12 DIAGNOSIS — Z98.890 H/O REPAIR OF RIGHT ROTATOR CUFF: ICD-10-CM

## 2022-07-12 DIAGNOSIS — M54.42 ACUTE MIDLINE LOW BACK PAIN WITH LEFT-SIDED SCIATICA: ICD-10-CM

## 2022-07-12 DIAGNOSIS — M79.18 MYOFASCIAL PAIN: ICD-10-CM

## 2022-07-12 DIAGNOSIS — G89.29 CHRONIC RIGHT SHOULDER PAIN: Primary | ICD-10-CM

## 2022-07-12 PROCEDURE — 99213 OFFICE O/P EST LOW 20 MIN: CPT | Performed by: PHYSICAL MEDICINE & REHABILITATION

## 2022-07-12 RX ORDER — ATORVASTATIN CALCIUM 20 MG/1
TABLET, FILM COATED ORAL
COMMUNITY
Start: 2022-07-04

## 2022-07-12 RX ORDER — DICLOFENAC SODIUM 10 MG/G
GEL TOPICAL
Qty: 500 G | Refills: 3 | Status: SHIPPED | OUTPATIENT
Start: 2022-07-12

## 2022-07-12 NOTE — PROGRESS NOTES
[facility]  [address], [facility] [address]  Phone: [phone]  Fax: [phone]    Pt's YOB: 1970    ASSESSMENT   Diagnoses and all orders for this visit:    1. Chronic right shoulder pain  -     diclofenac (Voltaren) 1 % gel; Apply 4 grams to right shoulder up to 4 times per day, maximum 16 grams per joint per day. Dispense 5 100 gram tubes    2. Muscle spasm    3. Myofascial pain    4. Trigger point    5. Cervical facet joint syndrome    6. H/O repair of right rotator cuff    7. Acute midline low back pain with left-sided sciatica         IMPRESSION AND PLAN:  Jt Lewis is a 50 y.o. male with history of cervical pain and presents to the office today for follow up. Pt reports no change in shoulder from previous visit but admits to back pain due to fall two weeks ago. He notes continuing to use the Voltaren gel with relief. Pt is still taking Lyrica 100 mg 1 cap BID and Skelaxin 800 mg 1 tab QHS. 1) Pt was given information on herniated disc exercises. 2) He will continue to take Lyrica 100 mg 1 cap BID and Skelaxin 800 mg 1 tab QHS as needed and received a refill on Voltaren 1% gel. 3) Discussed treatment options including Medrol Dosepak, future XR, MRI, and injections. 4) Mr. Salud Aguiar has a reminder for a \"due or due soon\" health maintenance. I have asked that he contact his primary care provider, Kunal Solorzano DO, for follow-up on this health maintenance. 5)  demonstrated consistency with prescribing. Follow-up and Dispositions    · Return in about 6 months (around 1/12/2023) for Medication follow up. HISTORY OF PRESENT ILLNESS:  Jt Lewis is a 50 y.o. male with history of cervical pain and presents to the office today for follow up. Since past visit in October he had seen José Miguel Blake due to moving and having to cancel the appointment.  Pt notes lack of strength since before his previous injury and feels pulling through his shoulder radiating into his back but it is no change from previous visit. He notes continuing to use the Voltaren gel with relief, he further mentions using it on his shoulder and also his chest and other joints. Pt is still taking Lyrica 100 mg 1 cap BID and Skelaxin 800 mg 1 tab QHS. He mentions blood sugar levels doing well but admits to previous back surgery causing episodic pain previously. Pt mentions tweaking left side of his back a few weeks ago while working outside where his pain has not subsided like he is used to. He notes the medication regimen helps with the pain but does not relief the sciatic nerve pain. His pain radiates into the hip but does not reach lower extremities. Pt mentions having previously prescribed Medrol Dosepak but is worried about blood sugar levels rising. Pt at this time desires to continue with current care.     Pain Scale: 3/10    PCP: Dameon Dickerson DO     Past Medical History:   Diagnosis Date    Adverse effect of anesthesia     for back sx-had issues with breathing  with apnea- 6 years ago    Asthma     Chest pain, unspecified     ATYPICAL,POSSIBLE ANGINA,MUSCULAR,CAD,CHEST WALL PAINS PERSISTANT AND RECURRENT    Chronic kidney disease     Diabetes mellitus ([address]     DM (diabetes mellitus) ([address]     GERD (gastroesophageal reflux disease)     HTN (hypertension)     Hypercholesteremia     Hypertension     Kidney stones     Sleep apnea     cpap        Social History     Socioeconomic History    Marital status:      Spouse name: Not on file    Number of children: Not on file    Years of education: Not on file    Highest education level: Not on file   Occupational History    Occupation:    Tobacco Use    Smoking status: Never Smoker    Smokeless tobacco: Never Used   Substance and Sexual Activity    Alcohol use: No    Drug use: No    Sexual activity: Not on file   Other Topics Concern    Not on file   Social History Narrative    ** Merged History Encounter **          Social Determinants of Health     Financial Resource Strain:     Difficulty of Paying Living Expenses: Not on file   Food Insecurity:     Worried About Running Out of Food in the Last Year: Not on file    Christina of Food in the Last Year: Not on file   Transportation Needs:     Lack of Transportation (Medical): Not on file    Lack of Transportation (Non-Medical): Not on file   Physical Activity:     Days of Exercise per Week: Not on file    Minutes of Exercise per Session: Not on file   Stress:     Feeling of Stress : Not on file   Social Connections:     Frequency of Communication with Friends and Family: Not on file    Frequency of Social Gatherings with Friends and Family: Not on file    Attends Faith Services: Not on file    Active Member of [address] [place] [address] [employer] Social Platform or Organizations: Not on file    Attends Club or Organization Meetings: Not on file    Marital Status: Not on file   Intimate Partner Violence:     Fear of Current or Ex-Partner: Not on file    Emotionally Abused: Not on file    Physically Abused: Not on file    Sexually Abused: Not on file   Housing Stability:     Unable to Pay for Housing in the Last Year: Not on file    Number of [place] in the Last Year: Not on file    Unstable Housing in the Last Year: Not on file       Current Outpatient Medications   Medication Sig Dispense Refill    diclofenac (Voltaren) 1 % gel Apply 4 grams to right shoulder up to 4 times per day, maximum 16 grams per joint per day. Dispense 5 100 gram tubes 500 g 3    pregabalin (LYRICA) 100 mg capsule TAKE 1 CAPSULE BY MOUTH TWICE DAILY. MAX DAILY AMOUNT: 200  Capsule 1    metaxalone (SKELAXIN) 800 mg tablet Take 1 Tablet by mouth nightly. TAKE 1 TABLET TWICE A DAY AS NEEDED FOR MUSCLE SPASMS 90 Tablet 1    hydroCHLOROthiazide (HYDRODIURIL) 25 mg tablet Take 1 Tab by mouth daily.  omeprazole (PRILOSEC) 40 mg capsule Take 40 mg by mouth daily.  magnesium 250 mg tab Take 250 mg by mouth two (2) times a day.  ondansetron (ZOFRAN ODT) 4 mg disintegrating tablet 1 Tab by SubLINGual route every eight (8) hours as needed for Nausea. 10 Tab 0    amLODIPine (NORVASC) 5 mg tablet Take 5 mg by mouth daily.  benazepril (LOTENSIN) 20 mg tablet Take 20 mg by mouth daily.  traMADol (ULTRAM) 50 mg tablet 1 po bid prn severe pain 60 Tab 1    cpap machine kit by Does Not Apply route.  Potassium Citrate 15 mEq TbER Take  by mouth two (2) times a day.  tamsulosin (FLOMAX) 0.4 mg capsule Take 1 Cap by mouth daily (after dinner). (Patient taking differently: Take 0.4 mg by mouth daily as needed.) 30 Cap 0    montelukast (SINGULAIR) 10 mg tablet Take 10 mg by mouth daily.  insulin glargine (LANTUS SOLOSTAR) 100 unit/mL (3 mL) pen 50 Units by SubCUTAneous route nightly.  insulin aspart (NOVOLOG) 100 unit/mL inpn 18 Units by SubCUTAneous route Before breakfast, lunch, and dinner.  aspirin 81 mg chewable tablet Take 81 mg by mouth daily.  fluticasone-salmeterol (ADVAIR DISKUS) 250-50 mcg/dose diskus inhaler Take 1 Puff by inhalation every twelve (12) hours.  albuterol (VENTOLIN HFA) 90 mcg/actuation inhaler Take  by inhalation every six (6) hours as needed.  Misc. Devices Kit Please provide the patient following size mask. Mask: Mirage FX large size mask. DME: [place], [place]  Phone: [phone], Fax: [phone] 1 Each 0    atorvastatin (LIPITOR) 20 mg tablet TAKE 1 TABLET BY MOUTH ONCE DAILY AT BEDTIME      carvedilol (COREG CR) 10 mg CR capsule Take  by mouth daily (with breakfast). (Patient not taking: Reported on 7/12/2022)         No Known Allergies      REVIEW OF SYSTEMS    Constitutional: Negative for fever, chills, or weight change. Respiratory: Negative for cough or shortness of breath. Cardiovascular: Negative for chest pain or palpitations.   Gastrointestinal: Negative for acid reflux, change in bowel habits, or constipation. Genitourinary: Negative for dysuria and flank pain. Musculoskeletal: Positive for upper back, lower back, and right shoulder pain. Skin: Negative for rash. Neurological: Negative for headaches, dizziness, or numbness. Endo/Heme/Allergies: Negative for increased bruising. Psychiatric/Behavioral: Negative for difficulty with sleep. As per HPI    PHYSICAL EXAMINATION  Visit Vitals  /80 (BP 1 Location: Right arm, BP Patient Position: Sitting, BP Cuff Size: Adult)   Pulse 86   Temp 97.5 °F (36.4 °C) (Tympanic)   Resp 16   Ht 5' 7\" (1.702 m)   Wt 299 lb (135.6 kg)   SpO2 96%   BMI 46.83 kg/m²       Constitutional: Awake, alert, and in no acute distress. Neurological: Sensation to light touch is intact. Negative Miller's sign bilaterally. Skin: warm, dry, and intact. Musculoskeletal: No pain with extension, axial loading, or forward flexion. No pain with internal or external rotation of his hips. Negative straight leg raise bilaterally. Good ROM in shoulders. Biceps  Triceps Deltoids Wrist Ext Wrist Flex Hand Intrin   Right +4/5 +4/5 +4/5 +4/5 +4/5 +4/5   Left +4/5 +4/5 +4/5 +4/5 +4/5 +4/5      Hip Flex  Quads Hamstrings Ankle DF EHL Ankle PF   Right +4/5 +4/5 +4/5 +4/5 +4/5 +4/5   Left +4/5 +4/5 +4/5 +4/5 +4/5 +4/5     IMAGING:    Cervical spine MRI from 8/19/2020 was personally reviewed with the patient and demonstrated:          Results from Hospital Encounter encounter on 08/19/20   MRI CERV SPINE WO CONT     Addendum Addendum: Comparison is made to MRI's dated December 6 2011 and October 26 2013: The right-sided cystic lesion at C1-C2 level is most likely a benign  synovial cyst, essentially stable since 2011.  No surrounding edema or destruction.  No additional workup indicated unless new symptoms arise.  Discussed with Dr. Harper Tate. Vera Briceno MD 9/4/2020  3:53 PM           Narrative MR CERVICAL SPINE WITHOUT CONTRAST     HISTORY: Progressive cervical pain with radicular symptoms despite extensive  physical therapy and using NSAID's.     COMPARISON: None     TECHNIQUE: Multisequence multiplanar imaging through the cervical spine.     FINDINGS:     Alignment: Mild straightening of the normal cervical lordosis  Vertebral body height: Normal  Marrow signal: Unremarkable  Disc spaces: Preserved height and signal intensity     Cervicomedullary Junction: Patent  Cervical cord: No cord expansion or atrophy.      On axial imaging, findings at each level are as follows:  C1-2 on the right side at the edge of the diagnostic information there is the  suggestion of a possibly fluid-filled or cystic structure measuring 1.2 x 1.1  cm. This appears to be in part invaginating between the occipital condyle on the  lateral mass of C1 this is unfortunately only seen in the sagittal views. The  bony structures are otherwise unremarkable for any focal destruction.     May represent a small amount of degenerative cystic formation arising from the  joint space between the occipital condyle and the lateral mass additional  imaging is necessary to demonstrate this.        C2/C3: Patent canal and foramina.     C3/C4: Minor signal loss within the disc Patent canal and foramina.     C4/C5: C5 demonstrates a benign vertebral body hemangioma there is a patent  canal and foramina C5/C6: Small amount of anterior osteophyte identified.  Patent  canal and foramina.     C6/C7: Patent canal and foramina.     C7/T1: Patent canal and foramina.     Other structures: As can be identified cervical visceral spaces show no evidence  of a mass and there is no evidence of any  adenopathy  .     Impression IMPRESSION:  There is a suggestion of a right-sided occipital mass possibly representing  degenerative change or possibility of a small tumor arising from the soft tissue  or bone.     Unfortunately this is only demonstrated in the extreme right sagittal images in  the sagittal plane only.  May Wish to acquire additional imaging through this area to exclude above  concerns.         The remainder of the cervical spine demonstrates only  Minor degenerative features present no central canal stenosis cord and nerve  roots are not compressed.     Benign vertebral body hemangioma C5 level.     Thank you for enabling us to participate in the care of this patient.      Right shoulder MRI from 09/18/2019 were personally reviewed and demonstrated:  Multisequence multiplanar MR images of the right shoulder were obtained.     HISTORY: Shoulder pain.     Mild to moderate supraspinatus tendinosis. Moderate infraspinatus tendinosis  with bursal surface fraying. Moderate inflammation in the subacromial bursa. Mild subscapular tendinosis. No muscular atrophy.     No glenohumeral joint effusion. No focal glenohumeral cartilage loss. Moderate  maceration of the superior labrum. Biceps anchor is not defined. Attenuated  remaining biceps tendon within the tendon sheath. Subcortical cystic changes in  the humeral head. No definite Hill-Sachs deformity. Glenoid is intact.     Moderate hypertrophy with mild inflammation at the acromioclavicular joint. Slightly curved acromion undersurface. No abnormal downsloping.     IMPRESSION:  1. Rotator cuff tendinosis with moderate bursal surface fraying in the  infraspinatus tendon. No focal tear or muscular atrophy. Subacromial bursitis present. 2. Complex degenerative tearing or maceration of the superior labrum with likely torn biceps anchor, attenuated biceps tendon in the bicipital groove. 3. Type II acromion. Hypertrophy and inflammation at the [facility] joint. Written by Jayde Tovar, as dictated by Alber Kramer MD.  I, Dr. Alber Kramer confirm that all documentation is accurate.

## 2022-07-12 NOTE — PROGRESS NOTES
Chief Complaint   Patient presents with    Follow-up       Pt preferred language for health care discussion is english. Is someone accompanying this pt? no    Is the patient using any DME equipment during OV? no    Depression Screening:  3 most recent [facility] Screens 9/13/2021 5/10/2021 9/14/2020 8/3/2020 7/6/2020 1/6/2020 12/27/2019   Little interest or pleasure in doing things Not at all Not at all Not at all Not at all Not at all Not at all Not at all   Feeling down, depressed, irritable, or hopeless Not at all Not at all Not at all Not at all Not at all Not at all Not at all   Total Score PHQ 2 0 0 0 0 0 0 0       Learning Assessment:  Learning Assessment 12/14/2016   PRIMARY LEARNER Patient   PRIMARY LANGUAGE ENGLISH   LEARNER PREFERENCE PRIMARY DEMONSTRATION   ANSWERED BY Patient   [place] Maintenance reviewed and discussed per provider. Yes        Advance Directive:  1. Do you have an advance directive in place? Patient Reply:no    2. If not, would you like material regarding how to put one in place? Patient Reply: no    Coordination of Care:  1. Have you been to the ER, urgent care clinic since your last visit? Hospitalized since your last visit? no    2. Have you seen or consulted any other health care providers outside of the [address] [place] [address] since your last visit? Include any pap smears or colon screening.  no

## 2022-09-12 DIAGNOSIS — M79.18 MYOFASCIAL PAIN: ICD-10-CM

## 2022-09-12 NOTE — TELEPHONE ENCOUNTER
Last Visit: 7/12/22 with MD Michelle Eason  Next Appointment: 1/16/23 with MD Mihcelle Eason  Previous Refill Encounter(s): 3/28/22 #180 with 1 refill    Requested Prescriptions     Pending Prescriptions Disp Refills    pregabalin (LYRICA) 100 mg capsule 180 Capsule 1     Sig: Take 1 Capsule by mouth two (2) times a day. Max Daily Amount: 200 mg. For [facility] in place:   Recommendation Provided To:    Intervention Detail: New Rx: 1, reason: Patient Preference  Gap Closed?:   Intervention Accepted By:   Time Spent (min): 5

## 2022-09-14 RX ORDER — PREGABALIN 100 MG/1
100 CAPSULE ORAL 2 TIMES DAILY
Qty: 180 CAPSULE | Refills: 1 | Status: SHIPPED | OUTPATIENT
Start: 2022-09-25

## 2022-12-16 ENCOUNTER — HOSPITAL ENCOUNTER (OUTPATIENT)
Dept: ULTRASOUND IMAGING | Age: 52
Discharge: HOME | End: 2022-12-16
Attending: PHYSICIAN ASSISTANT
Payer: OTHER GOVERNMENT

## 2022-12-16 DIAGNOSIS — N20.0 NEPHROLITHIASIS: ICD-10-CM

## 2022-12-16 PROCEDURE — 76770 US EXAM ABDO BACK WALL COMP: CPT

## 2023-01-16 ENCOUNTER — OFFICE VISIT (OUTPATIENT)
Dept: ORTHOPEDIC SURGERY | Age: 53
End: 2023-01-16
Payer: OTHER GOVERNMENT

## 2023-01-16 VITALS
HEIGHT: 67 IN | BODY MASS INDEX: 47.4 KG/M2 | WEIGHT: 302 LBS | OXYGEN SATURATION: 94 % | HEART RATE: 77 BPM | TEMPERATURE: 97.7 F | RESPIRATION RATE: 16 BRPM

## 2023-01-16 DIAGNOSIS — M79.18 MYOFASCIAL PAIN: Primary | ICD-10-CM

## 2023-01-16 DIAGNOSIS — E66.01 MORBID OBESITY WITH BMI OF 45.0-49.9, ADULT (HCC): ICD-10-CM

## 2023-01-16 DIAGNOSIS — Z98.890 H/O REPAIR OF RIGHT ROTATOR CUFF: ICD-10-CM

## 2023-01-16 DIAGNOSIS — M47.812 CERVICAL FACET JOINT SYNDROME: ICD-10-CM

## 2023-01-16 DIAGNOSIS — M25.511 CHRONIC RIGHT SHOULDER PAIN: ICD-10-CM

## 2023-01-16 DIAGNOSIS — M62.838 MUSCLE SPASM: ICD-10-CM

## 2023-01-16 DIAGNOSIS — G89.29 CHRONIC RIGHT SHOULDER PAIN: ICD-10-CM

## 2023-01-16 PROCEDURE — 99213 OFFICE O/P EST LOW 20 MIN: CPT | Performed by: PHYSICAL MEDICINE & REHABILITATION

## 2023-01-16 RX ORDER — ACETAMINOPHEN AND CODEINE PHOSPHATE 300; 30 MG/1; MG/1
1 TABLET ORAL
Qty: 21 TABLET | Refills: 0 | Status: CANCELLED | OUTPATIENT
Start: 2023-01-16 | End: 2023-01-23

## 2023-01-16 RX ORDER — PREGABALIN 100 MG/1
100 CAPSULE ORAL 2 TIMES DAILY
Qty: 180 CAPSULE | Refills: 1 | Status: SHIPPED | OUTPATIENT
Start: 2023-01-16

## 2023-01-16 RX ORDER — DICLOFENAC SODIUM 10 MG/G
GEL TOPICAL
Qty: 500 G | Refills: 3 | Status: SHIPPED | OUTPATIENT
Start: 2023-01-16

## 2023-01-16 NOTE — PROGRESS NOTES
Chief Complaint   Patient presents with    Follow-up       Pt preferred language for health care discussion is english. Is someone accompanying this pt? no    Is the patient using any DME equipment during OV? no    Depression Screening:  3 most recent [facility] Screens 9/13/2021 5/10/2021 9/14/2020 8/3/2020 7/6/2020 1/6/2020 12/27/2019   Little interest or pleasure in doing things Not at all Not at all Not at all Not at all Not at all Not at all Not at all   Feeling down, depressed, irritable, or hopeless Not at all Not at all Not at all Not at all Not at all Not at all Not at all   Total Score PHQ 2 0 0 0 0 0 0 0       Learning Assessment:  Learning Assessment 12/14/2016   PRIMARY LEARNER Patient   PRIMARY LANGUAGE ENGLISH   LEARNER PREFERENCE PRIMARY DEMONSTRATION   ANSWERED BY Patient   [place] Maintenance reviewed and discussed per provider. Yes        Advance Directive:  1. Do you have an advance directive in place? Patient Reply:no    2. If not, would you like material regarding how to put one in place? Patient Reply: no    Coordination of Care:  1. Have you been to the ER, urgent care clinic since your last visit? Hospitalized since your last visit? no    2. Have you seen or consulted any other health care providers outside of the [address] [place] [address] since your last visit? Include any pap smears or colon screening.  no

## 2023-01-16 NOTE — PROGRESS NOTES
[facility]  [address] [place], [facility] [address]  Phone: [phone]  Fax: [phone]    Pt's YOB: 1970    ASSESSMENT   Diagnoses and all orders for this visit:    1. Myofascial pain  -     pregabalin (LYRICA) 100 mg capsule; Take 1 Capsule by mouth two (2) times a day. Max Daily Amount: 200 mg.    2. Chronic right shoulder pain  -     diclofenac (Voltaren) 1 % gel; Apply 4 grams to right shoulder up to 4 times per day, maximum 16 grams per joint per day. Dispense 5 100 gram tubes    3. Muscle spasm    4. Cervical facet joint syndrome    5. Morbid obesity with BMI of 45.0-49.9, adult [facility])       IMPRESSION AND PLAN:  Dmitri Cooper is a 50 y.o. male with history of cervical pain and presents to the office today for follow up. Pt reports unchanged symptoms since his last office visit, improved and well managed at this time. He continues to take Lyrica 100 mg 1 cap BID, Skelaxin 800 mg 1 tab QHS, and using Voltaren 1% gel with benefit. 1) Pt was given information on Mediterranean diet. 2) He received refills of Lyrica 100 mg 1 cap BID and Voltaren 1% gel at this time. 3) Pt does not require refills of Skelaxin 800 mg 1 tab QHS as needed at this time. 4) Mr. Brit Muse has a reminder for a \"due or due soon\" health maintenance. I have asked that he contact his primary care provider, Ghanshyam Pereira DO, for follow-up on this health maintenance. 5)  demonstrated consistency with prescribing. 6) Weight loss recommended . Follow-up and Dispositions    Return in about 6 months (around 7/16/2023) for Medication follow up. HISTORY OF PRESENT ILLNESS:  Dmitri Cooper is a 50 y.o. male with history of cervical pain and presents to the office today for follow up. Pt reports unchanged symptoms since his last office visit, improved and well managed at this time. 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  He admits to a HEP of stretching and using the [place] with benefit. Pt continues to use Voltaren 1% gel with significant benefit, he further mentions using it on his shoulder and also his chest and other joints. He continues to take Lyrica 100 mg 1 cap BID, Skelaxin 800 mg 1 tab QHS, and using Voltaren 1% gel with benefit. Of note, pt has been instructed to not take vitamin D by his nephrologist due to kidney stones and kidney disease. Pt at this time desires to continue with current care.     Pain Scale: 2/10    PCP: Arik Soriano DO     Past Medical History:   Diagnosis Date    Adverse effect of anesthesia     for back sx-had issues with breathing  with apnea- 6 years ago    Asthma     Chest pain, unspecified     ATYPICAL,POSSIBLE ANGINA,MUSCULAR,CAD,CHEST WALL PAINS PERSISTANT AND RECURRENT    Chronic kidney disease     Diabetes mellitus ([facility] [address]     DM (diabetes mellitus) ([facility]     GERD (gastroesophageal reflux disease)     HTN (hypertension)     Hypercholesteremia     Hypertension     Kidney stones     Sleep apnea     cpap        Social History     Socioeconomic History    Marital status:      Spouse name: Not on file    Number of children: Not on file    Years of education: Not on file    Highest education level: Not on file   Occupational History    Occupation:    Tobacco Use    Smoking status: Never    Smokeless tobacco: Never   Substance and Sexual Activity    Alcohol use: No    Drug use: No    Sexual activity: Not on file   Other Topics Concern    Not on file   Social History Narrative    ** Merged History Encounter **          Social Determinants of Health     Financial Resource Strain: Not on file   Food Insecurity: Not on file   Transportation Needs: Not on file   Physical Activity: Not on file   Stress: Not on file   Social Connections: Not on file   Intimate Partner Violence: Not on file   Housing Stability: Not on file       Current Outpatient Medications   Medication Sig Dispense Refill    pregabalin (LYRICA) 100 mg capsule Take 1 Capsule by mouth two (2) times a day. Max Daily Amount: 200 mg. 180 Capsule 1    diclofenac (Voltaren) 1 % gel Apply 4 grams to right shoulder up to 4 times per day, maximum 16 grams per joint per day. Dispense 5 100 gram tubes 500 g 3    tamsulosin (FLOMAX) 0.4 mg capsule Take 1 Capsule by mouth daily (after dinner). 30 Capsule 1    atorvastatin (LIPITOR) 20 mg tablet TAKE 1 TABLET BY MOUTH ONCE DAILY AT BEDTIME      metaxalone (SKELAXIN) 800 mg tablet Take 1 Tablet by mouth nightly. TAKE 1 TABLET TWICE A DAY AS NEEDED FOR MUSCLE SPASMS 90 Tablet 1    hydroCHLOROthiazide (HYDRODIURIL) 25 mg tablet Take 1 Tab by mouth daily. omeprazole (PRILOSEC) 40 mg capsule Take 40 mg by mouth daily. magnesium 250 mg tab Take 250 mg by mouth two (2) times a day. ondansetron (ZOFRAN ODT) 4 mg disintegrating tablet 1 Tab by SubLINGual route every eight (8) hours as needed for Nausea. 10 Tab 0    amLODIPine (NORVASC) 5 mg tablet Take 5 mg by mouth daily. benazepril (LOTENSIN) 20 mg tablet Take 20 mg by mouth daily. traMADol (ULTRAM) 50 mg tablet 1 po bid prn severe pain 60 Tab 1    cpap machine kit by Does Not Apply route. Potassium Citrate 15 mEq TbER Take  by mouth two (2) times a day. carvedilol (COREG CR) 10 mg CR capsule Take  by mouth daily (with breakfast). montelukast (SINGULAIR) 10 mg tablet Take 10 mg by mouth daily. insulin aspart (NOVOLOG) 100 unit/mL inpn 18 Units by SubCUTAneous route Before breakfast, lunch, and dinner. aspirin 81 mg chewable tablet Take 81 mg by mouth daily. fluticasone propion-salmeteroL (ADVAIR/WIXELA) 250-50 mcg/dose diskus inhaler Take 1 Puff by inhalation every twelve (12) hours. albuterol (PROVENTIL HFA, VENTOLIN HFA, PROAIR HFA) 90 mcg/actuation inhaler Take  by inhalation every six (6) hours as needed. Misc. Devices Kit Please provide the patient following size mask.   Mask: Mirage FX large size mask. DME: [facility], [facility], [address]   Phone: [phone], Fax: [phone] 1 Each 0    tamsulosin (FLOMAX) 0.4 mg capsule Take 1 Cap by mouth daily (after dinner). (Patient taking differently: Take 0.4 mg by mouth daily as needed.) 30 Cap 0    insulin glargine (LANTUS SOLOSTAR) 100 unit/mL (3 mL) pen 50 Units by SubCUTAneous route nightly. No Known Allergies      REVIEW OF SYSTEMS    Constitutional: Negative for fever, chills, or weight change. Respiratory: Negative for cough or shortness of breath. Cardiovascular: Negative for chest pain or palpitations. Gastrointestinal: Negative for acid reflux, change in bowel habits, or constipation. Genitourinary: Negative for dysuria and flank pain. Musculoskeletal: Positive for upper back, lower back, and right shoulder pain. Skin: Negative for rash. Neurological: Negative for headaches, dizziness, or numbness. Endo/Heme/Allergies: Negative for increased bruising. Psychiatric/Behavioral: Negative for difficulty with sleep. As per HPI    PHYSICAL EXAMINATION  Visit Vitals  Pulse 77   Temp 97.7 °F (36.5 °C) (Temporal)   Resp 16   Ht 5' 7\" (1.702 m)   Wt 302 lb (137 kg)   SpO2 94%   BMI 47.30 kg/m²       Constitutional: Awake, alert, and in no acute distress. Neurological: 1+ symmetrical DTRs in the upper extremities. 1+ symmetrical DTRs in the lower extremities. Sensation to light touch is intact. Negative Miller's sign bilaterally. Skin: warm, dry, and intact. Musculoskeletal: No pain with extension, axial loading, or forward flexion. No pain with internal or external rotation of his hips. Negative straight leg raise bilaterally.       Biceps  Triceps Deltoids Wrist Ext Wrist Flex Hand Intrin   Right +4/5 +4/5 +4/5 +4/5 +4/5 +4/5   Left +4/5 +4/5 +4/5 +4/5 +4/5 +4/5      Hip Flex  Quads Hamstrings Ankle DF EHL Ankle PF   Right +4/5 +4/5 +4/5 +4/5 +4/5 +4/5   Left +4/5 +4/5 +4/5 +4/5 +4/5 +4/5 IMAGING:    Cervical spine MRI from 8/19/2020 was personally reviewed with the patient and demonstrated: Addendum Addendum: Comparison is made to MRI's dated December 6 2011 and October 26 2013: The right-sided cystic lesion at C1-C2 level is most likely a benign  synovial cyst, essentially stable since 2011. No surrounding edema or destruction. No additional workup indicated unless new symptoms arise. Discussed with Dr. Sharolyn Paget. Nimco Lorenz MD 9/4/2020  3:53 PM         Narrative FINDINGS:     Alignment: Mild straightening of the normal cervical lordosis  Vertebral body height: Normal  Marrow signal: Unremarkable  Disc spaces: Preserved height and signal intensity     Cervicomedullary Junction: Patent  Cervical cord: No cord expansion or atrophy. On axial imaging, findings at each level are as follows:  C1-2 on the right side at the edge of the diagnostic information there is the  suggestion of a possibly fluid-filled or cystic structure measuring 1.2 x 1.1  cm. This appears to be in part invaginating between the occipital condyle on the  lateral mass of C1 this is unfortunately only seen in the sagittal views. The  bony structures are otherwise unremarkable for any focal destruction. May represent a small amount of degenerative cystic formation arising from the  joint space between the occipital condyle and the lateral mass additional  imaging is necessary to demonstrate this. C2/C3: Patent canal and foramina. C3/C4: Minor signal loss within the disc Patent canal and foramina. C4/C5: C5 demonstrates a benign vertebral body hemangioma there is a patent  canal and foramina C5/C6: Small amount of anterior osteophyte identified. Patent  canal and foramina. C6/C7: Patent canal and foramina. C7/T1: Patent canal and foramina. Other structures: As can be identified cervical visceral spaces show no evidence  of a mass and there is no evidence of any  adenopathy  . Impression IMPRESSION:  There is a suggestion of a right-sided occipital mass possibly representing  degenerative change or possibility of a small tumor arising from the soft tissue  or bone. Unfortunately this is only demonstrated in the extreme right sagittal images in  the sagittal plane only. May Wish to acquire additional imaging through this area to exclude above  concerns. The remainder of the cervical spine demonstrates only  Minor degenerative features present no central canal stenosis cord and nerve  roots are not compressed. Benign vertebral body hemangioma C5 level. Thank you for enabling us to participate in the care of this patient. Right shoulder MRI from 09/18/2019 were personally reviewed and demonstrated:  Multisequence multiplanar MR images of the right shoulder were obtained. HISTORY: Shoulder pain. Mild to moderate supraspinatus tendinosis. Moderate infraspinatus tendinosis  with bursal surface fraying. Moderate inflammation in the subacromial bursa. Mild subscapular tendinosis. No muscular atrophy. No glenohumeral joint effusion. No focal glenohumeral cartilage loss. Moderate  maceration of the superior labrum. Biceps anchor is not defined. Attenuated  remaining biceps tendon within the tendon sheath. Subcortical cystic changes in  the humeral head. No definite Hill-Sachs deformity. Glenoid is intact. Moderate hypertrophy with mild inflammation at the acromioclavicular joint. Slightly curved acromion undersurface. No abnormal downsloping. IMPRESSION:  1. Rotator cuff tendinosis with moderate bursal surface fraying in the  infraspinatus tendon. No focal tear or muscular atrophy. Subacromial bursitis present. 2. Complex degenerative tearing or maceration of the superior labrum with likely torn biceps anchor, attenuated biceps tendon in the bicipital groove. 3. Type II acromion. Hypertrophy and inflammation at the [facility] [place] operated by [facility] joint.     Written by Minerva Loo, as dictated by Leann Jeter MD.  I, Dr. Leann Jeter confirm that all documentation is accurate.

## 2023-01-16 NOTE — Clinical Note
1/16/2023    Patient: Sherlyn Andrade   YOB: 1970   Date of Visit: 1/16/2023     Laurita Davies DO  [address] [place] [id number]  Via Fax: [phone]     Cheo Edwards DO  Via     Dear DO Cheo Snider DO,      Thank you for referring Mr. Stef Bosch to [facility] for evaluation. My notes for this consultation are attached. If you have questions, please do not hesitate to call me. I look forward to following your patient along with you.       Sincerely,    Xenia Espinoza MD

## 2023-01-28 ENCOUNTER — TRANSCRIBE ORDERS (OUTPATIENT)
Facility: HOSPITAL | Age: 53
End: 2023-01-28

## 2023-01-28 DIAGNOSIS — N28.1 RENAL CYST: Primary | ICD-10-CM

## 2023-03-02 RX ORDER — PREGABALIN 100 MG/1
CAPSULE ORAL
Qty: 180 CAPSULE | OUTPATIENT
Start: 2023-03-02

## 2023-03-13 RX ORDER — PREGABALIN 100 MG/1
CAPSULE ORAL
Qty: 180 CAPSULE | OUTPATIENT
Start: 2023-03-13

## 2023-03-14 ENCOUNTER — TELEPHONE (OUTPATIENT)
Age: 53
End: 2023-03-14

## 2023-03-14 DIAGNOSIS — M47.812 SPONDYLOSIS WITHOUT MYELOPATHY OR RADICULOPATHY, CERVICAL REGION: Primary | ICD-10-CM

## 2023-03-14 DIAGNOSIS — M54.42 CHRONIC BILATERAL LOW BACK PAIN WITH LEFT-SIDED SCIATICA: ICD-10-CM

## 2023-03-14 DIAGNOSIS — G89.29 CHRONIC BILATERAL LOW BACK PAIN WITH LEFT-SIDED SCIATICA: ICD-10-CM

## 2023-03-14 RX ORDER — PREGABALIN 100 MG/1
100 CAPSULE ORAL 2 TIMES DAILY
Qty: 180 CAPSULE | Refills: 0 | Status: SHIPPED | OUTPATIENT
Start: 2023-03-14 | End: 2023-06-12

## 2023-03-14 NOTE — TELEPHONE ENCOUNTER
Patient called requesting a refill of Lyrica 90 day supply be sent to [address] [facility], [place], [address]      Please advise pt at [phone]

## 2023-03-14 NOTE — TELEPHONE ENCOUNTER
[facility] does not have Lyrica in stock so they cannot send his refill. patient needs this filled at [facility]. RX is pended.

## 2023-05-15 ENCOUNTER — HOSPITAL ENCOUNTER (OUTPATIENT)
Facility: HOSPITAL | Age: 53
Discharge: HOME | End: 2023-05-18
Payer: OTHER GOVERNMENT

## 2023-05-15 DIAGNOSIS — N28.1 RENAL CYST: ICD-10-CM

## 2023-05-15 LAB — CREAT UR-MCNC: 1.6 MG/DL (ref 0.6–1.3)

## 2023-05-15 PROCEDURE — 6360000004 HC RX CONTRAST MEDICATION: Performed by: PHYSICIAN ASSISTANT

## 2023-05-15 PROCEDURE — 74183 MRI ABD W/O CNTR FLWD CNTR: CPT

## 2023-05-15 PROCEDURE — A9577 INJ MULTIHANCE: HCPCS | Performed by: PHYSICIAN ASSISTANT

## 2023-05-15 PROCEDURE — 82565 ASSAY OF CREATININE: CPT

## 2023-05-15 RX ADMIN — GADOBENATE DIMEGLUMINE 20 ML: 529 INJECTION, SOLUTION INTRAVENOUS at 09:25

## 2023-06-21 DIAGNOSIS — M47.812 SPONDYLOSIS WITHOUT MYELOPATHY OR RADICULOPATHY, CERVICAL REGION: ICD-10-CM

## 2023-06-21 DIAGNOSIS — M54.42 CHRONIC BILATERAL LOW BACK PAIN WITH LEFT-SIDED SCIATICA: ICD-10-CM

## 2023-06-21 DIAGNOSIS — G89.29 CHRONIC BILATERAL LOW BACK PAIN WITH LEFT-SIDED SCIATICA: ICD-10-CM

## 2023-06-22 RX ORDER — PREGABALIN 100 MG/1
CAPSULE ORAL
Qty: 180 CAPSULE | Refills: 0 | Status: SHIPPED | OUTPATIENT
Start: 2023-06-22 | End: 2023-09-20

## 2023-06-28 ENCOUNTER — OFFICE VISIT (OUTPATIENT)
Age: 53
End: 2023-06-28
Payer: OTHER GOVERNMENT

## 2023-06-28 VITALS
HEIGHT: 67 IN | TEMPERATURE: 97.8 F | WEIGHT: 270 LBS | RESPIRATION RATE: 18 BRPM | HEART RATE: 76 BPM | DIASTOLIC BLOOD PRESSURE: 87 MMHG | BODY MASS INDEX: 42.38 KG/M2 | OXYGEN SATURATION: 95 % | SYSTOLIC BLOOD PRESSURE: 131 MMHG

## 2023-06-28 DIAGNOSIS — M79.18 MYALGIA, OTHER SITE: ICD-10-CM

## 2023-06-28 DIAGNOSIS — M79.10 TRIGGER POINT: ICD-10-CM

## 2023-06-28 DIAGNOSIS — M54.42 CHRONIC BILATERAL LOW BACK PAIN WITH LEFT-SIDED SCIATICA: Primary | ICD-10-CM

## 2023-06-28 DIAGNOSIS — M62.838 OTHER MUSCLE SPASM: ICD-10-CM

## 2023-06-28 DIAGNOSIS — G89.29 CHRONIC BILATERAL LOW BACK PAIN WITH LEFT-SIDED SCIATICA: Primary | ICD-10-CM

## 2023-06-28 DIAGNOSIS — G62.9 NEUROPATHY: ICD-10-CM

## 2023-06-28 PROCEDURE — 20553 NJX 1/MLT TRIGGER POINTS 3/>: CPT | Performed by: PHYSICAL MEDICINE & REHABILITATION

## 2023-06-28 PROCEDURE — 3078F DIAST BP <80 MM HG: CPT | Performed by: PHYSICAL MEDICINE & REHABILITATION

## 2023-06-28 PROCEDURE — 3074F SYST BP LT 130 MM HG: CPT | Performed by: PHYSICAL MEDICINE & REHABILITATION

## 2023-06-28 PROCEDURE — 99214 OFFICE O/P EST MOD 30 MIN: CPT | Performed by: PHYSICAL MEDICINE & REHABILITATION

## 2023-06-28 RX ORDER — BUPIVACAINE HYDROCHLORIDE 2.5 MG/ML
2 INJECTION, SOLUTION INFILTRATION; PERINEURAL ONCE
Status: COMPLETED | OUTPATIENT
Start: 2023-06-28 | End: 2023-06-28

## 2023-06-28 RX ORDER — LIDOCAINE HYDROCHLORIDE 10 MG/ML
2 INJECTION, SOLUTION INFILTRATION; PERINEURAL ONCE
Status: COMPLETED | OUTPATIENT
Start: 2023-06-28 | End: 2023-06-28

## 2023-06-28 RX ORDER — BETAMETHASONE SODIUM PHOSPHATE AND BETAMETHASONE ACETATE 3; 3 MG/ML; MG/ML
12 INJECTION, SUSPENSION INTRA-ARTICULAR; INTRALESIONAL; INTRAMUSCULAR; SOFT TISSUE ONCE
Status: COMPLETED | OUTPATIENT
Start: 2023-06-28 | End: 2023-06-28

## 2023-06-28 RX ADMIN — LIDOCAINE HYDROCHLORIDE 2 ML: 10 INJECTION, SOLUTION INFILTRATION; PERINEURAL at 16:21

## 2023-06-28 RX ADMIN — BUPIVACAINE HYDROCHLORIDE 5 MG: 2.5 INJECTION, SOLUTION INFILTRATION; PERINEURAL at 16:22

## 2023-06-28 RX ADMIN — BETAMETHASONE SODIUM PHOSPHATE AND BETAMETHASONE ACETATE 12 MG: 3; 3 INJECTION, SUSPENSION INTRA-ARTICULAR; INTRALESIONAL; INTRAMUSCULAR; SOFT TISSUE at 16:20

## 2023-07-26 DIAGNOSIS — G89.29 CHRONIC BILATERAL LOW BACK PAIN WITH LEFT-SIDED SCIATICA: ICD-10-CM

## 2023-07-26 DIAGNOSIS — M54.42 CHRONIC BILATERAL LOW BACK PAIN WITH LEFT-SIDED SCIATICA: ICD-10-CM

## 2023-07-26 DIAGNOSIS — M47.812 SPONDYLOSIS WITHOUT MYELOPATHY OR RADICULOPATHY, CERVICAL REGION: ICD-10-CM

## 2023-07-26 NOTE — TELEPHONE ENCOUNTER
Patient called to let his doctor know that the recommended new dosage has been working well and he is tolerating it. He is in need of a new prescription with new dosing.        pregabalin (LYRICA) 100 MG capsule      Pharmacy:  [address] [place] [address]         Patient can be reached at: [phone]

## 2023-07-26 NOTE — TELEPHONE ENCOUNTER
Per the pt's last appt 06/28/23. He was instructed to take an additional lyrica in the evening. I have pended a new prescription with the new directions and changed the quantity. Please review. He is scheduled to follow up back up in January.

## 2023-07-27 RX ORDER — PREGABALIN 100 MG/1
CAPSULE ORAL
Qty: 270 CAPSULE | Refills: 1 | Status: SHIPPED | OUTPATIENT
Start: 2023-07-27 | End: 2023-10-26

## 2023-12-21 NOTE — PROGRESS NOTES
[facility]  [facility] [address]  [place] [address]  Phone: [phone]  Fax: [phone]    Pt's YOB: 1970    ASSESSMENT   Basim was seen today for neck pain.    Diagnoses and all orders for this visit:    Chronic bilateral low back pain with left-sided sciatica  -     pregabalin (LYRICA) 100 MG capsule; TAKE 1 CAPSULE BY MOUTH IN THE AM AND 2 CAPSULES BY MOUTH IN THE PM NEUROPATHIC SYMPTOMS    Trigger point  -     [place] INJECT TRIGGER POINTS, 3 OR GREATER  -     betamethasone acetate-betamethasone sodium phosphate (CELESTONE) injection 12 mg  -     BUPivacaine (MARCAINE) 0.25 % injection 5 mg  -     lidocaine 1 % injection 2 mL    Spondylosis without myelopathy or radiculopathy, cervical region    Myalgia, other site  -     pregabalin (LYRICA) 100 MG capsule; TAKE 1 CAPSULE BY MOUTH IN THE AM AND 2 CAPSULES BY MOUTH IN THE PM NEUROPATHIC SYMPTOMS    Body mass index (BMI) 45.0-49.9, adult ([facility])         IMPRESSION AND PLAN:  Basim Bellamy is a 53 y.o.  male with history of cervical pain and presents to the office today for medication follow up. Pt continues to complain of cervical, lumbar and right shoulder pain and headaches; managed well with his current medication regimen. He desires to undergo trigger point injection administered in the office for better pain management. He is taking Lyrica 100 mg 1 in the morning and 2 at night for neuropathic symptoms, Skelaxin 800 mg 1 tab QHS for muscle spasms, and uses Voltaren 1% gel with relief.       1) Trigger point injections were administered in the office today for better pain management.    2) Pt received refills of Lyrica 100 mg 1 in the morning and 2 at night for neuropathic symptoms.  This was increased from 1 po bid,   3) Mr. Bellamy has a reminder for a \"due or due soon\" health maintenance. I have asked that he contact his primary care provider, Frankel, Julia, DO, for follow-up on this

## 2024-01-02 ENCOUNTER — OFFICE VISIT (OUTPATIENT)
Age: 54
End: 2024-01-02
Payer: OTHER GOVERNMENT

## 2024-01-02 VITALS — BODY MASS INDEX: 46.77 KG/M2 | HEIGHT: 67 IN | HEART RATE: 85 BPM | WEIGHT: 298 LBS | OXYGEN SATURATION: 96 %

## 2024-01-02 DIAGNOSIS — G89.29 CHRONIC BILATERAL LOW BACK PAIN WITH LEFT-SIDED SCIATICA: Primary | ICD-10-CM

## 2024-01-02 DIAGNOSIS — M79.10 TRIGGER POINT: ICD-10-CM

## 2024-01-02 DIAGNOSIS — M47.812 SPONDYLOSIS WITHOUT MYELOPATHY OR RADICULOPATHY, CERVICAL REGION: ICD-10-CM

## 2024-01-02 DIAGNOSIS — M79.18 MYALGIA, OTHER SITE: ICD-10-CM

## 2024-01-02 DIAGNOSIS — M54.42 CHRONIC BILATERAL LOW BACK PAIN WITH LEFT-SIDED SCIATICA: Primary | ICD-10-CM

## 2024-01-02 PROCEDURE — 99214 OFFICE O/P EST MOD 30 MIN: CPT | Performed by: PHYSICAL MEDICINE & REHABILITATION

## 2024-01-02 PROCEDURE — 20553 NJX 1/MLT TRIGGER POINTS 3/>: CPT | Performed by: PHYSICAL MEDICINE & REHABILITATION

## 2024-01-02 RX ORDER — PREGABALIN 100 MG/1
CAPSULE ORAL
Qty: 270 CAPSULE | Refills: 1 | Status: SHIPPED | OUTPATIENT
Start: 2024-03-03 | End: 2024-10-01

## 2024-01-02 RX ORDER — BETAMETHASONE SODIUM PHOSPHATE AND BETAMETHASONE ACETATE 3; 3 MG/ML; MG/ML
12 INJECTION, SUSPENSION INTRA-ARTICULAR; INTRALESIONAL; INTRAMUSCULAR; SOFT TISSUE ONCE
Status: COMPLETED | OUTPATIENT
Start: 2024-01-02 | End: 2024-01-02

## 2024-01-02 RX ORDER — BUPIVACAINE HYDROCHLORIDE 2.5 MG/ML
2 INJECTION, SOLUTION INFILTRATION; PERINEURAL ONCE
Status: COMPLETED | OUTPATIENT
Start: 2024-01-02 | End: 2024-01-02

## 2024-01-02 RX ORDER — LIDOCAINE HYDROCHLORIDE 10 MG/ML
2 INJECTION, SOLUTION INFILTRATION; PERINEURAL ONCE
Status: COMPLETED | OUTPATIENT
Start: 2024-01-02 | End: 2024-01-02

## 2024-01-02 RX ADMIN — LIDOCAINE HYDROCHLORIDE 2 ML: 10 INJECTION, SOLUTION INFILTRATION; PERINEURAL at 16:44

## 2024-01-02 RX ADMIN — BUPIVACAINE HYDROCHLORIDE 5 MG: 2.5 INJECTION, SOLUTION INFILTRATION; PERINEURAL at 16:47

## 2024-01-02 RX ADMIN — BETAMETHASONE SODIUM PHOSPHATE AND BETAMETHASONE ACETATE 12 MG: 3; 3 INJECTION, SUSPENSION INTRA-ARTICULAR; INTRALESIONAL; INTRAMUSCULAR; SOFT TISSUE at 16:46

## 2024-07-31 ENCOUNTER — TELEPHONE (OUTPATIENT)
Age: 54
End: 2024-07-31

## 2024-07-31 DIAGNOSIS — M79.18 MYALGIA, OTHER SITE: Primary | ICD-10-CM

## 2024-07-31 RX ORDER — METAXALONE 800 MG/1
800 TABLET ORAL NIGHTLY PRN
Qty: 30 TABLET | Refills: 0 | Status: SHIPPED | OUTPATIENT
Start: 2024-07-31

## 2024-07-31 NOTE — TELEPHONE ENCOUNTER
Patient called to request a refill of metaxalone (strength unknown) stating he will be out prior to upcoming appointment on 8/28/24.  He cannot confirm the strength nor last fill date, but is requesting this to be called in to [facility] on [address].  Please review to determine if we can refill this for patient.

## 2024-08-28 ENCOUNTER — OFFICE VISIT (OUTPATIENT)
Age: 54
End: 2024-08-28
Payer: OTHER GOVERNMENT

## 2024-08-28 VITALS
HEIGHT: 67 IN | WEIGHT: 305.4 LBS | OXYGEN SATURATION: 96 % | TEMPERATURE: 98.4 F | SYSTOLIC BLOOD PRESSURE: 127 MMHG | DIASTOLIC BLOOD PRESSURE: 74 MMHG | BODY MASS INDEX: 47.93 KG/M2 | HEART RATE: 78 BPM

## 2024-08-28 DIAGNOSIS — M79.18 MYALGIA, OTHER SITE: Primary | ICD-10-CM

## 2024-08-28 DIAGNOSIS — M54.42 CHRONIC BILATERAL LOW BACK PAIN WITH LEFT-SIDED SCIATICA: ICD-10-CM

## 2024-08-28 DIAGNOSIS — R20.0 BILATERAL HAND NUMBNESS: ICD-10-CM

## 2024-08-28 DIAGNOSIS — G89.29 CHRONIC BILATERAL LOW BACK PAIN WITH LEFT-SIDED SCIATICA: ICD-10-CM

## 2024-08-28 DIAGNOSIS — M79.10 TRIGGER POINT: ICD-10-CM

## 2024-08-28 PROCEDURE — 3074F SYST BP LT 130 MM HG: CPT | Performed by: PHYSICAL MEDICINE & REHABILITATION

## 2024-08-28 PROCEDURE — 3078F DIAST BP <80 MM HG: CPT | Performed by: PHYSICAL MEDICINE & REHABILITATION

## 2024-08-28 PROCEDURE — 99214 OFFICE O/P EST MOD 30 MIN: CPT | Performed by: PHYSICAL MEDICINE & REHABILITATION

## 2024-08-28 RX ORDER — PREGABALIN 100 MG/1
CAPSULE ORAL
Qty: 270 CAPSULE | Refills: 1 | Status: SHIPPED | OUTPATIENT
Start: 2024-08-28 | End: 2025-03-28

## 2024-08-28 RX ORDER — METAXALONE 800 MG/1
800 TABLET ORAL 2 TIMES DAILY PRN
Qty: 180 TABLET | Refills: 1 | Status: SHIPPED | OUTPATIENT
Start: 2024-08-28

## 2024-08-28 NOTE — PROGRESS NOTES
Basim Bellamy presents today for   Chief Complaint   Patient presents with    Back Pain     Back pain about the the same as last visit       Is someone accompanying this pt? no    Is the patient using any DME equipment during OV? no        Coordination of Care:  1. Have you been to the ER, urgent care clinic since your last visit? no  Hospitalized since your last visit? no    2. Have you seen or consulted any other health care providers outside of the [facility] since your last visit? no Include any pap smears or colon screening. no

## 2024-08-28 NOTE — PROGRESS NOTES
[facility]  [facility]  [place] [address]  Phone: [phone]  Fax: [phone]    Patient's YOB: 1970    ASSESSMENT   Basim was seen today for back pain.    Diagnoses and all orders for this visit:    Myalgia, other site  -     metaxalone (SKELAXIN) 800 MG tablet; Take 1 tablet by mouth 2 times daily as needed for Pain  -     pregabalin (LYRICA) 100 MG capsule; TAKE 1 CAPSULE BY MOUTH IN THE AM AND 2 CAPSULES BY MOUTH IN THE PM NEUROPATHIC SYMPTOMS    Chronic bilateral low back pain with left-sided sciatica  -     pregabalin (LYRICA) 100 MG capsule; TAKE 1 CAPSULE BY MOUTH IN THE AM AND 2 CAPSULES BY MOUTH IN THE PM NEUROPATHIC SYMPTOMS    Bilateral hand numbness  -     EMG TWO EXTREMITIES UPPER; Future    Trigger point         IMPRESSION AND PLAN:  Basim Bellamy is a 54 y.o. male with history of cervical pain. He is taking Lyrica 100 mg 1 in the morning and 2 at night for neuropathic symptoms, Skelaxin 800 mg 1 tab QHS for muscle spasms, metaxalone 800 mg, and uses Voltaren 1% gel. Pt endorses a history of diabetes and blood sugars are controlled.    Patient was given information on carpal tunnel exercises.   Discussed and recommended obtaining an updated cervical MRI.  Discussed and ordered EMG of Upper Extremities.  Refilled metaxalone 800 mg for muscle spasm  Refilled pregabalin (Lyrica) 100 mg for neuropathic symptoms.  Mr. Bellamy has a reminder for a \"due or due soon\" health maintenance. I have asked that he contact his primary care provider, Frankel, Julia, DO, for follow-up on this health maintenance.   demonstrated consistency with prescribing.     Return in about 2 months (around 10/28/2024) for Medication follow up and EMG/NCS with Dr Upton.      HISTORY OF PRESENT ILLNESS:  Basim Bellamy is a 54 y.o.  male who presents to the office today for a medication follow up. At the time of his last OV with me  into the skin 3 times daily (before meals), Disp: , Rfl:     insulin glargine (LANTUS SOLOSTAR) 100 UNIT/ML injection pen, Inject 50 Units into the skin, Disp: , Rfl:      Allergies   Allergen Reactions    Vitamin D (Calciferol)      Other reaction(s): hx kidney stones         REVIEW OF SYSTEMS    Constitutional: Negative for fever, chills, or weight change.   Respiratory: Negative for cough or shortness of breath.     Cardiovascular: Negative for chest pain or palpitations.  Gastrointestinal: Negative for acid reflux, change in bowel habits, or constipation.  Genitourinary: Negative for dysuria and flank pain.   Musculoskeletal: Positive for cervical, lumbar and right shoulder pain.  Skin: Negative for rash.   Neurological: Positive for headaches, numbness, and tingling. Negative for dizziness.   Endo/Heme/Allergies: Negative for increased bruising.   Psychiatric/Behavioral: Negative for difficulty with sleep.    As per HPI    PHYSICAL EXAMINATION  /74 (Site: Left Upper Arm, Position: Sitting, Cuff Size: Medium Adult)   Pulse 78   Temp 98.4 °F (36.9 °C) (Skin)   Ht 1.702 m (5' 7\")   Wt (!) 138.5 kg (305 lb 6.4 oz)   SpO2 96% Comment: RA  BMI 47.83 kg/m²     Constitutional: Awake, alert, and in no acute distress.  Neurological:  1+ symmetrical DTRs in the upper extremities. 1+ symmetrical DTRs in the lower extremities. Sensation to light touch is intact. Negative Crespo's sign bilaterally.  Skin: warm, dry, and intact.   Musculoskeletal: No pain with extension, axial loading, or forward flexion. No pain with internal or external rotation of his hips. Negative straight leg raise bilaterally. Mild tightness across trapezius. Mild thenar muscle atrophy.      Biceps  Triceps Deltoids Wrist Ext Wrist Flex Hand Intrin   Right +4/5 +4/5 +4/5 +4/5 +4/5 +4/5   Left +4/5 +4/5 +4/5 +4/5 +4/5 +4/5      Hip Flex  Quads Hamstrings Ankle DF EHL Ankle PF   Right +4/5 +4/5 +4/5 +4/5 +4/5 +4/5   Left +4/5 +4/5 +4/5 +4/5

## 2024-11-08 ENCOUNTER — HOSPITAL ENCOUNTER (EMERGENCY)
Facility: HOSPITAL | Age: 54
Discharge: HOME | End: 2024-11-08
Attending: EMERGENCY MEDICINE
Payer: OTHER GOVERNMENT

## 2024-11-08 ENCOUNTER — APPOINTMENT (OUTPATIENT)
Facility: HOSPITAL | Age: 54
End: 2024-11-08
Payer: OTHER GOVERNMENT

## 2024-11-08 VITALS
WEIGHT: 300 LBS | DIASTOLIC BLOOD PRESSURE: 68 MMHG | HEART RATE: 75 BPM | HEIGHT: 67 IN | OXYGEN SATURATION: 95 % | SYSTOLIC BLOOD PRESSURE: 126 MMHG | RESPIRATION RATE: 22 BRPM | BODY MASS INDEX: 47.09 KG/M2 | TEMPERATURE: 98.2 F

## 2024-11-08 DIAGNOSIS — R07.9 CHEST PAIN, UNSPECIFIED TYPE: Primary | ICD-10-CM

## 2024-11-08 LAB
ANION GAP SERPL CALC-SCNC: 5 MMOL/L (ref 3–18)
BASOPHILS # BLD: 0.1 K/UL (ref 0–0.1)
BASOPHILS NFR BLD: 1 % (ref 0–2)
BUN SERPL-MCNC: 17 MG/DL (ref 7–18)
BUN/CREAT SERPL: 14 (ref 12–20)
CALCIUM SERPL-MCNC: 9.1 MG/DL (ref 8.5–10.1)
CHLORIDE SERPL-SCNC: 109 MMOL/L (ref 100–111)
CO2 SERPL-SCNC: 28 MMOL/L (ref 21–32)
CREAT SERPL-MCNC: 1.24 MG/DL (ref 0.6–1.3)
D DIMER PPP FEU-MCNC: 0.37 UG/ML(FEU)
DIFFERENTIAL METHOD BLD: NORMAL
EOSINOPHIL # BLD: 0.2 K/UL (ref 0–0.4)
EOSINOPHIL NFR BLD: 2 % (ref 0–5)
ERYTHROCYTE [DISTWIDTH] IN BLOOD BY AUTOMATED COUNT: 13.2 % (ref 11.6–14.5)
GLUCOSE SERPL-MCNC: 98 MG/DL (ref 74–99)
HCT VFR BLD AUTO: 44.2 % (ref 36–48)
HGB BLD-MCNC: 14.7 G/DL (ref 13–16)
IMM GRANULOCYTES # BLD AUTO: 0 K/UL (ref 0–0.04)
IMM GRANULOCYTES NFR BLD AUTO: 0 % (ref 0–0.5)
LYMPHOCYTES # BLD: 1.8 K/UL (ref 0.9–3.6)
LYMPHOCYTES NFR BLD: 25 % (ref 21–52)
MCH RBC QN AUTO: 27.7 PG (ref 24–34)
MCHC RBC AUTO-ENTMCNC: 33.3 G/DL (ref 31–37)
MCV RBC AUTO: 83.4 FL (ref 78–100)
MONOCYTES # BLD: 0.6 K/UL (ref 0.05–1.2)
MONOCYTES NFR BLD: 8 % (ref 3–10)
NEUTS SEG # BLD: 4.6 K/UL (ref 1.8–8)
NEUTS SEG NFR BLD: 64 % (ref 40–73)
NRBC # BLD: 0 K/UL (ref 0–0.01)
NRBC BLD-RTO: 0 PER 100 WBC
PLATELET # BLD AUTO: 229 K/UL (ref 135–420)
PMV BLD AUTO: 10.4 FL (ref 9.2–11.8)
POTASSIUM SERPL-SCNC: 3.7 MMOL/L (ref 3.5–5.5)
RBC # BLD AUTO: 5.3 M/UL (ref 4.35–5.65)
SODIUM SERPL-SCNC: 142 MMOL/L (ref 136–145)
TROPONIN I SERPL HS-MCNC: 5 NG/L (ref 0–78)
TROPONIN I SERPL HS-MCNC: 7 NG/L (ref 0–78)
WBC # BLD AUTO: 7.1 K/UL (ref 4.6–13.2)

## 2024-11-08 PROCEDURE — 85025 COMPLETE CBC W/AUTO DIFF WBC: CPT

## 2024-11-08 PROCEDURE — 85379 FIBRIN DEGRADATION QUANT: CPT

## 2024-11-08 PROCEDURE — 94762 N-INVAS EAR/PLS OXIMTRY CONT: CPT

## 2024-11-08 PROCEDURE — 84484 ASSAY OF TROPONIN QUANT: CPT

## 2024-11-08 PROCEDURE — 80048 BASIC METABOLIC PNL TOTAL CA: CPT

## 2024-11-08 PROCEDURE — 93005 ELECTROCARDIOGRAM TRACING: CPT | Performed by: EMERGENCY MEDICINE

## 2024-11-08 PROCEDURE — 99285 EMERGENCY DEPT VISIT HI MDM: CPT

## 2024-11-08 PROCEDURE — 71045 X-RAY EXAM CHEST 1 VIEW: CPT

## 2024-11-08 ASSESSMENT — PAIN DESCRIPTION - LOCATION: LOCATION: CHEST

## 2024-11-08 ASSESSMENT — PAIN SCALES - GENERAL: PAINLEVEL_OUTOF10: 5

## 2024-11-08 ASSESSMENT — PAIN DESCRIPTION - ORIENTATION: ORIENTATION: LEFT;UPPER

## 2024-11-08 ASSESSMENT — ENCOUNTER SYMPTOMS: RESPIRATORY NEGATIVE: 1

## 2024-11-08 ASSESSMENT — LIFESTYLE VARIABLES
HOW MANY STANDARD DRINKS CONTAINING ALCOHOL DO YOU HAVE ON A TYPICAL DAY: 1 OR 2
HOW OFTEN DO YOU HAVE A DRINK CONTAINING ALCOHOL: MONTHLY OR LESS

## 2024-11-08 ASSESSMENT — PAIN DESCRIPTION - DESCRIPTORS: DESCRIPTORS: ACHING

## 2024-11-08 NOTE — ED TRIAGE NOTES
WC to room 1 with c/o intermittent CP mid to left sided x 1 month, states has had stress test and other Cardiac tests done and starts seeing Dr Engel next month. \"It's more of a squeezing sensation off and on and I have been getting headaches and feeling fatigued all the time\". Denies SOB

## 2024-11-08 NOTE — ED PROVIDER NOTES
[facility] EMERGENCY DEPT  EMERGENCY DEPARTMENT ENCOUNTER      Pt Name: Basim Bellamy  MRN: [id number]  Birthdate 1970  Date of evaluation: 11/8/2024  Provider: LOUISE LOPEZ MD  4:40 PM    CHIEF COMPLAINT       Chief Complaint   Patient presents with    Chest Pain    Headache    Fatigue         HISTORY OF PRESENT ILLNESS    Basim Bellamy is a 54 y.o. male who presents to the emergency department     54-year-old male past medical history of hypertension diabetes morbid obesity chest pain obstructive sleep apnea and chronic kidney disease presents emergency department with chest pain.  This been going on for over a month.  He has been seen at [facility] had a stress test which was low risk.  Patient has no nausea vomiting or shortness of breath or dyspnea on exertion.  He has cardiology appointment at the end of the month.  Patient also had a CT done by his PCP evaluate his calcium score which was 12.  Patient states the pain is shocking left-sided nonradiating.  No other issues expressed.    The history is provided by the patient. No  was used.       Nursing Notes were reviewed.    REVIEW OF SYSTEMS       Review of Systems   Constitutional: Negative.    Respiratory: Negative.     Cardiovascular:  Positive for chest pain.   Neurological:  Positive for dizziness. Negative for weakness.       Except as noted above the remainder of the review of systems was reviewed and negative.       PAST MEDICAL HISTORY     Past Medical History:   Diagnosis Date    Adverse effect of anesthesia     for back sx-had issues with breathing  with apnea- 6 years ago    Asthma     Chest pain, unspecified     ATYPICAL,POSSIBLE ANGINA,MUSCULAR,CAD,CHEST WALL PAINS PERSISTANT AND RECURRENT    Chronic kidney disease     Diabetes mellitus (HCC)     DM (diabetes mellitus) (HCC)     GERD (gastroesophageal reflux disease)     HTN (hypertension)     Hypercholesteremia     Hypertension     Kidney stones     Sleep apnea     cpap  136.1 kg (300 lb)             Physical Exam  Vitals and nursing note reviewed.   Constitutional:       Appearance: Normal appearance.   HENT:      Head: Normocephalic and atraumatic.      Nose: Nose normal.      Mouth/Throat:      Mouth: Mucous membranes are moist.   Cardiovascular:      Rate and Rhythm: Normal rate and regular rhythm.      Pulses: Normal pulses.      Heart sounds: Normal heart sounds. No murmur heard.     No friction rub. No gallop.   Pulmonary:      Effort: No respiratory distress.      Breath sounds: Normal breath sounds. No stridor. No wheezing, rhonchi or rales.   Chest:      Chest wall: No tenderness.   Abdominal:      General: There is no distension.      Palpations: Abdomen is soft. There is no mass.      Tenderness: There is no abdominal tenderness. There is no right CVA tenderness, left CVA tenderness, guarding or rebound.      Hernia: No hernia is present.   Musculoskeletal:         General: Normal range of motion.      Cervical back: Normal range of motion.   Skin:     General: Skin is warm.      Capillary Refill: Capillary refill takes less than 2 seconds.      Coloration: Skin is not jaundiced or pale.      Findings: No bruising, erythema, lesion or rash.   Neurological:      Mental Status: He is alert and oriented to person, place, and time.      Cranial Nerves: No cranial nerve deficit.      Motor: No weakness.      Gait: Gait normal.   Psychiatric:         Mood and Affect: Mood normal.         Behavior: Behavior normal.         DIAGNOSTIC RESULTS     EKG: All EKG's are interpreted by the Emergency Department Physician who either signs or Co-signs this chart in the absence of a cardiologist.    Normal sinus rhythm at 80 normal axis normal intervals    RADIOLOGY:   Non-plain film images such as CT, Ultrasound and MRI are read by the radiologist. Plain radiographic images are visualized and preliminarily interpreted by the emergency physician with the below

## 2024-11-09 LAB
EKG ATRIAL RATE: 80 BPM
EKG DIAGNOSIS: NORMAL
EKG P AXIS: 46 DEGREES
EKG P-R INTERVAL: 180 MS
EKG Q-T INTERVAL: 366 MS
EKG QRS DURATION: 88 MS
EKG QTC CALCULATION (BAZETT): 422 MS
EKG R AXIS: 32 DEGREES
EKG T AXIS: 23 DEGREES
EKG VENTRICULAR RATE: 80 BPM

## 2024-11-09 PROCEDURE — 93010 ELECTROCARDIOGRAM REPORT: CPT | Performed by: INTERNAL MEDICINE

## 2024-11-17 ENCOUNTER — HOSPITAL ENCOUNTER (EMERGENCY)
Facility: HOSPITAL | Age: 54
Discharge: HOME | End: 2024-11-17
Attending: EMERGENCY MEDICINE
Payer: OTHER GOVERNMENT

## 2024-11-17 ENCOUNTER — APPOINTMENT (OUTPATIENT)
Facility: HOSPITAL | Age: 54
End: 2024-11-17
Payer: OTHER GOVERNMENT

## 2024-11-17 VITALS
WEIGHT: 300 LBS | HEART RATE: 88 BPM | DIASTOLIC BLOOD PRESSURE: 78 MMHG | RESPIRATION RATE: 26 BRPM | SYSTOLIC BLOOD PRESSURE: 126 MMHG | TEMPERATURE: 98.7 F | HEIGHT: 67 IN | OXYGEN SATURATION: 94 % | BODY MASS INDEX: 47.09 KG/M2

## 2024-11-17 DIAGNOSIS — R07.9 CHEST PAIN, UNSPECIFIED TYPE: Primary | ICD-10-CM

## 2024-11-17 LAB
ALBUMIN SERPL-MCNC: 3.6 G/DL (ref 3.4–5)
ALBUMIN/GLOB SERPL: 1.2 (ref 0.8–1.7)
ALP SERPL-CCNC: 100 U/L (ref 45–117)
ALT SERPL-CCNC: 26 U/L (ref 16–61)
ANION GAP SERPL CALC-SCNC: 8 MMOL/L (ref 3–18)
AST SERPL-CCNC: 15 U/L (ref 10–38)
BASOPHILS # BLD: 0 K/UL (ref 0–0.1)
BASOPHILS NFR BLD: 0 % (ref 0–2)
BILIRUB SERPL-MCNC: 0.8 MG/DL (ref 0.2–1)
BUN SERPL-MCNC: 26 MG/DL (ref 7–18)
BUN/CREAT SERPL: 20 (ref 12–20)
CALCIUM SERPL-MCNC: 8.7 MG/DL (ref 8.5–10.1)
CHLORIDE SERPL-SCNC: 108 MMOL/L (ref 100–111)
CO2 SERPL-SCNC: 28 MMOL/L (ref 21–32)
CREAT SERPL-MCNC: 1.28 MG/DL (ref 0.6–1.3)
DIFFERENTIAL METHOD BLD: ABNORMAL
EKG ATRIAL RATE: 92 BPM
EKG DIAGNOSIS: NORMAL
EKG P AXIS: 50 DEGREES
EKG P-R INTERVAL: 182 MS
EKG Q-T INTERVAL: 344 MS
EKG QRS DURATION: 80 MS
EKG QTC CALCULATION (BAZETT): 425 MS
EKG R AXIS: 44 DEGREES
EKG T AXIS: 21 DEGREES
EKG VENTRICULAR RATE: 92 BPM
EOSINOPHIL # BLD: 0.1 K/UL (ref 0–0.4)
EOSINOPHIL NFR BLD: 1 % (ref 0–5)
ERYTHROCYTE [DISTWIDTH] IN BLOOD BY AUTOMATED COUNT: 13.3 % (ref 11.6–14.5)
FLUAV RNA SPEC QL NAA+PROBE: NOT DETECTED
FLUBV RNA SPEC QL NAA+PROBE: NOT DETECTED
GLOBULIN SER CALC-MCNC: 2.9 G/DL (ref 2–4)
GLUCOSE SERPL-MCNC: 125 MG/DL (ref 74–99)
HCT VFR BLD AUTO: 47.5 % (ref 36–48)
HGB BLD-MCNC: 15.6 G/DL (ref 13–16)
IMM GRANULOCYTES # BLD AUTO: 0 K/UL (ref 0–0.04)
IMM GRANULOCYTES NFR BLD AUTO: 0 % (ref 0–0.5)
LYMPHOCYTES # BLD: 1 K/UL (ref 0.9–3.6)
LYMPHOCYTES NFR BLD: 9 % (ref 21–52)
MCH RBC QN AUTO: 27.4 PG (ref 24–34)
MCHC RBC AUTO-ENTMCNC: 32.8 G/DL (ref 31–37)
MCV RBC AUTO: 83.3 FL (ref 78–100)
MONOCYTES # BLD: 0.9 K/UL (ref 0.05–1.2)
MONOCYTES NFR BLD: 9 % (ref 3–10)
NEUTS SEG # BLD: 9 K/UL (ref 1.8–8)
NEUTS SEG NFR BLD: 81 % (ref 40–73)
NRBC # BLD: 0 K/UL (ref 0–0.01)
NRBC BLD-RTO: 0 PER 100 WBC
PLATELET # BLD AUTO: 215 K/UL (ref 135–420)
PMV BLD AUTO: 11.2 FL (ref 9.2–11.8)
POTASSIUM SERPL-SCNC: 4.1 MMOL/L (ref 3.5–5.5)
PROT SERPL-MCNC: 6.5 G/DL (ref 6.4–8.2)
RBC # BLD AUTO: 5.7 M/UL (ref 4.35–5.65)
SARS-COV-2 RNA RESP QL NAA+PROBE: NOT DETECTED
SODIUM SERPL-SCNC: 144 MMOL/L (ref 136–145)
SOURCE: NORMAL
TROPONIN I SERPL HS-MCNC: 6 NG/L (ref 0–78)
WBC # BLD AUTO: 11.1 K/UL (ref 4.6–13.2)

## 2024-11-17 PROCEDURE — 99285 EMERGENCY DEPT VISIT HI MDM: CPT

## 2024-11-17 PROCEDURE — 93010 ELECTROCARDIOGRAM REPORT: CPT | Performed by: INTERNAL MEDICINE

## 2024-11-17 PROCEDURE — 6370000000 HC RX 637 (ALT 250 FOR IP): Performed by: EMERGENCY MEDICINE

## 2024-11-17 PROCEDURE — 87636 SARSCOV2 & INF A&B AMP PRB: CPT

## 2024-11-17 PROCEDURE — 84484 ASSAY OF TROPONIN QUANT: CPT

## 2024-11-17 PROCEDURE — 93005 ELECTROCARDIOGRAM TRACING: CPT | Performed by: EMERGENCY MEDICINE

## 2024-11-17 PROCEDURE — 2580000003 HC RX 258: Performed by: EMERGENCY MEDICINE

## 2024-11-17 PROCEDURE — 80053 COMPREHEN METABOLIC PANEL: CPT

## 2024-11-17 PROCEDURE — 2500000003 HC RX 250 WO HCPCS: Performed by: EMERGENCY MEDICINE

## 2024-11-17 PROCEDURE — 96374 THER/PROPH/DIAG INJ IV PUSH: CPT

## 2024-11-17 PROCEDURE — 71045 X-RAY EXAM CHEST 1 VIEW: CPT

## 2024-11-17 PROCEDURE — 85025 COMPLETE CBC W/AUTO DIFF WBC: CPT

## 2024-11-17 RX ORDER — ACETAMINOPHEN 325 MG/1
975 TABLET ORAL
Status: COMPLETED | OUTPATIENT
Start: 2024-11-17 | End: 2024-11-17

## 2024-11-17 RX ORDER — FAMOTIDINE 20 MG/1
20 TABLET, FILM COATED ORAL 2 TIMES DAILY
Qty: 60 TABLET | Refills: 0 | Status: SHIPPED | OUTPATIENT
Start: 2024-11-17 | End: 2024-12-17

## 2024-11-17 RX ADMIN — FAMOTIDINE 20 MG: 10 INJECTION, SOLUTION INTRAVENOUS at 11:44

## 2024-11-17 RX ADMIN — ALUMINUM HYDROXIDE AND MAGNESIUM HYDROXIDE 20 ML: 200; 200 SUSPENSION ORAL at 11:44

## 2024-11-17 RX ADMIN — ACETAMINOPHEN 325MG 975 MG: 325 TABLET ORAL at 11:43

## 2024-11-17 ASSESSMENT — PAIN SCALES - GENERAL
PAINLEVEL_OUTOF10: 3
PAINLEVEL_OUTOF10: 2
PAINLEVEL_OUTOF10: 4

## 2024-11-17 ASSESSMENT — PAIN - FUNCTIONAL ASSESSMENT: PAIN_FUNCTIONAL_ASSESSMENT: 0-10

## 2024-11-17 NOTE — ED TRIAGE NOTES
Intermittent chest pain \"squeeze\" to left anterior chest for weeks. Patient states he has been evaluated for this with stress test 10/14    Here today because s/s accompanied by HTN at home, chills, dry cough, nausea, diarrhea

## 2024-11-17 NOTE — ED PROVIDER NOTES
1.  No active cardiopulmonary disease                Electronically signed by Margot Sharif            Medical Decision Making   I am the first provider for this patient.    I reviewed the vital signs, available nursing notes, past medical history, past surgical history, family history and social history.    Vital Signs-Reviewed the patient's vital signs.      EKG: ed course      ED Course: Progress Notes, Reevaluation, and Consults:    Provider Notes (Medical Decision Making):     MDM  Number of Diagnoses or Management Options  Chest pain, unspecified type  Diagnosis management comments: Differential diagnosis includes ACS, pneumonia, pleural effusion, viral infection, enteritis, electrolyte abnormality, GERD/reflux    - cbc, cmp   - tropx1, ekgx1  - cxr  - pepcid, maalox  - tylenol  - re eval        ED Course as of 11/17/24 1328   Sun Nov 17, 2024   1051 EKG interpreted by me ventricular rate 92 no no STEMI no ST depressions all intervals grossly within normal limits no T wave inversions normal axis normal sinus rhythm [NF]   1326 Patient states he feels much better his chest pain is gone and his headache is markedly relieved.  Will send Pepcid to the pharmacy.  And have patient follow-up with his PCP. [NF]      ED Course User Index  [NF] Albert Barker MD       Patient was given the following medications:  Medications   famotidine (PEPCID) 20 mg in sodium chloride (PF) 0.9 % 10 mL injection (20 mg IntraVENous Given 11/17/24 1144)   aluminum-magnesium hydroxide 200-200 MG/5ML suspension 20 mL (20 mLs Oral Given 11/17/24 1144)   acetaminophen (TYLENOL) tablet 975 mg (975 mg Oral Given 11/17/24 1143)       CONSULTS: (Who and What was discussed)  None    Chronic Conditions: as above    Social Determinants affecting Dx or Tx: None    Records Reviewed (source and summary of external notes): Old Medical Records, Previous electrocardiograms, Previous Radiology Studies, and Previous Laboratory Studies  ECHO 10/2024:

## 2024-11-18 ENCOUNTER — OFFICE VISIT (OUTPATIENT)
Age: 54
End: 2024-11-18
Payer: OTHER GOVERNMENT

## 2024-11-18 VITALS
HEART RATE: 78 BPM | BODY MASS INDEX: 47.56 KG/M2 | SYSTOLIC BLOOD PRESSURE: 140 MMHG | OXYGEN SATURATION: 94 % | HEIGHT: 67 IN | WEIGHT: 303 LBS | DIASTOLIC BLOOD PRESSURE: 80 MMHG

## 2024-11-18 DIAGNOSIS — R07.9 CHEST PAIN, UNSPECIFIED TYPE: Primary | ICD-10-CM

## 2024-11-18 PROCEDURE — 3077F SYST BP >= 140 MM HG: CPT | Performed by: INTERNAL MEDICINE

## 2024-11-18 PROCEDURE — 99205 OFFICE O/P NEW HI 60 MIN: CPT | Performed by: INTERNAL MEDICINE

## 2024-11-18 PROCEDURE — 3079F DIAST BP 80-89 MM HG: CPT | Performed by: INTERNAL MEDICINE

## 2024-11-18 RX ORDER — AMLODIPINE BESYLATE 5 MG/1
5 TABLET ORAL DAILY
COMMUNITY

## 2024-11-18 RX ORDER — NITROGLYCERIN 0.4 MG/1
0.4 TABLET SUBLINGUAL EVERY 5 MIN PRN
Qty: 25 TABLET | Refills: 3 | Status: SHIPPED | OUTPATIENT
Start: 2024-11-18

## 2024-11-18 NOTE — PROGRESS NOTES
[facility]    Basim Bellamy is 54 y.o. male     Patient is here today for cardiac evaluation  Denies prior history of MI or CHF  Here for evaluation of chest pain.  He has to go emerge department 3 times over the last 1 month with chest pain  Last month he had initial episode of chest pain where he feels like there was pressure and heaviness and the chest was being compressed that lasted on and off for 2 days.  It was also associated with some radiation to the jaw and the back and felt like his jaw was getting numb.  He did go to the ER where he had a stress echocardiogram which was unremarkable  He had 2 more episode like this however he was not given nitroglycerin.  Patient is very worried about the symptoms as these are different symptoms compared to his previous chest pain in the past.  He also explained experiencing some exertional dyspnea and fatigue since this episode has started which is new for him.  No orthopnea or PND.  No significant palpitation, presyncope syncope  Denies any nausea, vomiting, abdominal pain, fever, chills, sputum production. No hematuria or other bleeding complaints    Past Medical History:   Diagnosis Date    Adverse effect of anesthesia     for back sx-had issues with breathing  with apnea- 6 years ago    Asthma     Chronic kidney disease     Diabetes mellitus (HCC)     GERD (gastroesophageal reflux disease)     HTN (hypertension)     Hypercholesteremia     Kidney stones     Sleep apnea     cpap       Review of Systems:  Cardiac symptoms as noted above in HPI. All others negative.  Denies fatigue, malaise, skin rash, joint pain, blurring vision, photophobia, neck pain, hemoptysis, chronic cough, nausea, vomiting, hematuria, burning micturition, BRBPR, chronic headaches.    Current Outpatient Medications   Medication Sig    amLODIPine (NORVASC) 5 MG tablet Take 1 tablet by mouth daily    Magnesium 250 MG CAPS Take

## 2024-11-18 NOTE — PATIENT INSTRUCTIONS
Heart Catheterization Instructions    Patient’s Name:  Basim Bellamy    You are scheduled to have a left heart catheterization on 12/11/2024  at cath lab time.  Please check in at -  .     Please go to [facility] and park in the outpatient parking lot that is located around to the back of the hospital and enter through the [facility] building.  Once you enter through the [employer]ilion check in with the  there. The  will either give you directions or assist you in getting to the cath holding area.          You are not to eat or drink anything after midnight the night before your procedure. Small sips of water to take your medications is ok.     If you are diabetic, do not take your insulin/sugar pill the morning of the procedure.    MEDICATION INSTRUCTIONS:   Please take your morning medications with the following special instructions:    [x]          Please make sure to take your Blood pressure medication : amlodipine, coreg, hctz, benazepril.    [x]          Take your Aspirin.    We encourage families to wait in the waiting room on the first floor while the procedure is being done.  The Doctor will come out and talk with you as soon as the procedure is over.    There is the possibility that you may spend the night in the hospital, depending on the results of the procedure.  This will be determined after the procedure is done.  If angioplasty or stent is planned, you will stay at least one day.    If you or your family have any questions, please call our office Monday -Friday, 9:00 a.m.-4:30 p.m.,  At [phone], and ask to speak to one of the nurses.      OBTAIN LABWORK ORDERED SEVEN (7)  DAYS PRIOR TO PROCEDURE          IF YOU HAVE NOT RECEIVED A CALL FROM THE HOSPITAL TO CONFIRM YOUR PROCEDURE TIME WITHIN 48 HOURS OF YOUR SCHEDULED PROCEDURE, PLEASE CALL [phone] AND ASK TO SPEAK WITH JUAN OR MIKO TO FIND OUT WHAT TIME TO BE AT THE HOSPITAL FOR YOUR SCHEDULED

## 2024-12-03 ENCOUNTER — HOSPITAL ENCOUNTER (OUTPATIENT)
Facility: HOSPITAL | Age: 54
Discharge: HOME | End: 2024-12-06
Payer: OTHER GOVERNMENT

## 2024-12-03 DIAGNOSIS — R07.9 CHEST PAIN, UNSPECIFIED TYPE: ICD-10-CM

## 2024-12-03 LAB
ALBUMIN SERPL-MCNC: 3.7 G/DL (ref 3.4–5)
ALBUMIN/GLOB SERPL: 1.3 (ref 0.8–1.7)
ALP SERPL-CCNC: 90 U/L (ref 45–117)
ALT SERPL-CCNC: 26 U/L (ref 16–61)
ANION GAP SERPL CALC-SCNC: 6 MMOL/L (ref 3–18)
AST SERPL-CCNC: 9 U/L (ref 10–38)
BILIRUB SERPL-MCNC: 0.7 MG/DL (ref 0.2–1)
BUN SERPL-MCNC: 22 MG/DL (ref 7–18)
BUN/CREAT SERPL: 18 (ref 12–20)
CALCIUM SERPL-MCNC: 9.5 MG/DL (ref 8.5–10.1)
CHLORIDE SERPL-SCNC: 105 MMOL/L (ref 100–111)
CO2 SERPL-SCNC: 29 MMOL/L (ref 21–32)
CREAT SERPL-MCNC: 1.25 MG/DL (ref 0.6–1.3)
ERYTHROCYTE [DISTWIDTH] IN BLOOD BY AUTOMATED COUNT: 13.2 % (ref 11.6–14.5)
GLOBULIN SER CALC-MCNC: 2.8 G/DL (ref 2–4)
GLUCOSE SERPL-MCNC: 109 MG/DL (ref 74–99)
HCT VFR BLD AUTO: 46.4 % (ref 36–48)
HGB BLD-MCNC: 14.8 G/DL (ref 13–16)
INR PPP: 1 (ref 0.9–1.1)
MCH RBC QN AUTO: 27 PG (ref 24–34)
MCHC RBC AUTO-ENTMCNC: 31.9 G/DL (ref 31–37)
MCV RBC AUTO: 84.7 FL (ref 78–100)
NRBC # BLD: 0 K/UL (ref 0–0.01)
NRBC BLD-RTO: 0 PER 100 WBC
PLATELET # BLD AUTO: 220 K/UL (ref 135–420)
PMV BLD AUTO: 11.6 FL (ref 9.2–11.8)
POTASSIUM SERPL-SCNC: 4.1 MMOL/L (ref 3.5–5.5)
PROT SERPL-MCNC: 6.5 G/DL (ref 6.4–8.2)
PROTHROMBIN TIME: 13.2 SEC (ref 11.9–14.9)
RBC # BLD AUTO: 5.48 M/UL (ref 4.35–5.65)
SODIUM SERPL-SCNC: 140 MMOL/L (ref 136–145)
WBC # BLD AUTO: 8 K/UL (ref 4.6–13.2)

## 2024-12-03 PROCEDURE — 85610 PROTHROMBIN TIME: CPT

## 2024-12-03 PROCEDURE — 80053 COMPREHEN METABOLIC PANEL: CPT

## 2024-12-03 PROCEDURE — 85027 COMPLETE CBC AUTOMATED: CPT

## 2024-12-03 PROCEDURE — 36415 COLL VENOUS BLD VENIPUNCTURE: CPT

## 2024-12-04 NOTE — FLOWSHEET NOTE
Called to verify arrival time of 0645 for 0800 procedure on 12/11. Pre-procedure instructions verified including NPO after midnight and which meds to take and/or hold. All questions answered, patient verbalized understanding.

## 2024-12-11 ENCOUNTER — HOSPITAL ENCOUNTER (OUTPATIENT)
Facility: HOSPITAL | Age: 54
Setting detail: OUTPATIENT SURGERY
Discharge: HOME | End: 2024-12-11
Attending: INTERNAL MEDICINE | Admitting: INTERNAL MEDICINE
Payer: OTHER GOVERNMENT

## 2024-12-11 VITALS
WEIGHT: 300 LBS | RESPIRATION RATE: 17 BRPM | BODY MASS INDEX: 47.09 KG/M2 | SYSTOLIC BLOOD PRESSURE: 135 MMHG | TEMPERATURE: 98 F | OXYGEN SATURATION: 97 % | HEART RATE: 73 BPM | HEIGHT: 67 IN | DIASTOLIC BLOOD PRESSURE: 76 MMHG

## 2024-12-11 DIAGNOSIS — R07.9 CHEST PAIN, UNSPECIFIED TYPE: ICD-10-CM

## 2024-12-11 LAB — ECHO BSA: 2.54 M2

## 2024-12-11 PROCEDURE — C1894 INTRO/SHEATH, NON-LASER: HCPCS | Performed by: INTERNAL MEDICINE

## 2024-12-11 PROCEDURE — 99152 MOD SED SAME PHYS/QHP 5/>YRS: CPT | Performed by: INTERNAL MEDICINE

## 2024-12-11 PROCEDURE — 93458 L HRT ARTERY/VENTRICLE ANGIO: CPT | Performed by: INTERNAL MEDICINE

## 2024-12-11 PROCEDURE — 2500000003 HC RX 250 WO HCPCS: Performed by: INTERNAL MEDICINE

## 2024-12-11 PROCEDURE — 7100000010 HC PHASE II RECOVERY - FIRST 15 MIN: Performed by: INTERNAL MEDICINE

## 2024-12-11 PROCEDURE — C1769 GUIDE WIRE: HCPCS | Performed by: INTERNAL MEDICINE

## 2024-12-11 PROCEDURE — 2709999900 HC NON-CHARGEABLE SUPPLY: Performed by: INTERNAL MEDICINE

## 2024-12-11 PROCEDURE — 76000 FLUOROSCOPY <1 HR PHYS/QHP: CPT | Performed by: INTERNAL MEDICINE

## 2024-12-11 PROCEDURE — 6360000004 HC RX CONTRAST MEDICATION: Performed by: INTERNAL MEDICINE

## 2024-12-11 PROCEDURE — 6360000002 HC RX W HCPCS: Performed by: INTERNAL MEDICINE

## 2024-12-11 PROCEDURE — C1713 ANCHOR/SCREW BN/BN,TIS/BN: HCPCS | Performed by: INTERNAL MEDICINE

## 2024-12-11 PROCEDURE — 7100000011 HC PHASE II RECOVERY - ADDTL 15 MIN: Performed by: INTERNAL MEDICINE

## 2024-12-11 RX ORDER — MIDAZOLAM HYDROCHLORIDE 1 MG/ML
INJECTION, SOLUTION INTRAMUSCULAR; INTRAVENOUS PRN
Status: DISCONTINUED | OUTPATIENT
Start: 2024-12-11 | End: 2024-12-11 | Stop reason: HOSPADM

## 2024-12-11 RX ORDER — SODIUM CHLORIDE 0.9 % (FLUSH) 0.9 %
5-40 SYRINGE (ML) INJECTION PRN
Status: CANCELLED | OUTPATIENT
Start: 2024-12-11

## 2024-12-11 RX ORDER — SODIUM CHLORIDE 0.9 % (FLUSH) 0.9 %
5-40 SYRINGE (ML) INJECTION EVERY 12 HOURS SCHEDULED
Status: CANCELLED | OUTPATIENT
Start: 2024-12-11

## 2024-12-11 RX ORDER — ISOSORBIDE MONONITRATE 30 MG/1
30 TABLET, EXTENDED RELEASE ORAL DAILY
Status: CANCELLED | OUTPATIENT
Start: 2024-12-11

## 2024-12-11 RX ORDER — SODIUM CHLORIDE 9 MG/ML
INJECTION, SOLUTION INTRAVENOUS PRN
Status: CANCELLED | OUTPATIENT
Start: 2024-12-11

## 2024-12-11 RX ORDER — ISOSORBIDE MONONITRATE 30 MG/1
30 TABLET, EXTENDED RELEASE ORAL DAILY
Qty: 30 TABLET | Refills: 3 | Status: SHIPPED | OUTPATIENT
Start: 2024-12-11

## 2024-12-11 RX ORDER — ACETAMINOPHEN 325 MG/1
650 TABLET ORAL EVERY 4 HOURS PRN
Status: CANCELLED | OUTPATIENT
Start: 2024-12-11

## 2024-12-11 RX ORDER — SODIUM CHLORIDE 9 MG/ML
INJECTION, SOLUTION INTRAVENOUS PRN
Status: DISCONTINUED | OUTPATIENT
Start: 2024-12-11 | End: 2024-12-11 | Stop reason: HOSPADM

## 2024-12-11 RX ORDER — IODIXANOL 320 MG/ML
INJECTION, SOLUTION INTRAVASCULAR PRN
Status: DISCONTINUED | OUTPATIENT
Start: 2024-12-11 | End: 2024-12-11 | Stop reason: HOSPADM

## 2024-12-11 RX ORDER — VERAPAMIL HYDROCHLORIDE 2.5 MG/ML
INJECTION, SOLUTION INTRAVENOUS PRN
Status: DISCONTINUED | OUTPATIENT
Start: 2024-12-11 | End: 2024-12-11 | Stop reason: HOSPADM

## 2024-12-11 RX ORDER — FENTANYL CITRATE 50 UG/ML
INJECTION, SOLUTION INTRAMUSCULAR; INTRAVENOUS PRN
Status: DISCONTINUED | OUTPATIENT
Start: 2024-12-11 | End: 2024-12-11 | Stop reason: HOSPADM

## 2024-12-11 RX ORDER — ASPIRIN 81 MG/1
81 TABLET, CHEWABLE ORAL ONCE
Status: DISCONTINUED | OUTPATIENT
Start: 2024-12-11 | End: 2024-12-11 | Stop reason: HOSPADM

## 2024-12-11 RX ORDER — SODIUM CHLORIDE 9 MG/ML
INJECTION, SOLUTION INTRAVENOUS CONTINUOUS
Status: CANCELLED | OUTPATIENT
Start: 2024-12-11

## 2024-12-11 RX ORDER — HEPARIN SODIUM 1000 [USP'U]/ML
INJECTION, SOLUTION INTRAVENOUS; SUBCUTANEOUS PRN
Status: DISCONTINUED | OUTPATIENT
Start: 2024-12-11 | End: 2024-12-11 | Stop reason: HOSPADM

## 2024-12-11 ASSESSMENT — PAIN SCALES - GENERAL
PAINLEVEL_OUTOF10: 1
PAINLEVEL_OUTOF10: 1

## 2024-12-11 ASSESSMENT — PAIN - FUNCTIONAL ASSESSMENT
PAIN_FUNCTIONAL_ASSESSMENT: NONE - DENIES PAIN

## 2024-12-11 NOTE — PRE SEDATION
Sedation Plan  ASA: class 2 - patient with mild systemic disease     Mallampati class: II - soft palate, uvula, fauces visible.    Sedation plan: local anesthesia, moderate (conscious sedation) and minimal sedation    Risks, benefits, and alternatives discussed with patient and spouse.  Use of blood products discussed with patient and spouse who consented to blood products.

## 2024-12-11 NOTE — DISCHARGE INSTRUCTIONS
Cardiac Catheterization Discharge Instructions    Site Care  Check puncture site frequently for swelling or bleeding. If there is any bleeding, lie down (if access is groin), hold firm pressure with a clean towel or wash cloth. If bleeding doesn't stop, call 911. Notify your doctor for any redness, swelling, drainage, or oozing from the puncture site.   If extremity becomes cold, numb or painful go to the emergency room.   You may remove the bandage from your Right, Arm in 24 hours. You may shower at that point. No hot tubs, bath tubs, or swimming for 1 week.   After shower, pat the incision dry and you can place a clean band-aid over the site.   No lotions, ointments, or powders over puncture site for 1 week.   Use a clean band-aid over the puncture site each day for 5 days. Change band-aid daily.   Cold pack or ice can be placed on the area for 10 to 20 minutes to help with the soreness of the site.     Activity  Activity should be limited for the next 48 hours. Climb as little stairs as possible and avoiding any bending, stooping, or strenuous activity for 48 hours. No heavy lifting (anything heavier than a gallon of milk) for 5 days.   You can walk around the house and do light activity such as cooking.   If procedure was done through the groin, avoid stairs for the first day or two  If the procedure was done through the wrist, do not bend your wrist deeply for the first couple of days. Be careful when getting up from sitting as to not use the affected wrist for the first 48 hours.     Diet  You may resume your usual diet. Drink more fluids than usual to flush kidneys. If you have kidney, heart or liver disease talk with your doctor on fluid limitations  Keep eating a heart healthy diet full of fruits, vegetables, and whole grains.     Medication  Your doctor will tell you when to resume your medications.  If you take blood thinners such as warfarin (Coumadin), rivaroxaban (Xarelto), or apixaban (Eliquis) talk  sedentary lifestyle greatly increases your risk for illness      The discharge information has been reviewed with the Patient.  The Patient verbalized understanding. Discharge medications reviewed with the Patient and appropriate educational materials and side effects teaching were provided.=

## 2024-12-11 NOTE — PROGRESS NOTES
Cath holding summary    Patient escorted to cath holding from waiting area ambulatory, alert and oriented x 4, voicing no complaints.  Changed into gown and placed on monitor.   NPO since MN.  Lab results, med rec and H&P reviewed on chart.  PIV x 2 inserted without difficulty.  Family to bedside.

## 2024-12-11 NOTE — H&P
Please see clinic note from 11/18/24 as below for detail.  I saw and examined patient and confirmed above.  No interval change.  Labs reviewed.   Procedure explained to patient and all risk and benefit discussed with patient.  Risk, benefit, complication of LHC and possible PCI ( including but not limited to bleeding, infection, heart failure, stroke, MI, emergent bypass surgery, kidney failure, dialysis and death ) were discussed with patient and willing to proceed with procedure.  Proceed as planned. DW significant other    History and physical has been reviewed. There have been no significant clinical changes since the completion of the originally dated History and Physical.  Will be using moderate sedation.    ------------------------------------------------------------------------------------------------------------------

## 2024-12-11 NOTE — PROGRESS NOTES
AVS Discharge instructions reviewed with patient and copy given to patient.  All questions answered, including when to resume medications.  Patient verbalized understanding to all discharge instructions.  PIV removed. Procedural site within normal limits.  No hematoma or bleeding noted from procedural and PIV site. No pain noted at discharge. Patient back to neurological baseline, alert and oriented times 4. Patient discharged in the presence of a responsible adult (Wife) who will accompany patient home and is able to report post procedure complications.

## 2024-12-17 ENCOUNTER — PROCEDURE VISIT (OUTPATIENT)
Age: 54
End: 2024-12-17
Payer: OTHER GOVERNMENT

## 2024-12-17 VITALS
HEIGHT: 67 IN | DIASTOLIC BLOOD PRESSURE: 71 MMHG | WEIGHT: 309 LBS | HEART RATE: 79 BPM | TEMPERATURE: 98.3 F | OXYGEN SATURATION: 95 % | BODY MASS INDEX: 48.5 KG/M2 | SYSTOLIC BLOOD PRESSURE: 125 MMHG

## 2024-12-17 DIAGNOSIS — R20.2 NUMBNESS AND TINGLING IN BOTH HANDS: Primary | ICD-10-CM

## 2024-12-17 DIAGNOSIS — R20.0 NUMBNESS AND TINGLING IN BOTH HANDS: Primary | ICD-10-CM

## 2024-12-17 PROCEDURE — 95886 MUSC TEST DONE W/N TEST COMP: CPT | Performed by: PHYSICAL MEDICINE & REHABILITATION

## 2024-12-17 PROCEDURE — 95913 NRV CNDJ TEST 13/> STUDIES: CPT | Performed by: PHYSICAL MEDICINE & REHABILITATION

## 2024-12-17 NOTE — PROGRESS NOTES
Basim Bellamy presents today for   Chief Complaint   Patient presents with    Procedure     EMG BUE       Is someone accompanying this pt? no    Is the patient using any DME equipment during OV? no      Coordination of Care:  1. Have you been to the ER, urgent care clinic since your last visit? Yes, pt has been to ER several times for chest pain  Hospitalized since your last visit? Pt had cardiac cath done last week    2. Have you seen or consulted any other health care providers outside of the [facility] since your last visit? Yes, cardiology Include any pap smears or colon screening. no        
Cervical Parasp (Lower) Rami C7-C8 Nml Nml Nml Nml 0          Left Cervical Parasp (Upper) Rami C1-C3 Nml Nml Nml Nml 0          Left Cervical Parasp (Mid) Rami C4-C6 Nml Nml Nml Nml 0          Left Cervical Parasp (Lower) Rami C7-C8 Nml Nml Nml Nml 0                [facility]  OFFICE PROCEDURE PROGRESS NOTE        Chart reviewed for the following:   Quincy RIOJAS, have reviewed the History, Physical and updated the Allergic reactions for Basim Bellamy     TIME OUT performed immediately prior to start of procedure:   Quincy RIOJAS, have performed the following reviews on Basim Bellamy prior to the start of the procedure:            * Patient was identified by name and date of birth   * Agreement on procedure being performed was verified  * Risks and Benefits explained to the patient  * Procedure site verified and marked as necessary  * Patient was positioned for comfort  * Consent was signed and verified     Time: 4:49 PM EST     Date of procedure: 12/17/24     Procedure performed by:  Quincy Upton MD    Provider accompanied by: Scribe.    Patient accompanied by: Self    How tolerated by patient: tolerated the procedure well with no complications    Post Procedural Pain Scale: 0 - No Hurt    Comments: none        IQuincy MD, personally performed the services described in this documentation. I have authorized the scribe to complete the medical record entries input within this chart. I have reviewed the chart and agree that the record reflects my personal performance and is accurate and complete. [Electronically Signed: Quincy Upton MD. 12/17/2024 5:28 PM]

## 2024-12-24 RX ORDER — ISOSORBIDE MONONITRATE 30 MG/1
30 TABLET, EXTENDED RELEASE ORAL DAILY
Qty: 90 TABLET | Refills: 1 | Status: SHIPPED | OUTPATIENT
Start: 2024-12-24

## 2025-01-22 ENCOUNTER — OFFICE VISIT (OUTPATIENT)
Age: 55
End: 2025-01-22
Payer: OTHER GOVERNMENT

## 2025-01-22 VITALS
HEIGHT: 67 IN | DIASTOLIC BLOOD PRESSURE: 83 MMHG | SYSTOLIC BLOOD PRESSURE: 127 MMHG | BODY MASS INDEX: 48 KG/M2 | HEART RATE: 88 BPM | WEIGHT: 305.8 LBS | OXYGEN SATURATION: 95 % | TEMPERATURE: 97.8 F

## 2025-01-22 DIAGNOSIS — M79.10 TRIGGER POINT: ICD-10-CM

## 2025-01-22 DIAGNOSIS — G56.02 CARPAL TUNNEL SYNDROME OF LEFT WRIST: Primary | ICD-10-CM

## 2025-01-22 DIAGNOSIS — G62.9 NEUROPATHY: ICD-10-CM

## 2025-01-22 DIAGNOSIS — M79.18 MYALGIA, OTHER SITE: ICD-10-CM

## 2025-01-22 PROCEDURE — 3079F DIAST BP 80-89 MM HG: CPT | Performed by: PHYSICAL MEDICINE & REHABILITATION

## 2025-01-22 PROCEDURE — 99214 OFFICE O/P EST MOD 30 MIN: CPT | Performed by: PHYSICAL MEDICINE & REHABILITATION

## 2025-01-22 PROCEDURE — 3074F SYST BP LT 130 MM HG: CPT | Performed by: PHYSICAL MEDICINE & REHABILITATION

## 2025-01-22 NOTE — PROGRESS NOTES
Basim Bellamy presents today for   Chief Complaint   Patient presents with    Back Pain     LBP is the same    Neck Pain     Neck pain  EMG Upton 12/17/24       Is someone accompanying this pt? no    Is the patient using any DME equipment during OV? no      Coordination of Care:  1. Have you been to the ER, urgent care clinic since your last visit? no  Hospitalized since your last visit? no    2. Have you seen or consulted any other health care providers outside of the [facility] since your last visit? no Include any pap smears or colon screening. no    
cystic formation arising from the   joint space between the occipital condyle and the lateral mass additional   imaging is necessary to demonstrate this.           C2/C3: Patent canal and foramina.       C3/C4: Minor signal loss within the disc Patent canal and foramina.       C4/C5: C5 demonstrates a benign vertebral body hemangioma there is a patent   canal and foramina C5/C6: Small amount of anterior osteophyte identified. Patent   canal and foramina.       C6/C7: Patent canal and foramina.       C7/T1: Patent canal and foramina.       Other structures: As can be identified cervical visceral spaces show no evidence   of a mass and there is no evidence of any  adenopathy   .         Impression  IMPRESSION:   There is a suggestion of a right-sided occipital mass possibly representing   degenerative change or possibility of a small tumor arising from the soft tissue   or bone.       Unfortunately this is only demonstrated in the extreme right sagittal images in   the sagittal plane only.   May Wish to acquire additional imaging through this area to exclude above   concerns.            The remainder of the cervical spine demonstrates only   Minor degenerative features present no central canal stenosis cord and nerve   roots are not compressed.       Benign vertebral body hemangioma C5 level.       Thank you for enabling us to participate in the care of this patient.         Right shoulder MRI from 09/18/2019 were personally reviewed and demonstrated:   Mild to moderate supraspinatus tendinosis. Moderate infraspinatus tendinosis   with bursal surface fraying. Moderate inflammation in the subacromial bursa.   Mild subscapular tendinosis. No muscular atrophy.       No glenohumeral joint effusion. No focal glenohumeral cartilage loss. Moderate   maceration of the superior labrum. Biceps anchor is not defined. Attenuated   remaining biceps tendon within the tendon sheath. Subcortical cystic changes in   the humeral head. 
normal...

## 2025-01-27 ENCOUNTER — OFFICE VISIT (OUTPATIENT)
Age: 55
End: 2025-01-27
Payer: OTHER GOVERNMENT

## 2025-01-27 VITALS
HEIGHT: 67 IN | SYSTOLIC BLOOD PRESSURE: 104 MMHG | DIASTOLIC BLOOD PRESSURE: 60 MMHG | WEIGHT: 303 LBS | OXYGEN SATURATION: 95 % | HEART RATE: 96 BPM | BODY MASS INDEX: 47.56 KG/M2

## 2025-01-27 DIAGNOSIS — E78.00 PURE HYPERCHOLESTEROLEMIA: ICD-10-CM

## 2025-01-27 DIAGNOSIS — I25.83 CORONARY ARTERY DISEASE DUE TO LIPID RICH PLAQUE: Primary | ICD-10-CM

## 2025-01-27 DIAGNOSIS — I25.10 CORONARY ARTERY DISEASE DUE TO LIPID RICH PLAQUE: Primary | ICD-10-CM

## 2025-01-27 DIAGNOSIS — I10 ESSENTIAL HYPERTENSION WITH GOAL BLOOD PRESSURE LESS THAN 140/90: ICD-10-CM

## 2025-01-27 PROCEDURE — 3078F DIAST BP <80 MM HG: CPT | Performed by: INTERNAL MEDICINE

## 2025-01-27 PROCEDURE — 3074F SYST BP LT 130 MM HG: CPT | Performed by: INTERNAL MEDICINE

## 2025-01-27 PROCEDURE — 99214 OFFICE O/P EST MOD 30 MIN: CPT | Performed by: INTERNAL MEDICINE

## 2025-01-27 RX ORDER — ATORVASTATIN CALCIUM 40 MG/1
40 TABLET, FILM COATED ORAL DAILY
COMMUNITY
Start: 2024-12-23

## 2025-01-27 RX ORDER — BENAZEPRIL HYDROCHLORIDE 20 MG/1
20 TABLET ORAL DAILY
Qty: 90 TABLET | Refills: 2 | Status: SHIPPED | OUTPATIENT
Start: 2025-01-27 | End: 2025-01-31 | Stop reason: SDUPTHER

## 2025-01-27 RX ORDER — CARVEDILOL PHOSPHATE 40 MG/1
40 CAPSULE, EXTENDED RELEASE ORAL
Qty: 90 CAPSULE | Refills: 2 | Status: SHIPPED | OUTPATIENT
Start: 2025-01-27

## 2025-01-27 NOTE — PROGRESS NOTES
antianginal  Amlodipine, Coreg, Imdur.    Hypertension: /80.  Currently on benazepril, carvedilol, hydrochlorothiazide.  Salt restriction, DASH diet discussed.  Reduced benazepril and increase dose of Coreg in order to help with possible microvascular dysfunction as well as mild myocardial bridging.  Goal heart rate 55-60 discussed    Hyperlipidemia: Currently on atorvastatin 20 mg daily.  This been checked by PCP regularly.  Goal LDL less than 70 because of diabetes    Diabetes: Goal hemoglobin A1c less than 7 is recommended from cardiovascular standpoint.    Obesity: Current weight is 303 pound with BMI of 47.  Importance of diet and exercise was discussed with patient again during this visit.    This plan was discussed with patient who is in agreement.    Thank you for allowing me to participate in patient care. Please feel free to call me if you have any question or concern.     Mohan Engel MD  Please note: This document has been produced using voice recognition software. Unrecognized errors in transcription may be present.

## 2025-02-02 RX ORDER — BENAZEPRIL HYDROCHLORIDE 20 MG/1
20 TABLET ORAL DAILY
Qty: 90 TABLET | Refills: 2 | Status: SHIPPED | OUTPATIENT
Start: 2025-02-02

## 2025-03-05 ENCOUNTER — TELEPHONE (OUTPATIENT)
Age: 55
End: 2025-03-05

## 2025-03-05 DIAGNOSIS — M54.42 CHRONIC BILATERAL LOW BACK PAIN WITH LEFT-SIDED SCIATICA: ICD-10-CM

## 2025-03-05 DIAGNOSIS — G89.29 CHRONIC BILATERAL LOW BACK PAIN WITH LEFT-SIDED SCIATICA: ICD-10-CM

## 2025-03-05 DIAGNOSIS — M79.18 MYALGIA, OTHER SITE: ICD-10-CM

## 2025-03-05 RX ORDER — PREGABALIN 100 MG/1
CAPSULE ORAL
Qty: 270 CAPSULE | Refills: 1 | Status: SHIPPED | OUTPATIENT
Start: 2025-03-05 | End: 2025-10-03

## 2025-03-05 NOTE — TELEPHONE ENCOUNTER
Patient last seen 1/22/2025 and did not require refills at that time.Last prescription was written on 8/28/2024 for #270 with 1 refill. Patient has a follow up on 7/22/2025. I have pended Lyrica 100 mg #270 take 1 po q am and 2 po q pm with 1 refill. Please advise.

## 2025-03-05 NOTE — TELEPHONE ENCOUNTER
Patient is requesting a refill for pregabalin (LYRICA) 100 MG capsule     Patient tel [phone]    [id number] - [facility] [address] - P [phone] - F [phone]

## 2025-04-28 DIAGNOSIS — M79.18 MYALGIA, OTHER SITE: ICD-10-CM

## 2025-04-29 RX ORDER — METAXALONE 800 MG/1
800 TABLET ORAL 2 TIMES DAILY PRN
Qty: 180 TABLET | Refills: 1 | Status: SHIPPED | OUTPATIENT
Start: 2025-04-29

## 2025-06-02 ENCOUNTER — OFFICE VISIT (OUTPATIENT)
Age: 55
End: 2025-06-02
Payer: OTHER GOVERNMENT

## 2025-06-02 VITALS
BODY MASS INDEX: 44.73 KG/M2 | OXYGEN SATURATION: 96 % | WEIGHT: 285 LBS | HEIGHT: 67 IN | HEART RATE: 96 BPM | SYSTOLIC BLOOD PRESSURE: 102 MMHG | DIASTOLIC BLOOD PRESSURE: 62 MMHG

## 2025-06-02 DIAGNOSIS — R07.9 CHEST PAIN, UNSPECIFIED TYPE: Primary | ICD-10-CM

## 2025-06-02 DIAGNOSIS — Q24.5 CORONARY-MYOCARDIAL BRIDGE: ICD-10-CM

## 2025-06-02 DIAGNOSIS — I10 ESSENTIAL HYPERTENSION WITH GOAL BLOOD PRESSURE LESS THAN 140/90: ICD-10-CM

## 2025-06-02 DIAGNOSIS — E78.2 MIXED HYPERLIPIDEMIA: ICD-10-CM

## 2025-06-02 DIAGNOSIS — R00.2 PALPITATIONS: ICD-10-CM

## 2025-06-02 PROCEDURE — 3078F DIAST BP <80 MM HG: CPT | Performed by: PHYSICIAN ASSISTANT

## 2025-06-02 PROCEDURE — 99214 OFFICE O/P EST MOD 30 MIN: CPT | Performed by: PHYSICIAN ASSISTANT

## 2025-06-02 PROCEDURE — 3074F SYST BP LT 130 MM HG: CPT | Performed by: PHYSICIAN ASSISTANT

## 2025-06-02 RX ORDER — INSULIN ASPART 100 [IU]/ML
INJECTION, SOLUTION INTRAVENOUS; SUBCUTANEOUS
COMMUNITY
Start: 2025-04-28

## 2025-06-02 RX ORDER — METOPROLOL SUCCINATE 50 MG/1
50 TABLET, EXTENDED RELEASE ORAL DAILY
Qty: 30 TABLET | Refills: 3 | Status: SHIPPED | OUTPATIENT
Start: 2025-06-02

## 2025-06-02 RX ORDER — ISOSORBIDE MONONITRATE 30 MG/1
30 TABLET, EXTENDED RELEASE ORAL 2 TIMES DAILY
Qty: 90 TABLET | Refills: 1 | Status: SHIPPED | OUTPATIENT
Start: 2025-06-02

## 2025-06-02 RX ORDER — ACYCLOVIR 400 MG/1
TABLET ORAL
COMMUNITY
Start: 2025-05-08

## 2025-06-02 RX ORDER — SEMAGLUTIDE 1.34 MG/ML
1 INJECTION, SOLUTION SUBCUTANEOUS
COMMUNITY
Start: 2025-05-08

## 2025-06-02 NOTE — PROGRESS NOTES
[facility]     Basim Bellamy is a 55 y.o. male    Low risk Stress ECHO. Because of ongoing CP he underwent a LHC 12/2024 which showed Mild to moderate LAD with some myocardial bridging. Suspect chest pain likely microvascular dysfunction.     Presents to the office today for follow up. Since his last appt, he continues to have some chest discomfort which he has needed to take SL NTG. He describes the discomfort as a \"butterfly across his chest\" and a pressure. The pain is non radiating and occur randomly, mostly at rest. Often times these symptoms are early in the morning or rarely wake him up from sleep. These symptoms are similar to what he has felt before his LHC. He also reports feeling palpitations.     Denies SOB, diaphoresis, N/V/D, weight gain, LE edema, orthopnea, PND, near syncope or syncope, dizziness, melena, BRBPR, hemoptysis, recent travel     Past Medical History:   Diagnosis Date    Adverse effect of anesthesia     for back sx-had issues with breathing  with apnea- 6 years ago    Asthma     Chronic kidney disease     Diabetes mellitus (HCC)     GERD (gastroesophageal reflux disease)     HTN (hypertension)     Hypercholesteremia     Kidney stones     Sleep apnea     cpap       Past Surgical History:   Procedure Laterality Date    BACK SURGERY      BACK SURGERY  2010    laminectomy L5-S1    CARDIAC PROCEDURE N/A 12/11/2024    Left heart cath / coronary angiography performed by Mohan Engel MD at [facility] CARDIAC CATH LAB    CARDIAC PROCEDURE N/A 12/11/2024    Left heart cath performed by Mohan Engel MD at [facility] CARDIAC CATH LAB    SHOULDER SURGERY  2019    TONSILLECTOMY      UROLOGICAL SURGERY  3/24/2016    [facility], Dr. Nate Batres, Cystoscopy, Right Retrograde Ureteroscopy, Holmium Laser Lithotripsy, double j cath       Current Outpatient Medications   Medication Sig    Continuous Glucose Sensor (DEXCOM G7 SENSOR) MISC     insulin aspart (NOVOLOG) 100 UNIT/ML injection vial     OZEMPIC,

## 2025-06-02 NOTE — PATIENT INSTRUCTIONS
Stop Coreg    Start Toprol XL 50 mg daily   Take Imdur 30 mg twice a day in the am and pm   Decrease HCTZ to 12.5 mg daily   Event monitor   ECHO   TSH/T4 labs

## 2025-07-17 ENCOUNTER — TELEPHONE (OUTPATIENT)
Age: 55
End: 2025-07-17

## 2025-07-17 RX ORDER — ISOSORBIDE MONONITRATE 30 MG/1
30 TABLET, EXTENDED RELEASE ORAL DAILY
Qty: 90 TABLET | Refills: 3 | Status: SHIPPED | OUTPATIENT
Start: 2025-07-17

## 2025-07-17 NOTE — TELEPHONE ENCOUNTER
Fax has been sent to Dr. Frankel requesting [facility] authorization for Dr. Puja Alcantar. Mr. Bellamy is scheduled for an appt on 07/22/2025.

## 2025-07-28 RX ORDER — METOPROLOL SUCCINATE 50 MG/1
50 TABLET, EXTENDED RELEASE ORAL DAILY
Qty: 60 TABLET | Refills: 3 | Status: SHIPPED | OUTPATIENT
Start: 2025-07-28

## 2025-08-08 ENCOUNTER — OFFICE VISIT (OUTPATIENT)
Age: 55
End: 2025-08-08

## 2025-08-08 VITALS
BODY MASS INDEX: 44.45 KG/M2 | OXYGEN SATURATION: 96 % | SYSTOLIC BLOOD PRESSURE: 116 MMHG | HEART RATE: 93 BPM | HEIGHT: 67 IN | WEIGHT: 283.2 LBS | DIASTOLIC BLOOD PRESSURE: 82 MMHG

## 2025-08-08 DIAGNOSIS — I25.10 CORONARY ARTERY DISEASE DUE TO LIPID RICH PLAQUE: Primary | ICD-10-CM

## 2025-08-08 DIAGNOSIS — E78.00 PURE HYPERCHOLESTEROLEMIA: ICD-10-CM

## 2025-08-08 DIAGNOSIS — I48.0 PAF (PAROXYSMAL ATRIAL FIBRILLATION) (HCC): ICD-10-CM

## 2025-08-08 DIAGNOSIS — I10 ESSENTIAL HYPERTENSION WITH GOAL BLOOD PRESSURE LESS THAN 140/90: ICD-10-CM

## 2025-08-08 DIAGNOSIS — I25.83 CORONARY ARTERY DISEASE DUE TO LIPID RICH PLAQUE: Primary | ICD-10-CM

## 2025-08-08 RX ORDER — METOPROLOL SUCCINATE 50 MG/1
75 TABLET, EXTENDED RELEASE ORAL DAILY
Qty: 90 TABLET | Refills: 5 | Status: SHIPPED | OUTPATIENT
Start: 2025-08-08

## 2025-08-08 RX ORDER — ISOSORBIDE MONONITRATE 60 MG/1
60 TABLET, EXTENDED RELEASE ORAL DAILY
Qty: 90 TABLET | Refills: 3 | Status: SHIPPED | OUTPATIENT
Start: 2025-08-08

## 2025-08-19 DIAGNOSIS — G89.29 CHRONIC BILATERAL LOW BACK PAIN WITH LEFT-SIDED SCIATICA: ICD-10-CM

## 2025-08-19 DIAGNOSIS — M79.18 MYALGIA, OTHER SITE: ICD-10-CM

## 2025-08-19 DIAGNOSIS — M54.42 CHRONIC BILATERAL LOW BACK PAIN WITH LEFT-SIDED SCIATICA: ICD-10-CM

## 2025-08-20 RX ORDER — NITROGLYCERIN 0.4 MG/1
TABLET SUBLINGUAL
Qty: 25 TABLET | Refills: 3 | Status: SHIPPED | OUTPATIENT
Start: 2025-08-20

## 2025-08-21 RX ORDER — PREGABALIN 100 MG/1
CAPSULE ORAL
Qty: 270 CAPSULE | Refills: 0 | Status: SHIPPED | OUTPATIENT
Start: 2025-08-21 | End: 2025-11-21

## 2025-08-25 DIAGNOSIS — I10 ESSENTIAL HYPERTENSION WITH GOAL BLOOD PRESSURE LESS THAN 140/90: ICD-10-CM

## 2025-08-25 DIAGNOSIS — I48.0 PAF (PAROXYSMAL ATRIAL FIBRILLATION) (HCC): Primary | ICD-10-CM

## 2025-08-26 RX ORDER — ISOSORBIDE MONONITRATE 60 MG/1
60 TABLET, EXTENDED RELEASE ORAL DAILY
Qty: 180 TABLET | Refills: 1 | Status: SHIPPED | OUTPATIENT
Start: 2025-08-26

## (undated) DEVICE — SUTURE MCRYL SZ 4-0 L18IN ABSRB UD L19MM PS-2 3/8 CIR PRIM Y496G

## (undated) DEVICE — SUTURE FIBERLINK SZ 2-0 L26IN NONABSORBABLE BLU CLS LOOP AR7235

## (undated) DEVICE — BLANKET WRM AD W50XL85.8IN PACU FULL BODY FORC AIR

## (undated) DEVICE — BUR SHV CUT HLLW 6 FLUT 5.5MM --

## (undated) DEVICE — 3M™ MEDIPORE™ H SOFT CLOTH SURGICAL TAPE 2864, 4 INCH X 10 YARD (10CM X 9,14M), 12 ROLLS/CASE: Brand: 3M™ MEDIPORE™

## (undated) DEVICE — GUIDEWIRE VASC L260CM DIA0.035IN RAD 3MM J TIP L7CM PTFE

## (undated) DEVICE — SKIN MARKER,REGULAR TIP WITH RULER AND LABELS: Brand: DEVON

## (undated) DEVICE — Device

## (undated) DEVICE — ANGIOGRAPHY KIT CUST VASC

## (undated) DEVICE — SHOULDER SUSPENSION KIT 6 PER BOX

## (undated) DEVICE — SOLUTION IRRIG 3000ML 0.9% SOD CHL FLX CONT 0797208] ICU MEDICAL INC]

## (undated) DEVICE — BAND COMPR L29CM XLN CLR PLAS HEMSTAT EXT HK AND LOOP RETEN

## (undated) DEVICE — 4-PORT MANIFOLD: Brand: NEPTUNE 2

## (undated) DEVICE — CANNULA THREADED FLEX 8.0 X 72MM: Brand: CLEAR-TRAC

## (undated) DEVICE — SET FLD ADMIN 3 W STPCOCK FIX FEM L BOR 1IN

## (undated) DEVICE — Z DISCONTINUED USE ITEM 2150868 IMMOBILIZER SHLDR M BLK BASIC ABD SLNG

## (undated) DEVICE — STERILE POLYISOPRENE POWDER-FREE SURGICAL GLOVES: Brand: PROTEXIS

## (undated) DEVICE — PADS  DEFIB  ADULT

## (undated) DEVICE — PRESSURE MONITORING SET: Brand: TRUWAVE

## (undated) DEVICE — GAUZE,SPONGE,4"X4",16PLY,STRL,LF,10/TRAY: Brand: MEDLINE

## (undated) DEVICE — NEEDLE SPNL 22GA L3.5IN BLK HUB S STL REG WALL FIT STYL W/

## (undated) DEVICE — (D)PREP SKN CHLRAPRP APPL 26ML -- CONVERT TO ITEM 371833

## (undated) DEVICE — BLADE SHV DIA4MM RED RESECT FOR GEN DEB REM OF PERIOST FR

## (undated) DEVICE — ADHESIVE SKIN CLOSURE 4X22 CM PREMIERPRO EXOFINFUSION DISP

## (undated) DEVICE — DECANTER BAG 9": Brand: MEDLINE INDUSTRIES, INC.

## (undated) DEVICE — SOFT SILICONE HYDROCELLULAR SACRUM DRESSING WITH LOCK AWAY LAYER: Brand: ALLEVYN LIFE SACRUM (LARGE) PACK OF 10

## (undated) DEVICE — PROCEDURE KIT FLUID MGMT 10 FR CUST MAINFOLD

## (undated) DEVICE — DRAPE,REIN 53X77,STERILE: Brand: MEDLINE

## (undated) DEVICE — [ARTHROSCOPY PUMP,  DO NOT USE IF PACKAGE IS DAMAGED,  KEEP DRY,  KEEP AWAY FROM SUNLIGHT,  PROTECT FROM HEAT AND RADIOACTIVE SOURCES.]: Brand: FLOSTEADY

## (undated) DEVICE — PAD,ABDOMINAL,8"X10",ST,LF: Brand: MEDLINE

## (undated) DEVICE — TOWEL,OR,DSP,ST,BLUE,STD,4/PK,20PK/CS: Brand: MEDLINE

## (undated) DEVICE — SLINGSHOT 2

## (undated) DEVICE — 90-S MAX, SUCTION PROBE, NON-BENDABLE, MAX CUT LEVEL 11: Brand: SERFAS ENERGY

## (undated) DEVICE — CATHETER 5FR JL3.5 CORDIS 100CM

## (undated) DEVICE — SUTURE FIBERWIRE SZ 2 W/ TAPERED NEEDLE BLUE L38IN NONABSORB BLU L26.5MM 1/2 CIRCLE AR7200

## (undated) DEVICE — COVER US PRB W15XL120CM W/ GEL RUBBERBAND TAPE STRP FLD GEN

## (undated) DEVICE — PACK PROCEDURE SURG ORTH SHLDR CUST

## (undated) DEVICE — PAD,NON-ADHERENT,3X8,STERILE,LF,1/PK: Brand: MEDLINE

## (undated) DEVICE — SUPPORT WRST COMPR W/ HK MBRACE

## (undated) DEVICE — RADIFOCUS OPTITORQUE ANGIOGRAPHIC CATHETER: Brand: OPTITORQUE

## (undated) DEVICE — BLANKET WRM W40.2XL55.9IN IORT LO BODY + MISTRAL AIR

## (undated) DEVICE — SPONGE LAP 18X18IN STRL -- 5/PK

## (undated) DEVICE — GARMENT,MEDLINE,DVT,SEQUENTIAL,CALF,MD: Brand: MEDLINE

## (undated) DEVICE — GLIDESHEATH SLENDER STAINLESS STEEL KIT: Brand: GLIDESHEATH SLENDER

## (undated) DEVICE — DISPOSABLE MULTI BAG ADAPTERS Y                                    TUBING, STERILE, 2 TO A SET 6 SETS                                    PER BOX

## (undated) DEVICE — NEEDLE SUT PASS FOR ROT CUF LABRAL REP MULTFI SCORPION

## (undated) DEVICE — SUTURE ABSORBABLE BRAIDED 0 CTXB 36 IN VIO PDS II ZB370

## (undated) DEVICE — SUTURE VCRL SZ 3-0 L27IN ABSRB UD L36MM CT-1 1/2 CIR J258H

## (undated) DEVICE — FLUID CONTROL SHOULDER DRAPE PACK: Brand: CONVERTORS